# Patient Record
Sex: MALE | Race: BLACK OR AFRICAN AMERICAN | NOT HISPANIC OR LATINO | Employment: OTHER | URBAN - METROPOLITAN AREA
[De-identification: names, ages, dates, MRNs, and addresses within clinical notes are randomized per-mention and may not be internally consistent; named-entity substitution may affect disease eponyms.]

---

## 2017-01-20 RX ORDER — ATORVASTATIN CALCIUM 10 MG/1
TABLET, FILM COATED ORAL
Qty: 90 TABLET | Refills: 0 | Status: SHIPPED | OUTPATIENT
Start: 2017-01-20 | End: 2017-04-20 | Stop reason: SDUPTHER

## 2017-01-25 RX ORDER — METFORMIN HYDROCHLORIDE 1000 MG/1
TABLET ORAL
Qty: 180 TABLET | Refills: 0 | Status: SHIPPED | OUTPATIENT
Start: 2017-01-25 | End: 2017-06-09 | Stop reason: SDUPTHER

## 2017-02-06 ENCOUNTER — LAB VISIT (OUTPATIENT)
Dept: LAB | Facility: HOSPITAL | Age: 62
End: 2017-02-06
Attending: INTERNAL MEDICINE
Payer: MEDICARE

## 2017-02-06 ENCOUNTER — OFFICE VISIT (OUTPATIENT)
Dept: FAMILY MEDICINE | Facility: CLINIC | Age: 62
End: 2017-02-06
Payer: MEDICARE

## 2017-02-06 VITALS
DIASTOLIC BLOOD PRESSURE: 72 MMHG | TEMPERATURE: 98 F | SYSTOLIC BLOOD PRESSURE: 138 MMHG | HEART RATE: 70 BPM | OXYGEN SATURATION: 97 % | HEIGHT: 74 IN | WEIGHT: 252.19 LBS | BODY MASS INDEX: 32.36 KG/M2

## 2017-02-06 DIAGNOSIS — R80.9 MICROALBUMINURIA: ICD-10-CM

## 2017-02-06 DIAGNOSIS — Z12.5 SCREENING FOR PROSTATE CANCER: ICD-10-CM

## 2017-02-06 DIAGNOSIS — N18.30 CKD STAGE 3 DUE TO TYPE 2 DIABETES MELLITUS: ICD-10-CM

## 2017-02-06 DIAGNOSIS — Z79.4 LONG-TERM INSULIN USE: ICD-10-CM

## 2017-02-06 DIAGNOSIS — I10 ESSENTIAL HYPERTENSION: ICD-10-CM

## 2017-02-06 DIAGNOSIS — R80.9 DIABETES MELLITUS WITH PROTEINURIA: ICD-10-CM

## 2017-02-06 DIAGNOSIS — E11.29 DIABETES MELLITUS WITH PROTEINURIA: ICD-10-CM

## 2017-02-06 DIAGNOSIS — E11.21 DIABETES MELLITUS WITH PROTEINURIC DIABETIC NEPHROPATHY: ICD-10-CM

## 2017-02-06 DIAGNOSIS — E78.5 HYPERLIPIDEMIA, UNSPECIFIED HYPERLIPIDEMIA TYPE: ICD-10-CM

## 2017-02-06 DIAGNOSIS — R79.89 ABNORMAL LIVER FUNCTION TESTS: ICD-10-CM

## 2017-02-06 DIAGNOSIS — E11.22 CKD STAGE 3 DUE TO TYPE 2 DIABETES MELLITUS: ICD-10-CM

## 2017-02-06 LAB
ALBUMIN SERPL BCP-MCNC: 3.8 G/DL
ALP SERPL-CCNC: 91 U/L
ALT SERPL W/O P-5'-P-CCNC: 38 U/L
ANION GAP SERPL CALC-SCNC: 11 MMOL/L
AST SERPL-CCNC: 24 U/L
BASOPHILS # BLD AUTO: 0.02 K/UL
BASOPHILS NFR BLD: 0.4 %
BILIRUB SERPL-MCNC: 0.3 MG/DL
BUN SERPL-MCNC: 32 MG/DL
CALCIUM SERPL-MCNC: 9.1 MG/DL
CHLORIDE SERPL-SCNC: 106 MMOL/L
CO2 SERPL-SCNC: 20 MMOL/L
COMPLEXED PSA SERPL-MCNC: 0.93 NG/ML
CREAT SERPL-MCNC: 1.3 MG/DL
DIFFERENTIAL METHOD: ABNORMAL
EOSINOPHIL # BLD AUTO: 0.2 K/UL
EOSINOPHIL NFR BLD: 3.5 %
ERYTHROCYTE [DISTWIDTH] IN BLOOD BY AUTOMATED COUNT: 15.1 %
EST. GFR  (AFRICAN AMERICAN): >60 ML/MIN/1.73 M^2
EST. GFR  (NON AFRICAN AMERICAN): 58.5 ML/MIN/1.73 M^2
GLUCOSE SERPL-MCNC: 284 MG/DL
HCT VFR BLD AUTO: 42.8 %
HGB BLD-MCNC: 13.6 G/DL
LYMPHOCYTES # BLD AUTO: 2 K/UL
LYMPHOCYTES NFR BLD: 40.7 %
MCH RBC QN AUTO: 27.6 PG
MCHC RBC AUTO-ENTMCNC: 31.8 %
MCV RBC AUTO: 87 FL
MONOCYTES # BLD AUTO: 0.4 K/UL
MONOCYTES NFR BLD: 9 %
NEUTROPHILS # BLD AUTO: 2.3 K/UL
NEUTROPHILS NFR BLD: 46.2 %
PLATELET # BLD AUTO: 178 K/UL
PMV BLD AUTO: 11.3 FL
POTASSIUM SERPL-SCNC: 4.5 MMOL/L
PROT SERPL-MCNC: 7.2 G/DL
RBC # BLD AUTO: 4.93 M/UL
SODIUM SERPL-SCNC: 137 MMOL/L
WBC # BLD AUTO: 4.87 K/UL

## 2017-02-06 PROCEDURE — 99499 UNLISTED E&M SERVICE: CPT | Mod: S$GLB,,, | Performed by: INTERNAL MEDICINE

## 2017-02-06 PROCEDURE — 3078F DIAST BP <80 MM HG: CPT | Mod: S$GLB,,, | Performed by: INTERNAL MEDICINE

## 2017-02-06 PROCEDURE — 36415 COLL VENOUS BLD VENIPUNCTURE: CPT | Mod: PO

## 2017-02-06 PROCEDURE — 85025 COMPLETE CBC W/AUTO DIFF WBC: CPT

## 2017-02-06 PROCEDURE — 3075F SYST BP GE 130 - 139MM HG: CPT | Mod: S$GLB,,, | Performed by: INTERNAL MEDICINE

## 2017-02-06 PROCEDURE — 83036 HEMOGLOBIN GLYCOSYLATED A1C: CPT

## 2017-02-06 PROCEDURE — 99214 OFFICE O/P EST MOD 30 MIN: CPT | Mod: S$GLB,,, | Performed by: INTERNAL MEDICINE

## 2017-02-06 PROCEDURE — 84153 ASSAY OF PSA TOTAL: CPT

## 2017-02-06 PROCEDURE — 99999 PR PBB SHADOW E&M-EST. PATIENT-LVL III: CPT | Mod: PBBFAC,,, | Performed by: INTERNAL MEDICINE

## 2017-02-06 PROCEDURE — 80053 COMPREHEN METABOLIC PANEL: CPT

## 2017-02-06 PROCEDURE — 4010F ACE/ARB THERAPY RXD/TAKEN: CPT | Mod: S$GLB,,, | Performed by: INTERNAL MEDICINE

## 2017-02-06 PROCEDURE — 3045F PR MOST RECENT HEMOGLOBIN A1C LEVEL 7.0-9.0%: CPT | Mod: S$GLB,,, | Performed by: INTERNAL MEDICINE

## 2017-02-06 NOTE — PROGRESS NOTES
Chief complaint,Followup on labs,     61-year-old black male here to review labs .  His last A1c was 8.3 back in May 2016.  He presents today without any labs prior.   He was being seen by endocrine and has an insulin device attached to his arm but it has not been adjusted.  He says his sugars are normal but sometimes up to 180.  He does inquire about weight loss surgery which we did discuss about attending the seminars to become an warmth in that significant weight loss would help a lot of his comorbidities.  He is on his ACE inhibitor.  We will reassess his microalbumin.  His LDL is not perfectly controlled we discussed the potential increase in Lipitor.  Counseled regarding these issues.Total time over 25 minutes with over 50% counseling.    Review of systems: No other particular myalgias arthralgias no incontinence,       PAST MEDICAL HISTORY:                                                        1. Hypertension.                                                             2. Uncontrolled diabetes.  With renal disease and proteinuria. seen by ENDO                                                  3. Lumbar disk disease and DJD.                                              4. Hyperlipidemia.  -good on lipitor                                                         5. Erectile dysfunction.                                                     6. History of mild right rotator cuff tendinitis.                            7. Colon polyps .   Normal 11/10 -5 years   8. Elevated ALT    9.  Insomnia  10.  Slight hemoglobin reduction                                                                                                                   PAST SURGICAL HISTORY:  None.                                                                                                                             FAMILY HISTORY:  Father  of smoking-related lung cancer.  Brother    of smoking-related lung cancer.  Mom  of  "breast cancer.  Other sibling   with hypertension, diabetes, but no coronary artery disease and no other     new diagnoses.                                                                                                                                            SOCIAL HISTORY:   with 2 children.  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      X.       Vitals as above  Gen: no distress  Gerson was seen today for diabetes.    Diagnoses and all orders for this visit:    Uncontrolled type 2 diabetes mellitus with stage 3 chronic kidney disease, with long-term current use of insulin, reassess A1c, consider weight loss surgery  -     Hemoglobin A1c; Future  -     Microalbumin/creatinine urine ratio; Future  -     Comprehensive metabolic panel; Future  -     CBC auto differential; Future    Long-term insulin use, discussed that if indeed adjustments in the device need to be made he will need to follow-up with endocrine but I will be happy to refill    CKD stage 3 due to type 2 diabetes mellitus, reassess kidney function    Diabetes mellitus with proteinuria, reassess proteinuria and continue ACE inhibitor    Diabetes mellitus with proteinuric diabetic nephropathy    Microalbuminuria    Hyperlipidemia, unspecified hyperlipidemia type, reassess and possibly adjust Lipitor    Abnormal liver function tests, likely related to his weight and diabetes    Essential hypertension, fair control, restart monitoring at home to ensure accuracy of overall control    Screening for prostate cancer  -     PSA, Screening; Future        clinical note will be sensitive as I do need to document potential compliance issues with the uncontrolled hypertension but do not want that to stop patient from returning"This note will not be shared with the patient."  "

## 2017-02-07 ENCOUNTER — PATIENT MESSAGE (OUTPATIENT)
Dept: FAMILY MEDICINE | Facility: CLINIC | Age: 62
End: 2017-02-07

## 2017-02-07 LAB
ESTIMATED AVG GLUCOSE: 258 MG/DL
HBA1C MFR BLD HPLC: 10.6 %

## 2017-02-15 RX ORDER — QUINAPRIL 40 MG/1
TABLET ORAL
Qty: 180 TABLET | Refills: 0 | Status: SHIPPED | OUTPATIENT
Start: 2017-02-15 | End: 2017-05-13 | Stop reason: SDUPTHER

## 2017-03-05 RX ORDER — CITALOPRAM 40 MG/1
TABLET, FILM COATED ORAL
Qty: 30 TABLET | Refills: 11 | Status: SHIPPED | OUTPATIENT
Start: 2017-03-05 | End: 2018-04-20

## 2017-05-03 RX ORDER — DILTIAZEM HYDROCHLORIDE 240 MG/1
CAPSULE, COATED, EXTENDED RELEASE ORAL
Qty: 90 CAPSULE | Refills: 3 | Status: SHIPPED | OUTPATIENT
Start: 2017-05-03 | End: 2018-05-11 | Stop reason: SDUPTHER

## 2017-05-10 NOTE — TELEPHONE ENCOUNTER
----- Message from Silverio Hills sent at 5/10/2017  3:24 PM CDT -----  Contact: [facility]  Called regarding fax that was sent over for pt's medication. [place] can be reached @ [phone].

## 2017-05-15 RX ORDER — QUINAPRIL 40 MG/1
TABLET ORAL
Qty: 180 TABLET | Refills: 0 | Status: SHIPPED | OUTPATIENT
Start: 2017-05-15 | End: 2017-08-11 | Stop reason: SDUPTHER

## 2017-06-09 RX ORDER — METFORMIN HYDROCHLORIDE 1000 MG/1
TABLET ORAL
Qty: 60 TABLET | Refills: 0 | Status: SHIPPED | OUTPATIENT
Start: 2017-06-09 | End: 2017-07-04 | Stop reason: SDUPTHER

## 2017-07-05 RX ORDER — METFORMIN HYDROCHLORIDE 1000 MG/1
TABLET ORAL
Qty: 60 TABLET | Refills: 0 | Status: SHIPPED | OUTPATIENT
Start: 2017-07-05 | End: 2017-08-14 | Stop reason: SDUPTHER

## 2017-07-18 ENCOUNTER — TELEPHONE (OUTPATIENT)
Dept: FAMILY MEDICINE | Facility: CLINIC | Age: 62
End: 2017-07-18

## 2017-07-18 DIAGNOSIS — E11.29 DIABETES MELLITUS WITH PROTEINURIA: Primary | ICD-10-CM

## 2017-07-18 DIAGNOSIS — R80.9 DIABETES MELLITUS WITH PROTEINURIA: Primary | ICD-10-CM

## 2017-08-11 ENCOUNTER — LAB VISIT (OUTPATIENT)
Dept: LAB | Facility: HOSPITAL | Age: 62
End: 2017-08-11
Attending: INTERNAL MEDICINE
Payer: MEDICARE

## 2017-08-11 ENCOUNTER — OFFICE VISIT (OUTPATIENT)
Dept: FAMILY MEDICINE | Facility: CLINIC | Age: 62
End: 2017-08-11
Payer: MEDICARE

## 2017-08-11 VITALS
HEIGHT: 74 IN | OXYGEN SATURATION: 98 % | TEMPERATURE: 98 F | WEIGHT: 250.69 LBS | HEART RATE: 71 BPM | BODY MASS INDEX: 32.17 KG/M2 | SYSTOLIC BLOOD PRESSURE: 138 MMHG | DIASTOLIC BLOOD PRESSURE: 78 MMHG

## 2017-08-11 DIAGNOSIS — Z79.4 LONG-TERM INSULIN USE: ICD-10-CM

## 2017-08-11 DIAGNOSIS — E78.5 HYPERLIPIDEMIA, UNSPECIFIED HYPERLIPIDEMIA TYPE: ICD-10-CM

## 2017-08-11 DIAGNOSIS — M47.816 LUMBAR ARTHROPATHY: ICD-10-CM

## 2017-08-11 DIAGNOSIS — Z86.010 HISTORY OF COLONIC POLYPS: ICD-10-CM

## 2017-08-11 DIAGNOSIS — I10 ESSENTIAL HYPERTENSION: ICD-10-CM

## 2017-08-11 DIAGNOSIS — E11.29 DIABETES MELLITUS WITH PROTEINURIA: Primary | ICD-10-CM

## 2017-08-11 DIAGNOSIS — R80.9 DIABETES MELLITUS WITH PROTEINURIA: Primary | ICD-10-CM

## 2017-08-11 LAB
ALBUMIN SERPL BCP-MCNC: 3.9 G/DL
ALP SERPL-CCNC: 110 U/L
ALT SERPL W/O P-5'-P-CCNC: 41 U/L
ANION GAP SERPL CALC-SCNC: 12 MMOL/L
AST SERPL-CCNC: 22 U/L
BILIRUB SERPL-MCNC: 0.4 MG/DL
BUN SERPL-MCNC: 15 MG/DL
CALCIUM SERPL-MCNC: 9.5 MG/DL
CHLORIDE SERPL-SCNC: 105 MMOL/L
CHOLEST/HDLC SERPL: 2.3 {RATIO}
CO2 SERPL-SCNC: 22 MMOL/L
CREAT SERPL-MCNC: 1.3 MG/DL
EST. GFR  (AFRICAN AMERICAN): >60 ML/MIN/1.73 M^2
EST. GFR  (NON AFRICAN AMERICAN): 58.1 ML/MIN/1.73 M^2
GLUCOSE SERPL-MCNC: 147 MG/DL
HDL/CHOLESTEROL RATIO: 42.9 %
HDLC SERPL-MCNC: 147 MG/DL
HDLC SERPL-MCNC: 63 MG/DL
LDLC SERPL CALC-MCNC: 56.6 MG/DL
NONHDLC SERPL-MCNC: 84 MG/DL
POTASSIUM SERPL-SCNC: 4.1 MMOL/L
PROT SERPL-MCNC: 7.5 G/DL
SODIUM SERPL-SCNC: 139 MMOL/L
TRIGL SERPL-MCNC: 137 MG/DL

## 2017-08-11 PROCEDURE — 80053 COMPREHEN METABOLIC PANEL: CPT

## 2017-08-11 PROCEDURE — 99214 OFFICE O/P EST MOD 30 MIN: CPT | Mod: S$GLB,,, | Performed by: INTERNAL MEDICINE

## 2017-08-11 PROCEDURE — 4010F ACE/ARB THERAPY RXD/TAKEN: CPT | Mod: S$GLB,,, | Performed by: INTERNAL MEDICINE

## 2017-08-11 PROCEDURE — 3075F SYST BP GE 130 - 139MM HG: CPT | Mod: S$GLB,,, | Performed by: INTERNAL MEDICINE

## 2017-08-11 PROCEDURE — 3046F HEMOGLOBIN A1C LEVEL >9.0%: CPT | Mod: S$GLB,,, | Performed by: INTERNAL MEDICINE

## 2017-08-11 PROCEDURE — 99999 PR PBB SHADOW E&M-EST. PATIENT-LVL III: CPT | Mod: PBBFAC,,, | Performed by: INTERNAL MEDICINE

## 2017-08-11 PROCEDURE — 83036 HEMOGLOBIN GLYCOSYLATED A1C: CPT

## 2017-08-11 PROCEDURE — 36415 COLL VENOUS BLD VENIPUNCTURE: CPT | Mod: PO

## 2017-08-11 PROCEDURE — 3078F DIAST BP <80 MM HG: CPT | Mod: S$GLB,,, | Performed by: INTERNAL MEDICINE

## 2017-08-11 PROCEDURE — 3008F BODY MASS INDEX DOCD: CPT | Mod: S$GLB,,, | Performed by: INTERNAL MEDICINE

## 2017-08-11 PROCEDURE — 80061 LIPID PANEL: CPT

## 2017-08-11 PROCEDURE — 99499 UNLISTED E&M SERVICE: CPT | Mod: S$GLB,,, | Performed by: INTERNAL MEDICINE

## 2017-08-11 RX ORDER — QUINAPRIL 40 MG/1
TABLET ORAL
Qty: 180 TABLET | Refills: 0 | Status: SHIPPED | OUTPATIENT
Start: 2017-08-11 | End: 2017-11-13 | Stop reason: SDUPTHER

## 2017-08-11 RX ORDER — GLIMEPIRIDE 4 MG/1
TABLET ORAL
Qty: 180 TABLET | Refills: 0 | Status: SHIPPED | OUTPATIENT
Start: 2017-08-11 | End: 2017-09-06

## 2017-08-11 NOTE — PROGRESS NOTES
Chief complaint, checkup on diabetes    62-year-old black male here to check on his diabetes.  He was seen by endocrine.  They rec in an increase in his injection of victoza but he cannot afford it and it is no longer covered.  We discussed that this will likely be an issue in the future.  Likely need to find alternative long-term medication.  He was taken off of the Amaryl when starting this appropriately and so we will restart the full dose of Amaryl as well as continue the metformin and he will continue the NovoLog device that he has.  I would still recommend follow-up with endocrine given my lack of familiarity and adjusting this device.  He is due for lipid profile as well..  Colonoscopy was normal and 2010 but he has a history of polyps and so therefore is at the 7 year point and I would recommend a colonoscopy.  His PSA was actually normal this February 2017.  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  Some occasional low back pain after driving and we discussed this could be muscular he may well need to apply heat and do some stretching.  I made him aware of the healthy back program and other functional restoration programs should he have decreased function related to his back.  He questions about in the inversion device which I do not see recommended by the spine clinic often and he can possibly message them and get their opinion  Counseling regarding the multitude of these issues.Total time over 25 minutes with over 50% counseling.      ROS:   CONST: weight stable. EYES: no vision change. ENT: no sore throat. CV: no chest pain w/ exertion. RESP: no shortness of breath. GI: no nausea, vomiting, diarrhea. No dysphagia. : no urinary issues. MUSCULOSKELETAL: no new myalgias or arthralgias. SKIN: no new changes. NEURO: no focal deficits. PSYCH: no new issues. ENDOCRINE: no polyuria. HEME: no lymph nodes. ALLERGY: no general pruritis.       PAST MEDICAL HISTORY:                                                         1. Hypertension.                                                             2. Uncontrolled diabetes.  With renal disease and proteinuria. seen by ENDO                                                  3. Lumbar disk disease and DJD.  Seen by spine clinic in the past                                             4. Hyperlipidemia.  -good on lipitor                                                         5. Erectile dysfunction.                                                     6. History of mild right rotator cuff tendinitis.                            7. Colon polyps .   Normal 11/10 -5 years   8. Elevated ALT    9.  Insomnia  10.  Slight hemoglobin reduction                                                                                                                   PAST SURGICAL HISTORY:  None.                                                                                                                             FAMILY HISTORY:  Father  of smoking-related lung cancer.  Brother    of smoking-related lung cancer.  Mom  of breast cancer.  Other sibling   with hypertension, diabetes, but no coronary artery disease and no other     new diagnoses.                                                                                                                                            SOCIAL HISTORY:   with 2 children.  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      X.       Vitals as above  Gen: no distress  Exam otherwise deferred        Gerson was seen today for diabetes.    Diagnoses and all orders for this visit:    Diabetes mellitus with proteinuria, uncontrolled, A1c today, restart Amaryl, continue follow-up with endocrine    Long-term insulin use  -     Hemoglobin A1c; Future  -     Comprehensive metabolic panel; Future    Uncontrolled type 2 diabetes mellitus with microalbuminuria, with long-term current use of insulin    Hyperlipidemia,  "unspecified hyperlipidemia type, reassess  -     Lipid panel; Future    Essential hypertension, chronic and stable    History of colonic polyps, overdue, arrange colonoscopy    Lumbar arthropathy, discussed as above    Other orders  -     glimepiride (AMARYL) 4 MG tablet; TAKE 1 TABLET BY MOUTH TWICE A DAY       clinical note will be sensitive as I do need to document potential compliance issues with the uncontrolled hypertension but do not want that to stop patient from returning"This note will not be shared with the patient."  "

## 2017-08-12 LAB
ESTIMATED AVG GLUCOSE: 203 MG/DL
HBA1C MFR BLD HPLC: 8.7 %

## 2017-08-14 ENCOUNTER — TELEPHONE (OUTPATIENT)
Dept: ADMINISTRATIVE | Facility: HOSPITAL | Age: 62
End: 2017-08-14

## 2017-08-15 RX ORDER — METFORMIN HYDROCHLORIDE 1000 MG/1
TABLET ORAL
Qty: 60 TABLET | Refills: 0 | Status: SHIPPED | OUTPATIENT
Start: 2017-08-15 | End: 2017-09-14 | Stop reason: SDUPTHER

## 2017-08-17 NOTE — TELEPHONE ENCOUNTER
Received from [facility], Rob Jones, OD diabetic eye exam 6/30/2017 and Kiersten Jones, OD 1/23/2017.  Updated health maintenance and sent to [facility].

## 2017-09-04 RX ORDER — HYDROCHLOROTHIAZIDE 25 MG/1
TABLET ORAL
Qty: 90 TABLET | Refills: 2 | Status: SHIPPED | OUTPATIENT
Start: 2017-09-04 | End: 2018-05-04 | Stop reason: SDUPTHER

## 2017-09-14 ENCOUNTER — TELEPHONE (OUTPATIENT)
Dept: FAMILY MEDICINE | Facility: CLINIC | Age: 62
End: 2017-09-14

## 2017-09-14 DIAGNOSIS — Z86.010 HISTORY OF COLONIC POLYPS: Primary | ICD-10-CM

## 2017-09-14 RX ORDER — METFORMIN HYDROCHLORIDE 1000 MG/1
TABLET ORAL
Qty: 60 TABLET | Refills: 0 | Status: SHIPPED | OUTPATIENT
Start: 2017-09-14 | End: 2017-10-15 | Stop reason: SDUPTHER

## 2017-09-14 NOTE — TELEPHONE ENCOUNTER
----- Message from Linda Blum sent at 9/14/2017 12:55 PM CDT -----  Contact: self  Patient is requesting an order to have a colonoscopy done at [facility] .   [phone]

## 2017-10-07 ENCOUNTER — PATIENT MESSAGE (OUTPATIENT)
Dept: ENDOSCOPY | Facility: HOSPITAL | Age: 62
End: 2017-10-07

## 2017-10-16 RX ORDER — METFORMIN HYDROCHLORIDE 1000 MG/1
TABLET ORAL
Qty: 60 TABLET | Refills: 0 | Status: SHIPPED | OUTPATIENT
Start: 2017-10-16 | End: 2018-01-27 | Stop reason: SDUPTHER

## 2017-10-17 ENCOUNTER — TELEPHONE (OUTPATIENT)
Dept: OPTOMETRY | Facility: CLINIC | Age: 62
End: 2017-10-17

## 2017-11-06 ENCOUNTER — PATIENT MESSAGE (OUTPATIENT)
Dept: OPTOMETRY | Facility: CLINIC | Age: 62
End: 2017-11-06

## 2017-11-13 RX ORDER — GLIMEPIRIDE 4 MG/1
TABLET ORAL
Qty: 180 TABLET | Refills: 0 | Status: SHIPPED | OUTPATIENT
Start: 2017-11-13 | End: 2018-02-13 | Stop reason: SDUPTHER

## 2017-11-13 RX ORDER — QUINAPRIL 40 MG/1
TABLET ORAL
Qty: 180 TABLET | Refills: 0 | Status: SHIPPED | OUTPATIENT
Start: 2017-11-13 | End: 2018-02-06 | Stop reason: SDUPTHER

## 2018-01-27 RX ORDER — METFORMIN HYDROCHLORIDE 1000 MG/1
TABLET ORAL
Qty: 60 TABLET | Refills: 0 | Status: SHIPPED | OUTPATIENT
Start: 2018-01-27 | End: 2018-03-13 | Stop reason: SDUPTHER

## 2018-02-07 RX ORDER — QUINAPRIL 40 MG/1
TABLET ORAL
Qty: 180 TABLET | Refills: 0 | Status: SHIPPED | OUTPATIENT
Start: 2018-02-07 | End: 2018-05-09 | Stop reason: SDUPTHER

## 2018-02-09 PROBLEM — F39 MOOD DISORDER: Status: ACTIVE | Noted: 2018-02-09

## 2018-02-09 PROBLEM — N52.1 DIABETES WITH CIRCULATORY DISORDER CAUSING ERECTILE DYSFUNCTION: Status: ACTIVE | Noted: 2018-02-09

## 2018-02-09 PROBLEM — E11.59 DIABETES WITH CIRCULATORY DISORDER CAUSING ERECTILE DYSFUNCTION: Status: ACTIVE | Noted: 2018-02-09

## 2018-02-14 RX ORDER — GLIMEPIRIDE 4 MG/1
TABLET ORAL
Qty: 180 TABLET | Refills: 0 | Status: SHIPPED | OUTPATIENT
Start: 2018-02-14 | End: 2018-04-20 | Stop reason: ALTCHOICE

## 2018-02-21 DIAGNOSIS — H40.1132 PRIMARY OPEN ANGLE GLAUCOMA OF BOTH EYES, MODERATE STAGE: Primary | ICD-10-CM

## 2018-03-02 ENCOUNTER — OFFICE VISIT (OUTPATIENT)
Dept: FAMILY MEDICINE | Facility: CLINIC | Age: 63
End: 2018-03-02
Payer: MEDICARE

## 2018-03-02 ENCOUNTER — LAB VISIT (OUTPATIENT)
Dept: LAB | Facility: HOSPITAL | Age: 63
End: 2018-03-02
Attending: INTERNAL MEDICINE
Payer: MEDICARE

## 2018-03-02 VITALS
SYSTOLIC BLOOD PRESSURE: 120 MMHG | DIASTOLIC BLOOD PRESSURE: 80 MMHG | OXYGEN SATURATION: 100 % | TEMPERATURE: 99 F | HEART RATE: 67 BPM | WEIGHT: 249.31 LBS | BODY MASS INDEX: 32 KG/M2 | HEIGHT: 74 IN

## 2018-03-02 DIAGNOSIS — E11.21 DIABETES MELLITUS WITH PROTEINURIC DIABETIC NEPHROPATHY: ICD-10-CM

## 2018-03-02 DIAGNOSIS — Z12.5 SCREENING FOR PROSTATE CANCER: ICD-10-CM

## 2018-03-02 DIAGNOSIS — F39 MOOD DISORDER: ICD-10-CM

## 2018-03-02 DIAGNOSIS — N52.1 DIABETES WITH CIRCULATORY DISORDER CAUSING ERECTILE DYSFUNCTION: ICD-10-CM

## 2018-03-02 DIAGNOSIS — E78.2 MIXED HYPERLIPIDEMIA: ICD-10-CM

## 2018-03-02 DIAGNOSIS — R80.9 DIABETES MELLITUS WITH PROTEINURIA: ICD-10-CM

## 2018-03-02 DIAGNOSIS — E11.59 DIABETES WITH CIRCULATORY DISORDER CAUSING ERECTILE DYSFUNCTION: ICD-10-CM

## 2018-03-02 DIAGNOSIS — G47.00 INSOMNIA, UNSPECIFIED TYPE: ICD-10-CM

## 2018-03-02 DIAGNOSIS — M47.816 LUMBAR ARTHROPATHY: ICD-10-CM

## 2018-03-02 DIAGNOSIS — Z79.4 LONG-TERM INSULIN USE: ICD-10-CM

## 2018-03-02 DIAGNOSIS — Z00.00 ROUTINE MEDICAL EXAM: Primary | ICD-10-CM

## 2018-03-02 DIAGNOSIS — Z86.010 HISTORY OF COLONIC POLYPS: ICD-10-CM

## 2018-03-02 DIAGNOSIS — Z00.00 ROUTINE MEDICAL EXAM: ICD-10-CM

## 2018-03-02 DIAGNOSIS — I10 ESSENTIAL HYPERTENSION: ICD-10-CM

## 2018-03-02 DIAGNOSIS — E11.29 DIABETES MELLITUS WITH PROTEINURIA: ICD-10-CM

## 2018-03-02 LAB
ALBUMIN SERPL BCP-MCNC: 3.8 G/DL
ALP SERPL-CCNC: 90 U/L
ALT SERPL W/O P-5'-P-CCNC: 29 U/L
ANION GAP SERPL CALC-SCNC: 12 MMOL/L
AST SERPL-CCNC: 19 U/L
BASOPHILS # BLD AUTO: 0.03 K/UL
BASOPHILS NFR BLD: 0.7 %
BILIRUB SERPL-MCNC: 0.5 MG/DL
BUN SERPL-MCNC: 14 MG/DL
CALCIUM SERPL-MCNC: 9.4 MG/DL
CHLORIDE SERPL-SCNC: 104 MMOL/L
CHOLEST SERPL-MCNC: 121 MG/DL
CHOLEST/HDLC SERPL: 2.5 {RATIO}
CO2 SERPL-SCNC: 25 MMOL/L
COMPLEXED PSA SERPL-MCNC: 1 NG/ML
CREAT SERPL-MCNC: 1.3 MG/DL
DIFFERENTIAL METHOD: NORMAL
EOSINOPHIL # BLD AUTO: 0.1 K/UL
EOSINOPHIL NFR BLD: 3.2 %
ERYTHROCYTE [DISTWIDTH] IN BLOOD BY AUTOMATED COUNT: 13.6 %
EST. GFR  (AFRICAN AMERICAN): >60 ML/MIN/1.73 M^2
EST. GFR  (NON AFRICAN AMERICAN): 58.1 ML/MIN/1.73 M^2
ESTIMATED AVG GLUCOSE: 258 MG/DL
GLUCOSE SERPL-MCNC: 117 MG/DL
HBA1C MFR BLD HPLC: 10.6 %
HCT VFR BLD AUTO: 43.7 %
HDLC SERPL-MCNC: 49 MG/DL
HDLC SERPL: 40.5 %
HGB BLD-MCNC: 14.2 G/DL
IMM GRANULOCYTES # BLD AUTO: 0.01 K/UL
IMM GRANULOCYTES NFR BLD AUTO: 0.2 %
LDLC SERPL CALC-MCNC: 54 MG/DL
LYMPHOCYTES # BLD AUTO: 1.6 K/UL
LYMPHOCYTES NFR BLD: 36.3 %
MCH RBC QN AUTO: 27.8 PG
MCHC RBC AUTO-ENTMCNC: 32.5 G/DL
MCV RBC AUTO: 86 FL
MONOCYTES # BLD AUTO: 0.5 K/UL
MONOCYTES NFR BLD: 10.2 %
NEUTROPHILS # BLD AUTO: 2.2 K/UL
NEUTROPHILS NFR BLD: 49.4 %
NONHDLC SERPL-MCNC: 72 MG/DL
NRBC BLD-RTO: 0 /100 WBC
PLATELET # BLD AUTO: 212 K/UL
PMV BLD AUTO: 10.8 FL
POTASSIUM SERPL-SCNC: 3.7 MMOL/L
PROT SERPL-MCNC: 7.4 G/DL
RBC # BLD AUTO: 5.11 M/UL
SODIUM SERPL-SCNC: 141 MMOL/L
T4 FREE SERPL-MCNC: 0.98 NG/DL
TRIGL SERPL-MCNC: 90 MG/DL
TSH SERPL DL<=0.005 MIU/L-ACNC: 0.38 UIU/ML
WBC # BLD AUTO: 4.41 K/UL

## 2018-03-02 PROCEDURE — 80053 COMPREHEN METABOLIC PANEL: CPT

## 2018-03-02 PROCEDURE — 99499 UNLISTED E&M SERVICE: CPT | Mod: S$GLB,,, | Performed by: INTERNAL MEDICINE

## 2018-03-02 PROCEDURE — 3074F SYST BP LT 130 MM HG: CPT | Mod: S$GLB,,, | Performed by: INTERNAL MEDICINE

## 2018-03-02 PROCEDURE — 84439 ASSAY OF FREE THYROXINE: CPT

## 2018-03-02 PROCEDURE — 85025 COMPLETE CBC W/AUTO DIFF WBC: CPT

## 2018-03-02 PROCEDURE — 99396 PREV VISIT EST AGE 40-64: CPT | Mod: 25,S$GLB,, | Performed by: INTERNAL MEDICINE

## 2018-03-02 PROCEDURE — 99999 PR PBB SHADOW E&M-EST. PATIENT-LVL V: CPT | Mod: PBBFAC,,, | Performed by: INTERNAL MEDICINE

## 2018-03-02 PROCEDURE — 84443 ASSAY THYROID STIM HORMONE: CPT

## 2018-03-02 PROCEDURE — 99214 OFFICE O/P EST MOD 30 MIN: CPT | Mod: 25,S$GLB,, | Performed by: INTERNAL MEDICINE

## 2018-03-02 PROCEDURE — 36415 COLL VENOUS BLD VENIPUNCTURE: CPT | Mod: PO

## 2018-03-02 PROCEDURE — 80061 LIPID PANEL: CPT

## 2018-03-02 PROCEDURE — 83036 HEMOGLOBIN GLYCOSYLATED A1C: CPT

## 2018-03-02 PROCEDURE — 84153 ASSAY OF PSA TOTAL: CPT

## 2018-03-02 PROCEDURE — 3079F DIAST BP 80-89 MM HG: CPT | Mod: S$GLB,,, | Performed by: INTERNAL MEDICINE

## 2018-03-02 NOTE — PROGRESS NOTES
Chief complaint, PHYSICAL---last appt 6 mo ago but should by every 3- last ENDO appt 5 mo ago    62-year-old black male  here for his physical.  Regarding health maintenance his PSA is due at this time.History of colon polyps but normal in  with a 5 year follow-up recommended and we have attempted to schedule in the past.  Very overdue.  Living in [place] so we will set it up at the main campus          ROS:   CONST: weight stable. EYES: no vision change. ENT: no sore throat. CV: no chest pain w/ exertion. RESP: no shortness of breath. GI: no nausea, vomiting, diarrhea. No dysphagia. : no urinary issues. MUSCULOSKELETAL: no new myalgias or arthralgias. SKIN: no new changes. NEURO: no focal deficits. PSYCH: no new issues. ENDOCRINE: no polyuria. HEME: no lymph nodes. ALLERGY: no general pruritis.       PAST MEDICAL HISTORY:                                                        1. Hypertension.                                                             2. Uncontrolled diabetes.  With renal disease and proteinuria. seen by ENDO                                                  3. Lumbar disk disease and DJD.  Seen by spine clinic in the past                                             4. Hyperlipidemia.  -good on lipitor                                                         5. Erectile dysfunction.                                                     6. History of mild right rotator cuff tendinitis.                            7. Colon polyps .   Normal 11/10 -5 years   8. Elevated ALT    9.  Insomnia  10.  Slight hemoglobin reduction                                                                                                                   PAST SURGICAL HISTORY:  None.                                                                                                                             FAMILY HISTORY:  Father  of smoking-related lung cancer.  Brother    of smoking-related lung  cancer.  Mom  of breast cancer.  Other sibling   with hypertension, diabetes, but no coronary artery disease and no other     new diagnoses.                                                                                                                                            SOCIAL HISTORY:   with 2 children.  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      X.       Vitals as above  Gen: no distress  EYES: conjunctiva clear, non-icteric, PERRL  ENT: nose clear, nasal mucosa normal, oropharynx clear and moist, teeth good  NECK:supple, thyroid non-palpable  RESP: effort is good, lungs clear  CV: heart RRR w/o murmur, gallops or rubs; no carotid bruits, no edema  GI: abdomen soft, non-distended, non-tender, no hepatosplenomegaly  MS: gait normal, no clubbing or cyanosis of the digits  SKIN: no rashes, warm to touch      Archer was seen today for diabetes.    Diagnoses and all orders for this visit:    Routine medical exam, overdue for colonoscopy, update PSA and other blood work  -     Hemoglobin A1c; Future  -     Comprehensive metabolic panel; Future  -     PSA, Screening; Future  -     CBC auto differential; Future  -     Lipid panel; Future  -     TSH; Future  -     Urinalysis; Future  -     Microalbumin/creatinine urine ratio; Future    Screening for prostate cancer  -     PSA, Screening; Future                                                    Additional evaluation and management issues:    Additionally, patient has numerous other medical issues to address separate from his physical.  here to check on his diabetes.  He was seen by endocrine.  He does have the insulin device on his arm.  No longer on the victoza but he cannot afford it and it is no longer covered.  .  Still needs to be managed by some endocrine specialist of the endocrinologists he was seeing prior does not see patients on Friday and that is his day off so we will refer to local nurse practitioner..  He was  taken off of the Amaryl when starting this appropriately and so we will restart the full dose of Amaryl as well as continue the metformin and he will continue the NovoLog device that he has.  I would still recommend follow-up with endocrine given my lack of familiarity and adjusting this device.  He is due for lipid profile as well..  Colonoscopy was normal and 2010 but he has a history of polyps and so therefore is at the 7 year point and I would recommend a colonoscopy -ordered but not done.  His PSA was actually normal this February 2017.  Working part-time.  Some occasional low back pain after driving and we discussed this could be muscular he may well need to apply heat and do some stretching.  I made him aware of the healthy back program and other functional restoration programs should he have decreased function related to his back.              Assessment and plan:    Diabetes with circulatory disorder causing erectile dysfunction, overdue for assessment, refer to endocrine nurse practitioner  -     Hemoglobin A1c; Future  -     Comprehensive metabolic panel; Future  -     CBC auto differential; Future  -     Lipid panel; Future  -     TSH; Future  -     Urinalysis; Future  -     Microalbumin/creatinine urine ratio; Future    Uncontrolled type 2 diabetes mellitus with stage 3 chronic kidney disease, with long-term current use of insulin    Diabetes mellitus with proteinuric diabetic nephropathy    Diabetes mellitus with proteinuria, explained that there is protein in the urine which is a sign of kidney damage and we need to keep all associated risk factors under control especially diabetes    Uncontrolled type 2 diabetes mellitus with microalbuminuria, with long-term current use of insulin    History of colonic polyps  -     Case request GI: COLONOSCOPY    Essential hypertension, chronic and stable    Mood disorder, chronic and stable    Long-term insulin use    Mixed hyperlipidemia  -     Lipid panel;  "Future    Insomnia, unspecified type, chronic and stable    Lumbar arthropathy    Other orders  -     Cancel: Case request GI: COLONOSCOPY              clinical note will be sensitive as I do need to document potential compliance issues with the uncontrolled hypertension but do not want that to stop patient from returning""This note will not be shared with the patient."  "

## 2018-03-07 ENCOUNTER — TELEPHONE (OUTPATIENT)
Dept: ENDOSCOPY | Facility: HOSPITAL | Age: 63
End: 2018-03-07

## 2018-03-09 ENCOUNTER — OFFICE VISIT (OUTPATIENT)
Dept: OPTOMETRY | Facility: CLINIC | Age: 63
End: 2018-03-09
Payer: MEDICARE

## 2018-03-09 ENCOUNTER — CLINICAL SUPPORT (OUTPATIENT)
Dept: OPHTHALMOLOGY | Facility: CLINIC | Age: 63
End: 2018-03-09
Payer: MEDICARE

## 2018-03-09 DIAGNOSIS — H52.7 REFRACTIVE ERROR: ICD-10-CM

## 2018-03-09 DIAGNOSIS — H25.13 NUCLEAR SCLEROSIS OF BOTH EYES: ICD-10-CM

## 2018-03-09 DIAGNOSIS — H40.1132 PRIMARY OPEN ANGLE GLAUCOMA (POAG) OF BOTH EYES, MODERATE STAGE: Primary | ICD-10-CM

## 2018-03-09 DIAGNOSIS — E11.9 TYPE 2 DIABETES MELLITUS WITHOUT RETINOPATHY: ICD-10-CM

## 2018-03-09 DIAGNOSIS — H40.1132 PRIMARY OPEN ANGLE GLAUCOMA OF BOTH EYES, MODERATE STAGE: ICD-10-CM

## 2018-03-09 PROCEDURE — 92083 EXTENDED VISUAL FIELD XM: CPT | Mod: S$GLB,,, | Performed by: OPTOMETRIST

## 2018-03-09 PROCEDURE — 92014 COMPRE OPH EXAM EST PT 1/>: CPT | Mod: S$GLB,,, | Performed by: OPTOMETRIST

## 2018-03-09 PROCEDURE — 92015 DETERMINE REFRACTIVE STATE: CPT | Mod: S$GLB,,, | Performed by: OPTOMETRIST

## 2018-03-09 PROCEDURE — 92133 CPTRZD OPH DX IMG PST SGM ON: CPT | Mod: S$GLB,,, | Performed by: OPTOMETRIST

## 2018-03-09 PROCEDURE — 99999 PR PBB SHADOW E&M-EST. PATIENT-LVL I: CPT | Mod: PBBFAC,,, | Performed by: OPTOMETRIST

## 2018-03-09 RX ORDER — LATANOPROST 50 UG/ML
1 SOLUTION/ DROPS OPHTHALMIC NIGHTLY
Qty: 7.5 ML | Refills: 2 | Status: SHIPPED | OUTPATIENT
Start: 2018-03-09 | End: 2018-05-16 | Stop reason: SDUPTHER

## 2018-03-09 NOTE — PROGRESS NOTES
Subjective:       Patient ID: Gerson Phillips III is a 62 y.o. male      Chief Complaint   Patient presents with    Glaucoma     Latanoprost QHS OU    Diabetic Eye Exam     lbs: 73 x 1 day      History of Present Illness   Additional comments: lbs: 73 x 1 day            Comments   Dls: 6 months ago at Dr. Moncada    Pt here for diabetic eye exam and glaucoma check. Transferring care to   [facility].  Pt c/o blurry vision at distance and near ou. Pt wears pal's.  Pt c/o tearing ou off/on no itching no burning no pain no ha's no   floaters.     Eye meds:  Latanoprost ou qhs (pt states forgets to use gtts) last dose x 2 nights   ago    Hemoglobin A1C       Date                     Value               Ref Range           Status                03/02/2018               10.6 (H)            4.0 - 5.6 %         Final                 08/11/2017               8.7 (H)             4.0 - 5.6 %         Final                  02/06/2017               10.6 (H)            4.5 - 6.2 %         Final             ----------        Assessment/Plan:     1. Primary open angle glaucoma of both eyes, moderate stage  Moderate Normal Tension Glaucoma OU  · FHx: ?  · Tbase (before treatment):  14 // 14  · Tmax: 14 // 14  · HVF defects (date: 03/09/18) : OD inf and sup nasal step // OS inf and sup nasal step  · OCT optic nerve (date: 03/09/18) : abnormal  OD // abnormal OS    · CCT:  //   · Goal IOP:  Low teens  · Lasers/surgeries: none  · Current treatment: latanoprost QHS OU    Discussed with pt importance of compliance with drops. IOP doing well. Continue drops. RTC 6 months for IOP check and CCT.    - latanoprost 0.005 % ophthalmic solution; Place 1 drop into both eyes every evening.  Dispense: 7.5 mL; Refill: 2    2. Type 2 diabetes mellitus without retinopathy  No diabetic retinopathy. Educated patient on importance of good blood sugar control, compliance with meds, and follow up care with PCP. Return in 1 year for dilated eye exam, sooner  "PRN.    3. Nuclear sclerosis of both eyes  Educated patient on the presence of cataracts and effects on vision, including clouded, blurred or dim vision, increasing difficulty with vision at night, sensitivity to light and glare, need for brighter light for reading and other activities, and seeing "halos" around lights. No surgery at this time. Recheck in one year.    4. Refractive error  Educated patient on refractive error and discussed lens options. Dispensed updated spectacle Rx. Educated about adaptation period to new specs.    Eyeglass Final Rx     Eyeglass Final Rx       Sphere Cylinder Axis Add    Right -0.75 +0.50 075 +2.50    Left -0.50 Sphere  +2.50    Expiration Date:  3/10/2019                  Follow-up in about 6 months (around 9/9/2018) for IOP check, CCT.       "

## 2018-03-13 RX ORDER — METFORMIN HYDROCHLORIDE 1000 MG/1
TABLET ORAL
Qty: 60 TABLET | Refills: 2 | Status: SHIPPED | OUTPATIENT
Start: 2018-03-13 | End: 2018-06-14 | Stop reason: SDUPTHER

## 2018-03-25 ENCOUNTER — ANESTHESIA EVENT (OUTPATIENT)
Dept: ENDOSCOPY | Facility: HOSPITAL | Age: 63
End: 2018-03-25
Payer: MEDICARE

## 2018-03-26 ENCOUNTER — HOSPITAL ENCOUNTER (OUTPATIENT)
Facility: HOSPITAL | Age: 63
Discharge: HOME | End: 2018-03-26
Attending: COLON & RECTAL SURGERY | Admitting: COLON & RECTAL SURGERY
Payer: MEDICARE

## 2018-03-26 ENCOUNTER — ANESTHESIA (OUTPATIENT)
Dept: ENDOSCOPY | Facility: HOSPITAL | Age: 63
End: 2018-03-26
Payer: MEDICARE

## 2018-03-26 ENCOUNTER — SURGERY (OUTPATIENT)
Age: 63
End: 2018-03-26

## 2018-03-26 VITALS
TEMPERATURE: 98 F | OXYGEN SATURATION: 96 % | SYSTOLIC BLOOD PRESSURE: 124 MMHG | WEIGHT: 248 LBS | DIASTOLIC BLOOD PRESSURE: 80 MMHG | HEART RATE: 69 BPM | RESPIRATION RATE: 13 BRPM | BODY MASS INDEX: 31.83 KG/M2 | HEIGHT: 74 IN

## 2018-03-26 DIAGNOSIS — Z12.11 SPECIAL SCREENING FOR MALIGNANT NEOPLASMS, COLON: ICD-10-CM

## 2018-03-26 LAB — POCT GLUCOSE: 74 MG/DL (ref 70–110)

## 2018-03-26 PROCEDURE — S5010 5% DEXTROSE AND 0.45% SALINE: HCPCS | Performed by: COLON & RECTAL SURGERY

## 2018-03-26 PROCEDURE — D9220A PRA ANESTHESIA: Mod: CRNA,,, | Performed by: NURSE ANESTHETIST, CERTIFIED REGISTERED

## 2018-03-26 PROCEDURE — 82962 GLUCOSE BLOOD TEST: CPT | Performed by: COLON & RECTAL SURGERY

## 2018-03-26 PROCEDURE — G0105 COLORECTAL SCRN; HI RISK IND: HCPCS | Performed by: COLON & RECTAL SURGERY

## 2018-03-26 PROCEDURE — 25000003 PHARM REV CODE 250: Performed by: COLON & RECTAL SURGERY

## 2018-03-26 PROCEDURE — D9220A PRA ANESTHESIA: Mod: ANES,,, | Performed by: ANESTHESIOLOGY

## 2018-03-26 PROCEDURE — 37000008 HC ANESTHESIA 1ST 15 MINUTES: Performed by: COLON & RECTAL SURGERY

## 2018-03-26 PROCEDURE — 63600175 PHARM REV CODE 636 W HCPCS: Performed by: NURSE ANESTHETIST, CERTIFIED REGISTERED

## 2018-03-26 PROCEDURE — 37000009 HC ANESTHESIA EA ADD 15 MINS: Performed by: COLON & RECTAL SURGERY

## 2018-03-26 PROCEDURE — G0105 COLORECTAL SCRN; HI RISK IND: HCPCS | Mod: GC,,, | Performed by: COLON & RECTAL SURGERY

## 2018-03-26 RX ORDER — LIDOCAINE HCL/PF 100 MG/5ML
SYRINGE (ML) INTRAVENOUS
Status: DISCONTINUED | OUTPATIENT
Start: 2018-03-26 | End: 2018-03-26

## 2018-03-26 RX ORDER — PROPOFOL 10 MG/ML
VIAL (ML) INTRAVENOUS CONTINUOUS PRN
Status: DISCONTINUED | OUTPATIENT
Start: 2018-03-26 | End: 2018-03-26

## 2018-03-26 RX ORDER — DEXTROSE MONOHYDRATE AND SODIUM CHLORIDE 5; .45 G/100ML; G/100ML
INJECTION, SOLUTION INTRAVENOUS CONTINUOUS
Status: DISCONTINUED | OUTPATIENT
Start: 2018-03-26 | End: 2018-03-26 | Stop reason: HOSPADM

## 2018-03-26 RX ORDER — PROPOFOL 10 MG/ML
VIAL (ML) INTRAVENOUS
Status: DISCONTINUED | OUTPATIENT
Start: 2018-03-26 | End: 2018-03-26

## 2018-03-26 RX ADMIN — PROPOFOL 200 MCG/KG/MIN: 10 INJECTION, EMULSION INTRAVENOUS at 11:03

## 2018-03-26 RX ADMIN — PROPOFOL 40 MG: 10 INJECTION, EMULSION INTRAVENOUS at 11:03

## 2018-03-26 RX ADMIN — DEXTROSE AND SODIUM CHLORIDE: 5; .45 INJECTION, SOLUTION INTRAVENOUS at 10:03

## 2018-03-26 RX ADMIN — PROPOFOL 60 MG: 10 INJECTION, EMULSION INTRAVENOUS at 11:03

## 2018-03-26 RX ADMIN — LIDOCAINE HYDROCHLORIDE 100 MG: 20 INJECTION, SOLUTION INTRAVENOUS at 11:03

## 2018-03-26 RX ADMIN — DEXTROSE MONOHYDRATE AND SODIUM CHLORIDE: 5; .45 INJECTION, SOLUTION INTRAVENOUS at 11:03

## 2018-03-26 NOTE — PROVATION PATIENT INSTRUCTIONS
Discharge Summary/Instructions after an Endoscopic Procedure  Patient Name: Gerson Phillips  Patient MRN: [id number]  Patient YOB: 1955 Monday, March 26, 2018  Jamar Batista MD  RESTRICTIONS:  During your procedure today, you received medications for sedation.  These   medications may affect your judgment, balance and coordination.  Therefore,   for 24 hours, you have the following restrictions:   - DO NOT drive a car, operate machinery, make legal/financial decisions,   sign important papers or drink alcohol.    ACTIVITY:  The following day: return to full activity including work, except no heavy   lifting, straining or running for 3 days if polyps were removed.  DIET:  Eat and drink normally unless instructed otherwise.     TREATMENT FOR COMMON SIDE EFFECTS:  - Mild abdominal pain, nausea, belching, bloating or excessive gas:  rest,   eat lightly and use a heating pad.  - Sore Throat: treat with throat lozenges and/or gargle with warm salt   water.  - Because air was used during the procedure, expelling large amounts of air   from your rectum or belching is normal.  - If a bowel prep was taken, you may not have a bowel movement for 1-3 days.    This is normal.  SYMPTOMS TO WATCH FOR AND REPORT TO YOUR PHYSICIAN:  1. Abdominal pain or bloating, other than gas cramps.  2. Chest pain.  3. Back pain.  4. Signs of infection such as: chills or fever occurring within 24 hours   after the procedure.  5. Rectal bleeding, which would show as bright red, maroon, or black stools.   (A tablespoon of blood from the rectum is not serious, especially if   hemorrhoids are present.)  6. Vomiting.  7. Weakness or dizziness.  GO DIRECTLY TO THE NEAREST EMERGENCY ROOM IF YOU HAVE ANY OF THE FOLLOWING:      Difficulty breathing              Chills and/or fever over 101 F   Persistent vomiting and/or vomiting blood   Severe abdominal pain   Severe chest pain   Black, tarry stools   Bleeding- more than one tablespoon   Any  other symptom or condition that you feel may need urgent attention  Your doctor recommends these additional instructions:  If any biopsies were taken, your doctors clinic will contact you in 1 to 2   weeks with any results.  You are being discharged to home.   Your physician has recommended a repeat colonoscopy in 10 years for   screening purposes.  For questions, problems or results please call your physician - Jamar Batista MD at Work:  [phone].  [facility], EMERGENCY ROOM PHONE NUMBER: [phone]  IF A COMPLICATION OR EMERGENCY SITUATION ARISES AND YOU ARE UNABLE TO REACH   YOUR PHYSICIAN - GO DIRECTLY TO THE EMERGENCY ROOM.  Jamar Batista MD  3/26/2018 11:35:56 AM  This report has been verified and signed electronically.

## 2018-03-26 NOTE — ANESTHESIA PREPROCEDURE EVALUATION
03/26/2018  Gerson Phillips III is a 62 y.o., male.    Patient Active Problem List   Diagnosis    Hyperlipidemia    HTN (hypertension)    Insomnia    Diabetes mellitus type II, uncontrolled    Low back pain    Cervical radiculopathy    Lateral epicondylitis    Abnormal liver function tests    Anemia    DDD (degenerative disc disease), lumbar    Thoracic or lumbosacral neuritis or radiculitis    Diabetes mellitus with renal manifestations, uncontrolled    Microalbuminuria    History of colonic polyps    Depression    Long-term insulin use    CKD stage 3 due to type 2 diabetes mellitus    Diabetes mellitus with proteinuria    Diabetes mellitus with proteinuric diabetic nephropathy    Mood disorder    Diabetes with circulatory disorder causing erectile dysfunction     Past Surgical History:   Procedure Laterality Date    COLONOSCOPY N/A 9/1/2017    Procedure: COLONOSCOPY;  Surgeon: Gopal Ny MD;  Location: [facility];  Service: Endoscopy;  Laterality: N/A;         Anesthesia Evaluation    I have reviewed the Patient Summary Reports.    I have reviewed the Nursing Notes.   I have reviewed the Medications.     Review of Systems      Physical Exam  General:  Well nourished    Airway/Jaw/Neck:  Airway Findings: Mouth Opening: Normal General Airway Assessment: Adult  Mallampati: II  Improves to II with phonation.  Jaw/Neck Findings:  Limited Ability to Prognath  Neck ROM: Normal ROM     Eyes/Ears/Nose:  Eyes/Ears/Nose Findings:    Dental:  Dental Findings: In tact   Chest/Lungs:  Chest/Lungs Findings: Clear to auscultation, Normal Respiratory Rate     Heart/Vascular:  Heart Findings: Rate: Normal  Rhythm: Regular Rhythm  Sounds: Normal     Abdomen:  Abdomen Findings:  Normal     Musculoskeletal:  Musculoskeletal Findings:    Skin:  Skin Findings:     Mental Status:  Mental Status Findings:   Cooperative, Alert and Oriented         Anesthesia Plan  Type of Anesthesia, risks & benefits discussed:  Anesthesia Type:  general, MAC  Patient's Preference:   Intra-op Monitoring Plan:   Intra-op Monitoring Plan Comments:   Post Op Pain Control Plan:   Post Op Pain Control Plan Comments:   Induction:   IV  Beta Blocker:  Patient is not currently on a Beta-Blocker (No further documentation required).       Informed Consent: Patient understands risks and agrees with Anesthesia plan.  Questions answered. Anesthesia consent signed with patient.  ASA Score: 3     Day of Surgery Review of History & Physical:    H&P update referred to the surgeon.         Ready For Surgery From Anesthesia Perspective.

## 2018-03-26 NOTE — H&P
Endoscopy H&P    Procedure : Colonoscopy      asymptomatic screening exam, benign polyps 12 years ago, last colonoscopy normal 2010      Past Medical History:   Diagnosis Date    Abnormal liver function tests 6/18/2013    Anemia 6/18/2013    Cervical radiculopathy 3/12/2013    CKD stage 3 due to type 2 diabetes mellitus 3/4/2016    Diabetes mellitus type II, uncontrolled 10/18/2012    Diabetes mellitus with renal manifestations, uncontrolled 9/27/2013    History of colonic polyps 10/5/2015    HTN (hypertension) 10/18/2012    Hyperlipidemia 10/18/2012    Lateral epicondylitis 6/18/2013    Long-term insulin use 3/4/2016    Low back pain 1/29/2013    Microalbuminuria 3/30/2015    Uncontrolled type 2 diabetes mellitus with proteinuria or albuminuria 3/30/2015    Uncontrolled type 2 diabetes mellitus with proteinuria or microalbuminuria 3/30/2015             Review of Systems -ROS:  GENERAL: No fever, chills, fatigability or weight loss.  CHEST: Denies POLLACK, cyanosis, wheezing, cough and sputum production.  CARDIOVASCULAR: Denies chest pain, PND, orthopnea or reduced exercise tolerance.   Musculoskeletal ROS: negative for - gait disturbance or joint pain  Neurological ROS: negative for - confusion or memory loss        Physical Exam:  General: well developed, well nourished, no distress  Head: normocephalic  Neck: supple, symmetrical, trachea midline  Lungs:  clear to auscultation bilaterally and normal respiratory effort  Heart: regular rate and rhythm, S1, S2 normal, no murmur, rub or gallop and regular rate and rhythm  Abdomen: soft, non-tender non-distented; bowel sounds normal; no masses,  no organomegaly  Extremities: no cyanosis or edema, or clubbing       Moderate Sedation (choice): Mallampati Score 1    ASA : III    IMP: asymptomatic screening exam    Plan: Colonoscopy with Moderate sedation.  I have explained the procedure including indications, alternatives, expected outcomes and potential  complications. The patient appears to understand and gives informed consent. The patient is medically ready for surgery.     Have seen and examined the patient with the fellow and agree with their plan.  MONIAC LEBLANC

## 2018-03-26 NOTE — TRANSFER OF CARE
"Anesthesia Transfer of Care Note    Patient: Gerson Phillips III    Procedure(s) Performed: Procedure(s) (LRB):  COLONOSCOPY (N/A)    Patient location: GI    Anesthesia Type: general    Transport from OR: Transported from OR on 6-10 L/min O2 by face mask with adequate spontaneous ventilation    Post pain: adequate analgesia    Post assessment: no apparent anesthetic complications    Post vital signs: stable    Level of consciousness: sedated    Nausea/Vomiting: no nausea/vomiting    Complications: none    Transfer of care protocol was followed      Last vitals:   Visit Vitals  /61   Pulse 87   Temp 36.6 °C (97.9 °F)   Resp 15   Ht 6' 2" (1.88 m)   Wt 112.5 kg (248 lb)   SpO2 95%   BMI 31.84 kg/m²     "

## 2018-03-28 NOTE — ANESTHESIA POSTPROCEDURE EVALUATION
"Anesthesia Post Evaluation    Patient: Gerson Phillips III    Procedure(s) Performed: Procedure(s) (LRB):  COLONOSCOPY (N/A)    Final Anesthesia Type: general  Patient location during evaluation: PACU  Patient participation: Yes- Able to Participate  Level of consciousness: awake and alert and oriented  Pain management: adequate  Airway patency: patent  PONV status at discharge: No PONV  Anesthetic complications: no      Cardiovascular status: blood pressure returned to baseline and hemodynamically stable  Respiratory status: unassisted  Hydration status: euvolemic  Follow-up not needed.        Visit Vitals  /80   Pulse 69   Temp 36.6 °C (97.9 °F)   Resp 13   Ht 6' 2" (1.88 m)   Wt 112.5 kg (248 lb)   SpO2 96%   BMI 31.84 kg/m²       Pain/Viktor Score: No Data Recorded      "

## 2018-04-02 ENCOUNTER — TELEPHONE (OUTPATIENT)
Dept: ENDOSCOPY | Facility: HOSPITAL | Age: 63
End: 2018-04-02

## 2018-04-19 ENCOUNTER — TELEPHONE (OUTPATIENT)
Dept: ENDOCRINOLOGY | Facility: CLINIC | Age: 63
End: 2018-04-19

## 2018-04-20 ENCOUNTER — OFFICE VISIT (OUTPATIENT)
Dept: ENDOCRINOLOGY | Facility: CLINIC | Age: 63
End: 2018-04-20
Payer: MEDICARE

## 2018-04-20 VITALS
BODY MASS INDEX: 32.4 KG/M2 | HEART RATE: 84 BPM | WEIGHT: 252.44 LBS | HEIGHT: 74 IN | DIASTOLIC BLOOD PRESSURE: 71 MMHG | SYSTOLIC BLOOD PRESSURE: 137 MMHG

## 2018-04-20 DIAGNOSIS — E78.5 HYPERLIPIDEMIA LDL GOAL <70: ICD-10-CM

## 2018-04-20 DIAGNOSIS — R80.9 MICROALBUMINURIA: ICD-10-CM

## 2018-04-20 DIAGNOSIS — I10 ESSENTIAL HYPERTENSION: ICD-10-CM

## 2018-04-20 PROCEDURE — 99999 PR PBB SHADOW E&M-EST. PATIENT-LVL IV: CPT | Mod: PBBFAC,,, | Performed by: NURSE PRACTITIONER

## 2018-04-20 PROCEDURE — 3075F SYST BP GE 130 - 139MM HG: CPT | Mod: CPTII,S$GLB,, | Performed by: NURSE PRACTITIONER

## 2018-04-20 PROCEDURE — 3078F DIAST BP <80 MM HG: CPT | Mod: CPTII,S$GLB,, | Performed by: NURSE PRACTITIONER

## 2018-04-20 PROCEDURE — 99204 OFFICE O/P NEW MOD 45 MIN: CPT | Mod: S$GLB,,, | Performed by: NURSE PRACTITIONER

## 2018-04-20 PROCEDURE — 3046F HEMOGLOBIN A1C LEVEL >9.0%: CPT | Mod: CPTII,S$GLB,, | Performed by: NURSE PRACTITIONER

## 2018-04-20 NOTE — LETTER
April 20, 2018      Dylan Torrez MD  [address]  [place] [address]           [facility]  [address]  [place] [address]  Phone: [phone]  Fax: [phone]          Patient: Gerson Phillips III   MR Number: [id number]   YOB: 1955   Date of Visit: 4/20/2018       Dear Dr. Dylan Torrez:    Thank you for referring Gerson Phillips to me for evaluation. Attached you will find relevant portions of my assessment and plan of care.    If you have questions, please do not hesitate to call me. I look forward to following Gerson Phillips along with you.    Sincerely,    Katie Jones NP    Enclosure  CC:  No Recipients    If you would like to receive this communication electronically, please contact [email or web address] or [phone] to request more information on [employer] access.    For providers and/or their staff who would like to refer a patient to [facility], please contact us through our one-stop-shop provider referral line, [facility], at [phone].    If you feel you have received this communication in error or would no longer like to receive these types of communications, please e-mail [email or web address]

## 2018-04-20 NOTE — PATIENT INSTRUCTIONS
Please test blood sugar before meals and bedtime.   Keep a glucose log and record possible reasons for highs or lows.   Reduce to 5 clicks for dinner.   Stop glimepiride.     Undergo  Continuous Glucose Monitoring (CGM) study.   Return for CGM follow up.       UNDERSTANDING CONTINUOUS GLUCOSE MONITORING     What is continuous glucose monitoring?   Continuous glucose monitoring system (CGMS) is a method used to record your blood sugar levels over a period of time (usually 7 days). Your home blood glucose monitoring is very helpful, but only measures blood glucose levels at various points in time and can miss dangerous high and low patterns, especially during the night while you sleep. Continuous glucose monitoring provides a continuous 24-hour measurement of your blood glucose levels.     Who benefits from continuous glucose monitoring?    People who experience dangerous high and low blood glucose readings.    People who suffer from hypoglycemia unawareness and cannot feel when their blood glucose is dropping.    People who desire better blood glucose control.    People with elevated A1C levels.     How does continuous glucose monitoring work?   A glucose sensor is inserted on the top of your skin on the back of your arm. We do not leave a needle under your skin. The blood glucose sensor measures your blood sugar level every 15 minutes, 24 hours a day. At the end of the 7 days, the CGM Sensor is then downloaded and reviewed so your healthcare provider can see your blood sugar trends and patterns.     What are your responsibilities to ensure successful testing?   It is recommended that you monitor your blood readings as directed by your healthcare provider.  It is vital that you document the correct times of your medications, meals, snacks, blood sugar readings, and any exercise.       How do you prepare for the test?   There is nothing you need to do to prepare for the test. You do not need to fast (restrict food  intake) the night before the test.     Revised: 01/2017    © 2015 [facility] ([email or web address]) is a non-profit, academic, multi-specialty, healthcare delivery system dedicated to patient care, research and education.

## 2018-04-20 NOTE — PROGRESS NOTES
"CC: This 62 y.o. Black or  male  is here for evaluation of  T2DM along with comorbidities indicated in the Visit Diagnosis section of this encounter.    HPI: Gerson Phillips III was diagnosed with T2DM  > 20 years ago.     New to Endocrine at [facility]. Previously followed by Dr. Hoskins with lov in (9/2017), when he was having wide BG fluctuations presumably d/t diet.   Transferring DM care here since Dr. Seals cannot see him on Fridays.     Glimepiride was dc'd per Dr. Seals but was resumed about 2 months ago. He started using VGo last year; finds it more convenient than MDI.     a1c carla from 8.7 to 10.6% in March. Pt does not know why his BG carla.     LAST DIABETES EDUCATION: years ago     PRESCRIBED DIABETES MEDICATIONS: metformin 1000 mg bid, glimepiride 4 mg bid, Novolog via Vgo 40 - takes 3-6 clicks per meal depending on his BG.     Misses medication doses - no but sometimes boluses a little late during the meal.     Changes his Vgo every morning.   Generally takes 6 clicks for lunch and for dinner.     DM COMPLICATIONS: nephropathy and impotence    SIGNIFICANT DIABETES MED HISTORY:   Unable to afford Victoza     SELF MONITORING BLOOD GLUCOSE: Checks blood glucose at home 3x/week. Recalls BGs , up to 200s in the evening.     HYPOGLYCEMIC EPISODES: BG drops overnight about 2x/week to 50-70s. Corrects with coke.      CURRENT DIET: drinks Coke Zero and water. Eats 3 meals/day. No snacks. Eats out a lot on the road d/t nature of job.     Diet recall: breakfast was scrambled eggs and 3 slices of bread. Dinner was chicken strips with sauce, 10-15 french fries, 1 slice of [place] toast. Lunch was skipped.     CURRENT EXERCISE: none     OCCUPATION: works Sunday through Thursday. .       /71 (BP Location: Right arm, Patient Position: Sitting, BP Method: Large (Automatic))   Pulse 84   Ht 6' 2" (1.88 m)   Wt 114.5 kg (252 lb 6.8 oz)   BMI 32.41 kg/m²       ROS: "   CONSTITUTIONAL: Appetite good, denies fatigue  SKIN: No rash or pruritis   EYES: + visual disturbances damien when BG is high   RESPIRATORY: No shortness of breath or cough  CARDIAC: No chest pain or palpitations  GI: No nausea, vomiting, or diarrhea  : No urinary frequency or dysuria   MS: No arthralgias or mylagias   NEURO: No paresthesias or tremors.   OTHER: denies polydipsia       PHYSICAL EXAM:  GENERAL: Well developed, well nourished. No acute distress.   PSYCH: AAOx3, appropriate mood and affect, conversant, well-groomed. Judgement and insight good.   NEURO: Cranial nerves grossly intact. Speech clear, no tremor.   NECK: Trachea midline, no thyromegaly or lymphadenopathy.   CHEST: Respirations even and unlabored. CTA bilaterally.  CARDIOVASCULAR: Regular rate and rhythm. No bruits. No murmur. No edema.   ABDOMEN: Soft, non-tender, non-distended. Bowel sounds present.   MS: Gait steady. No clubbing.   SKIN: Normal skin turgor. Skin warm and dry. No areas of breakdown.     Feet - sees Podiatry as well.     Protective Sensation (w/ 10 gram monofilament):  Right: Intact  Left: Intact     vibratory sensation present to BLE.       Visual Inspection:  Skin Breakdown -  Neither    Pedal Pulses:   Right: Present  Left: Present        Hemoglobin A1C   Date Value Ref Range Status   03/02/2018 10.6 (H) 4.0 - 5.6 % Final     Comment:     According to ADA guidelines, hemoglobin A1c <7.0% represents  optimal control in non-pregnant diabetic patients. Different  metrics may apply to specific patient populations.   Standards of Medical Care in Diabetes-2016.  For the purpose of screening for the presence of diabetes:  <5.7%     Consistent with the absence of diabetes  5.7-6.4%  Consistent with increasing risk for diabetes   (prediabetes)  >or=6.5%  Consistent with diabetes  Currently, no consensus exists for use of hemoglobin A1c  for diagnosis of diabetes for children.  This Hemoglobin A1c assay has significant  interference with fetal   hemoglobin   (HbF). The results are invalid for patients with abnormal amounts of   HbF,   including those with known Hereditary Persistence   of Fetal Hemoglobin. Heterozygous hemoglobin variants (HbAS, HbAC,   HbAD, HbAE, HbA2) do not significantly interfere with this assay;   however, presence of multiple variants in a sample may impact the %   interference.     08/11/2017 8.7 (H) 4.0 - 5.6 % Final     Comment:     According to ADA guidelines, hemoglobin A1c <7.0% represents  optimal control in non-pregnant diabetic patients. Different  metrics may apply to specific patient populations.   Standards of Medical Care in Diabetes-2016.  For the purpose of screening for the presence of diabetes:  <5.7%     Consistent with the absence of diabetes  5.7-6.4%  Consistent with increasing risk for diabetes   (prediabetes)  >or=6.5%  Consistent with diabetes  Currently, no consensus exists for use of hemoglobin A1c  for diagnosis of diabetes for children.  This Hemoglobin A1c assay has significant interference with fetal   hemoglobin   (HbF). The results are invalid for patients with abnormal amounts of   HbF,   including those with known Hereditary Persistence   of Fetal Hemoglobin. Heterozygous hemoglobin variants (HbAS, HbAC,   HbAD, HbAE, HbA2) do not significantly interfere with this assay;   however, presence of multiple variants in a sample may impact the %   interference.     02/06/2017 10.6 (H) 4.5 - 6.2 % Final     Comment:     According to ADA guidelines, hemoglobin A1C <7.0% represents  optimal control in non-pregnant diabetic patients.  Different  metrics may apply to specific populations.   Standards of Medical Care in Diabetes - 2016.  For the purpose of screening for the presence of diabetes:  <5.7%     Consistent with the absence of diabetes  5.7-6.4%  Consistent with increasing risk for diabetes   (prediabetes)  >or=6.5%  Consistent with diabetes  Currently no consensus exists for  use of hemoglobin A1C  for diagnosis of diabetes for children.             Chemistry        Component Value Date/Time     03/02/2018 1025    K 3.7 03/02/2018 1025     03/02/2018 1025    CO2 25 03/02/2018 1025    BUN 14 03/02/2018 1025    CREATININE 1.3 03/02/2018 1025     (H) 03/02/2018 1025        Component Value Date/Time    CALCIUM 9.4 03/02/2018 1025    ALKPHOS 90 03/02/2018 1025    AST 19 03/02/2018 1025    ALT 29 03/02/2018 1025    BILITOT 0.5 03/02/2018 1025    ESTGFRAFRICA >60.0 03/02/2018 1025    EGFRNONAA 58.1 (A) 03/02/2018 1025          Lab Results   Component Value Date    LDLCALC 54.0 (L) 03/02/2018        Ref. Range 3/2/2018 10:25   Cholesterol Latest Ref Range: 120 - 199 mg/dL 121   HDL Latest Ref Range: 40 - 75 mg/dL 49   LDL Cholesterol Latest Ref Range: 63.0 - 159.0 mg/dL 54.0 (L)   Total Cholesterol/HDL Ratio Latest Ref Range: 2.0 - 5.0  2.5   Triglycerides Latest Ref Range: 30 - 150 mg/dL 90     Lab Results   Component Value Date    MICALBCREAT 68.3 (H) 03/02/2018           STANDARDS of CARE:        ASA:               Last eye exam: 3/2018      ASSESSMENT and PLAN:    A1C GOAL: < 7.5%    1. Uncontrolled type 2 diabetes mellitus with complication, with long-term current use of insulin  Will set pt's glycemic goals a bit higher than usual d/t the nature of his job and risk of hypoglycemia on frequent insulin doses/day.     Difficult to determine reasons for fluctuations without a good BG log, especially when he is adjusting his insulin doses frequently.     Advised -   Please test blood sugar before meals and bedtime.   Keep a glucose log and record possible reasons for highs or lows.   Reduce to 5 clicks for dinner.   Stop glimepiride.     Undergo  Continuous Glucose Monitoring (CGM) study.   Return for CGM follow up.     Ambulatory Referral to Diabetes Education    GLUCOSE MONITORING CONTINUOUS MIN 72 HOURS   2. Microalbuminuria  Improve glycemic control.      3. Essential  hypertension  controlled   4. Hyperlipidemia LDL goal <70  At goal.        Orders Placed This Encounter   Procedures    Ambulatory Referral to Diabetes Education     Referral Priority:   Routine     Referral Type:   Consultation     Referral Reason:   Specialty Services Required     Requested Specialty:   Endocrinology     Number of Visits Requested:   1     Expiration Date:   4/20/2019    GLUCOSE MONITORING CONTINUOUS MIN 72 HOURS     Standing Status:   Future     Standing Expiration Date:   4/21/2019        No Follow-up on file.     Thank you very much for allowing me to participate in Gerson Phillips III's care.

## 2018-04-24 ENCOUNTER — TELEPHONE (OUTPATIENT)
Dept: ENDOCRINOLOGY | Facility: CLINIC | Age: 63
End: 2018-04-24

## 2018-04-24 NOTE — TELEPHONE ENCOUNTER
Called patient and scheduled CGM On for Wed 5/30/18 at 3:00p and CGM follow up for Wed 6/6/18 at 1:00p. Patient verbalized understanding. Reminder letter mailed.

## 2018-05-04 RX ORDER — HYDROCHLOROTHIAZIDE 25 MG/1
TABLET ORAL
Qty: 90 TABLET | Refills: 2 | Status: SHIPPED | OUTPATIENT
Start: 2018-05-04 | End: 2019-02-07 | Stop reason: SDUPTHER

## 2018-05-09 RX ORDER — QUINAPRIL 40 MG/1
TABLET ORAL
Qty: 180 TABLET | Refills: 1 | Status: SHIPPED | OUTPATIENT
Start: 2018-05-09 | End: 2019-04-23 | Stop reason: SDUPTHER

## 2018-05-14 RX ORDER — QUINAPRIL 40 MG/1
TABLET ORAL
Qty: 180 TABLET | Refills: 0 | Status: SHIPPED | OUTPATIENT
Start: 2018-05-14 | End: 2018-10-10 | Stop reason: SDUPTHER

## 2018-05-14 RX ORDER — DILTIAZEM HYDROCHLORIDE 240 MG/1
CAPSULE, COATED, EXTENDED RELEASE ORAL
Qty: 90 CAPSULE | Refills: 3 | Status: SHIPPED | OUTPATIENT
Start: 2018-05-14 | End: 2019-06-14 | Stop reason: SDUPTHER

## 2018-05-16 DIAGNOSIS — H40.1132 PRIMARY OPEN ANGLE GLAUCOMA (POAG) OF BOTH EYES, MODERATE STAGE: ICD-10-CM

## 2018-05-16 RX ORDER — LATANOPROST 50 UG/ML
1 SOLUTION/ DROPS OPHTHALMIC NIGHTLY
Qty: 7.5 ML | Refills: 2 | Status: SHIPPED | OUTPATIENT
Start: 2018-05-16 | End: 2020-02-14 | Stop reason: SDUPTHER

## 2018-05-18 ENCOUNTER — TELEPHONE (OUTPATIENT)
Dept: OPTOMETRY | Facility: CLINIC | Age: 63
End: 2018-05-18

## 2018-06-14 RX ORDER — METFORMIN HYDROCHLORIDE 1000 MG/1
TABLET ORAL
Qty: 60 TABLET | Refills: 2 | Status: SHIPPED | OUTPATIENT
Start: 2018-06-14 | End: 2018-07-20 | Stop reason: SDUPTHER

## 2018-06-19 RX ORDER — GLIMEPIRIDE 4 MG/1
TABLET ORAL
Qty: 180 TABLET | Refills: 0 | Status: SHIPPED | OUTPATIENT
Start: 2018-06-19 | End: 2018-07-20

## 2018-07-13 ENCOUNTER — CLINICAL SUPPORT (OUTPATIENT)
Dept: ENDOCRINOLOGY | Facility: CLINIC | Age: 63
End: 2018-07-13
Payer: MEDICARE

## 2018-07-13 DIAGNOSIS — Z79.4 UNCONTROLLED TYPE 2 DIABETES MELLITUS WITH HYPEROSMOLARITY WITHOUT COMA, WITH LONG-TERM CURRENT USE OF INSULIN: Primary | ICD-10-CM

## 2018-07-13 DIAGNOSIS — E11.00 UNCONTROLLED TYPE 2 DIABETES MELLITUS WITH HYPEROSMOLARITY WITHOUT COMA, WITH LONG-TERM CURRENT USE OF INSULIN: Primary | ICD-10-CM

## 2018-07-13 PROCEDURE — 95250 CONT GLUC MNTR PHYS/QHP EQP: CPT | Mod: S$GLB,,, | Performed by: DIETITIAN, REGISTERED

## 2018-07-13 NOTE — PROGRESS NOTES
"DIABETES EDUCATOR NOTE    PLACEMENT OF FREESTYLE MARIKA PRO SENSOR    CONTINOUS GLUCOSE MONITORING SYSTEM (CGMS)    Patient is here in clinic today for placement of continuous glucose monitoring sensor.    Each patient verified that they were here for CGMS procedure ordered by their provider and that they have a working glucose meter and supplies at home.    Patient will be provided with a Freestyle Marika Sensor and a copy of the Continuous Glucose Monitoring Patient Log to fill out during the study.    A detailed explanation of Continuous Glucose Monitoring was provided. Patient informed that this is a blind procedure and that they will not actually see the blood sugar tracing in real time. Reviewed with patient the [employer] patient education handout called "Your Freestyle Marika Pro sensor: What you need to know" to review self-care during the study to avoid sensor loosening or removal ie... Bathing, Swimming, dressing, and exercising.    Instructed patient to check blood sugar using home glucometer and to record the following on provided patient log sheets:Blood sugar taken at home, Meals and snacks, Activity, and Diabetes medications taken and dosage    Patient was brought to a private location. Arm for insertion was selected and prepared and allowed to dry. Glucose Sensor Serial Number [id number] was inserted to back of patient's right  upper arm.    The following forms were given and reviewed in detail with patient and all questions answered.    · Continuous Glucose Monitoring Patient Log MRN [id number]    · Freestyle Tutor Assignment Patient Handout "Your Freestyle Marika Pro Sensor: What you need to know"    "

## 2018-07-20 ENCOUNTER — CLINICAL SUPPORT (OUTPATIENT)
Dept: ENDOCRINOLOGY | Facility: CLINIC | Age: 63
End: 2018-07-20
Payer: MEDICARE

## 2018-07-20 ENCOUNTER — LAB VISIT (OUTPATIENT)
Dept: LAB | Facility: HOSPITAL | Age: 63
End: 2018-07-20
Attending: NURSE PRACTITIONER
Payer: MEDICARE

## 2018-07-20 ENCOUNTER — OFFICE VISIT (OUTPATIENT)
Dept: ENDOCRINOLOGY | Facility: CLINIC | Age: 63
End: 2018-07-20
Payer: MEDICARE

## 2018-07-20 VITALS
HEIGHT: 74 IN | RESPIRATION RATE: 18 BRPM | BODY MASS INDEX: 32.79 KG/M2 | WEIGHT: 255.5 LBS | HEART RATE: 81 BPM | SYSTOLIC BLOOD PRESSURE: 128 MMHG | DIASTOLIC BLOOD PRESSURE: 60 MMHG

## 2018-07-20 DIAGNOSIS — R80.9 MICROALBUMINURIA: ICD-10-CM

## 2018-07-20 DIAGNOSIS — Z79.4 UNCONTROLLED TYPE 2 DIABETES MELLITUS WITH HYPEROSMOLARITY WITHOUT COMA, WITH LONG-TERM CURRENT USE OF INSULIN: Primary | ICD-10-CM

## 2018-07-20 DIAGNOSIS — E78.5 HYPERLIPIDEMIA LDL GOAL <70: ICD-10-CM

## 2018-07-20 DIAGNOSIS — Z79.4 UNCONTROLLED TYPE 2 DIABETES MELLITUS WITH HYPEROSMOLARITY WITHOUT COMA, WITH LONG-TERM CURRENT USE OF INSULIN: ICD-10-CM

## 2018-07-20 DIAGNOSIS — E11.00 UNCONTROLLED TYPE 2 DIABETES MELLITUS WITH HYPEROSMOLARITY WITHOUT COMA, WITH LONG-TERM CURRENT USE OF INSULIN: Primary | ICD-10-CM

## 2018-07-20 DIAGNOSIS — E11.00 UNCONTROLLED TYPE 2 DIABETES MELLITUS WITH HYPEROSMOLARITY WITHOUT COMA, WITH LONG-TERM CURRENT USE OF INSULIN: ICD-10-CM

## 2018-07-20 DIAGNOSIS — I10 ESSENTIAL HYPERTENSION: ICD-10-CM

## 2018-07-20 LAB
ANION GAP SERPL CALC-SCNC: 9 MMOL/L
BUN SERPL-MCNC: 16 MG/DL
CALCIUM SERPL-MCNC: 9.4 MG/DL
CHLORIDE SERPL-SCNC: 106 MMOL/L
CO2 SERPL-SCNC: 23 MMOL/L
CREAT SERPL-MCNC: 1.4 MG/DL
EST. GFR  (AFRICAN AMERICAN): >60 ML/MIN/1.73 M^2
EST. GFR  (NON AFRICAN AMERICAN): 53 ML/MIN/1.73 M^2
GLUCOSE SERPL-MCNC: 226 MG/DL
POTASSIUM SERPL-SCNC: 4.3 MMOL/L
SODIUM SERPL-SCNC: 138 MMOL/L

## 2018-07-20 PROCEDURE — 99999 PR PBB SHADOW E&M-EST. PATIENT-LVL IV: CPT | Mod: PBBFAC,,, | Performed by: NURSE PRACTITIONER

## 2018-07-20 PROCEDURE — 83036 HEMOGLOBIN GLYCOSYLATED A1C: CPT

## 2018-07-20 PROCEDURE — 3008F BODY MASS INDEX DOCD: CPT | Mod: CPTII,S$GLB,, | Performed by: NURSE PRACTITIONER

## 2018-07-20 PROCEDURE — 3046F HEMOGLOBIN A1C LEVEL >9.0%: CPT | Mod: CPTII,S$GLB,, | Performed by: NURSE PRACTITIONER

## 2018-07-20 PROCEDURE — 3074F SYST BP LT 130 MM HG: CPT | Mod: CPTII,S$GLB,, | Performed by: NURSE PRACTITIONER

## 2018-07-20 PROCEDURE — 99214 OFFICE O/P EST MOD 30 MIN: CPT | Mod: S$GLB,,, | Performed by: NURSE PRACTITIONER

## 2018-07-20 PROCEDURE — 95250 CONT GLUC MNTR PHYS/QHP EQP: CPT | Mod: S$GLB,,, | Performed by: NURSE PRACTITIONER

## 2018-07-20 PROCEDURE — 3078F DIAST BP <80 MM HG: CPT | Mod: CPTII,S$GLB,, | Performed by: NURSE PRACTITIONER

## 2018-07-20 PROCEDURE — 80048 BASIC METABOLIC PNL TOTAL CA: CPT

## 2018-07-20 PROCEDURE — 36415 COLL VENOUS BLD VENIPUNCTURE: CPT | Mod: PN

## 2018-07-20 PROCEDURE — 99499 UNLISTED E&M SERVICE: CPT | Mod: S$GLB,,, | Performed by: NURSE PRACTITIONER

## 2018-07-20 PROCEDURE — 95251 CONT GLUC MNTR ANALYSIS I&R: CPT | Mod: S$GLB,,, | Performed by: NURSE PRACTITIONER

## 2018-07-20 RX ORDER — ASPIRIN 81 MG/1
81 TABLET ORAL DAILY
Refills: 0 | COMMUNITY
Start: 2018-07-20 | End: 2019-01-21

## 2018-07-20 RX ORDER — INSULIN ASPART 100 [IU]/ML
INJECTION, SOLUTION INTRAVENOUS; SUBCUTANEOUS
Qty: 1 BOX | Refills: 2 | Status: SHIPPED | OUTPATIENT
Start: 2018-07-20 | End: 2018-08-17

## 2018-07-20 RX ORDER — INSULIN GLARGINE 100 [IU]/ML
INJECTION, SOLUTION SUBCUTANEOUS
Qty: 1 BOX | Refills: 2 | Status: SHIPPED | OUTPATIENT
Start: 2018-07-20 | End: 2018-11-06 | Stop reason: SDUPTHER

## 2018-07-20 RX ORDER — PEN NEEDLE, DIABETIC 30 GX3/16"
1 NEEDLE, DISPOSABLE MISCELLANEOUS
Qty: 400 EACH | Refills: 4 | Status: SHIPPED | OUTPATIENT
Start: 2018-07-20 | End: 2019-09-28 | Stop reason: SDUPTHER

## 2018-07-20 RX ORDER — METFORMIN HYDROCHLORIDE 1000 MG/1
1000 TABLET ORAL 2 TIMES DAILY
Qty: 180 TABLET | Refills: 2 | Status: SHIPPED | OUTPATIENT
Start: 2018-07-20 | End: 2019-01-21 | Stop reason: SDUPTHER

## 2018-07-20 NOTE — PROGRESS NOTES
DIABETES EDUCATION NOTE    Return of the Freestyle Delores Pro Sensor and Patient Log.    Patient returned to clinic today to return Glucose Sensor and signed patient log form used in CMGS procedure.    The CGMS Sensor will be scanned and downloaded. All reports will be imported into the patient's electronic medical record.    Endocrine Nurse Practitioner will complete data interpretation and make recommendations; will forward recommendations to the ordering provider for follow up with patient.

## 2018-07-20 NOTE — PATIENT INSTRUCTIONS
Stop V-Go - not delivering enough insulin for you.   Start Basaglar (long acting once daily insulin) 46 units every day.   Start Novolog Flexpen 16 units before each meal.   Test blood sugar 4x/day before each meal and bedtime.   Return to clinic in a month. Send a glucose log in 2 weeks.

## 2018-07-20 NOTE — PROGRESS NOTES
CC: This 63 y.o. Black or  male  is here for evaluation of  T2DM along with comorbidities indicated in the Visit Diagnosis section of this encounter.    HPI: Gerson Phillips III was diagnosed with T2DM  > 20 years ago.     Initial visit on 4/20/18  New to Endocrine at [facility]. Previously followed by Dr. Hoskins with lov in (9/2017), when he was having wide BG fluctuations presumably d/t diet.   Transferring DM care here since Dr. Seals cannot see him on Fridays.   Glimepiride was dc'd per Dr. Selas but was resumed about 2 months ago. He started using VGo last year; finds it more convenient than MDI.   a1c carla from 8.7 to 10.6% in March. Pt does not know why his BG carla.   Plan Will set pt's glycemic goals a bit higher than usual d/t the nature of his job and risk of hypoglycemia on frequent insulin doses/day.   Difficult to determine reasons for fluctuations without a good BG log, especially when he is adjusting his insulin doses frequently.   Advised -   Please test blood sugar before meals and bedtime.   Keep a glucose log and record possible reasons for highs or lows.   Reduce to 5 clicks for dinner.   Stop glimepiride.   Undergo  Continuous Glucose Monitoring (CGM) study. Return for CGM follow up.     Interval history  Arrives today for  Continuous Glucose Monitoring (CGM) study f/u.   BGs continue to be high with average  mg/dL despite taking 12 units ac consistently.       LAST DIABETES EDUCATION: years ago     PRESCRIBED DIABETES MEDICATIONS: metformin 1000 mg bid,  Novolog in Vgo 40 with 6 clicks ac (=12 units ac)     Misses medication doses - no but sometimes boluses a little late during the meal.     Changes his Vgo every morning.       DM COMPLICATIONS: nephropathy and impotence    SIGNIFICANT DIABETES MED HISTORY:   Unable to afford Victoza     SELF MONITORING BLOOD GLUCOSE: Checks blood glucose at home - inconsistent     HYPOGLYCEMIC EPISODES: none recent. Lowest BG was 89  "last night.       CURRENT DIET: drinks Coke Zero and water. Eats 3 meals/day. No snacks. Eats out a lot on the road d/t nature of job.   See food log in [employer].     CURRENT EXERCISE: none     OCCUPATION: works Sunday through Thursday. .       /60   Pulse 81   Resp 18   Ht 6' 2" (1.88 m)   Wt 115.9 kg (255 lb 8.2 oz)   BMI 32.81 kg/m²       ROS:   CONSTITUTIONAL: Appetite good, + fatigue  EYES: + visual disturbances damien when BG is high         PHYSICAL EXAM:  GENERAL: Well developed, well nourished. No acute distress.   PSYCH: AAOx3, appropriate mood and affect, conversant, well-groomed. Judgement and insight good.   NEURO: Cranial nerves grossly intact. Speech clear, no tremor.   CHEST: Respirations even and unlabored.         Hemoglobin A1C   Date Value Ref Range Status   03/02/2018 10.6 (H) 4.0 - 5.6 % Final     Comment:     According to ADA guidelines, hemoglobin A1c <7.0% represents  optimal control in non-pregnant diabetic patients. Different  metrics may apply to specific patient populations.   Standards of Medical Care in Diabetes-2016.  For the purpose of screening for the presence of diabetes:  <5.7%     Consistent with the absence of diabetes  5.7-6.4%  Consistent with increasing risk for diabetes   (prediabetes)  >or=6.5%  Consistent with diabetes  Currently, no consensus exists for use of hemoglobin A1c  for diagnosis of diabetes for children.  This Hemoglobin A1c assay has significant interference with fetal   hemoglobin   (HbF). The results are invalid for patients with abnormal amounts of   HbF,   including those with known Hereditary Persistence   of Fetal Hemoglobin. Heterozygous hemoglobin variants (HbAS, HbAC,   HbAD, HbAE, HbA2) do not significantly interfere with this assay;   however, presence of multiple variants in a sample may impact the %   interference.     08/11/2017 8.7 (H) 4.0 - 5.6 % Final     Comment:     According to ADA guidelines, hemoglobin A1c <7.0% " represents  optimal control in non-pregnant diabetic patients. Different  metrics may apply to specific patient populations.   Standards of Medical Care in Diabetes-2016.  For the purpose of screening for the presence of diabetes:  <5.7%     Consistent with the absence of diabetes  5.7-6.4%  Consistent with increasing risk for diabetes   (prediabetes)  >or=6.5%  Consistent with diabetes  Currently, no consensus exists for use of hemoglobin A1c  for diagnosis of diabetes for children.  This Hemoglobin A1c assay has significant interference with fetal   hemoglobin   (HbF). The results are invalid for patients with abnormal amounts of   HbF,   including those with known Hereditary Persistence   of Fetal Hemoglobin. Heterozygous hemoglobin variants (HbAS, HbAC,   HbAD, HbAE, HbA2) do not significantly interfere with this assay;   however, presence of multiple variants in a sample may impact the %   interference.     02/06/2017 10.6 (H) 4.5 - 6.2 % Final     Comment:     According to ADA guidelines, hemoglobin A1C <7.0% represents  optimal control in non-pregnant diabetic patients.  Different  metrics may apply to specific populations.   Standards of Medical Care in Diabetes - 2016.  For the purpose of screening for the presence of diabetes:  <5.7%     Consistent with the absence of diabetes  5.7-6.4%  Consistent with increasing risk for diabetes   (prediabetes)  >or=6.5%  Consistent with diabetes  Currently no consensus exists for use of hemoglobin A1C  for diagnosis of diabetes for children.             Chemistry        Component Value Date/Time     03/02/2018 1025    K 3.7 03/02/2018 1025     03/02/2018 1025    CO2 25 03/02/2018 1025    BUN 14 03/02/2018 1025    CREATININE 1.3 03/02/2018 1025     (H) 03/02/2018 1025        Component Value Date/Time    CALCIUM 9.4 03/02/2018 1025    ALKPHOS 90 03/02/2018 1025    AST 19 03/02/2018 1025    ALT 29 03/02/2018 1025    BILITOT 0.5 03/02/2018 1025     ESTGFRAFRICA >60.0 03/02/2018 1025    EGFRNONAA 58.1 (A) 03/02/2018 1025          Lab Results   Component Value Date    LDLCALC 54.0 (L) 03/02/2018        Ref. Range 3/2/2018 10:25   Cholesterol Latest Ref Range: 120 - 199 mg/dL 121   HDL Latest Ref Range: 40 - 75 mg/dL 49   LDL Cholesterol Latest Ref Range: 63.0 - 159.0 mg/dL 54.0 (L)   Total Cholesterol/HDL Ratio Latest Ref Range: 2.0 - 5.0  2.5   Triglycerides Latest Ref Range: 30 - 150 mg/dL 90     Lab Results   Component Value Date    MICALBCREAT 68.3 (H) 03/02/2018           STANDARDS of CARE:        ASA:               Last eye exam: 3/2018      ASSESSMENT and PLAN:    A1C GOAL: < 7.5%      1. Uncontrolled type 2 diabetes mellitus with hyperosmolarity without coma, with long-term current use of insulin  Reviewed CGM report in depth.   Stop V-Go - not delivering enough insulin for you.   Start Basaglar (long acting once daily insulin) 46 units every day.   Start Novolog Flexpen 16 units before each meal.   Test blood sugar 4x/day before each meal and bedtime.   Return to clinic in a month. Send a glucose log in 2 weeks.   Labs today.       Hemoglobin A1c    Basic metabolic panel    aspirin (ECOTRIN) 81 MG EC tablet   2. Hyperlipidemia LDL goal <70  At goal    3. Microalbuminuria  Basic metabolic panel   4. Essential hypertension  controlled       Orders Placed This Encounter   Procedures    Hemoglobin A1c     Standing Status:   Future     Number of Occurrences:   1     Standing Expiration Date:   9/18/2019    Basic metabolic panel     Standing Status:   Future     Number of Occurrences:   1     Standing Expiration Date:   9/18/2019        Follow-up in about 4 weeks (around 8/17/2018).

## 2018-07-21 LAB
ESTIMATED AVG GLUCOSE: 292 MG/DL
HBA1C MFR BLD HPLC: 11.8 %

## 2018-08-06 ENCOUNTER — TELEPHONE (OUTPATIENT)
Dept: FAMILY MEDICINE | Facility: CLINIC | Age: 63
End: 2018-08-06

## 2018-08-06 NOTE — TELEPHONE ENCOUNTER
----- Message from Anjali Dumont sent at 8/6/2018 11:01 AM CDT -----  Contact: Self  Pt is requesting blood work order be put in the system, He is scheduled on 08/24. Please call pt at [phone].

## 2018-08-09 RX ORDER — IBUPROFEN 800 MG/1
TABLET ORAL
Qty: 40 TABLET | Refills: 1 | Status: SHIPPED | OUTPATIENT
Start: 2018-08-09 | End: 2020-04-06

## 2018-08-17 ENCOUNTER — LAB VISIT (OUTPATIENT)
Dept: LAB | Facility: HOSPITAL | Age: 63
End: 2018-08-17
Attending: INTERNAL MEDICINE
Payer: MEDICARE

## 2018-08-17 ENCOUNTER — TELEPHONE (OUTPATIENT)
Dept: ENDOCRINOLOGY | Facility: CLINIC | Age: 63
End: 2018-08-17

## 2018-08-17 LAB
ANION GAP SERPL CALC-SCNC: 9 MMOL/L
BUN SERPL-MCNC: 16 MG/DL
CALCIUM SERPL-MCNC: 9.4 MG/DL
CHLORIDE SERPL-SCNC: 108 MMOL/L
CO2 SERPL-SCNC: 23 MMOL/L
CREAT SERPL-MCNC: 1.2 MG/DL
EST. GFR  (AFRICAN AMERICAN): >60 ML/MIN/1.73 M^2
EST. GFR  (NON AFRICAN AMERICAN): >60 ML/MIN/1.73 M^2
ESTIMATED AVG GLUCOSE: 269 MG/DL
GLUCOSE SERPL-MCNC: 227 MG/DL
HBA1C MFR BLD HPLC: 11 %
POTASSIUM SERPL-SCNC: 3.9 MMOL/L
SODIUM SERPL-SCNC: 140 MMOL/L
T4 FREE SERPL-MCNC: 0.83 NG/DL
TSH SERPL DL<=0.005 MIU/L-ACNC: 0.25 UIU/ML

## 2018-08-17 PROCEDURE — 84439 ASSAY OF FREE THYROXINE: CPT

## 2018-08-17 PROCEDURE — 83036 HEMOGLOBIN GLYCOSYLATED A1C: CPT

## 2018-08-17 PROCEDURE — 36415 COLL VENOUS BLD VENIPUNCTURE: CPT | Mod: PO

## 2018-08-17 PROCEDURE — 80048 BASIC METABOLIC PNL TOTAL CA: CPT

## 2018-08-17 PROCEDURE — 84443 ASSAY THYROID STIM HORMONE: CPT

## 2018-08-17 RX ORDER — INSULIN ASPART 100 [IU]/ML
INJECTION, SOLUTION INTRAVENOUS; SUBCUTANEOUS
Qty: 1 BOX | Refills: 2 | Status: SHIPPED | OUTPATIENT
Start: 2018-08-17 | End: 2018-12-10 | Stop reason: ALTCHOICE

## 2018-08-17 NOTE — TELEPHONE ENCOUNTER
----- Message from Meli Macedo sent at 8/17/2018 10:34 AM CDT -----  Contact: [facility]  [phone]  Pt needs refill on [employer] Strength . Pls call [facility] [phone]. Thanks.......Charla

## 2018-09-04 ENCOUNTER — TELEPHONE (OUTPATIENT)
Dept: ENDOCRINOLOGY | Facility: CLINIC | Age: 63
End: 2018-09-04

## 2018-09-05 NOTE — TELEPHONE ENCOUNTER
Spoke to pt re: bg log. Advised him to f/u in 1 month and test bg 4x/day, ac/hs. Make notes on log when having higher or lower BGs than expected. He reports having high BGs in the mornings likely bc of bedtime snack like crackers.     He voiced understanding.

## 2018-09-14 ENCOUNTER — TELEPHONE (OUTPATIENT)
Dept: ENDOCRINOLOGY | Facility: CLINIC | Age: 63
End: 2018-09-14

## 2018-10-10 RX ORDER — QUINAPRIL 40 MG/1
TABLET ORAL
Qty: 180 TABLET | Refills: 0 | Status: SHIPPED | OUTPATIENT
Start: 2018-10-10 | End: 2018-12-10 | Stop reason: SDUPTHER

## 2018-10-29 ENCOUNTER — OFFICE VISIT (OUTPATIENT)
Dept: FAMILY MEDICINE | Facility: CLINIC | Age: 63
End: 2018-10-29
Payer: MEDICARE

## 2018-10-29 ENCOUNTER — HOSPITAL ENCOUNTER (OUTPATIENT)
Dept: RADIOLOGY | Facility: HOSPITAL | Age: 63
Discharge: HOME | End: 2018-10-29
Attending: INTERNAL MEDICINE
Payer: MEDICARE

## 2018-10-29 VITALS
HEIGHT: 74 IN | BODY MASS INDEX: 32.73 KG/M2 | WEIGHT: 255.06 LBS | TEMPERATURE: 99 F | DIASTOLIC BLOOD PRESSURE: 72 MMHG | SYSTOLIC BLOOD PRESSURE: 148 MMHG | OXYGEN SATURATION: 98 % | HEART RATE: 91 BPM

## 2018-10-29 DIAGNOSIS — E78.5 HYPERLIPIDEMIA, UNSPECIFIED HYPERLIPIDEMIA TYPE: ICD-10-CM

## 2018-10-29 DIAGNOSIS — M25.572 ACUTE LEFT ANKLE PAIN: ICD-10-CM

## 2018-10-29 DIAGNOSIS — Z86.010 HISTORY OF COLONIC POLYPS: ICD-10-CM

## 2018-10-29 DIAGNOSIS — E78.2 MIXED HYPERLIPIDEMIA: ICD-10-CM

## 2018-10-29 DIAGNOSIS — I10 ESSENTIAL HYPERTENSION: ICD-10-CM

## 2018-10-29 DIAGNOSIS — R79.89 ABNORMAL THYROID BLOOD TEST: ICD-10-CM

## 2018-10-29 DIAGNOSIS — Z79.4 LONG-TERM INSULIN USE: ICD-10-CM

## 2018-10-29 PROCEDURE — 3046F HEMOGLOBIN A1C LEVEL >9.0%: CPT | Mod: CPTII,,, | Performed by: INTERNAL MEDICINE

## 2018-10-29 PROCEDURE — 73610 X-RAY EXAM OF ANKLE: CPT | Mod: TC,FY,PO,LT

## 2018-10-29 PROCEDURE — 99214 OFFICE O/P EST MOD 30 MIN: CPT | Mod: S$PBB,,, | Performed by: INTERNAL MEDICINE

## 2018-10-29 PROCEDURE — 3008F BODY MASS INDEX DOCD: CPT | Mod: CPTII,,, | Performed by: INTERNAL MEDICINE

## 2018-10-29 PROCEDURE — 73610 X-RAY EXAM OF ANKLE: CPT | Mod: 26,LT,, | Performed by: RADIOLOGY

## 2018-10-29 PROCEDURE — 99999 PR PBB SHADOW E&M-EST. PATIENT-LVL III: CPT | Mod: PBBFAC,,, | Performed by: INTERNAL MEDICINE

## 2018-10-29 PROCEDURE — 3077F SYST BP >= 140 MM HG: CPT | Mod: CPTII,,, | Performed by: INTERNAL MEDICINE

## 2018-10-29 PROCEDURE — 99213 OFFICE O/P EST LOW 20 MIN: CPT | Mod: PBBFAC,PO,25 | Performed by: INTERNAL MEDICINE

## 2018-10-29 PROCEDURE — 3078F DIAST BP <80 MM HG: CPT | Mod: CPTII,,, | Performed by: INTERNAL MEDICINE

## 2018-10-29 NOTE — PROGRESS NOTES
Chief complaint, DM---last appt 7 mo ago but should by every 3- last ENDO appt 3 mo ago    63-year-old black male  here for diabetic follow-up and other issues.  It looks like about 3 months ago he was changed to basal insulin as well as NovoLog 16 before meals.  It has been about 3 months so we can reassess A1c response.  He does report that his sugars are running in the 100 range rather than in the 200.  He does have diabetic shoes with inserts and does have flat feet.  For 2 weeks has had pain in the left inner ankle.  No trauma.  It is slightly swollen.  Moderate severity.  He has not been walking around barefoot.  He also has some pain in the right lower side and abdomen where he had shingles before.  It has been ongoing for 2-3 weeks.  He has not checked but has not noticed any rash or sticking of his clothes.  It is a burning quality.  It is somewhat positional.          ROS:   CONST: weight stable. EYES: no vision change. ENT: no sore throat. CV: no chest pain w/ exertion. RESP: no shortness of breath. GI: no nausea, vomiting, diarrhea. No dysphagia. : no urinary issues. MUSCULOSKELETAL: no new myalgias or arthralgias. SKIN: no new changes. NEURO: no focal deficits. PSYCH: no new issues. ENDOCRINE: no polyuria. HEME: no lymph nodes. ALLERGY: no general pruritis.       PAST MEDICAL HISTORY:                                                        1. Hypertension.                                                             2. Uncontrolled diabetes.  With renal disease and proteinuria. seen by ENDO 2012                                                 3. Lumbar disk disease and DJD.  Seen by spine clinic in the past                                             4. Hyperlipidemia.  -good on lipitor                                                         5. Erectile dysfunction.                                                     6. History of mild right rotator cuff tendinitis.                            7. Colon  polyps .   Normal 11/10 , normal 3/18----5 years   8. Elevated ALT    9.  Insomnia  10.  Slight hemoglobin reduction      11.  Zoster on right abdomen                                                                                                               PAST SURGICAL HISTORY:  None.                                                                                                                             FAMILY HISTORY:  Father  of smoking-related lung cancer.  Brother    of smoking-related lung cancer.  Mom  of breast cancer.  Other sibling   with hypertension, diabetes, but no coronary artery disease and no other     new diagnoses.                                                                                                                                            SOCIAL HISTORY:   with 2 children.  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      X.       Vitals as above  Gen: no distress  EYES: conjunctiva clear, non-icteric, PERRL  ENT: nose clear, nasal mucosa normal, oropharynx clear and moist, teeth good  NECK:supple, thyroid non-palpable  RESP: effort is good, lungs clear  CV: heart RRR w/o murmur, gallops or rubs; no carotid bruits, no edema  GI: abdomen soft, non-distended, no abdominal tenderness but there is some tenderness along the right lateral ribcage with the abdominal musculature attach is to the ribs.  Squeezing the ribs side to side and anterior posterior however does not elicit any particular rib pain, no hepatosplenomegaly  MS: gait normal, no clubbing or cyanosis of the digits, he does have obvious flat feet with an inward turned ankle.  The ankle itself has good flexion and extension without ankle joint pain.  There is tenderness over the left medial ankle below the malleolus and it is a little bit swollen in this area.  It appears to be more so the tendons.  No lateral tenderness. No redness or inflammatory changes to the area of  swelling  SKIN: no rashes, warm to touch, no zoster    [place] was seen today for diabetes.    Diagnoses and all orders for this visit:    Uncontrolled type 2 diabetes mellitus with microalbuminuria, with long-term current use of insulin, reassess on current insulin regimen and patient knows he needs to make follow-up with endocrine that is not set right now so will check labs  -     Hemoglobin A1c; Future  -     T4, free; Future  -     TSH; Future    Acute left ankle pain, appears to be a strain of the left inner ankle possibly related to his flat feet.  He does have some new diabetic shoes with inserts and perhaps this is aggravating but does not appear to directly relate to that.  Given uncontrolled diabetes we did discuss possibility of unrecognized fracture due to minor trauma so will obtain x-ray to rule out such issue.  I recommend he use a pull on ankle support and continue to wear his shoes and inserts and if it is an ongoing problem we would have him revisit with Podiatry  -     X-Ray Foot Complete Left; Future    Essential hypertension, fair control    Long-term insulin use    Mixed hyperlipidemia, assessed for the year    Hyperlipidemia, unspecified hyperlipidemia type    History of colonic polyps    Abnormal thyroid blood test, appears to have a persistent slight TSH suppression but no symptoms of hyperthyroidism and actually having trouble losing weight.  Reassess  -     T4, free; Future  -     TSH; Future     Right abdominal pain, suspect this is a muscular strain given that it is reproducible with the muscles attach to the rib but no rib injury and I do not think there is any intra-abdominal pathology and do not think this is zoster given there has been no rash over 3 weeks and no previous sensitivity in this area to suggest any post herpetic neuralgia.  Discussed this with patient and he will monitor clinically     clinical note will be sensitive as I do need to document potential compliance issues  "with the uncontrolled hypertension but do not want that to stop patient from returning"This note will not be shared with the patient."  "

## 2018-10-29 NOTE — PROGRESS NOTES
Per communication w provider (via emeterio/phone) - exam should be a L ankle. Changing xray order to L ankle. RICARDO

## 2018-11-06 RX ORDER — INSULIN GLARGINE 100 [IU]/ML
INJECTION, SOLUTION SUBCUTANEOUS
Qty: 15 ML | Refills: 0 | Status: SHIPPED | OUTPATIENT
Start: 2018-11-06 | End: 2018-12-09 | Stop reason: SDUPTHER

## 2018-11-12 ENCOUNTER — TELEPHONE (OUTPATIENT)
Dept: FAMILY MEDICINE | Facility: CLINIC | Age: 63
End: 2018-11-12

## 2018-11-12 NOTE — TELEPHONE ENCOUNTER
----- Message from Meli Macedo sent at 11/12/2018  9:56 AM CST -----  Contact: [facility]/ [facility] [phone]  Requesting office notes on this pt. Pls fax to [facility] [phone]. Thanks...........Charla

## 2018-12-10 ENCOUNTER — OFFICE VISIT (OUTPATIENT)
Dept: ENDOCRINOLOGY | Facility: CLINIC | Age: 63
End: 2018-12-10
Payer: MEDICARE

## 2018-12-10 VITALS
SYSTOLIC BLOOD PRESSURE: 138 MMHG | WEIGHT: 256.19 LBS | HEIGHT: 74 IN | DIASTOLIC BLOOD PRESSURE: 78 MMHG | BODY MASS INDEX: 32.88 KG/M2 | HEART RATE: 87 BPM

## 2018-12-10 DIAGNOSIS — E78.5 HYPERLIPIDEMIA LDL GOAL <70: ICD-10-CM

## 2018-12-10 DIAGNOSIS — R80.9 MICROALBUMINURIA: ICD-10-CM

## 2018-12-10 PROCEDURE — 3078F DIAST BP <80 MM HG: CPT | Mod: CPTII,S$GLB,, | Performed by: NURSE PRACTITIONER

## 2018-12-10 PROCEDURE — 99214 OFFICE O/P EST MOD 30 MIN: CPT | Mod: 25,S$GLB,, | Performed by: NURSE PRACTITIONER

## 2018-12-10 PROCEDURE — 3045F PR MOST RECENT HEMOGLOBIN A1C LEVEL 7.0-9.0%: CPT | Mod: CPTII,S$GLB,, | Performed by: NURSE PRACTITIONER

## 2018-12-10 PROCEDURE — 99999 PR PBB SHADOW E&M-EST. PATIENT-LVL IV: CPT | Mod: PBBFAC,,, | Performed by: NURSE PRACTITIONER

## 2018-12-10 PROCEDURE — 3075F SYST BP GE 130 - 139MM HG: CPT | Mod: CPTII,S$GLB,, | Performed by: NURSE PRACTITIONER

## 2018-12-10 PROCEDURE — G0008 ADMIN INFLUENZA VIRUS VAC: HCPCS | Mod: S$GLB,,, | Performed by: NURSE PRACTITIONER

## 2018-12-10 PROCEDURE — 90686 IIV4 VACC NO PRSV 0.5 ML IM: CPT | Mod: S$GLB,,, | Performed by: NURSE PRACTITIONER

## 2018-12-10 PROCEDURE — 3008F BODY MASS INDEX DOCD: CPT | Mod: CPTII,S$GLB,, | Performed by: NURSE PRACTITIONER

## 2018-12-10 RX ORDER — INSULIN GLARGINE 100 [IU]/ML
INJECTION, SOLUTION SUBCUTANEOUS
Qty: 3 BOX | Refills: 2 | Status: SHIPPED | OUTPATIENT
Start: 2018-12-10 | End: 2019-01-21 | Stop reason: SDUPTHER

## 2018-12-10 NOTE — PROGRESS NOTES
CC: This 63 y.o. Black or  male  is here for evaluation of  T2DM along with comorbidities indicated in the Visit Diagnosis section of this encounter.    HPI: Gerson Phillips III was diagnosed with T2DM  > 20 years ago.   Previously followed by Dr. Hoskins with lov in (9/2017).       Prior visit on 7/20/18  Arrives today for  Continuous Glucose Monitoring (CGM) study f/u.   BGs continue to be high with average  mg/dL despite taking 12 units ac consistently.   Plan Reviewed CGM report in depth.   Stop V-Go - not delivering enough insulin for you.   Start Basaglar (long acting once daily insulin) 46 units every day.   Start Novolog Flexpen 16 units before each meal.   Test blood sugar 4x/day before each meal and bedtime.   Return to clinic in a month. Send a glucose log in 2 weeks.   Labs today.     Interval history  a1c down from 11 to 8.7% about 6 wks ago with change from Vgo to MDI.   Reports fluctuations in his BGs but he is also adjusting the dose of his Novolog by a few units each time and brings no logs.       LAST DIABETES EDUCATION: years ago     PRESCRIBED DIABETES MEDICATIONS: metformin 1000 mg bid,  Basaglar  46 units every day, Novolog Flexpen 16 units before each meal.       Misses medication doses - doesn't go out to eat a lot but if he does, then he will inject novolog prior to leaving his house.     Skips Novolog if premeal BG < 150.   He adjusts novolog anywhere from 13-18 units ac depending on his BG.     DM COMPLICATIONS: nephropathy and impotence    SIGNIFICANT DIABETES MED HISTORY:   Unable to afford Victoza     SELF MONITORING BLOOD GLUCOSE: Checks blood glucose at home - 3-4x/day. No logs. Recalls -   Fasting 78-200s - morning numbers fluctuate more and tend to be higher.   prelunch and predinner 100s.     HYPOGLYCEMIC EPISODES: BG drops to the 70s once or twice a week, either in the morning when he wakes up or in the daytime.   Keeps a coke on hand for correction.   Denies  "overnight symptoms.     Last occurred around 11 am; breakfast at 6 am earlier that day was coffee with AS and biscuit and he had injected 16 units of novolog. FBG that day was in the 180s.      CURRENT DIET: drinks Coke Zero and water. Eats 3 meals/day. No snacks. Eats out a lot on the road d/t nature of job.     CURRENT EXERCISE: none     OCCUPATION: works Sunday through Thursday. .       /78 (BP Location: Right arm, Patient Position: Sitting, BP Method: Large (Automatic))   Pulse 87   Ht 6' 2" (1.88 m)   Wt 116.2 kg (256 lb 2.8 oz)   BMI 32.89 kg/m²       ROS:   CONSTITUTIONAL: Appetite good, + fatigue  EYES: + visual disturbances damien when BG is high         PHYSICAL EXAM:  GENERAL: Well developed, well nourished. No acute distress.   PSYCH: AAOx3, appropriate mood and affect, conversant, well-groomed. Judgement and insight good.   NEURO: Cranial nerves grossly intact. Speech clear, no tremor.   CHEST: Respirations even and unlabored.         Hemoglobin A1C   Date Value Ref Range Status   10/29/2018 8.7 (H) 4.0 - 5.6 % Final     Comment:     ADA Screening Guidelines:  5.7-6.4%  Consistent with prediabetes  >or=6.5%  Consistent with diabetes  High levels of fetal hemoglobin interfere with the HbA1C  assay. Heterozygous hemoglobin variants (HbS, HgC, etc)do  not significantly interfere with this assay.   However, presence of multiple variants may affect accuracy.     08/17/2018 11.0 (H) 4.0 - 5.6 % Final     Comment:     ADA Screening Guidelines:  5.7-6.4%  Consistent with prediabetes  >or=6.5%  Consistent with diabetes  High levels of fetal hemoglobin interfere with the HbA1C  assay. Heterozygous hemoglobin variants (HbS, HgC, etc)do  not significantly interfere with this assay.   However, presence of multiple variants may affect accuracy.     07/20/2018 11.8 (H) 4.0 - 5.6 % Final     Comment:     ADA Screening Guidelines:  5.7-6.4%  Consistent with prediabetes  >or=6.5%  Consistent with " diabetes  High levels of fetal hemoglobin interfere with the HbA1C  assay. Heterozygous hemoglobin variants (HbS, HgC, etc)do  not significantly interfere with this assay.   However, presence of multiple variants may affect accuracy.             Chemistry        Component Value Date/Time     08/17/2018 0907    K 3.9 08/17/2018 0907     08/17/2018 0907    CO2 23 08/17/2018 0907    BUN 16 08/17/2018 0907    CREATININE 1.2 08/17/2018 0907     (H) 08/17/2018 0907        Component Value Date/Time    CALCIUM 9.4 08/17/2018 0907    ALKPHOS 90 03/02/2018 1025    AST 19 03/02/2018 1025    ALT 29 03/02/2018 1025    BILITOT 0.5 03/02/2018 1025    ESTGFRAFRICA >60.0 08/17/2018 0907    EGFRNONAA >60.0 08/17/2018 0907          Lab Results   Component Value Date    LDLCALC 54.0 (L) 03/02/2018        Ref. Range 3/2/2018 10:25   Cholesterol Latest Ref Range: 120 - 199 mg/dL 121   HDL Latest Ref Range: 40 - 75 mg/dL 49   LDL Cholesterol Latest Ref Range: 63.0 - 159.0 mg/dL 54.0 (L)   Total Cholesterol/HDL Ratio Latest Ref Range: 2.0 - 5.0  2.5   Triglycerides Latest Ref Range: 30 - 150 mg/dL 90     Lab Results   Component Value Date    MICALBCREAT 68.3 (H) 03/02/2018           STANDARDS of CARE:        ASA:               Last eye exam: 3/2018      ASSESSMENT and PLAN:    A1C GOAL: < 7.5%    1. Diabetes mellitus type 2, uncontrolled, with complications  Continue metformin.   Continue Basaglar.   Stop Novolog and start Ozempic, which has a lower risk of hypoglycemia and hopefully will help to stabilize blood sugars.     Start Ozempic. Inject 0.25 mg once weekly for 4 weeks, then increase to 0.5 mg weekly.     Test blood sugar 2x/day.     Return to clinic in 6 weeks. Labs prior.         Hemoglobin A1c    Basic metabolic panel   2. Microalbuminuria  Microalbumin/creatinine urine ratio  Continue quinapril    3. Hyperlipidemia LDL goal <70  Lipid panel  At goal         Orders Placed This Encounter   Procedures     Influenza - Quadrivalent (3 years & older) (PF)    Hemoglobin A1c     Standing Status:   Future     Standing Expiration Date:   2/8/2020    Basic metabolic panel     Standing Status:   Future     Standing Expiration Date:   2/8/2020    Microalbumin/creatinine urine ratio     Standing Status:   Future     Standing Expiration Date:   2/8/2020     Order Specific Question:   Specimen Source     Answer:   Urine    Lipid panel     Standing Status:   Future     Standing Expiration Date:   12/10/2019        Follow-up in about 6 weeks (around 1/21/2019).

## 2018-12-10 NOTE — PATIENT INSTRUCTIONS
Continue metformin.   Continue Basaglar.   Stop Novolog and start Ozempic, which has a lower risk of hypoglycemia and hopefully will help to stabilize blood sugars.     Start Ozempic. Inject 0.25 mg once weekly for 4 weeks, then increase to 0.5 mg weekly.     Test blood sugar 2x/day.     Return to clinic in 6 weeks.

## 2019-01-14 ENCOUNTER — LAB VISIT (OUTPATIENT)
Dept: LAB | Facility: HOSPITAL | Age: 64
End: 2019-01-14
Attending: NURSE PRACTITIONER
Payer: MEDICARE

## 2019-01-14 DIAGNOSIS — E78.5 HYPERLIPIDEMIA LDL GOAL <70: ICD-10-CM

## 2019-01-14 LAB
ANION GAP SERPL CALC-SCNC: 7 MMOL/L
BUN SERPL-MCNC: 18 MG/DL
CALCIUM SERPL-MCNC: 9.6 MG/DL
CHLORIDE SERPL-SCNC: 98 MMOL/L
CHOLEST SERPL-MCNC: 111 MG/DL
CHOLEST/HDLC SERPL: 2.4 {RATIO}
CO2 SERPL-SCNC: 28 MMOL/L
CREAT SERPL-MCNC: 1.2 MG/DL
EST. GFR  (AFRICAN AMERICAN): >60 ML/MIN/1.73 M^2
EST. GFR  (NON AFRICAN AMERICAN): >60 ML/MIN/1.73 M^2
ESTIMATED AVG GLUCOSE: 203 MG/DL
GLUCOSE SERPL-MCNC: 139 MG/DL
HBA1C MFR BLD HPLC: 8.7 %
HDLC SERPL-MCNC: 47 MG/DL
HDLC SERPL: 42.3 %
LDLC SERPL CALC-MCNC: 48.4 MG/DL
NONHDLC SERPL-MCNC: 64 MG/DL
POTASSIUM SERPL-SCNC: 3.9 MMOL/L
SODIUM SERPL-SCNC: 133 MMOL/L
TRIGL SERPL-MCNC: 78 MG/DL

## 2019-01-14 PROCEDURE — 80048 BASIC METABOLIC PNL TOTAL CA: CPT

## 2019-01-14 PROCEDURE — 36415 COLL VENOUS BLD VENIPUNCTURE: CPT | Mod: PN

## 2019-01-14 PROCEDURE — 83036 HEMOGLOBIN GLYCOSYLATED A1C: CPT

## 2019-01-14 PROCEDURE — 80061 LIPID PANEL: CPT

## 2019-01-16 RX ORDER — SEMAGLUTIDE 1.34 MG/ML
INJECTION, SOLUTION SUBCUTANEOUS
Qty: 1.5 SYRINGE | Refills: 0 | OUTPATIENT
Start: 2019-01-16

## 2019-01-16 NOTE — TELEPHONE ENCOUNTER
Is he feeling ok on the Ozempic? Any nausea, vomiting, constipation, or diarrhea?     If not, I would like to increase his Ozempic dose.

## 2019-01-16 NOTE — TELEPHONE ENCOUNTER
Patient called and stated that he is completely out of his Ozempic. He usually takes it on Monday but did not have a pen so was unable to take it this week. He has an appointment with provider on Mon 1/21/19 and would like a refill.

## 2019-01-17 NOTE — TELEPHONE ENCOUNTER
Called patient who stated he had mild nausea at first but thought it was from the Metformin. He said it is ok to increase his Ozempic because the nausea symptoms have subsided for a while.

## 2019-01-21 ENCOUNTER — OFFICE VISIT (OUTPATIENT)
Dept: ENDOCRINOLOGY | Facility: CLINIC | Age: 64
End: 2019-01-21
Payer: MEDICARE

## 2019-01-21 VITALS
SYSTOLIC BLOOD PRESSURE: 134 MMHG | WEIGHT: 249.31 LBS | HEIGHT: 74 IN | HEART RATE: 87 BPM | DIASTOLIC BLOOD PRESSURE: 76 MMHG | BODY MASS INDEX: 32 KG/M2

## 2019-01-21 DIAGNOSIS — R80.9 MICROALBUMINURIA: ICD-10-CM

## 2019-01-21 DIAGNOSIS — I10 ESSENTIAL HYPERTENSION: ICD-10-CM

## 2019-01-21 PROCEDURE — 99999 PR PBB SHADOW E&M-EST. PATIENT-LVL IV: ICD-10-PCS | Mod: PBBFAC,,, | Performed by: NURSE PRACTITIONER

## 2019-01-21 PROCEDURE — 3008F BODY MASS INDEX DOCD: CPT | Mod: CPTII,S$GLB,, | Performed by: NURSE PRACTITIONER

## 2019-01-21 PROCEDURE — 3045F PR MOST RECENT HEMOGLOBIN A1C LEVEL 7.0-9.0%: ICD-10-PCS | Mod: CPTII,S$GLB,, | Performed by: NURSE PRACTITIONER

## 2019-01-21 PROCEDURE — 99214 PR OFFICE/OUTPT VISIT, EST, LEVL IV, 30-39 MIN: ICD-10-PCS | Mod: S$GLB,,, | Performed by: NURSE PRACTITIONER

## 2019-01-21 PROCEDURE — 3075F SYST BP GE 130 - 139MM HG: CPT | Mod: CPTII,S$GLB,, | Performed by: NURSE PRACTITIONER

## 2019-01-21 PROCEDURE — 3045F PR MOST RECENT HEMOGLOBIN A1C LEVEL 7.0-9.0%: CPT | Mod: CPTII,S$GLB,, | Performed by: NURSE PRACTITIONER

## 2019-01-21 PROCEDURE — 99214 OFFICE O/P EST MOD 30 MIN: CPT | Mod: S$GLB,,, | Performed by: NURSE PRACTITIONER

## 2019-01-21 PROCEDURE — 3078F PR MOST RECENT DIASTOLIC BLOOD PRESSURE < 80 MM HG: ICD-10-PCS | Mod: CPTII,S$GLB,, | Performed by: NURSE PRACTITIONER

## 2019-01-21 PROCEDURE — 99999 PR PBB SHADOW E&M-EST. PATIENT-LVL IV: CPT | Mod: PBBFAC,,, | Performed by: NURSE PRACTITIONER

## 2019-01-21 PROCEDURE — 3008F PR BODY MASS INDEX (BMI) DOCUMENTED: ICD-10-PCS | Mod: CPTII,S$GLB,, | Performed by: NURSE PRACTITIONER

## 2019-01-21 PROCEDURE — 3078F DIAST BP <80 MM HG: CPT | Mod: CPTII,S$GLB,, | Performed by: NURSE PRACTITIONER

## 2019-01-21 PROCEDURE — 3075F PR MOST RECENT SYSTOLIC BLOOD PRESS GE 130-139MM HG: ICD-10-PCS | Mod: CPTII,S$GLB,, | Performed by: NURSE PRACTITIONER

## 2019-01-21 RX ORDER — INSULIN GLARGINE 100 [IU]/ML
INJECTION, SOLUTION SUBCUTANEOUS
Qty: 1 BOX | Refills: 5 | Status: SHIPPED | OUTPATIENT
Start: 2019-01-21 | End: 2020-01-22 | Stop reason: SDUPTHER

## 2019-01-21 RX ORDER — METFORMIN HYDROCHLORIDE 1000 MG/1
1000 TABLET ORAL 2 TIMES DAILY
Qty: 180 TABLET | Refills: 2 | Status: SHIPPED | OUTPATIENT
Start: 2019-01-21 | End: 2020-02-11

## 2019-01-21 NOTE — PROGRESS NOTES
CC: This 63 y.o. Black or  male  is here for evaluation of  T2DM along with comorbidities indicated in the Visit Diagnosis section of this encounter.    HPI: Gerson Phillips III was diagnosed with T2DM  > 20 years ago.   Previously followed by Dr. Hoskins with lov in (9/2017).         Prior visit on 12/10/18  a1c down from 11 to 8.7% about 6 wks ago with change from Vgo to MDI.   Reports fluctuations in his BGs but he is also adjusting the dose of his Novolog by a few units each time and brings no logs.   Plan Continue metformin.   Continue Basaglar.   Stop Novolog and start Ozempic, which has a lower risk of hypoglycemia and hopefully will help to stabilize blood sugars.   Start Ozempic. Inject 0.25 mg once weekly for 4 weeks, then increase to 0.5 mg weekly.   Test blood sugar 2x/day. Return to clinic in 6 weeks. Labs prior.     Interval history  a1c the same at 8.7%.     He has switched from novolog to ozempic weekly as advised. Has titrated to 0.5 mg weekly and was advised last week to increase to 1 mg weekly. However, he's been waiting for the last week for a refill from his pharmacy so was unable to take last week's dose.     He is tolerating Ozempic well - had a couple of episodes of nausea but none recent; finds it does cut his appetite but he is able to eat. He has lost 7 lb since lov.     LAST DIABETES EDUCATION: years ago     PRESCRIBED DIABETES MEDICATIONS: metformin 1000 mg bid,  Basaglar  46 units every day, Ozempic as above     Misses medication doses - no     DM COMPLICATIONS: nephropathy and impotence    SIGNIFICANT DIABETES MED HISTORY:   Unable to afford Victoza   Vgo stopped d/t ineffectiveness in 7/2018 and MDI (basalgar and novolog)  started     SELF MONITORING BLOOD GLUCOSE: Checks blood glucose at home - 2x/day                       HYPOGLYCEMIC EPISODES: none   Keeps a coke on hand for correction.     CURRENT DIET: drinks Coke Zero and water. Eats 3 meals/day. No snacks. Eats  "out a lot on the road d/t nature of job.     CURRENT EXERCISE: none     OCCUPATION: works Sunday through Thursday. .       /76 (BP Location: Right arm, Patient Position: Sitting, BP Method: Large (Automatic))   Pulse 87   Ht 6' 2" (1.88 m)   Wt 113.1 kg (249 lb 5.4 oz)   BMI 32.01 kg/m²       ROS:   CONSTITUTIONAL: Appetite good, + fatigue  RESPIRATORY: no dyspnea   CV: No chest pain   MS:         PHYSICAL EXAM:  GENERAL: Well developed, well nourished. No acute distress.   PSYCH: AAOx3, appropriate mood and affect, conversant, well-groomed. Judgement and insight good.   NEURO: Cranial nerves grossly intact. Speech clear, no tremor.   CHEST: Respirations even and unlabored.         Hemoglobin A1C   Date Value Ref Range Status   01/14/2019 8.7 (H) 4.0 - 5.6 % Final     Comment:     ADA Screening Guidelines:  5.7-6.4%  Consistent with prediabetes  >or=6.5%  Consistent with diabetes  High levels of fetal hemoglobin interfere with the HbA1C  assay. Heterozygous hemoglobin variants (HbS, HgC, etc)do  not significantly interfere with this assay.   However, presence of multiple variants may affect accuracy.     10/29/2018 8.7 (H) 4.0 - 5.6 % Final     Comment:     ADA Screening Guidelines:  5.7-6.4%  Consistent with prediabetes  >or=6.5%  Consistent with diabetes  High levels of fetal hemoglobin interfere with the HbA1C  assay. Heterozygous hemoglobin variants (HbS, HgC, etc)do  not significantly interfere with this assay.   However, presence of multiple variants may affect accuracy.     08/17/2018 11.0 (H) 4.0 - 5.6 % Final     Comment:     ADA Screening Guidelines:  5.7-6.4%  Consistent with prediabetes  >or=6.5%  Consistent with diabetes  High levels of fetal hemoglobin interfere with the HbA1C  assay. Heterozygous hemoglobin variants (HbS, HgC, etc)do  not significantly interfere with this assay.   However, presence of multiple variants may affect accuracy.             Chemistry        Component " Value Date/Time     (L) 01/14/2019 0813    K 3.9 01/14/2019 0813    CL 98 01/14/2019 0813    CO2 28 01/14/2019 0813    BUN 18 01/14/2019 0813    CREATININE 1.2 01/14/2019 0813     (H) 01/14/2019 0813        Component Value Date/Time    CALCIUM 9.6 01/14/2019 0813    ALKPHOS 90 03/02/2018 1025    AST 19 03/02/2018 1025    ALT 29 03/02/2018 1025    BILITOT 0.5 03/02/2018 1025    ESTGFRAFRICA >60 01/14/2019 0813    EGFRNONAA >60 01/14/2019 0813          Lab Results   Component Value Date    LDLCALC 48.4 (L) 01/14/2019        Ref. Range 3/2/2018 10:25   Cholesterol Latest Ref Range: 120 - 199 mg/dL 121   HDL Latest Ref Range: 40 - 75 mg/dL 49   LDL Cholesterol Latest Ref Range: 63.0 - 159.0 mg/dL 54.0 (L)   Total Cholesterol/HDL Ratio Latest Ref Range: 2.0 - 5.0  2.5   Triglycerides Latest Ref Range: 30 - 150 mg/dL 90     Lab Results   Component Value Date    MICALBCREAT 29.5 01/14/2019           STANDARDS of CARE:        ASA:               Last eye exam: 3/2018      ASSESSMENT and PLAN:    A1C GOAL: < 7.5%      1. Diabetes mellitus type 2, uncontrolled, with complications  Unsure why A1c is not better despite lower blood sugars noted from log.   Will continue same plan however - increase Ozempic to 1 mg once weekly.   Continue metformin and Basaglar at 46 units once daily.   Call if blood sugars drop less than 80 - will need to reduce Basaglar dose possibly to 38-40 units.     Test blood sugar 2x/day.     Call with any issues especially if blood sugars are low or you are unable to tolerate higher Ozempic dose.   Return to clinic in 5 months with labs prior.      2. Microalbuminuria  Improved and wnl now.   Continue quinapril   3. Essential hypertension  Continue current rx             No orders of the defined types were placed in this encounter.       Follow-up in about 5 months (around 6/21/2019).

## 2019-01-21 NOTE — PATIENT INSTRUCTIONS
Unsure why A1c is not better despite lower blood sugars noted from log.   Will continue same plan however - increase Ozempic to 1 mg once weekly.   Continue metformin and Basaglar at 46 units once daily.   Call if blood sugars drop less than 80 - will need to reduce Basaglar dose possibly to 38-40 units.     Test blood sugar 2x/day.     Call with any issues especially if blood sugars are low or you are unable to tolerate higher Ozempic dose.

## 2019-02-08 RX ORDER — HYDROCHLOROTHIAZIDE 25 MG/1
TABLET ORAL
Qty: 90 TABLET | Refills: 2 | Status: SHIPPED | OUTPATIENT
Start: 2019-02-08 | End: 2019-11-11 | Stop reason: SDUPTHER

## 2019-03-21 ENCOUNTER — TELEPHONE (OUTPATIENT)
Dept: FAMILY MEDICINE | Facility: CLINIC | Age: 64
End: 2019-03-21

## 2019-03-21 DIAGNOSIS — Z80.0 FAMILY HISTORY OF COLON CANCER: ICD-10-CM

## 2019-03-21 DIAGNOSIS — N40.0 BENIGN PROSTATIC HYPERPLASIA, UNSPECIFIED WHETHER LOWER URINARY TRACT SYMPTOMS PRESENT: ICD-10-CM

## 2019-03-21 NOTE — TELEPHONE ENCOUNTER
----- Message from Jihan Hickman sent at 3/21/2019  3:12 PM CDT -----  Contact: Self  Patient called to request lab orders prior to upcoming visit. Please call to advise at [phone]

## 2019-03-29 ENCOUNTER — TELEPHONE (OUTPATIENT)
Dept: SURGERY | Facility: CLINIC | Age: 64
End: 2019-03-29

## 2019-03-29 NOTE — TELEPHONE ENCOUNTER
Returning pt call , left .informed pt  needs labs drawn before pt 3 month f/u on 4/15/19. Informed pt he can go to any [facility] facility that has a lab and get them done or if he prefers to have an appt he can call back to have them scheduled.

## 2019-03-29 NOTE — TELEPHONE ENCOUNTER
----- Message from Sofya Marina sent at 3/29/2019  3:17 PM CDT -----  Contact: Self   Patient is rt a call. Please call at [phone].

## 2019-04-03 DIAGNOSIS — E11.9 TYPE 2 DIABETES MELLITUS WITHOUT COMPLICATION, UNSPECIFIED WHETHER LONG TERM INSULIN USE: ICD-10-CM

## 2019-04-08 ENCOUNTER — LAB VISIT (OUTPATIENT)
Dept: LAB | Facility: HOSPITAL | Age: 64
End: 2019-04-08
Attending: INTERNAL MEDICINE
Payer: MEDICARE

## 2019-04-08 DIAGNOSIS — N40.0 BENIGN PROSTATIC HYPERPLASIA, UNSPECIFIED WHETHER LOWER URINARY TRACT SYMPTOMS PRESENT: ICD-10-CM

## 2019-04-08 LAB
ALBUMIN SERPL BCP-MCNC: 3.8 G/DL (ref 3.5–5.2)
ALP SERPL-CCNC: 86 U/L (ref 55–135)
ALT SERPL W/O P-5'-P-CCNC: 24 U/L (ref 10–44)
ANION GAP SERPL CALC-SCNC: 7 MMOL/L (ref 8–16)
AST SERPL-CCNC: 14 U/L (ref 10–40)
BASOPHILS # BLD AUTO: 0.03 K/UL (ref 0–0.2)
BASOPHILS NFR BLD: 0.7 % (ref 0–1.9)
BILIRUB SERPL-MCNC: 0.5 MG/DL (ref 0.1–1)
BUN SERPL-MCNC: 14 MG/DL (ref 8–23)
CALCIUM SERPL-MCNC: 9.6 MG/DL (ref 8.7–10.5)
CHLORIDE SERPL-SCNC: 105 MMOL/L (ref 95–110)
CO2 SERPL-SCNC: 26 MMOL/L (ref 23–29)
COMPLEXED PSA SERPL-MCNC: 1.3 NG/ML (ref 0–4)
CREAT SERPL-MCNC: 1.3 MG/DL (ref 0.5–1.4)
DIFFERENTIAL METHOD: ABNORMAL
EOSINOPHIL # BLD AUTO: 0.1 K/UL (ref 0–0.5)
EOSINOPHIL NFR BLD: 2.5 % (ref 0–8)
ERYTHROCYTE [DISTWIDTH] IN BLOOD BY AUTOMATED COUNT: 14.3 % (ref 11.5–14.5)
EST. GFR  (AFRICAN AMERICAN): >60 ML/MIN/1.73 M^2
EST. GFR  (NON AFRICAN AMERICAN): 57.7 ML/MIN/1.73 M^2
ESTIMATED AVG GLUCOSE: 163 MG/DL (ref 68–131)
GLUCOSE SERPL-MCNC: 130 MG/DL (ref 70–110)
HBA1C MFR BLD HPLC: 7.3 % (ref 4–5.6)
HCT VFR BLD AUTO: 44.4 % (ref 40–54)
HGB BLD-MCNC: 13.8 G/DL (ref 14–18)
IMM GRANULOCYTES # BLD AUTO: 0.01 K/UL (ref 0–0.04)
IMM GRANULOCYTES NFR BLD AUTO: 0.2 % (ref 0–0.5)
LYMPHOCYTES # BLD AUTO: 1.7 K/UL (ref 1–4.8)
LYMPHOCYTES NFR BLD: 37.5 % (ref 18–48)
MCH RBC QN AUTO: 27 PG (ref 27–31)
MCHC RBC AUTO-ENTMCNC: 31.1 G/DL (ref 32–36)
MCV RBC AUTO: 87 FL (ref 82–98)
MONOCYTES # BLD AUTO: 0.5 K/UL (ref 0.3–1)
MONOCYTES NFR BLD: 11.6 % (ref 4–15)
NEUTROPHILS # BLD AUTO: 2.1 K/UL (ref 1.8–7.7)
NEUTROPHILS NFR BLD: 47.5 % (ref 38–73)
NRBC BLD-RTO: 0 /100 WBC
PLATELET # BLD AUTO: 217 K/UL (ref 150–350)
PMV BLD AUTO: 10.4 FL (ref 9.2–12.9)
POTASSIUM SERPL-SCNC: 4.1 MMOL/L (ref 3.5–5.1)
PROT SERPL-MCNC: 7.1 G/DL (ref 6–8.4)
RBC # BLD AUTO: 5.11 M/UL (ref 4.6–6.2)
SODIUM SERPL-SCNC: 138 MMOL/L (ref 136–145)
WBC # BLD AUTO: 4.4 K/UL (ref 3.9–12.7)

## 2019-04-08 PROCEDURE — 84153 ASSAY OF PSA TOTAL: CPT

## 2019-04-08 PROCEDURE — 80053 COMPREHEN METABOLIC PANEL: CPT

## 2019-04-08 PROCEDURE — 36415 COLL VENOUS BLD VENIPUNCTURE: CPT | Mod: PO

## 2019-04-08 PROCEDURE — 83036 HEMOGLOBIN GLYCOSYLATED A1C: CPT

## 2019-04-08 PROCEDURE — 85025 COMPLETE CBC W/AUTO DIFF WBC: CPT

## 2019-04-12 ENCOUNTER — DOCUMENTATION ONLY (OUTPATIENT)
Dept: ENDOCRINOLOGY | Facility: CLINIC | Age: 64
End: 2019-04-12

## 2019-04-12 NOTE — PROGRESS NOTES
Patients insurance does not cover Ozempic but prefers bydureon and patient would prefer this based on the huge difference in out of pocket costs so I have put in the rx for the substitute.

## 2019-04-23 RX ORDER — QUINAPRIL 40 MG/1
TABLET ORAL
Qty: 180 TABLET | Refills: 0 | Status: SHIPPED | OUTPATIENT
Start: 2019-04-23 | End: 2019-07-21 | Stop reason: SDUPTHER

## 2019-05-06 ENCOUNTER — OFFICE VISIT (OUTPATIENT)
Dept: FAMILY MEDICINE | Facility: CLINIC | Age: 64
End: 2019-05-06
Payer: MEDICARE

## 2019-05-06 VITALS
HEART RATE: 89 BPM | DIASTOLIC BLOOD PRESSURE: 76 MMHG | SYSTOLIC BLOOD PRESSURE: 136 MMHG | WEIGHT: 242.31 LBS | BODY MASS INDEX: 31.1 KG/M2 | OXYGEN SATURATION: 98 % | HEIGHT: 74 IN

## 2019-05-06 DIAGNOSIS — E11.29 DIABETES MELLITUS WITH PROTEINURIA: ICD-10-CM

## 2019-05-06 DIAGNOSIS — Z86.010 HISTORY OF COLONIC POLYPS: ICD-10-CM

## 2019-05-06 DIAGNOSIS — Z12.5 SCREENING FOR PROSTATE CANCER: ICD-10-CM

## 2019-05-06 DIAGNOSIS — E11.21 DIABETES MELLITUS WITH PROTEINURIC DIABETIC NEPHROPATHY: ICD-10-CM

## 2019-05-06 DIAGNOSIS — N52.1 DIABETES WITH CIRCULATORY DISORDER CAUSING ERECTILE DYSFUNCTION: ICD-10-CM

## 2019-05-06 DIAGNOSIS — M47.816 LUMBAR ARTHROPATHY: ICD-10-CM

## 2019-05-06 DIAGNOSIS — N18.30 CKD STAGE 3 DUE TO TYPE 2 DIABETES MELLITUS: ICD-10-CM

## 2019-05-06 DIAGNOSIS — E11.22 CKD STAGE 3 DUE TO TYPE 2 DIABETES MELLITUS: ICD-10-CM

## 2019-05-06 DIAGNOSIS — Z00.00 ROUTINE MEDICAL EXAM: Primary | ICD-10-CM

## 2019-05-06 DIAGNOSIS — E11.59 DIABETES WITH CIRCULATORY DISORDER CAUSING ERECTILE DYSFUNCTION: ICD-10-CM

## 2019-05-06 DIAGNOSIS — I10 ESSENTIAL HYPERTENSION: ICD-10-CM

## 2019-05-06 DIAGNOSIS — R80.9 DIABETES MELLITUS WITH PROTEINURIA: ICD-10-CM

## 2019-05-06 DIAGNOSIS — E11.65 UNCONTROLLED TYPE 2 DIABETES MELLITUS WITH HYPERGLYCEMIA: ICD-10-CM

## 2019-05-06 DIAGNOSIS — E78.2 MIXED HYPERLIPIDEMIA: ICD-10-CM

## 2019-05-06 DIAGNOSIS — Z79.4 LONG-TERM INSULIN USE: ICD-10-CM

## 2019-05-06 DIAGNOSIS — F39 MOOD DISORDER: ICD-10-CM

## 2019-05-06 PROCEDURE — 3008F PR BODY MASS INDEX (BMI) DOCUMENTED: ICD-10-PCS | Mod: CPTII,S$GLB,, | Performed by: INTERNAL MEDICINE

## 2019-05-06 PROCEDURE — 99999 PR PBB SHADOW E&M-EST. PATIENT-LVL III: ICD-10-PCS | Mod: PBBFAC,,, | Performed by: INTERNAL MEDICINE

## 2019-05-06 PROCEDURE — 99214 PR OFFICE/OUTPT VISIT, EST, LEVL IV, 30-39 MIN: ICD-10-PCS | Mod: S$GLB,,, | Performed by: INTERNAL MEDICINE

## 2019-05-06 PROCEDURE — 99214 OFFICE O/P EST MOD 30 MIN: CPT | Mod: S$GLB,,, | Performed by: INTERNAL MEDICINE

## 2019-05-06 PROCEDURE — 3078F DIAST BP <80 MM HG: CPT | Mod: CPTII,S$GLB,, | Performed by: INTERNAL MEDICINE

## 2019-05-06 PROCEDURE — 3075F SYST BP GE 130 - 139MM HG: CPT | Mod: CPTII,S$GLB,, | Performed by: INTERNAL MEDICINE

## 2019-05-06 PROCEDURE — 3045F PR MOST RECENT HEMOGLOBIN A1C LEVEL 7.0-9.0%: ICD-10-PCS | Mod: CPTII,S$GLB,, | Performed by: INTERNAL MEDICINE

## 2019-05-06 PROCEDURE — 99999 PR PBB SHADOW E&M-EST. PATIENT-LVL III: CPT | Mod: PBBFAC,,, | Performed by: INTERNAL MEDICINE

## 2019-05-06 PROCEDURE — 99499 UNLISTED E&M SERVICE: CPT | Mod: S$GLB,,, | Performed by: INTERNAL MEDICINE

## 2019-05-06 PROCEDURE — 3075F PR MOST RECENT SYSTOLIC BLOOD PRESS GE 130-139MM HG: ICD-10-PCS | Mod: CPTII,S$GLB,, | Performed by: INTERNAL MEDICINE

## 2019-05-06 PROCEDURE — 3008F BODY MASS INDEX DOCD: CPT | Mod: CPTII,S$GLB,, | Performed by: INTERNAL MEDICINE

## 2019-05-06 PROCEDURE — 3045F PR MOST RECENT HEMOGLOBIN A1C LEVEL 7.0-9.0%: CPT | Mod: CPTII,S$GLB,, | Performed by: INTERNAL MEDICINE

## 2019-05-06 PROCEDURE — 3078F PR MOST RECENT DIASTOLIC BLOOD PRESSURE < 80 MM HG: ICD-10-PCS | Mod: CPTII,S$GLB,, | Performed by: INTERNAL MEDICINE

## 2019-05-06 PROCEDURE — 99499 RISK ADDL DX/OHS AUDIT: ICD-10-PCS | Mod: S$GLB,,, | Performed by: INTERNAL MEDICINE

## 2019-05-06 NOTE — PROGRESS NOTES
Chief complaint, PHYSICAL---last appt 6 mo ago but should by every 3- last ENDO appt 5 mo ago    63-year-old black male  here for his physical.  Regarding health maintenance his PSA is normal at this time.History of colon polyps but normal in , normal 3/18 --5 years  with a 5 year follow-up recommended.  Living in [place]         ROS:   CONST: weight stable. EYES: no vision change. ENT: no sore throat. CV: no chest pain w/ exertion. RESP: no shortness of breath. GI: no nausea, vomiting, diarrhea. No dysphagia. : no urinary issues. MUSCULOSKELETAL: no new myalgias or arthralgias. SKIN: no new changes. NEURO: no focal deficits. PSYCH: no new issues. ENDOCRINE: no polyuria. HEME: no lymph nodes. ALLERGY: no general pruritis.       PAST MEDICAL HISTORY:                                                        1. Hypertension.                                                             2. Uncontrolled diabetes.  With renal disease and proteinuria. seen by ENDO                                                  3. Lumbar disk disease and DJD.  Seen by spine clinic in the past                                             4. Hyperlipidemia.  -good on lipitor                                                         5. Erectile dysfunction.                                                     6. History of mild right rotator cuff tendinitis.                            7. Colon polyps .   Normal 11/10 -normal 3/18 --5 years   8. Elevated ALT    9.  Insomnia  10.  Slight hemoglobin reduction                                                                                                                   PAST SURGICAL HISTORY:  None.                                                                                                                             FAMILY HISTORY:  Father  of smoking-related lung cancer.  Brother    of smoking-related lung cancer.  Mom  of breast cancer.  Other sibling   with  hypertension, diabetes, but no coronary artery disease and no other     new diagnoses.                                                                                                                                            SOCIAL HISTORY:   with 2 children.  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      X.       Vitals as above  Gen: no distress  EYES: conjunctiva clear, non-icteric, PERRL  ENT: nose clear, nasal mucosa normal, oropharynx clear and moist, teeth good  NECK:supple, thyroid non-palpable  RESP: effort is good, lungs clear  CV: heart RRR w/o murmur, gallops or rubs; no carotid bruits, no edema  GI: abdomen soft, non-distended, non-tender, no hepatosplenomegaly  MS: gait normal, no clubbing or cyanosis of the digits  SKIN: no rashes, warm to touch      Gerson was seen today for diabetes.    Diagnoses and all orders for this visit:    Routine medical exam, UTD on colonoscopy,  PSA and other blood work      Screening for prostate cancer  -     PSA, normal                                                    Additional evaluation and management issues:    Additionally, patient has numerous other medical issues to address separate from his physical.  here to check on his diabetes.  He was seen by endocrine. a1c down to 7.3.  he was changed to basal insulin as well as a weekly injection and taken off of his mealtime NovoLog..  He does note reduced appetite and reduced intake therefore allowing him to lose some weight which appears to be about 13 lb since last October.  Encouraged him to continue to work with that dietary reduction, weight loss and diabetes should continue to improve.  .  Still needs to be managed by some endocrine specialist of the endocrinologists he was seeing prior.  .  I would still recommend follow-up with endocrine given my lack of familiarity and adjusting medications.  He is due for lipid profile as well..    Blood pressure running under good control.  All  "the separate issues reviewed patient counseled and is evaluation and management will be based upon time counseling.Total time over 25 minutes with over 50% counseling.      Working part-time.  Some occasional low back pain after driving and we discussed this could be muscular he may well need to apply heat and do some stretching.  I made him aware of the healthy back program and other functional restoration programs should he have decreased function related to his back.              Assessment and plan:        Uncontrolled type 2 diabetes mellitus with microalbuminuria, with long-term current use of insulin, improving which is good and encouraged him to continue with lifestyle modification, weight loss and current meds which I did refill    Long-term insulin use, continue basal insulin    Mixed hyperlipidemia, chronic and stable    Essential hypertension, chronic and stable    Diabetes with circulatory disorder causing erectile dysfunction, chronic and stable    Uncontrolled type 2 diabetes mellitus with stage 3 chronic kidney disease, with long-term current use of insulin    Diabetes mellitus with proteinuric diabetic nephropathy    Diabetes mellitus with proteinuria    Mood disorder, chronic and stable    Lumbar arthropathy    CKD stage 3 due to type 2 diabetes mellitus, slight reduction in the past but GFR most recently normal    Diabetes mellitus with renal manifestations, uncontrolled    Uncontrolled type 2 diabetes mellitus with hyperglycemia    Other orders  -     semaglutide (OZEMPIC) 1 mg/dose (2 mg/1.5 mL) PnIj; Inject 1 mg into the skin every 7 days.              clinical note will be sensitive as I do need to document potential compliance issues with the uncontrolled hypertension but do not want that to stop patient from returning"This note will not be shared with the patient."  "

## 2019-06-17 RX ORDER — DILTIAZEM HYDROCHLORIDE 240 MG/1
CAPSULE, COATED, EXTENDED RELEASE ORAL
Qty: 90 CAPSULE | Refills: 12 | Status: SHIPPED | OUTPATIENT
Start: 2019-06-17 | End: 2020-06-30

## 2019-06-20 ENCOUNTER — TELEPHONE (OUTPATIENT)
Dept: ENDOCRINOLOGY | Facility: CLINIC | Age: 64
End: 2019-06-20

## 2019-07-08 ENCOUNTER — TELEPHONE (OUTPATIENT)
Dept: ENDOCRINOLOGY | Facility: CLINIC | Age: 64
End: 2019-07-08

## 2019-07-08 NOTE — TELEPHONE ENCOUNTER
----- Message from Justino Romero sent at 7/8/2019 12:31 PM CDT -----  Contact: Self  Type: Patient Call Back    Who called:self    What is the request in detail: Patient looking to reschedule his appointment. Please advise    Can the clinic reply by ANILNER? no    Would the patient rather a call back or a response via [facility]? call    Best call back number: [phone]

## 2019-07-22 RX ORDER — QUINAPRIL 40 MG/1
TABLET ORAL
Qty: 180 TABLET | Refills: 0 | Status: SHIPPED | OUTPATIENT
Start: 2019-07-22 | End: 2020-01-26

## 2019-08-02 ENCOUNTER — PATIENT OUTREACH (OUTPATIENT)
Dept: ADMINISTRATIVE | Facility: OTHER | Age: 64
End: 2019-08-02

## 2019-08-02 DIAGNOSIS — E11.9 TYPE 2 DIABETES MELLITUS WITHOUT COMPLICATION, UNSPECIFIED WHETHER LONG TERM INSULIN USE: Primary | ICD-10-CM

## 2019-08-05 ENCOUNTER — LAB VISIT (OUTPATIENT)
Dept: LAB | Facility: HOSPITAL | Age: 64
End: 2019-08-05
Attending: NURSE PRACTITIONER
Payer: COMMERCIAL

## 2019-08-05 ENCOUNTER — OFFICE VISIT (OUTPATIENT)
Dept: ENDOCRINOLOGY | Facility: CLINIC | Age: 64
End: 2019-08-05
Payer: COMMERCIAL

## 2019-08-05 VITALS
SYSTOLIC BLOOD PRESSURE: 139 MMHG | WEIGHT: 243.63 LBS | BODY MASS INDEX: 31.28 KG/M2 | DIASTOLIC BLOOD PRESSURE: 83 MMHG | HEART RATE: 81 BPM

## 2019-08-05 DIAGNOSIS — E66.9 NON MORBID OBESITY, UNSPECIFIED OBESITY TYPE: ICD-10-CM

## 2019-08-05 DIAGNOSIS — E78.5 HYPERLIPIDEMIA LDL GOAL <70: ICD-10-CM

## 2019-08-05 DIAGNOSIS — I10 ESSENTIAL HYPERTENSION: ICD-10-CM

## 2019-08-05 LAB
ESTIMATED AVG GLUCOSE: 174 MG/DL (ref 68–131)
HBA1C MFR BLD HPLC: 7.7 % (ref 4–5.6)

## 2019-08-05 PROCEDURE — 99999 PR PBB SHADOW E&M-EST. PATIENT-LVL III: ICD-10-PCS | Mod: PBBFAC,,, | Performed by: NURSE PRACTITIONER

## 2019-08-05 PROCEDURE — 99214 OFFICE O/P EST MOD 30 MIN: CPT | Mod: S$GLB,,, | Performed by: NURSE PRACTITIONER

## 2019-08-05 PROCEDURE — 3045F PR MOST RECENT HEMOGLOBIN A1C LEVEL 7.0-9.0%: ICD-10-PCS | Mod: CPTII,S$GLB,, | Performed by: NURSE PRACTITIONER

## 2019-08-05 PROCEDURE — 3045F PR MOST RECENT HEMOGLOBIN A1C LEVEL 7.0-9.0%: CPT | Mod: CPTII,S$GLB,, | Performed by: NURSE PRACTITIONER

## 2019-08-05 PROCEDURE — 99499 UNLISTED E&M SERVICE: CPT | Mod: S$GLB,,, | Performed by: NURSE PRACTITIONER

## 2019-08-05 PROCEDURE — 1101F PT FALLS ASSESS-DOCD LE1/YR: CPT | Mod: CPTII,S$GLB,, | Performed by: NURSE PRACTITIONER

## 2019-08-05 PROCEDURE — 3008F BODY MASS INDEX DOCD: CPT | Mod: CPTII,S$GLB,, | Performed by: NURSE PRACTITIONER

## 2019-08-05 PROCEDURE — 99214 PR OFFICE/OUTPT VISIT, EST, LEVL IV, 30-39 MIN: ICD-10-PCS | Mod: S$GLB,,, | Performed by: NURSE PRACTITIONER

## 2019-08-05 PROCEDURE — 3075F SYST BP GE 130 - 139MM HG: CPT | Mod: CPTII,S$GLB,, | Performed by: NURSE PRACTITIONER

## 2019-08-05 PROCEDURE — 83036 HEMOGLOBIN GLYCOSYLATED A1C: CPT

## 2019-08-05 PROCEDURE — 3008F PR BODY MASS INDEX (BMI) DOCUMENTED: ICD-10-PCS | Mod: CPTII,S$GLB,, | Performed by: NURSE PRACTITIONER

## 2019-08-05 PROCEDURE — 99999 PR PBB SHADOW E&M-EST. PATIENT-LVL III: CPT | Mod: PBBFAC,,, | Performed by: NURSE PRACTITIONER

## 2019-08-05 PROCEDURE — 3075F PR MOST RECENT SYSTOLIC BLOOD PRESS GE 130-139MM HG: ICD-10-PCS | Mod: CPTII,S$GLB,, | Performed by: NURSE PRACTITIONER

## 2019-08-05 PROCEDURE — 1101F PR PT FALLS ASSESS DOC 0-1 FALLS W/OUT INJ PAST YR: ICD-10-PCS | Mod: CPTII,S$GLB,, | Performed by: NURSE PRACTITIONER

## 2019-08-05 PROCEDURE — 3079F DIAST BP 80-89 MM HG: CPT | Mod: CPTII,S$GLB,, | Performed by: NURSE PRACTITIONER

## 2019-08-05 PROCEDURE — 36415 COLL VENOUS BLD VENIPUNCTURE: CPT | Mod: PN

## 2019-08-05 PROCEDURE — 3079F PR MOST RECENT DIASTOLIC BLOOD PRESSURE 80-89 MM HG: ICD-10-PCS | Mod: CPTII,S$GLB,, | Performed by: NURSE PRACTITIONER

## 2019-08-05 PROCEDURE — 99499 RISK ADDL DX/OHS AUDIT: ICD-10-PCS | Mod: S$GLB,,, | Performed by: NURSE PRACTITIONER

## 2019-08-05 NOTE — PROGRESS NOTES
CC: This 64 y.o. Black or  male  is here for evaluation of  T2DM along with comorbidities indicated in the Visit Diagnosis section of this encounter.    HPI: Gerson Phillips III was diagnosed with T2DM  > 20 years ago.   Previously followed by Dr. Hoskins with lov in (9/2017).         Prior visit 1/2019  a1c the same at 8.7%.   He has switched from novolog to ozempic weekly as advised. Has titrated to 0.5 mg weekly and was advised last week to increase to 1 mg weekly. However, he's been waiting for the last week for a refill from his pharmacy so was unable to take last week's dose.   He is tolerating Ozempic well - had a couple of episodes of nausea but none recent; finds it does cut his appetite but he is able to eat. He has lost 7 lb since lov.   Plan Unsure why A1c is not better despite lower blood sugars noted from log.   Will continue same plan however - increase Ozempic to 1 mg once weekly.   Continue metformin and Basaglar at 46 units once daily.   Call if blood sugars drop less than 80 - will need to reduce Basaglar dose possibly to 38-40 units.   Test blood sugar 2x/day.   Call with any issues especially if blood sugars are low or you are unable to tolerate higher Ozempic dose.   Return to clinic in 5 months with labs prior.     Interval history  a1c down 8.7 to 7.3%. He has lost 6 lb since last visit. Tolerating higher dose of ozempic of 1 mg weekly. Finds it cuts his appetite.     LAST DIABETES EDUCATION: years ago     PRESCRIBED DIABETES MEDICATIONS: metformin 1000 mg bid,  Basaglar  46 units every day, Ozempic 1 mg once weekly     Misses medication doses - no     DM COMPLICATIONS: nephropathy and impotence    SIGNIFICANT DIABETES MED HISTORY:   Unable to afford Victoza   Vgo stopped d/t ineffectiveness in 7/2018 and MDI (basalgar and novolog)  started   Novolog stopped and Ozempic started 12/2019.     SELF MONITORING BLOOD GLUCOSE: Checks blood glucose at home - 1x/day at different times.  Forgot log.   Recalls BGs range from 90-150s. He does not note any particular glucose patterns during the day.     HYPOGLYCEMIC EPISODES: felt his BG was low in the last couple of weeks once. Very infrequent overall. Was not able to test his BG at that time; it was during the day and he hadn't eat anything for several hours. Felt better after he ate.   Keeps a coke on hand for correction.     CURRENT DIET: drinks Coke Zero and water. Eats 3 meals/day. No snacks. Eats out a lot on the road d/t nature of job - makes his own sandwiches or eats a subway. Eats 1 sandwich at a time     CURRENT EXERCISE: none - will start walking on his days off.     OCCUPATION: -- has cut down his days - works now from Tuesday to Friday. .     SOCIAL: he has a 5 year old daughter.       /83 (BP Location: Right arm, Patient Position: Sitting, BP Method: Large (Automatic))   Pulse 81   Wt 110.5 kg (243 lb 9.6 oz)   BMI 31.28 kg/m²       ROS:   CONSTITUTIONAL: Appetite good, + fatigue  GI: Denies nausea, diarrhea, constipation  MS:         PHYSICAL EXAM:  GENERAL: Well developed, well nourished. No acute distress.   PSYCH: AAOx3, appropriate mood and affect, conversant, well-groomed. Judgement and insight good.   NEURO: Cranial nerves grossly intact. Speech clear, no tremor.   CHEST: Respirations even and unlabored.         Hemoglobin A1C   Date Value Ref Range Status   04/08/2019 7.3 (H) 4.0 - 5.6 % Final     Comment:     ADA Screening Guidelines:  5.7-6.4%  Consistent with prediabetes  >or=6.5%  Consistent with diabetes  High levels of fetal hemoglobin interfere with the HbA1C  assay. Heterozygous hemoglobin variants (HbS, HgC, etc)do  not significantly interfere with this assay.   However, presence of multiple variants may affect accuracy.     01/14/2019 8.7 (H) 4.0 - 5.6 % Final     Comment:     ADA Screening Guidelines:  5.7-6.4%  Consistent with prediabetes  >or=6.5%  Consistent with diabetes  High levels of fetal  hemoglobin interfere with the HbA1C  assay. Heterozygous hemoglobin variants (HbS, HgC, etc)do  not significantly interfere with this assay.   However, presence of multiple variants may affect accuracy.     10/29/2018 8.7 (H) 4.0 - 5.6 % Final     Comment:     ADA Screening Guidelines:  5.7-6.4%  Consistent with prediabetes  >or=6.5%  Consistent with diabetes  High levels of fetal hemoglobin interfere with the HbA1C  assay. Heterozygous hemoglobin variants (HbS, HgC, etc)do  not significantly interfere with this assay.   However, presence of multiple variants may affect accuracy.             Chemistry        Component Value Date/Time     04/08/2019 0838    K 4.1 04/08/2019 0838     04/08/2019 0838    CO2 26 04/08/2019 0838    BUN 14 04/08/2019 0838    CREATININE 1.3 04/08/2019 0838     (H) 04/08/2019 0838        Component Value Date/Time    CALCIUM 9.6 04/08/2019 0838    ALKPHOS 86 04/08/2019 0838    AST 14 04/08/2019 0838    ALT 24 04/08/2019 0838    BILITOT 0.5 04/08/2019 0838    ESTGFRAFRICA >60.0 04/08/2019 0838    EGFRNONAA 57.7 (A) 04/08/2019 0838          Lab Results   Component Value Date    LDLCALC 48.4 (L) 01/14/2019        Ref. Range 1/14/2019 08:13   Cholesterol Latest Ref Range: 120 - 199 mg/dL 111 (L)   HDL Latest Ref Range: 40 - 75 mg/dL 47   Hdl/Cholesterol Ratio Latest Ref Range: 20.0 - 50.0 % 42.3   LDL Cholesterol External Latest Ref Range: 63.0 - 159.0 mg/dL 48.4 (L)   Non-HDL Cholesterol Latest Units: mg/dL 64   Total Cholesterol/HDL Ratio Latest Ref Range: 2.0 - 5.0  2.4   Triglycerides Latest Ref Range: 30 - 150 mg/dL 78       Lab Results   Component Value Date    MICALBCREAT 29.5 01/14/2019           STANDARDS of CARE:        ASA:               Last eye exam: 3/2018      ASSESSMENT and PLAN:    A1C GOAL: < 7.5%    1. Diabetes mellitus type 2, uncontrolled, with complications  Start exercise - 1/2 hour a day and then increase to 1 hour a day.     a1c today and will call with  results.     Test blood glucose 2x/day.   Return to clinic in 3 months with a1c and bmp prior.     Hemoglobin A1c    Hemoglobin A1c   2. Essential hypertension  Basic metabolic panel   3. Hyperlipidemia LDL goal <70  At goal   4. Non morbid obesity, unspecified obesity type  Increases insulin resistance. Weight loss noted.   Will start exercise.        Orders Placed This Encounter   Procedures    Hemoglobin A1c     Standing Status:   Future     Standing Expiration Date:   10/3/2020    Hemoglobin A1c     Standing Status:   Future     Standing Expiration Date:   10/3/2020    Basic metabolic panel     Standing Status:   Future     Standing Expiration Date:   10/3/2020        Follow up in about 3 months (around 11/5/2019).

## 2019-08-05 NOTE — PATIENT INSTRUCTIONS
Start exercise - 1/2 hour a day and then increase to 1 hour a day.     a1c today and will call with results.   6  Test blood glucose 2x/day.   Return to clinic in 3 months with a1c and bmp prior.

## 2019-09-10 ENCOUNTER — PATIENT OUTREACH (OUTPATIENT)
Dept: ADMINISTRATIVE | Facility: OTHER | Age: 64
End: 2019-09-10

## 2019-09-30 RX ORDER — PEN NEEDLE, DIABETIC 30 GX3/16"
NEEDLE, DISPOSABLE MISCELLANEOUS
Qty: 100 EACH | Refills: 3 | Status: SHIPPED | OUTPATIENT
Start: 2019-09-30 | End: 2020-07-10 | Stop reason: SDUPTHER

## 2019-10-08 ENCOUNTER — PATIENT OUTREACH (OUTPATIENT)
Dept: ADMINISTRATIVE | Facility: OTHER | Age: 64
End: 2019-10-08

## 2019-10-31 ENCOUNTER — PATIENT OUTREACH (OUTPATIENT)
Dept: ADMINISTRATIVE | Facility: OTHER | Age: 64
End: 2019-10-31

## 2019-11-11 RX ORDER — HYDROCHLOROTHIAZIDE 25 MG/1
TABLET ORAL
Qty: 90 TABLET | Refills: 2 | Status: SHIPPED | OUTPATIENT
Start: 2019-11-11 | End: 2020-03-13 | Stop reason: DRUGHIGH

## 2019-11-21 ENCOUNTER — TELEPHONE (OUTPATIENT)
Dept: ENDOCRINOLOGY | Facility: CLINIC | Age: 64
End: 2019-11-21

## 2019-11-21 NOTE — PATIENT INSTRUCTIONS
.   Patient with N/V x1 day. Not feeling nauseated now. Is having contractions. Has several children at home flu +.     -will IV hydrate and r/o labor, if no cervical change then will d/c home with tamiflu script since has close contacts with flu.

## 2019-11-21 NOTE — TELEPHONE ENCOUNTER
----- Message from Nicole Nielsen LPN sent at 11/21/2019  1:15 PM CST -----  Contact: Self/  [phone]      ----- Message -----  From: Allyson Womack  Sent: 11/21/2019  12:50 PM CST  To: Karen Wilson Staff    Type: Patient Call Back    Who called:  Patient    What is the request in detail:  Patient would like staff to give him a call.  He stated his [employer] has dropped him.  He's also wanting to know if there's a cheaper insulin that can be called in for him.  Thank you    Would the patient rather a call back or a response via [facility]?   Call back    Best call back number:  [phone]

## 2019-11-21 NOTE — TELEPHONE ENCOUNTER
Called pt to give him Katie's advice regarding a cheaper insulin.Pt did not have a pen and paper at the time to write down the advice because he was driving. Pt stated he will call the office on tomorrow to get the advice. Pt is aware that the Novolin 70/30 is in a vial and requires a syringe. Pt stated he is a  and he needs a pen. Pt is concerned this may not be a good option for him.

## 2019-11-21 NOTE — TELEPHONE ENCOUNTER
The insulin vials will be the cheapest option. All other options are brand name only, expensive and about the same price as the Basaglar.     However, there is an oral form of Ozempic now called Rybelsus. With the coupon, it should be $10/month. He will need to text READY to [id number]. The offer ends at the end of the year. He will need to take it once daily first thing in the morning when he wakes up with only water and no more than 1/2 cup of water. He must wait at least 1/2 hour before eating or drinking any else, including his other meds. He can start the first dose of Rybelsus 1 week after his last dose of Ozempic.

## 2019-11-21 NOTE — TELEPHONE ENCOUNTER
He can take mixed insulin called Novolin 70/30 24 units twice daily before breakfast and dinner. It will need to be taken from vial and syringe. He can purchase it without a prescription at [facility] for $27 per vial.     He can test his BG 3x/day before breakfast, dinner, and bedtime and send a log in 2 weeks for further advice.

## 2019-11-22 NOTE — TELEPHONE ENCOUNTER
Spoke with pt on yesterday. He stated he will call back so that he could write down the advice from Katie. However, Katie has additional advice for the pt. Called pt today but was unable to reach him. LVM for him to call the office.

## 2019-11-26 ENCOUNTER — TELEPHONE (OUTPATIENT)
Dept: ENDOCRINOLOGY | Facility: CLINIC | Age: 64
End: 2019-11-26

## 2019-11-26 ENCOUNTER — PATIENT MESSAGE (OUTPATIENT)
Dept: ENDOCRINOLOGY | Facility: CLINIC | Age: 64
End: 2019-11-26

## 2019-11-26 NOTE — TELEPHONE ENCOUNTER
----- Message from Allyson Womack sent at 11/26/2019 12:57 PM CST -----  Contact: Self/  [phone]  Type: Patient Call Back    Who called:  Patient    What is the request in detail: Patient return staffs call from Friday, and would like staff to give him a call.  Thank you    Would the patient rather a call back or a response via [facility]?   Call back    Best call back number: [phone]

## 2019-11-26 NOTE — TELEPHONE ENCOUNTER
Spoke with pt to inform him of Katie's advice:    You can take mixed insulin called Novolin 70/30.   24 units twice daily before breakfast and dinner. It will need to be taken from vial and syringe. You can purchase it without a prescription at [facility] for $27 per vial. The insulin vials will be the cheapest option. All other options are brand name only, expensive and about the same price as the Basaglar.      Test your blood sugars  3 times a day before breakfast, dinner, and bedtime and send a blood sugar log in 2 weeks for further advice.    There is an oral form of Ozempic now called Rybelsus. With the coupon, it should be $10/month. You will need to text READY to "[employer] to get the coupon. The offer ends at the end of the year. You will need to take it once daily first thing in the morning when you wake up with only water (no more than 1/2 cup of water). You must wait at least 1/2 hour before eating or drinking any else, including your other meds. He can start the first dose of Rybelsus 1 week after his last dose of Ozempic. A prescription has been sent to [facility] pharmacy.    Pt wrote down advice and read correct advice back to me. Also, stated he understood advice. Pt  requested that I send him a message. Sent advice in the message through the pt portal. Pt stated he had no further questions or concerns.

## 2020-01-22 RX ORDER — INSULIN GLARGINE 100 [IU]/ML
INJECTION, SOLUTION SUBCUTANEOUS
Qty: 1 BOX | Refills: 12 | Status: SHIPPED | OUTPATIENT
Start: 2020-01-22 | End: 2020-07-10 | Stop reason: SDUPTHER

## 2020-01-22 NOTE — TELEPHONE ENCOUNTER
----- Message from Sheron Pizarro sent at 2020 11:02 AM CST -----  Contact: Wife Layo [phone]  Type: RX Refill Request    Who Called: Wife Layo    Have you contacted your pharmacy: Yes, was told the Rx has .    Refill or New Rx: Refill    RX Name and Strength: insulin (BASAGLAR KWIKPEN U-100 INSULIN) glargine 100 units/mL (3mL) SubQ pen    Is this a 30 day or 90 day RX: 90 day    Preferred Pharmacy with phone number:  .  [facility]/pharmacy #[id number] - [place], [place] - [address]  [address]  [place] [address]  Phone: [phone] Fax: [phone]    Local or Mail Order: Local    Would the patient rather a call back or a response via [facility]? Call back    Best Call Back Number: [phone]    Additional Information: Pt is out of town working. Was told by [facility] to have Dr Torrez update the Rx so that an out of town [facility] can pull it from the [facility]. Patient is out of his insulin and would like a refill for a couple of days. Please call wife when this is done.

## 2020-01-24 DIAGNOSIS — E11.9 TYPE 2 DIABETES MELLITUS WITHOUT COMPLICATION: ICD-10-CM

## 2020-01-24 DIAGNOSIS — E11.9 TYPE 2 DIABETES MELLITUS WITHOUT COMPLICATION, UNSPECIFIED WHETHER LONG TERM INSULIN USE: ICD-10-CM

## 2020-01-26 RX ORDER — QUINAPRIL 40 MG/1
TABLET ORAL
Qty: 180 TABLET | Refills: 0 | Status: SHIPPED | OUTPATIENT
Start: 2020-01-26 | End: 2020-04-23

## 2020-02-11 RX ORDER — METFORMIN HYDROCHLORIDE 1000 MG/1
TABLET ORAL
Qty: 180 TABLET | Refills: 0 | Status: SHIPPED | OUTPATIENT
Start: 2020-02-11 | End: 2020-07-10 | Stop reason: SDUPTHER

## 2020-02-11 NOTE — TELEPHONE ENCOUNTER
Spoke with pt and informed him that his refills for Metformin have been sent to the pharmacy and that it is time for a F/U appt with A1C prior. He stated he has an appt on 02/14/2020 with Dr. Guilleng he will see if he can do the labs on that appt.

## 2020-02-13 ENCOUNTER — PATIENT OUTREACH (OUTPATIENT)
Dept: ADMINISTRATIVE | Facility: OTHER | Age: 65
End: 2020-02-13

## 2020-02-13 NOTE — PROGRESS NOTES
Chart reviewed.   Requested updates from Care Everywhere.  Immunizations reconciled.   HM updated.    Eye appt 02/14/2020    a1c orders already placed.

## 2020-02-14 ENCOUNTER — LAB VISIT (OUTPATIENT)
Dept: LAB | Facility: HOSPITAL | Age: 65
End: 2020-02-14
Attending: INTERNAL MEDICINE
Payer: COMMERCIAL

## 2020-02-14 ENCOUNTER — OFFICE VISIT (OUTPATIENT)
Dept: FAMILY MEDICINE | Facility: CLINIC | Age: 65
End: 2020-02-14
Payer: COMMERCIAL

## 2020-02-14 ENCOUNTER — OFFICE VISIT (OUTPATIENT)
Dept: OPTOMETRY | Facility: CLINIC | Age: 65
End: 2020-02-14
Payer: COMMERCIAL

## 2020-02-14 VITALS
DIASTOLIC BLOOD PRESSURE: 70 MMHG | SYSTOLIC BLOOD PRESSURE: 126 MMHG | OXYGEN SATURATION: 99 % | BODY MASS INDEX: 30.21 KG/M2 | WEIGHT: 235.44 LBS | HEART RATE: 59 BPM | HEIGHT: 74 IN | TEMPERATURE: 98 F

## 2020-02-14 DIAGNOSIS — N52.1 DIABETES WITH CIRCULATORY DISORDER CAUSING ERECTILE DYSFUNCTION: ICD-10-CM

## 2020-02-14 DIAGNOSIS — H25.13 NUCLEAR SCLEROSIS OF BOTH EYES: ICD-10-CM

## 2020-02-14 DIAGNOSIS — E11.22 CKD STAGE 3 DUE TO TYPE 2 DIABETES MELLITUS: ICD-10-CM

## 2020-02-14 DIAGNOSIS — E11.59 DIABETES WITH CIRCULATORY DISORDER CAUSING ERECTILE DYSFUNCTION: ICD-10-CM

## 2020-02-14 DIAGNOSIS — H40.1132 PRIMARY OPEN ANGLE GLAUCOMA (POAG) OF BOTH EYES, MODERATE STAGE: Primary | ICD-10-CM

## 2020-02-14 DIAGNOSIS — E11.9 TYPE 2 DIABETES MELLITUS WITHOUT RETINOPATHY: ICD-10-CM

## 2020-02-14 DIAGNOSIS — D64.9 ANEMIA, UNSPECIFIED TYPE: ICD-10-CM

## 2020-02-14 DIAGNOSIS — R80.9 DIABETES MELLITUS WITH PROTEINURIA: ICD-10-CM

## 2020-02-14 DIAGNOSIS — N18.30 CKD STAGE 3 DUE TO TYPE 2 DIABETES MELLITUS: ICD-10-CM

## 2020-02-14 DIAGNOSIS — E78.5 HYPERLIPIDEMIA, UNSPECIFIED HYPERLIPIDEMIA TYPE: ICD-10-CM

## 2020-02-14 DIAGNOSIS — H52.7 REFRACTIVE ERROR: ICD-10-CM

## 2020-02-14 DIAGNOSIS — F39 MOOD DISORDER: ICD-10-CM

## 2020-02-14 DIAGNOSIS — Z80.0 FAMILY HISTORY OF COLON CANCER: ICD-10-CM

## 2020-02-14 DIAGNOSIS — E11.29 DIABETES MELLITUS WITH PROTEINURIA: ICD-10-CM

## 2020-02-14 DIAGNOSIS — E11.65 UNCONTROLLED TYPE 2 DIABETES MELLITUS WITH HYPERGLYCEMIA: ICD-10-CM

## 2020-02-14 DIAGNOSIS — E11.21 DIABETES MELLITUS WITH PROTEINURIC DIABETIC NEPHROPATHY: ICD-10-CM

## 2020-02-14 DIAGNOSIS — I10 ESSENTIAL HYPERTENSION: ICD-10-CM

## 2020-02-14 DIAGNOSIS — E78.2 MIXED HYPERLIPIDEMIA: ICD-10-CM

## 2020-02-14 DIAGNOSIS — Z79.4 LONG-TERM INSULIN USE: ICD-10-CM

## 2020-02-14 LAB
ALBUMIN SERPL BCP-MCNC: 4.3 G/DL (ref 3.5–5.2)
ALP SERPL-CCNC: 81 U/L (ref 55–135)
ALT SERPL W/O P-5'-P-CCNC: 19 U/L (ref 10–44)
ANION GAP SERPL CALC-SCNC: 9 MMOL/L (ref 8–16)
AST SERPL-CCNC: 15 U/L (ref 10–40)
BASOPHILS # BLD AUTO: 0.04 K/UL (ref 0–0.2)
BASOPHILS NFR BLD: 0.9 % (ref 0–1.9)
BILIRUB SERPL-MCNC: 0.3 MG/DL (ref 0.1–1)
BUN SERPL-MCNC: 14 MG/DL (ref 8–23)
CALCIUM SERPL-MCNC: 10 MG/DL (ref 8.7–10.5)
CHLORIDE SERPL-SCNC: 103 MMOL/L (ref 95–110)
CHOLEST SERPL-MCNC: 120 MG/DL (ref 120–199)
CHOLEST/HDLC SERPL: 2 {RATIO} (ref 2–5)
CO2 SERPL-SCNC: 29 MMOL/L (ref 23–29)
CREAT SERPL-MCNC: 1.2 MG/DL (ref 0.5–1.4)
DIFFERENTIAL METHOD: ABNORMAL
EOSINOPHIL # BLD AUTO: 0.1 K/UL (ref 0–0.5)
EOSINOPHIL NFR BLD: 3.1 % (ref 0–8)
ERYTHROCYTE [DISTWIDTH] IN BLOOD BY AUTOMATED COUNT: 13.9 % (ref 11.5–14.5)
EST. GFR  (AFRICAN AMERICAN): >60 ML/MIN/1.73 M^2
EST. GFR  (NON AFRICAN AMERICAN): >60 ML/MIN/1.73 M^2
ESTIMATED AVG GLUCOSE: 189 MG/DL (ref 68–131)
GLUCOSE SERPL-MCNC: 68 MG/DL (ref 70–110)
HBA1C MFR BLD HPLC: 8.2 % (ref 4–5.6)
HCT VFR BLD AUTO: 48.2 % (ref 40–54)
HDLC SERPL-MCNC: 59 MG/DL (ref 40–75)
HDLC SERPL: 49.2 % (ref 20–50)
HGB BLD-MCNC: 14.4 G/DL (ref 14–18)
IMM GRANULOCYTES # BLD AUTO: 0 K/UL (ref 0–0.04)
IMM GRANULOCYTES NFR BLD AUTO: 0 % (ref 0–0.5)
LDLC SERPL CALC-MCNC: 53.4 MG/DL (ref 63–159)
LEFT EYE DM RETINOPATHY: NEGATIVE
LYMPHOCYTES # BLD AUTO: 2 K/UL (ref 1–4.8)
LYMPHOCYTES NFR BLD: 44.8 % (ref 18–48)
MCH RBC QN AUTO: 27.8 PG (ref 27–31)
MCHC RBC AUTO-ENTMCNC: 29.9 G/DL (ref 32–36)
MCV RBC AUTO: 93 FL (ref 82–98)
MONOCYTES # BLD AUTO: 0.5 K/UL (ref 0.3–1)
MONOCYTES NFR BLD: 11.9 % (ref 4–15)
NEUTROPHILS # BLD AUTO: 1.8 K/UL (ref 1.8–7.7)
NEUTROPHILS NFR BLD: 39.3 % (ref 38–73)
NONHDLC SERPL-MCNC: 61 MG/DL
NRBC BLD-RTO: 0 /100 WBC
PLATELET # BLD AUTO: 177 K/UL (ref 150–350)
PMV BLD AUTO: 10.7 FL (ref 9.2–12.9)
POTASSIUM SERPL-SCNC: 3.8 MMOL/L (ref 3.5–5.1)
PROT SERPL-MCNC: 7.8 G/DL (ref 6–8.4)
RBC # BLD AUTO: 5.18 M/UL (ref 4.6–6.2)
RIGHT EYE DM RETINOPATHY: NEGATIVE
SODIUM SERPL-SCNC: 141 MMOL/L (ref 136–145)
TRIGL SERPL-MCNC: 38 MG/DL (ref 30–150)
TSH SERPL DL<=0.005 MIU/L-ACNC: 0.41 UIU/ML (ref 0.4–4)
WBC # BLD AUTO: 4.46 K/UL (ref 3.9–12.7)

## 2020-02-14 PROCEDURE — 83036 HEMOGLOBIN GLYCOSYLATED A1C: CPT

## 2020-02-14 PROCEDURE — 92014 PR EYE EXAM, EST PATIENT,COMPREHESV: ICD-10-PCS | Mod: S$GLB,,, | Performed by: OPTOMETRIST

## 2020-02-14 PROCEDURE — 92015 DETERMINE REFRACTIVE STATE: CPT | Mod: S$GLB,,, | Performed by: OPTOMETRIST

## 2020-02-14 PROCEDURE — 92133 POSTERIOR SEGMENT OCT OPTIC NERVE(OCULAR COHERENCE TOMOGRAPHY) - OU - BOTH EYES: ICD-10-PCS | Mod: S$GLB,,, | Performed by: OPTOMETRIST

## 2020-02-14 PROCEDURE — 80053 COMPREHEN METABOLIC PANEL: CPT

## 2020-02-14 PROCEDURE — 99214 PR OFFICE/OUTPT VISIT, EST, LEVL IV, 30-39 MIN: ICD-10-PCS | Mod: S$GLB,,, | Performed by: INTERNAL MEDICINE

## 2020-02-14 PROCEDURE — 1101F PR PT FALLS ASSESS DOC 0-1 FALLS W/OUT INJ PAST YR: ICD-10-PCS | Mod: CPTII,S$GLB,, | Performed by: INTERNAL MEDICINE

## 2020-02-14 PROCEDURE — 3074F PR MOST RECENT SYSTOLIC BLOOD PRESSURE < 130 MM HG: ICD-10-PCS | Mod: CPTII,S$GLB,, | Performed by: INTERNAL MEDICINE

## 2020-02-14 PROCEDURE — 3078F DIAST BP <80 MM HG: CPT | Mod: CPTII,S$GLB,, | Performed by: INTERNAL MEDICINE

## 2020-02-14 PROCEDURE — 3074F SYST BP LT 130 MM HG: CPT | Mod: CPTII,S$GLB,, | Performed by: INTERNAL MEDICINE

## 2020-02-14 PROCEDURE — 36415 COLL VENOUS BLD VENIPUNCTURE: CPT | Mod: PO

## 2020-02-14 PROCEDURE — 80061 LIPID PANEL: CPT

## 2020-02-14 PROCEDURE — 3051F HG A1C>EQUAL 7.0%<8.0%: CPT | Mod: CPTII,S$GLB,, | Performed by: INTERNAL MEDICINE

## 2020-02-14 PROCEDURE — 84443 ASSAY THYROID STIM HORMONE: CPT

## 2020-02-14 PROCEDURE — 92014 COMPRE OPH EXAM EST PT 1/>: CPT | Mod: S$GLB,,, | Performed by: OPTOMETRIST

## 2020-02-14 PROCEDURE — 92133 CPTRZD OPH DX IMG PST SGM ON: CPT | Mod: S$GLB,,, | Performed by: OPTOMETRIST

## 2020-02-14 PROCEDURE — 99999 PR PBB SHADOW E&M-EST. PATIENT-LVL III: CPT | Mod: PBBFAC,,, | Performed by: INTERNAL MEDICINE

## 2020-02-14 PROCEDURE — 99999 PR PBB SHADOW E&M-EST. PATIENT-LVL III: ICD-10-PCS | Mod: PBBFAC,,, | Performed by: INTERNAL MEDICINE

## 2020-02-14 PROCEDURE — 1101F PT FALLS ASSESS-DOCD LE1/YR: CPT | Mod: CPTII,S$GLB,, | Performed by: INTERNAL MEDICINE

## 2020-02-14 PROCEDURE — 92015 PR REFRACTION: ICD-10-PCS | Mod: S$GLB,,, | Performed by: OPTOMETRIST

## 2020-02-14 PROCEDURE — 99999 PR PBB SHADOW E&M-EST. PATIENT-LVL II: CPT | Mod: PBBFAC,,, | Performed by: OPTOMETRIST

## 2020-02-14 PROCEDURE — 3051F PR MOST RECENT HEMOGLOBIN A1C LEVEL 7.0 - < 8.0%: ICD-10-PCS | Mod: CPTII,S$GLB,, | Performed by: INTERNAL MEDICINE

## 2020-02-14 PROCEDURE — 85025 COMPLETE CBC W/AUTO DIFF WBC: CPT

## 2020-02-14 PROCEDURE — 99999 PR PBB SHADOW E&M-EST. PATIENT-LVL II: ICD-10-PCS | Mod: PBBFAC,,, | Performed by: OPTOMETRIST

## 2020-02-14 PROCEDURE — 3078F PR MOST RECENT DIASTOLIC BLOOD PRESSURE < 80 MM HG: ICD-10-PCS | Mod: CPTII,S$GLB,, | Performed by: INTERNAL MEDICINE

## 2020-02-14 PROCEDURE — 99214 OFFICE O/P EST MOD 30 MIN: CPT | Mod: S$GLB,,, | Performed by: INTERNAL MEDICINE

## 2020-02-14 PROCEDURE — 3008F PR BODY MASS INDEX (BMI) DOCUMENTED: ICD-10-PCS | Mod: CPTII,S$GLB,, | Performed by: INTERNAL MEDICINE

## 2020-02-14 PROCEDURE — 3008F BODY MASS INDEX DOCD: CPT | Mod: CPTII,S$GLB,, | Performed by: INTERNAL MEDICINE

## 2020-02-14 RX ORDER — LATANOPROST 50 UG/ML
1 SOLUTION/ DROPS OPHTHALMIC NIGHTLY
Qty: 7.5 ML | Refills: 2 | Status: SHIPPED | OUTPATIENT
Start: 2020-02-14 | End: 2020-07-17

## 2020-02-14 NOTE — PROGRESS NOTES
Chief complaint, follow-up on diabetes    64-year-old black male  here to check on his diabetes.  He was seen by endocrine. a1c down to 7.3, 7.7 - 6 mo ago.  He is now on basal insulin which was 46 with sometimes he takes 36 due to some low sugars at night.  He was on Medicare disability but then lost his disability since he is back to work.  He is now back on his wife's insurance with coverage.  He is now back on his weekly injection ofOzempic. .  He does note reduced appetite and reduced intake therefore allowing him to lose some weight which appears to be about 7 lb ..  Encouraged him to continue to work with that dietary reduction, weight loss and diabetes should continue to improve.  .  Still needs to be managed by some endocrine specialist of the endocrinologists he was seeing prior.  .  I would still recommend follow-up with endocrine given my lack of familiarity and adjusting medications.  He is due for lipid profile as well..    Blood pressure running under good control.      All issues reviewed patient counseled and is evaluation and management will be based upon time counseling.Total time over 25 minutes with over 50% counseling.      Working part-time.  Some occasional low back pain after driving and we discussed this could be muscular he may well need to apply heat and do some stretching.  I made him aware of the healthy back program and other functional restoration programs should he have decreased function related to his back.          Regarding health maintenance his PSA is normal at this time.History of colon polyps but normal in 2010, normal 3/18 --5 years  with a 5 year follow-up recommended.  Living in [place]         ROS:   CONST: weight stable. EYES: no vision change. ENT: no sore throat. CV: no chest pain w/ exertion. RESP: no shortness of breath. GI: no nausea, vomiting, diarrhea. No dysphagia. : no urinary issues. MUSCULOSKELETAL: no new myalgias or arthralgias. SKIN: no new changes.  NEURO: no focal deficits. PSYCH: no new issues. ENDOCRINE: no polyuria. HEME: no lymph nodes. ALLERGY: no general pruritis.       PAST MEDICAL HISTORY:                                                        1. Hypertension.                                                             2. Uncontrolled diabetes.  With renal disease and proteinuria. seen by ENDO                                                  3. Lumbar disk disease and DJD.  Seen by spine clinic in the past                                             4. Hyperlipidemia.  -good on lipitor                                                         5. Erectile dysfunction.                                                     6. History of mild right rotator cuff tendinitis.                            7. Colon polyps .   Normal 11/10 -normal 3/18 --5 years   8. Elevated ALT    9.  Insomnia  10.  Slight hemoglobin reduction                                                                                                                   PAST SURGICAL HISTORY:  None.                                                                                                                             FAMILY HISTORY:  Father  of smoking-related lung cancer.  Brother    of smoking-related lung cancer.  Mom  of breast cancer.  Other sibling   with hypertension, diabetes, but no coronary artery disease and no other     new diagnoses.                                                                                                                                            SOCIAL HISTORY:   with 2 children.  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      X.       Vitals as above  Gen: no distress    Exam otherwise deferred            Assessment and plan:    Gerson was seen today for diabetes.    Diagnoses and all orders for this visit:    Uncontrolled type 2 diabetes mellitus with microalbuminuria, with long-term current use of insulin,  "he is adjusting insulin, he is back on his weekly medication for about one month which is likely improving sugars but is A1c still may reflect the lack of the medication previously.  He has Endocrine follow-up in April.  -     Lipid panel; Future  -     Hemoglobin A1c; Future  -     Comprehensive metabolic panel; Future  -     CBC auto differential; Future  -     TSH; Future    Long-term insulin use    Mixed hyperlipidemia, reassess    Essential hypertension, chronic and stable    Diabetes with circulatory disorder causing erectile dysfunction    Uncontrolled type 2 diabetes mellitus with stage 3 chronic kidney disease, with long-term current use of insulin    Diabetes mellitus with proteinuric diabetic nephropathy    Diabetes mellitus with proteinuria    Mood disorder, chronic and stable    CKD stage 3 due to type 2 diabetes mellitus, reassess    Diabetes mellitus with renal manifestations, uncontrolled    Uncontrolled type 2 diabetes mellitus with hyperglycemia    Family history of colon cancer    Hyperlipidemia, unspecified hyperlipidemia type    Anemia, unspecified type, reassess            clinical note will be sensitive as I do need to document potential compliance issues with the uncontrolled hypertension but do not want that to stop patient from returning""This note will not be shared with the patient."  "

## 2020-02-14 NOTE — PROGRESS NOTES
Subjective:       Patient ID: Gerson Phillips III is a 64 y.o. male      Chief Complaint   Patient presents with    Concerns About Ocular Health    Glaucoma    Diabetic Eye Exam     History of Present Illness  Dls: 3/9/18 Dr. Contreras     65 y/o male presents today for diabetic eye exam and glaucoma ck.   Pt c/o problems seeing at night for driving with glare at night.   Pt c/o blurry vision at distance and near ou. Pt wears bifocal glasses.      this am     No tearing  + itching  + burning  No pain  No ha's  No floaters  No flashes    Eye meds  Latanoprost OU Q HS last dose x 2 nights ago    Hemoglobin A1C       Date                     Value               Ref Range           Status             08/05/2019               7.7 (H)             4.0 - 5.6 %         Final             04/08/2019               7.3 (H)             4.0 - 5.6 %         Final             01/14/2019               8.7 (H)             4.0 - 5.6 %         Final         Assessment/Plan:     1. Primary open angle glaucoma (POAG) of both eyes, moderate stage  Moderate Normal Tension Glaucoma OU  · FHx: ?  · Tbase (before treatment):  14 // 14  · Tmax: 14 // 14  · HVF defects (date: 03/09/18) : OD inf and sup nasal step // OS inf and sup nasal step  · OCT optic nerve (date: 2/14/2020) : abnormal  OD // abnormal OS    · CCT:  //   · Goal IOP:  Low teens  · Lasers/surgeries: none  · Current treatment: latanoprost QHS OU      - Posterior Segment OCT Optic Nerve- Both eyes  - Wolff Visual Field - OU - Extended - Both Eyes; Future  - latanoprost 0.005 % ophthalmic solution; Place 1 drop into both eyes every evening.  Dispense: 7.5 mL; Refill: 2    Continue latanoprost QHS OU. 6 month IOP/HVF/CCT      2. Type 2 diabetes mellitus without retinopathy  No diabetic retinopathy. Discussed with pt the effects of diabetes on vision, importance of good blood sugar control, compliance with meds, and follow up care with PCP. Return in 1 year for dilated eye exam,  sooner PRN.    3. Nuclear sclerosis of both eyes  Educated pt on presence of cataracts and effects on vision. No surgery at this time. Recheck in one year.    4. Refractive error  Educated patient on refractive error and discussed lens options. Dispensed updated spectacle Rx. Educated about adaptation period to new specs.    Eyeglass Final Rx     Eyeglass Final Rx       Sphere Cylinder Axis Add    Right -0.50 +0.50 085 +2.50    Left -0.25 +0.75 025 +2.50    Expiration Date:  2/14/2021                  Follow up in about 6 months (around 8/14/2020) for HVF 24-2, IOP check, CCT.

## 2020-03-12 ENCOUNTER — PATIENT OUTREACH (OUTPATIENT)
Dept: ADMINISTRATIVE | Facility: OTHER | Age: 65
End: 2020-03-12

## 2020-03-13 ENCOUNTER — OFFICE VISIT (OUTPATIENT)
Dept: ENDOCRINOLOGY | Facility: CLINIC | Age: 65
End: 2020-03-13
Payer: COMMERCIAL

## 2020-03-13 VITALS
SYSTOLIC BLOOD PRESSURE: 128 MMHG | HEART RATE: 71 BPM | DIASTOLIC BLOOD PRESSURE: 81 MMHG | BODY MASS INDEX: 29.83 KG/M2 | WEIGHT: 232.31 LBS

## 2020-03-13 DIAGNOSIS — E78.5 HYPERLIPIDEMIA LDL GOAL <70: ICD-10-CM

## 2020-03-13 DIAGNOSIS — I10 ESSENTIAL HYPERTENSION: ICD-10-CM

## 2020-03-13 PROCEDURE — 3079F DIAST BP 80-89 MM HG: CPT | Mod: CPTII,S$GLB,, | Performed by: NURSE PRACTITIONER

## 2020-03-13 PROCEDURE — 3008F BODY MASS INDEX DOCD: CPT | Mod: CPTII,S$GLB,, | Performed by: NURSE PRACTITIONER

## 2020-03-13 PROCEDURE — 3052F HG A1C>EQUAL 8.0%<EQUAL 9.0%: CPT | Mod: CPTII,S$GLB,, | Performed by: NURSE PRACTITIONER

## 2020-03-13 PROCEDURE — 99999 PR PBB SHADOW E&M-EST. PATIENT-LVL III: ICD-10-PCS | Mod: PBBFAC,,, | Performed by: NURSE PRACTITIONER

## 2020-03-13 PROCEDURE — 3008F PR BODY MASS INDEX (BMI) DOCUMENTED: ICD-10-PCS | Mod: CPTII,S$GLB,, | Performed by: NURSE PRACTITIONER

## 2020-03-13 PROCEDURE — 3074F PR MOST RECENT SYSTOLIC BLOOD PRESSURE < 130 MM HG: ICD-10-PCS | Mod: CPTII,S$GLB,, | Performed by: NURSE PRACTITIONER

## 2020-03-13 PROCEDURE — 3074F SYST BP LT 130 MM HG: CPT | Mod: CPTII,S$GLB,, | Performed by: NURSE PRACTITIONER

## 2020-03-13 PROCEDURE — 99214 PR OFFICE/OUTPT VISIT, EST, LEVL IV, 30-39 MIN: ICD-10-PCS | Mod: S$GLB,,, | Performed by: NURSE PRACTITIONER

## 2020-03-13 PROCEDURE — 1101F PR PT FALLS ASSESS DOC 0-1 FALLS W/OUT INJ PAST YR: ICD-10-PCS | Mod: CPTII,S$GLB,, | Performed by: NURSE PRACTITIONER

## 2020-03-13 PROCEDURE — 99214 OFFICE O/P EST MOD 30 MIN: CPT | Mod: S$GLB,,, | Performed by: NURSE PRACTITIONER

## 2020-03-13 PROCEDURE — 99999 PR PBB SHADOW E&M-EST. PATIENT-LVL III: CPT | Mod: PBBFAC,,, | Performed by: NURSE PRACTITIONER

## 2020-03-13 PROCEDURE — 1101F PT FALLS ASSESS-DOCD LE1/YR: CPT | Mod: CPTII,S$GLB,, | Performed by: NURSE PRACTITIONER

## 2020-03-13 PROCEDURE — 3079F PR MOST RECENT DIASTOLIC BLOOD PRESSURE 80-89 MM HG: ICD-10-PCS | Mod: CPTII,S$GLB,, | Performed by: NURSE PRACTITIONER

## 2020-03-13 PROCEDURE — 3052F PR MOST RECENT HEMOGLOBIN A1C LEVEL 8.0 - < 9.0%: ICD-10-PCS | Mod: CPTII,S$GLB,, | Performed by: NURSE PRACTITIONER

## 2020-03-13 RX ORDER — HYDROCHLOROTHIAZIDE 12.5 MG/1
12.5 TABLET ORAL DAILY
Qty: 30 TABLET | Refills: 11 | Status: SHIPPED | OUTPATIENT
Start: 2020-03-13 | End: 2020-06-01 | Stop reason: SDUPTHER

## 2020-03-13 NOTE — PATIENT INSTRUCTIONS
Continue metformin 1000 mg twice daily and Ozempic 1 mg once weekly.   Decrease Basaglar to 32 units once daily.  Test glucose 2x/day. Return to clinic in 3 months with labs prior. Call if blood sugars continue to drop less than 80. Would prefer for your blood sugars to be  before meals in order to avoid low glucoses while on the road.

## 2020-03-13 NOTE — PROGRESS NOTES
CC: This 64 y.o. Black or  male  is here for evaluation of  T2DM along with comorbidities indicated in the Visit Diagnosis section of this encounter.    HPI: Gerson Phillips III was diagnosed with T2DM  > 20 years ago.   Previously followed by Dr. Hoskins with lov in (9/2017).         Prior visit 8/5/19  a1c down 8.7 to 7.3%. He has lost 6 lb since last visit. Tolerating higher dose of ozempic of 1 mg weekly. Finds it cuts his appetite.   Plan Start exercise - 1/2 hour a day and then increase to 1 hour a day.   a1c today and will call with results.   Test blood glucose 2x/day.   Return to clinic in 3 months with a1c and bmp prior.       Interval history  a1c up from 7.7 to 8.2% about a month ago. Patient was without his insurance for 3 months and couldn't get his Ozempic at that time. He resumed Ozempic in late January.     Because his BGs were dropping into the 50-60s midday, he has been adjusting Basaglar based off of morning BGs. If BG < 90, he skips it. If BG 90-150s, he injects 36 units. If his BG is > 150, he injects 46.   This week, he has mostly injected 36 units.     He has lost 11 lb since last visit in August. He finds his appetite is lower.   BP this morning is 128/81. He has not taken his quinapril, diltiazem, or hctz this morning yet.         LAST DIABETES EDUCATION: years ago     PRESCRIBED DIABETES MEDICATIONS: metformin 1000 mg bid with food,  Basaglar 46 units every morning ~ 7 AM, Ozempic 1 mg once weekly     Misses medication doses - no     DM COMPLICATIONS: nephropathy and impotence    SIGNIFICANT DIABETES MED HISTORY:   Unable to afford Victoza   Vgo stopped d/t ineffectiveness in 7/2018 and MDI (basalgar and novolog)  started   Novolog stopped and Ozempic started 12/2019.       SELF MONITORING BLOOD GLUCOSE: Checks blood glucose at home - 2x/day. Forgot log.  FBGs 90-150s, depending on diet. Evenings 90-130s  '  HYPOGLYCEMIC EPISODES: lowest BG this week was 78 during the day and  he thinks he had taken 36 units that morning     CURRENT DIET: drinks Coke Zero and water. Eats 3 meals/day. No snacks. Eats out a lot on the road for dinner d/t nature of job - makes his own sandwiches for lunch/breakfast.  Eats 1 sandwich at a time     CURRENT EXERCISE: none     OCCUPATION:  .           /81 (BP Location: Right arm, Patient Position: Sitting, BP Method: Large (Automatic))   Pulse 71   Wt 105.4 kg (232 lb 4.8 oz)   BMI 29.83 kg/m²       ROS:   CONSTITUTIONAL: Appetite ok, + fatigue  GI: Denies nausea, diarrhea, constipation      PHYSICAL EXAM:  GENERAL: Well developed, well nourished. No acute distress.   PSYCH: AAOx3, appropriate mood and affect, conversant, well-groomed. Judgement and insight good.   NEURO: Cranial nerves grossly intact. Speech clear, no tremor.   CHEST: Respirations even and unlabored.   Foot exam: no wounds noted.       Hemoglobin A1C   Date Value Ref Range Status   02/14/2020 8.2 (H) 4.0 - 5.6 % Final     Comment:     ADA Screening Guidelines:  5.7-6.4%  Consistent with prediabetes  >or=6.5%  Consistent with diabetes  High levels of fetal hemoglobin interfere with the HbA1C  assay. Heterozygous hemoglobin variants (HbS, HgC, etc)do  not significantly interfere with this assay.   However, presence of multiple variants may affect accuracy.     08/05/2019 7.7 (H) 4.0 - 5.6 % Final     Comment:     ADA Screening Guidelines:  5.7-6.4%  Consistent with prediabetes  >or=6.5%  Consistent with diabetes  High levels of fetal hemoglobin interfere with the HbA1C  assay. Heterozygous hemoglobin variants (HbS, HgC, etc)do  not significantly interfere with this assay.   However, presence of multiple variants may affect accuracy.     04/08/2019 7.3 (H) 4.0 - 5.6 % Final     Comment:     ADA Screening Guidelines:  5.7-6.4%  Consistent with prediabetes  >or=6.5%  Consistent with diabetes  High levels of fetal hemoglobin interfere with the HbA1C  assay. Heterozygous  hemoglobin variants (HbS, HgC, etc)do  not significantly interfere with this assay.   However, presence of multiple variants may affect accuracy.             Chemistry        Component Value Date/Time     02/14/2020 1136    K 3.8 02/14/2020 1136     02/14/2020 1136    CO2 29 02/14/2020 1136    BUN 14 02/14/2020 1136    CREATININE 1.2 02/14/2020 1136    GLU 68 (L) 02/14/2020 1136        Component Value Date/Time    CALCIUM 10.0 02/14/2020 1136    ALKPHOS 81 02/14/2020 1136    AST 15 02/14/2020 1136    ALT 19 02/14/2020 1136    BILITOT 0.3 02/14/2020 1136    ESTGFRAFRICA >60.0 02/14/2020 1136    EGFRNONAA >60.0 02/14/2020 1136          Lab Results   Component Value Date    LDLCALC 53.4 (L) 02/14/2020        Ref. Range 2/14/2020 11:36   Cholesterol Latest Ref Range: 120 - 199 mg/dL 120   HDL Latest Ref Range: 40 - 75 mg/dL 59   Hdl/Cholesterol Ratio Latest Ref Range: 20.0 - 50.0 % 49.2   LDL Cholesterol External Latest Ref Range: 63.0 - 159.0 mg/dL 53.4 (L)   Non-HDL Cholesterol Latest Units: mg/dL 61   Total Cholesterol/HDL Ratio Latest Ref Range: 2.0 - 5.0  2.0   Triglycerides Latest Ref Range: 30 - 150 mg/dL 38       Lab Results   Component Value Date    MICALBCREAT 29.5 01/14/2019           STANDARDS of CARE:        ASA:               Last eye exam: 3/2018      ASSESSMENT and PLAN:    A1C GOAL: < 7.5%      1. Diabetes mellitus type 2, uncontrolled, with complications  Continue metformin 1000 mg twice daily and Ozempic 1 mg once weekly.   Decrease Basaglar to 32 units once daily.  Test glucose 2x/day.   Return to clinic in 3 months with labs prior. Call if blood sugars continue to drop less than 80. Would prefer for your blood sugars to be  before meals in order to avoid low glucoses while on the road.       Microalbumin/creatinine urine ratio    Hemoglobin A1c   2. Essential hypertension  DECREASE hctz to 12.5 mg once daily     hydroCHLOROthiazide (HYDRODIURIL) 12.5 MG Tab   3. Hyperlipidemia LDL  goal <70  At goal        Orders Placed This Encounter   Procedures    Microalbumin/creatinine urine ratio     Standing Status:   Future     Standing Expiration Date:   5/12/2021     Order Specific Question:   Specimen Source     Answer:   Urine    Hemoglobin A1c     Standing Status:   Future     Standing Expiration Date:   5/12/2021        Follow up in about 3 months (around 6/13/2020).

## 2020-04-06 RX ORDER — IBUPROFEN 800 MG/1
TABLET ORAL
Qty: 20 TABLET | Refills: 0 | Status: SHIPPED | OUTPATIENT
Start: 2020-04-06 | End: 2020-04-27

## 2020-04-23 RX ORDER — QUINAPRIL 40 MG/1
TABLET ORAL
Qty: 180 TABLET | Refills: 0 | Status: SHIPPED | OUTPATIENT
Start: 2020-04-23 | End: 2020-08-28 | Stop reason: SDUPTHER

## 2020-04-27 RX ORDER — IBUPROFEN 800 MG/1
TABLET ORAL
Qty: 20 TABLET | Refills: 0 | Status: SHIPPED | OUTPATIENT
Start: 2020-04-27 | End: 2020-06-09

## 2020-04-28 ENCOUNTER — TELEPHONE (OUTPATIENT)
Dept: ENDOCRINOLOGY | Facility: CLINIC | Age: 65
End: 2020-04-28

## 2020-04-28 NOTE — TELEPHONE ENCOUNTER
----- Message from Nasima Fritz MA sent at 4/28/2020  8:57 AM CDT -----  Contact: self  [phone]      ----- Message -----  From: Amina Ireland  Sent: 4/28/2020   8:51 AM CDT  To: [facility] Staff    .Type: Patient Call Back    Who called: self    What is the request in detail: pt stated that he had an episode with his diabetes around 4 pm yesteday and couldn't stop it from going down. He was able to get levels to go back to normal around 1 am this morning & feeling fine     Can the clinic reply by [facility]? Call back     Would the patient rather a call back or a response via [facility]?  Call back    Best call back number: [phone]

## 2020-04-28 NOTE — TELEPHONE ENCOUNTER
BG dropped to 60s at 4 pm. He got shaky and weak. Drank 20 ounce coke and BG carla to 90. Then dropped to 50s. Then he drank OJ and 6-7 peppermints through the night because BGs stayed < 100. He was checking his BG hourly. BGs finally carla to 140 around midnight. He skipped metformin last night.     FBG today was 96.     He drank 2 beers yesterday ~ 2 pm, which is not unusual for him. No alcohol on Sunday.   Breakfast yesterday was scrambled eggs and mcclellan, 2 slices of bread. Lunch was okra and shrimp, maybe a spoonful of rice but not much. Denies unusual amount of physical activity the last few days. He has been taking Basaglar 32 units every morning.   FBGs 130-140s  predinner 76-96    Advised - Recommend decreasing Basaglar from 32 to 24 units and take it every night. Reviewed importance of timing every ~ 24 hours. Continue metformin and Ozempic. Best to avoid alcohol as it can lower blood sugar too much. If you do drink alcohol, you need to make sure to eat a meal with carbohydrate.   To correct low blood sugar, correct with coke/3-4 peppermints and then once blood sugar is around 90 to 100, eat a snack with carbohydrate and protein like nuts, cheese, or meat.

## 2020-05-25 RX ORDER — IBUPROFEN 800 MG/1
TABLET ORAL
Qty: 20 TABLET | Refills: 0 | OUTPATIENT
Start: 2020-05-25

## 2020-06-01 DIAGNOSIS — I10 ESSENTIAL HYPERTENSION: ICD-10-CM

## 2020-06-01 RX ORDER — HYDROCHLOROTHIAZIDE 12.5 MG/1
12.5 TABLET ORAL DAILY
Qty: 90 TABLET | Refills: 1 | Status: SHIPPED | OUTPATIENT
Start: 2020-06-01 | End: 2020-08-19 | Stop reason: SDUPTHER

## 2020-06-05 ENCOUNTER — LAB VISIT (OUTPATIENT)
Dept: LAB | Facility: HOSPITAL | Age: 65
End: 2020-06-05
Attending: NURSE PRACTITIONER

## 2020-06-05 PROCEDURE — 83036 HEMOGLOBIN GLYCOSYLATED A1C: CPT

## 2020-06-05 PROCEDURE — 36415 COLL VENOUS BLD VENIPUNCTURE: CPT | Mod: PN

## 2020-06-06 LAB
ESTIMATED AVG GLUCOSE: 146 MG/DL (ref 68–131)
HBA1C MFR BLD HPLC: 6.7 % (ref 4–5.6)

## 2020-06-23 ENCOUNTER — TELEPHONE (OUTPATIENT)
Dept: FAMILY MEDICINE | Facility: CLINIC | Age: 65
End: 2020-06-23

## 2020-06-23 NOTE — TELEPHONE ENCOUNTER
----- Message from Allyson Womack sent at 6/23/2020  1:45 PM CDT -----  Regarding: Self/  [phone]  Type: Patient Call Back    Who called:  Patient    What is the request in detail:  Patient would like staff to give him a call regarding how his heart is beating.  Thank you    Would the patient rather a call back or a response via [facility]?   Call back    Best call back number:  [phone]

## 2020-07-09 ENCOUNTER — PATIENT OUTREACH (OUTPATIENT)
Dept: ADMINISTRATIVE | Facility: OTHER | Age: 65
End: 2020-07-09

## 2020-07-10 ENCOUNTER — OFFICE VISIT (OUTPATIENT)
Dept: ENDOCRINOLOGY | Facility: CLINIC | Age: 65
End: 2020-07-10
Payer: COMMERCIAL

## 2020-07-10 VITALS
DIASTOLIC BLOOD PRESSURE: 75 MMHG | SYSTOLIC BLOOD PRESSURE: 130 MMHG | TEMPERATURE: 98 F | WEIGHT: 234.69 LBS | HEART RATE: 68 BPM | BODY MASS INDEX: 30.13 KG/M2

## 2020-07-10 DIAGNOSIS — E11.9 CONTROLLED TYPE 2 DIABETES MELLITUS WITHOUT COMPLICATION, WITHOUT LONG-TERM CURRENT USE OF INSULIN: Primary | ICD-10-CM

## 2020-07-10 DIAGNOSIS — R00.2 PALPITATIONS: ICD-10-CM

## 2020-07-10 DIAGNOSIS — I10 ESSENTIAL HYPERTENSION: ICD-10-CM

## 2020-07-10 DIAGNOSIS — E78.5 HYPERLIPIDEMIA LDL GOAL <70: ICD-10-CM

## 2020-07-10 DIAGNOSIS — R21 RASH: ICD-10-CM

## 2020-07-10 PROCEDURE — 1101F PR PT FALLS ASSESS DOC 0-1 FALLS W/OUT INJ PAST YR: ICD-10-PCS | Mod: CPTII,S$GLB,, | Performed by: NURSE PRACTITIONER

## 2020-07-10 PROCEDURE — 99999 PR PBB SHADOW E&M-EST. PATIENT-LVL IV: CPT | Mod: PBBFAC,,, | Performed by: NURSE PRACTITIONER

## 2020-07-10 PROCEDURE — 99999 PR PBB SHADOW E&M-EST. PATIENT-LVL IV: ICD-10-PCS | Mod: PBBFAC,,, | Performed by: NURSE PRACTITIONER

## 2020-07-10 PROCEDURE — 3078F PR MOST RECENT DIASTOLIC BLOOD PRESSURE < 80 MM HG: ICD-10-PCS | Mod: CPTII,S$GLB,, | Performed by: NURSE PRACTITIONER

## 2020-07-10 PROCEDURE — 3075F SYST BP GE 130 - 139MM HG: CPT | Mod: CPTII,S$GLB,, | Performed by: NURSE PRACTITIONER

## 2020-07-10 PROCEDURE — 1159F PR MEDICATION LIST DOCUMENTED IN MEDICAL RECORD: ICD-10-PCS | Mod: S$GLB,,, | Performed by: NURSE PRACTITIONER

## 2020-07-10 PROCEDURE — 99214 OFFICE O/P EST MOD 30 MIN: CPT | Mod: S$GLB,,, | Performed by: NURSE PRACTITIONER

## 2020-07-10 PROCEDURE — 99214 PR OFFICE/OUTPT VISIT, EST, LEVL IV, 30-39 MIN: ICD-10-PCS | Mod: S$GLB,,, | Performed by: NURSE PRACTITIONER

## 2020-07-10 PROCEDURE — 1101F PT FALLS ASSESS-DOCD LE1/YR: CPT | Mod: CPTII,S$GLB,, | Performed by: NURSE PRACTITIONER

## 2020-07-10 PROCEDURE — 1126F PR PAIN SEVERITY QUANTIFIED, NO PAIN PRESENT: ICD-10-PCS | Mod: S$GLB,,, | Performed by: NURSE PRACTITIONER

## 2020-07-10 PROCEDURE — 3044F HG A1C LEVEL LT 7.0%: CPT | Mod: CPTII,S$GLB,, | Performed by: NURSE PRACTITIONER

## 2020-07-10 PROCEDURE — 3075F PR MOST RECENT SYSTOLIC BLOOD PRESS GE 130-139MM HG: ICD-10-PCS | Mod: CPTII,S$GLB,, | Performed by: NURSE PRACTITIONER

## 2020-07-10 PROCEDURE — 3008F PR BODY MASS INDEX (BMI) DOCUMENTED: ICD-10-PCS | Mod: CPTII,S$GLB,, | Performed by: NURSE PRACTITIONER

## 2020-07-10 PROCEDURE — 3044F PR MOST RECENT HEMOGLOBIN A1C LEVEL <7.0%: ICD-10-PCS | Mod: CPTII,S$GLB,, | Performed by: NURSE PRACTITIONER

## 2020-07-10 PROCEDURE — 3078F DIAST BP <80 MM HG: CPT | Mod: CPTII,S$GLB,, | Performed by: NURSE PRACTITIONER

## 2020-07-10 PROCEDURE — 1126F AMNT PAIN NOTED NONE PRSNT: CPT | Mod: S$GLB,,, | Performed by: NURSE PRACTITIONER

## 2020-07-10 PROCEDURE — 3008F BODY MASS INDEX DOCD: CPT | Mod: CPTII,S$GLB,, | Performed by: NURSE PRACTITIONER

## 2020-07-10 PROCEDURE — 1159F MED LIST DOCD IN RCRD: CPT | Mod: S$GLB,,, | Performed by: NURSE PRACTITIONER

## 2020-07-10 RX ORDER — SEMAGLUTIDE 1.34 MG/ML
INJECTION, SOLUTION SUBCUTANEOUS
Qty: 6 SYRINGE | Refills: 2 | Status: SHIPPED | OUTPATIENT
Start: 2020-07-10 | End: 2021-02-02 | Stop reason: SDUPTHER

## 2020-07-10 RX ORDER — PEN NEEDLE, DIABETIC 30 GX3/16"
NEEDLE, DISPOSABLE MISCELLANEOUS
Qty: 100 EACH | Refills: 3 | Status: SHIPPED | OUTPATIENT
Start: 2020-07-10 | End: 2021-02-02 | Stop reason: SDUPTHER

## 2020-07-10 RX ORDER — INSULIN GLARGINE 100 [IU]/ML
INJECTION, SOLUTION SUBCUTANEOUS
Qty: 2 BOX | Refills: 2 | Status: SHIPPED | OUTPATIENT
Start: 2020-07-10 | End: 2020-08-28

## 2020-07-10 RX ORDER — METFORMIN HYDROCHLORIDE 1000 MG/1
1000 TABLET ORAL 2 TIMES DAILY
Qty: 180 TABLET | Refills: 2 | Status: SHIPPED | OUTPATIENT
Start: 2020-07-10 | End: 2020-08-28 | Stop reason: SDUPTHER

## 2020-07-10 NOTE — PROGRESS NOTES
CC: This 65 y.o. Black or  male  is here for evaluation of  T2DM along with comorbidities indicated in the Visit Diagnosis section of this encounter.    HPI: Gerson Phillips III was diagnosed with T2DM  > 20 years ago.   Previously followed by Dr. Hoskins with lov in (9/2017).       Prior visit 3/13/20  a1c up from 7.7 to 8.2% about a month ago. Patient was without his insurance for 3 months and couldn't get his Ozempic at that time. He resumed Ozempic in late January.   Because his BGs were dropping into the 50-60s midday, he has been adjusting Basaglar based off of morning BGs. If BG < 90, he skips it. If BG 90-150s, he injects 36 units. If his BG is > 150, he injects 46.   This week, he has mostly injected 36 units.   He has lost 11 lb since last visit in August. He finds his appetite is lower.   BP this morning is 128/81. He has not taken his quinapril, diltiazem, or hctz this morning yet.   Plan Continue metformin 1000 mg twice daily and Ozempic 1 mg once weekly.   Decrease Basaglar to 32 units once daily.  Test glucose 2x/day.   Return to clinic in 3 months with labs prior. Call if blood sugars continue to drop less than 80. Would prefer for your blood sugars to be  before meals in order to avoid low glucoses while on the road.       Interval history  a1c is down from 8.2 in Feb to 6.7% in June, which is the lowest it's ever been. He has been watching his diet.   He suspects he has another shingles outbreak on his back. It itches and burns like his previous shingles rash.       LAST DIABETES EDUCATION: years ago     DIABETES MEDICATIONS: metformin 1000 mg bid with food,  Basaglar 30 units every morning ~ 7 AM, Ozempic 1 mg once weekly     Misses medication doses - no     DM COMPLICATIONS: nephropathy and impotence    SIGNIFICANT DIABETES MED HISTORY:   Unable to afford Victoza   Vgo stopped d/t ineffectiveness in 7/2018 and MDI (basalgar and novolog)  started   Novolog stopped and Ozempic  started 12/2019.       SELF MONITORING BLOOD GLUCOSE: Checks blood glucose at home - 2x/day.                  HYPOGLYCEMIC EPISODES:   BG drops to 70s about 1x every 2 weeks - morning and afternoon. One day BG remained in the 50-70s despite drinking a lot of coke and eating a meal. Not related to exercise or alcohol.     CURRENT DIET: drinks Coke Zero and water. Eats 3 meals/day. No snacks. Eats out a lot on the road for dinner d/t nature of job - makes his own sandwiches for lunch/breakfast.  Eats 1 sandwich at a time     CURRENT EXERCISE: walks a little     OCCUPATION:            /75 (BP Location: Right arm, Patient Position: Sitting, BP Method: Large (Automatic))   Pulse 68   Temp 98.1 °F (36.7 °C) (Oral)   Wt 106.5 kg (234 lb 11.2 oz)   BMI 30.13 kg/m²       ROS:   CONSTITUTIONAL: Appetite ok, + fatigue  SKIN: burning and itching to left upper back   CV: + palpitations - had a virtual visit scheduled with Dr. Torrez but was unable to complete it       PHYSICAL EXAM:  GENERAL: Well developed, well nourished. No acute distress.   PSYCH: AAOx3, appropriate mood and affect, conversant, well-groomed. Judgement and insight good.   NEURO: Cranial nerves grossly intact. Speech clear, no tremor.   CHEST: Respirations even and unlabored.   SKIN: RASH to left upper back         Hemoglobin A1C   Date Value Ref Range Status   06/05/2020 6.7 (H) 4.0 - 5.6 % Final     Comment:     ADA Screening Guidelines:  5.7-6.4%  Consistent with prediabetes  >or=6.5%  Consistent with diabetes  High levels of fetal hemoglobin interfere with the HbA1C  assay. Heterozygous hemoglobin variants (HbS, HgC, etc)do  not significantly interfere with this assay.   However, presence of multiple variants may affect accuracy.     02/14/2020 8.2 (H) 4.0 - 5.6 % Final     Comment:     ADA Screening Guidelines:  5.7-6.4%  Consistent with prediabetes  >or=6.5%  Consistent with diabetes  High levels of fetal hemoglobin interfere  with the HbA1C  assay. Heterozygous hemoglobin variants (HbS, HgC, etc)do  not significantly interfere with this assay.   However, presence of multiple variants may affect accuracy.     08/05/2019 7.7 (H) 4.0 - 5.6 % Final     Comment:     ADA Screening Guidelines:  5.7-6.4%  Consistent with prediabetes  >or=6.5%  Consistent with diabetes  High levels of fetal hemoglobin interfere with the HbA1C  assay. Heterozygous hemoglobin variants (HbS, HgC, etc)do  not significantly interfere with this assay.   However, presence of multiple variants may affect accuracy.             Chemistry        Component Value Date/Time     02/14/2020 1136    K 3.8 02/14/2020 1136     02/14/2020 1136    CO2 29 02/14/2020 1136    BUN 14 02/14/2020 1136    CREATININE 1.2 02/14/2020 1136    GLU 68 (L) 02/14/2020 1136        Component Value Date/Time    CALCIUM 10.0 02/14/2020 1136    ALKPHOS 81 02/14/2020 1136    AST 15 02/14/2020 1136    ALT 19 02/14/2020 1136    BILITOT 0.3 02/14/2020 1136    ESTGFRAFRICA >60.0 02/14/2020 1136    EGFRNONAA >60.0 02/14/2020 1136          Lab Results   Component Value Date    LDLCALC 53.4 (L) 02/14/2020        Ref. Range 2/14/2020 11:36   Cholesterol Latest Ref Range: 120 - 199 mg/dL 120   HDL Latest Ref Range: 40 - 75 mg/dL 59   Hdl/Cholesterol Ratio Latest Ref Range: 20.0 - 50.0 % 49.2   LDL Cholesterol External Latest Ref Range: 63.0 - 159.0 mg/dL 53.4 (L)   Non-HDL Cholesterol Latest Units: mg/dL 61   Total Cholesterol/HDL Ratio Latest Ref Range: 2.0 - 5.0  2.0   Triglycerides Latest Ref Range: 30 - 150 mg/dL 38       Lab Results   Component Value Date    MICALBCREAT 26.0 06/05/2020           STANDARDS of CARE:        ASA:               Last eye exam: 3/2018      ASSESSMENT and PLAN:    A1C GOAL: < 7.5%      1. Controlled type 2 diabetes mellitus without complication, without long-term current use of insulin  Reduce Basaglar from 30 to 26 units once daily to avoid low blood sugars. Patient is  at high risk of injury from hypoglycemia during his work as a .   Continue Ozempic and metformin.   Test glucose 2x/day.    Follow up in 3 months with virtual visit and labs prior. Pt wishes to stay in Endocrine at this time.       Hemoglobin A1C    Basic metabolic panel   2. Essential hypertension  controlled   3. Hyperlipidemia LDL goal <70  At goal   4. Rash  F/u with Primary   5. Palpitations  F/u with Primary          Orders Placed This Encounter   Procedures    Hemoglobin A1C     Standing Status:   Future     Standing Expiration Date:   9/8/2021    Basic metabolic panel     Standing Status:   Future     Standing Expiration Date:   9/8/2021        Follow up in about 3 months (around 10/10/2020).

## 2020-07-10 NOTE — PATIENT INSTRUCTIONS
Reduce Basaglar from 30 to 26 units once daily to avoid low blood sugars. Patient is at high risk of injury from hypoglycemia during his work as a .   Continue Ozempic and metformin.   Test glucose 2x/day.    Follow up in 3 months with virtual visit and labs prior. Pt wishes to stay in Endocrine at this time.

## 2020-07-14 ENCOUNTER — TELEPHONE (OUTPATIENT)
Dept: FAMILY MEDICINE | Facility: CLINIC | Age: 65
End: 2020-07-14

## 2020-07-14 NOTE — TELEPHONE ENCOUNTER
----- Message from Mica Ovalle sent at 7/14/2020  3:35 PM CDT -----  Type: Patient Call Back    Who called: Self     What is the request in detail: calling in regards to scheduling a sooner appt. With  [place] he thinks he may have shingles again. Pt. Stated he wont be back in town until Friday but would like to be seen when he arrives in town.     Can the clinic reply by [facility]? Call back     Would the patient rather a call back or a response via [facility]? Call back     Best call back number: [phone]

## 2020-08-19 DIAGNOSIS — I10 ESSENTIAL HYPERTENSION: ICD-10-CM

## 2020-08-20 RX ORDER — HYDROCHLOROTHIAZIDE 12.5 MG/1
12.5 TABLET ORAL DAILY
Qty: 90 TABLET | Refills: 1 | Status: SHIPPED | OUTPATIENT
Start: 2020-08-20 | End: 2020-08-21 | Stop reason: SDUPTHER

## 2020-08-21 DIAGNOSIS — I10 ESSENTIAL HYPERTENSION: ICD-10-CM

## 2020-08-21 RX ORDER — HYDROCHLOROTHIAZIDE 12.5 MG/1
12.5 TABLET ORAL DAILY
Qty: 90 TABLET | Refills: 1 | Status: SHIPPED | OUTPATIENT
Start: 2020-08-21 | End: 2020-08-28 | Stop reason: SDUPTHER

## 2020-08-28 ENCOUNTER — LAB VISIT (OUTPATIENT)
Dept: LAB | Facility: HOSPITAL | Age: 65
End: 2020-08-28
Attending: INTERNAL MEDICINE
Payer: MEDICARE

## 2020-08-28 ENCOUNTER — OFFICE VISIT (OUTPATIENT)
Dept: FAMILY MEDICINE | Facility: CLINIC | Age: 65
End: 2020-08-28
Payer: MEDICARE

## 2020-08-28 VITALS
HEART RATE: 83 BPM | HEIGHT: 74 IN | OXYGEN SATURATION: 97 % | BODY MASS INDEX: 30.36 KG/M2 | DIASTOLIC BLOOD PRESSURE: 70 MMHG | TEMPERATURE: 99 F | WEIGHT: 236.56 LBS | SYSTOLIC BLOOD PRESSURE: 138 MMHG

## 2020-08-28 DIAGNOSIS — Z00.00 ROUTINE MEDICAL EXAM: Primary | ICD-10-CM

## 2020-08-28 DIAGNOSIS — E78.2 MIXED HYPERLIPIDEMIA: ICD-10-CM

## 2020-08-28 DIAGNOSIS — Z12.5 SCREENING FOR PROSTATE CANCER: ICD-10-CM

## 2020-08-28 DIAGNOSIS — N40.0 BENIGN PROSTATIC HYPERPLASIA, UNSPECIFIED WHETHER LOWER URINARY TRACT SYMPTOMS PRESENT: ICD-10-CM

## 2020-08-28 DIAGNOSIS — B02.8 HERPES ZOSTER WITH COMPLICATION: ICD-10-CM

## 2020-08-28 DIAGNOSIS — Z79.4 LONG-TERM INSULIN USE: ICD-10-CM

## 2020-08-28 DIAGNOSIS — I10 ESSENTIAL HYPERTENSION: ICD-10-CM

## 2020-08-28 LAB
COMPLEXED PSA SERPL-MCNC: 1.2 NG/ML (ref 0–4)
ESTIMATED AVG GLUCOSE: 148 MG/DL (ref 68–131)
HBA1C MFR BLD HPLC: 6.8 % (ref 4–5.6)

## 2020-08-28 PROCEDURE — 3044F PR MOST RECENT HEMOGLOBIN A1C LEVEL <7.0%: ICD-10-PCS | Mod: CPTII,S$GLB,, | Performed by: INTERNAL MEDICINE

## 2020-08-28 PROCEDURE — 1159F MED LIST DOCD IN RCRD: CPT | Mod: S$GLB,,, | Performed by: INTERNAL MEDICINE

## 2020-08-28 PROCEDURE — 3075F SYST BP GE 130 - 139MM HG: CPT | Mod: CPTII,S$GLB,, | Performed by: INTERNAL MEDICINE

## 2020-08-28 PROCEDURE — 3078F PR MOST RECENT DIASTOLIC BLOOD PRESSURE < 80 MM HG: ICD-10-PCS | Mod: CPTII,S$GLB,, | Performed by: INTERNAL MEDICINE

## 2020-08-28 PROCEDURE — 3044F HG A1C LEVEL LT 7.0%: CPT | Mod: CPTII,S$GLB,, | Performed by: INTERNAL MEDICINE

## 2020-08-28 PROCEDURE — 83036 HEMOGLOBIN GLYCOSYLATED A1C: CPT

## 2020-08-28 PROCEDURE — 99397 PR PREVENTIVE VISIT,EST,65 & OVER: ICD-10-PCS | Mod: S$GLB,,, | Performed by: INTERNAL MEDICINE

## 2020-08-28 PROCEDURE — 1101F PR PT FALLS ASSESS DOC 0-1 FALLS W/OUT INJ PAST YR: ICD-10-PCS | Mod: CPTII,S$GLB,, | Performed by: INTERNAL MEDICINE

## 2020-08-28 PROCEDURE — 3075F PR MOST RECENT SYSTOLIC BLOOD PRESS GE 130-139MM HG: ICD-10-PCS | Mod: CPTII,S$GLB,, | Performed by: INTERNAL MEDICINE

## 2020-08-28 PROCEDURE — 1159F PR MEDICATION LIST DOCUMENTED IN MEDICAL RECORD: ICD-10-PCS | Mod: S$GLB,,, | Performed by: INTERNAL MEDICINE

## 2020-08-28 PROCEDURE — 99397 PER PM REEVAL EST PAT 65+ YR: CPT | Mod: S$GLB,,, | Performed by: INTERNAL MEDICINE

## 2020-08-28 PROCEDURE — 99214 OFFICE O/P EST MOD 30 MIN: CPT | Mod: 25,S$GLB,, | Performed by: INTERNAL MEDICINE

## 2020-08-28 PROCEDURE — 84153 ASSAY OF PSA TOTAL: CPT

## 2020-08-28 PROCEDURE — 99999 PR PBB SHADOW E&M-EST. PATIENT-LVL III: ICD-10-PCS | Mod: PBBFAC,,, | Performed by: INTERNAL MEDICINE

## 2020-08-28 PROCEDURE — 3008F BODY MASS INDEX DOCD: CPT | Mod: CPTII,S$GLB,, | Performed by: INTERNAL MEDICINE

## 2020-08-28 PROCEDURE — 3078F DIAST BP <80 MM HG: CPT | Mod: CPTII,S$GLB,, | Performed by: INTERNAL MEDICINE

## 2020-08-28 PROCEDURE — 99214 PR OFFICE/OUTPT VISIT, EST, LEVL IV, 30-39 MIN: ICD-10-PCS | Mod: 25,S$GLB,, | Performed by: INTERNAL MEDICINE

## 2020-08-28 PROCEDURE — 3008F PR BODY MASS INDEX (BMI) DOCUMENTED: ICD-10-PCS | Mod: CPTII,S$GLB,, | Performed by: INTERNAL MEDICINE

## 2020-08-28 PROCEDURE — 99999 PR PBB SHADOW E&M-EST. PATIENT-LVL III: CPT | Mod: PBBFAC,,, | Performed by: INTERNAL MEDICINE

## 2020-08-28 PROCEDURE — 36415 COLL VENOUS BLD VENIPUNCTURE: CPT | Mod: PO

## 2020-08-28 PROCEDURE — 1101F PT FALLS ASSESS-DOCD LE1/YR: CPT | Mod: CPTII,S$GLB,, | Performed by: INTERNAL MEDICINE

## 2020-08-28 RX ORDER — DILTIAZEM HYDROCHLORIDE 240 MG/1
240 CAPSULE, COATED, EXTENDED RELEASE ORAL DAILY
Qty: 90 CAPSULE | Refills: 12 | Status: SHIPPED | OUTPATIENT
Start: 2020-08-28 | End: 2021-11-23 | Stop reason: SDUPTHER

## 2020-08-28 RX ORDER — HYDROCHLOROTHIAZIDE 12.5 MG/1
12.5 TABLET ORAL DAILY
Qty: 90 TABLET | Refills: 1 | Status: SHIPPED | OUTPATIENT
Start: 2020-08-28 | End: 2020-10-30 | Stop reason: SDUPTHER

## 2020-08-28 RX ORDER — INSULIN GLARGINE 100 [IU]/ML
INJECTION, SOLUTION SUBCUTANEOUS
Qty: 100 ML | Refills: 12 | Status: SHIPPED | OUTPATIENT
Start: 2020-08-28 | End: 2020-12-18 | Stop reason: CLARIF

## 2020-08-28 RX ORDER — ATORVASTATIN CALCIUM 20 MG/1
20 TABLET, FILM COATED ORAL DAILY
Qty: 90 TABLET | Refills: 3 | Status: SHIPPED | OUTPATIENT
Start: 2020-08-28 | End: 2021-01-27 | Stop reason: SDUPTHER

## 2020-08-28 RX ORDER — QUINAPRIL 40 MG/1
40 TABLET ORAL 2 TIMES DAILY
Qty: 180 TABLET | Refills: 12 | Status: SHIPPED | OUTPATIENT
Start: 2020-08-28 | End: 2021-01-26 | Stop reason: SDUPTHER

## 2020-08-28 RX ORDER — METFORMIN HYDROCHLORIDE 1000 MG/1
1000 TABLET ORAL 2 TIMES DAILY
Qty: 180 TABLET | Refills: 2 | Status: SHIPPED | OUTPATIENT
Start: 2020-08-28 | End: 2021-01-26 | Stop reason: SDUPTHER

## 2020-08-28 NOTE — PROGRESS NOTES
Chief complaint, physical exam    65-year-old black male  here for his physical.  Looks like he is due for his PSA.  He is up-to-date on his colonoscopy which was updated in 2018.  He is trying to be active.  He did gain a little bit of weight due to decreased activity.   Regarding health maintenance his PSA is normal at this time.History of colon polyps but normal in 2010, normal 3/18 --5 years  with a 5 year follow-up recommended.  Living in [place]                              ROS:   CONST: weight stable. EYES: no vision change. ENT: no sore throat. CV: no chest pain w/ exertion. RESP: no shortness of breath. GI: no nausea, vomiting, diarrhea. No dysphagia. : no urinary issues. MUSCULOSKELETAL: no new myalgias or arthralgias. SKIN: no new changes. NEURO: no focal deficits. PSYCH: no new issues. ENDOCRINE: no polyuria. HEME: no lymph nodes. ALLERGY: no general pruritis.       PAST MEDICAL HISTORY:                                                        1. Hypertension.                                                             2. Uncontrolled diabetes.  With renal disease and proteinuria. seen by ENDO 2012                                                 3. Lumbar disk disease and DJD.  Seen by spine clinic in the past                                             4. Hyperlipidemia.  -good on lipitor                                                         5. Erectile dysfunction.                                                     6. History of mild right rotator cuff tendinitis.                            7. Colon polyps 2006.   Normal 11/10 -normal 3/18 --5 years   8. Elevated ALT    9.  Insomnia  10.  Slight hemoglobin reduction                                                                                                                   PAST SURGICAL HISTORY:  None.                                                                                                                             FAMILY HISTORY:   Father  of smoking-related lung cancer.  Brother    of smoking-related lung cancer.  Mom  of breast cancer.  Other sibling   with hypertension, diabetes, but no coronary artery disease and no other     new diagnoses.                                                                                                                                            SOCIAL HISTORY:   with 2 children.  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      X.       Vitals as above  Gen: no distress  EYES: conjunctiva clear, non-icteric, PERRL  ENT: nose clear, nasal mucosa normal, oropharynx clear and moist, teeth good  NECK:supple, thyroid non-palpable  RESP: effort is good, lungs clear  CV: heart RRR w/o murmur, gallops or rubs; no carotid bruits, no edema  GI: abdomen soft, non-distended, non-tender, no hepatosplenomegaly  MS: gait normal, no clubbing or cyanosis of the digits  SKIN: no rashes, warm to touch              Assessment and plan:        Gerson was seen today for diabetes.    Diagnoses and all orders for this visit:    Routine medical exam                                              Additional evaluation and management issues:    Additionally, patient has numerous other medical issues to address separate from his physical.  He has had shingles before in about two months ago he had some itching and pain in his posterior left shoulder blade.  He never saw a rash.  He size endocrinologist who looked and did not see a rash.  He took no medicines.  He thinks it might have been shingles.  It is still there but pain is definitely mild in getting better.  We discussed watching for recurrence and if ever it is more apparent that it might be shingles we would treat him with Valtrex and so forth.    He solid by endocrinology for his diabetes which has generally improved.  He is back on a Medicare product and his current basal insulin is not covered so we will switch him to Lantus which is  probably covered.  He is taking 28 units at this time.  I will write the prescription for 30 and he can adjust especially if indeed switching over to a new insulin does not give him the same control he can make adjustments.  The weekly injection is helping as well.  He is due for an A1c today.    Previously a1c down to 7.3, 7.7, 8.2, 6.7 .         He is now back on his weekly injection ofOzempic. .  He does note reduced appetite and reduced intake therefore allowing him to lose some weight which appears to be about 7 lb ..  Encouraged him to continue to work with that dietary reduction, weight loss and diabetes should continue to improve.  .  Still needs to be managed by some endocrine specialist of the endocrinologists he was seeing prior.      Blood pressure running well.  He needs various refills so I will send them all.      All issues reviewed patient counseled and is evaluation and management will be based upon time counseling.Total time over 25 minutes with over 50% counseling.            Assessment and plan:    Herpes zoster with complication clinically sounds like it could have been zoster but cannot confirm, either way his post herpetic neuralgia is improving fairly rapidly    Uncontrolled type 2 diabetes mellitus with microalbuminuria, with long-term current use of insulin, improving, due for reassessment, make insulin switch as above  -     Hemoglobin A1C; Future    Long-term insulin use, insulin switch due to formulary change    Essential hypertension, chronic and stable  -     hydroCHLOROthiazide (HYDRODIURIL) 12.5 MG Tab; Take 1 tablet (12.5 mg total) by mouth once daily.    Mixed hyperlipidemia, chronic and stable  -     atorvastatin (LIPITOR) 20 MG tablet; Take 1 tablet (20 mg total) by mouth once daily.    Benign prostatic hyperplasia, unspecified whether lower urinary tract symptoms present  -     Prostate Specific Antigen, Diagnostic; Future    Screening for prostate cancer  -     Prostate  "Specific Antigen, Diagnostic; Future    Other orders  -     insulin (LANTUS SOLOSTAR U-100 INSULIN) glargine 100 units/mL (3mL) SubQ pen; 30 units a day  -     quinapriL (ACCUPRIL) 40 MG tablet; Take 1 tablet (40 mg total) by mouth 2 (two) times daily.  -     metFORMIN (GLUCOPHAGE) 1000 MG tablet; Take 1 tablet (1,000 mg total) by mouth 2 (two) times daily.  -     diltiaZEM (CARDIZEM CD) 240 MG 24 hr capsule; Take 1 capsule (240 mg total) by mouth once daily.        clinical note will be sensitive as I do need to document potential compliance issues with the uncontrolled hypertension but do not want that to stop patient from returning"This note will not be shared with the patient."    "

## 2020-10-30 DIAGNOSIS — I10 ESSENTIAL HYPERTENSION: ICD-10-CM

## 2020-10-30 RX ORDER — HYDROCHLOROTHIAZIDE 12.5 MG/1
12.5 TABLET ORAL DAILY
Qty: 90 TABLET | Refills: 1 | Status: SHIPPED | OUTPATIENT
Start: 2020-10-30 | End: 2022-02-02 | Stop reason: SDUPTHER

## 2020-12-18 ENCOUNTER — PATIENT OUTREACH (OUTPATIENT)
Dept: ADMINISTRATIVE | Facility: OTHER | Age: 65
End: 2020-12-18

## 2020-12-18 ENCOUNTER — CLINICAL SUPPORT (OUTPATIENT)
Dept: FAMILY MEDICINE | Facility: CLINIC | Age: 65
End: 2020-12-18
Payer: MEDICARE

## 2020-12-18 ENCOUNTER — OFFICE VISIT (OUTPATIENT)
Dept: ENDOCRINOLOGY | Facility: CLINIC | Age: 65
End: 2020-12-18
Payer: MEDICARE

## 2020-12-18 VITALS
RESPIRATION RATE: 18 BRPM | OXYGEN SATURATION: 98 % | HEART RATE: 78 BPM | SYSTOLIC BLOOD PRESSURE: 132 MMHG | BODY MASS INDEX: 30.42 KG/M2 | DIASTOLIC BLOOD PRESSURE: 77 MMHG | HEIGHT: 74 IN | WEIGHT: 237 LBS | TEMPERATURE: 98 F

## 2020-12-18 DIAGNOSIS — Z23 NEED FOR INFLUENZA VACCINATION: Primary | ICD-10-CM

## 2020-12-18 DIAGNOSIS — E78.5 HYPERLIPIDEMIA, UNSPECIFIED HYPERLIPIDEMIA TYPE: ICD-10-CM

## 2020-12-18 DIAGNOSIS — I10 ESSENTIAL HYPERTENSION: ICD-10-CM

## 2020-12-18 DIAGNOSIS — E11.9 CONTROLLED TYPE 2 DIABETES MELLITUS WITHOUT COMPLICATION, WITHOUT LONG-TERM CURRENT USE OF INSULIN: Primary | ICD-10-CM

## 2020-12-18 PROCEDURE — 1159F MED LIST DOCD IN RCRD: CPT | Mod: S$GLB,,, | Performed by: NURSE PRACTITIONER

## 2020-12-18 PROCEDURE — 3075F PR MOST RECENT SYSTOLIC BLOOD PRESS GE 130-139MM HG: ICD-10-PCS | Mod: CPTII,S$GLB,, | Performed by: NURSE PRACTITIONER

## 2020-12-18 PROCEDURE — 90694 VACC AIIV4 NO PRSRV 0.5ML IM: CPT | Mod: S$GLB,,, | Performed by: NURSE PRACTITIONER

## 2020-12-18 PROCEDURE — 3078F PR MOST RECENT DIASTOLIC BLOOD PRESSURE < 80 MM HG: ICD-10-PCS | Mod: CPTII,S$GLB,, | Performed by: NURSE PRACTITIONER

## 2020-12-18 PROCEDURE — 3008F PR BODY MASS INDEX (BMI) DOCUMENTED: ICD-10-PCS | Mod: CPTII,S$GLB,, | Performed by: NURSE PRACTITIONER

## 2020-12-18 PROCEDURE — 3008F BODY MASS INDEX DOCD: CPT | Mod: CPTII,S$GLB,, | Performed by: NURSE PRACTITIONER

## 2020-12-18 PROCEDURE — 99999 PR PBB SHADOW E&M-EST. PATIENT-LVL V: ICD-10-PCS | Mod: PBBFAC,,, | Performed by: NURSE PRACTITIONER

## 2020-12-18 PROCEDURE — 1126F PR PAIN SEVERITY QUANTIFIED, NO PAIN PRESENT: ICD-10-PCS | Mod: S$GLB,,, | Performed by: NURSE PRACTITIONER

## 2020-12-18 PROCEDURE — 90694 FLU VACCINE - QUADRIVALENT - ADJUVANTED: ICD-10-PCS | Mod: S$GLB,,, | Performed by: NURSE PRACTITIONER

## 2020-12-18 PROCEDURE — G0008 FLU VACCINE - QUADRIVALENT - ADJUVANTED: ICD-10-PCS | Mod: S$GLB,,, | Performed by: NURSE PRACTITIONER

## 2020-12-18 PROCEDURE — 99999 PR PBB SHADOW E&M-EST. PATIENT-LVL V: CPT | Mod: PBBFAC,,, | Performed by: NURSE PRACTITIONER

## 2020-12-18 PROCEDURE — 3044F HG A1C LEVEL LT 7.0%: CPT | Mod: CPTII,S$GLB,, | Performed by: NURSE PRACTITIONER

## 2020-12-18 PROCEDURE — 1126F AMNT PAIN NOTED NONE PRSNT: CPT | Mod: S$GLB,,, | Performed by: NURSE PRACTITIONER

## 2020-12-18 PROCEDURE — 99214 OFFICE O/P EST MOD 30 MIN: CPT | Mod: S$GLB,,, | Performed by: NURSE PRACTITIONER

## 2020-12-18 PROCEDURE — 99999 PR PBB SHADOW E&M-EST. PATIENT-LVL I: ICD-10-PCS | Mod: PBBFAC,,,

## 2020-12-18 PROCEDURE — 1159F PR MEDICATION LIST DOCUMENTED IN MEDICAL RECORD: ICD-10-PCS | Mod: S$GLB,,, | Performed by: NURSE PRACTITIONER

## 2020-12-18 PROCEDURE — 3075F SYST BP GE 130 - 139MM HG: CPT | Mod: CPTII,S$GLB,, | Performed by: NURSE PRACTITIONER

## 2020-12-18 PROCEDURE — 99499 NO LOS: ICD-10-PCS | Mod: S$GLB,,, | Performed by: NURSE PRACTITIONER

## 2020-12-18 PROCEDURE — 3078F DIAST BP <80 MM HG: CPT | Mod: CPTII,S$GLB,, | Performed by: NURSE PRACTITIONER

## 2020-12-18 PROCEDURE — 3044F PR MOST RECENT HEMOGLOBIN A1C LEVEL <7.0%: ICD-10-PCS | Mod: CPTII,S$GLB,, | Performed by: NURSE PRACTITIONER

## 2020-12-18 PROCEDURE — 99214 PR OFFICE/OUTPT VISIT, EST, LEVL IV, 30-39 MIN: ICD-10-PCS | Mod: S$GLB,,, | Performed by: NURSE PRACTITIONER

## 2020-12-18 PROCEDURE — G0008 ADMIN INFLUENZA VIRUS VAC: HCPCS | Mod: S$GLB,,, | Performed by: NURSE PRACTITIONER

## 2020-12-18 PROCEDURE — 99999 PR PBB SHADOW E&M-EST. PATIENT-LVL I: CPT | Mod: PBBFAC,,,

## 2020-12-18 PROCEDURE — 99499 UNLISTED E&M SERVICE: CPT | Mod: S$GLB,,, | Performed by: NURSE PRACTITIONER

## 2020-12-18 RX ORDER — INSULIN GLARGINE 300 U/ML
INJECTION, SOLUTION SUBCUTANEOUS
Qty: 3 SYRINGE | Refills: 5 | Status: SHIPPED | OUTPATIENT
Start: 2020-12-18 | End: 2021-03-19 | Stop reason: ALTCHOICE

## 2020-12-18 NOTE — PATIENT INSTRUCTIONS
Labs today. Will call with recommendations. Reported glucoses are mostly at goal.   Test glucose 2x/day. Follow up in 3 months with labs prior.   F/u with Dr. Torrez regarding fatigue.

## 2020-12-18 NOTE — PROGRESS NOTES
CC: This 65 y.o. Black or  male  is here for evaluation of  T2DM along with comorbidities indicated in the Visit Diagnosis section of this encounter.    HPI: Gerson Phillips III was diagnosed with T2DM  > 20 years ago.   Previously followed by Dr. Hoskins with lov in (9/2017).         Prior visit 7/10/20  a1c is down from 8.2 in Feb to 6.7% in June, which is the lowest it's ever been. He has been watching his diet.   He suspects he has another shingles outbreak on his back. It itches and burns like his previous shingles rash.   Plan Reduce Basaglar from 30 to 26 units once daily to avoid low blood sugars. Patient is at high risk of injury from hypoglycemia during his work as a .   Continue Ozempic and metformin.   Test glucose 2x/day.    Follow up in 3 months with virtual visit and labs prior. Pt wishes to stay in Endocrine at this time.       Interval history  Last a1c was 6.8% in late August.   He has reduced Basaglar from 30 to 28 units. States Basaglar is no longer covered and requests alternative.         LAST DIABETES EDUCATION: years ago     DIABETES MEDICATIONS: metformin 1000 mg bid with food,  Basaglar 28 units every morning ~ 7 AM, Ozempic 1 mg once weekly     Misses medication doses - no     DM COMPLICATIONS: nephropathy and impotence    SIGNIFICANT DIABETES MED HISTORY:   Unable to afford Victoza   Vgo stopped d/t ineffectiveness in 7/2018 and MDI (basalgar and novolog)  started   Novolog stopped and Ozempic started 12/2019.       SELF MONITORING BLOOD GLUCOSE: Checks blood glucose at home - 2x/day.  Fasting, predinner   BGs 90-140s; mostly 120-140s        HYPOGLYCEMIC EPISODES:   None recent     CURRENT DIET: drinks Coke Zero and water. Eats 3 meals/day. No snacks. Eats out a lot on the road for dinner d/t nature of job - makes his own sandwiches for lunch/breakfast.  Eats 1 sandwich at a time     CURRENT EXERCISE: walks a little     OCCUPATION:            BP  "132/77 (BP Location: Right arm, Patient Position: Sitting, BP Method: Large (Automatic))   Pulse 78   Temp 97.8 °F (36.6 °C)   Resp 18   Ht 6' 2" (1.88 m)   Wt 107.5 kg (237 lb)   SpO2 98%   BMI 30.43 kg/m²       ROS:   CONSTITUTIONAL: Appetite ok, + fatigue  EYES: NO VISUAL DISTURBANCES       PHYSICAL EXAM:  GENERAL: Well developed, well nourished. No acute distress.   PSYCH: AAOx3, appropriate mood and affect, conversant, well-groomed. Judgement and insight good.   NEURO: Cranial nerves grossly intact. Speech clear, no tremor.   CHEST: Respirations even and unlabored.     Foot exam:     Protective Sensation (w/ 10 gram monofilament):  Right: Intact  Left: Intact    Visual Inspection:  Skin Breakdown -  Neither    Pedal Pulses:   Right: Present  Left: Present    Posterior tibialis:   Right:Present  Left: Present          Hemoglobin A1C   Date Value Ref Range Status   08/28/2020 6.8 (H) 4.0 - 5.6 % Final     Comment:     ADA Screening Guidelines:  5.7-6.4%  Consistent with prediabetes  >or=6.5%  Consistent with diabetes  High levels of fetal hemoglobin interfere with the HbA1C  assay. Heterozygous hemoglobin variants (HbS, HgC, etc)do  not significantly interfere with this assay.   However, presence of multiple variants may affect accuracy.     06/05/2020 6.7 (H) 4.0 - 5.6 % Final     Comment:     ADA Screening Guidelines:  5.7-6.4%  Consistent with prediabetes  >or=6.5%  Consistent with diabetes  High levels of fetal hemoglobin interfere with the HbA1C  assay. Heterozygous hemoglobin variants (HbS, HgC, etc)do  not significantly interfere with this assay.   However, presence of multiple variants may affect accuracy.     02/14/2020 8.2 (H) 4.0 - 5.6 % Final     Comment:     ADA Screening Guidelines:  5.7-6.4%  Consistent with prediabetes  >or=6.5%  Consistent with diabetes  High levels of fetal hemoglobin interfere with the HbA1C  assay. Heterozygous hemoglobin variants (HbS, HgC, etc)do  not significantly " interfere with this assay.   However, presence of multiple variants may affect accuracy.             Chemistry        Component Value Date/Time     02/14/2020 1136    K 3.8 02/14/2020 1136     02/14/2020 1136    CO2 29 02/14/2020 1136    BUN 14 02/14/2020 1136    CREATININE 1.2 02/14/2020 1136    GLU 68 (L) 02/14/2020 1136        Component Value Date/Time    CALCIUM 10.0 02/14/2020 1136    ALKPHOS 81 02/14/2020 1136    AST 15 02/14/2020 1136    ALT 19 02/14/2020 1136    BILITOT 0.3 02/14/2020 1136    ESTGFRAFRICA >60.0 02/14/2020 1136    EGFRNONAA >60.0 02/14/2020 1136          Lab Results   Component Value Date    LDLCALC 53.4 (L) 02/14/2020        Ref. Range 2/14/2020 11:36   Cholesterol Latest Ref Range: 120 - 199 mg/dL 120   HDL Latest Ref Range: 40 - 75 mg/dL 59   Hdl/Cholesterol Ratio Latest Ref Range: 20.0 - 50.0 % 49.2   LDL Cholesterol External Latest Ref Range: 63.0 - 159.0 mg/dL 53.4 (L)   Non-HDL Cholesterol Latest Units: mg/dL 61   Total Cholesterol/HDL Ratio Latest Ref Range: 2.0 - 5.0  2.0   Triglycerides Latest Ref Range: 30 - 150 mg/dL 38       Lab Results   Component Value Date    MICALBCREAT 26.0 06/05/2020               ASSESSMENT and PLAN:    A1C GOAL: < 7.5%      1. Controlled type 2 diabetes mellitus without complication, without long-term current use of insulin  Labs today. Will call with recommendations. Reported glucoses are mostly at goal.   Test glucose 2x/day. Follow up in 3 months with labs prior.     Ambulatory referral/consult to Podiatry    Hemoglobin A1C   2. Hyperlipidemia, unspecified hyperlipidemia type  Lipid Panel  Continue statin    3. Essential hypertension  controlled   4.  fatigue F/u with PCP            No orders of the defined types were placed in this encounter.       No follow-ups on file.

## 2021-01-26 DIAGNOSIS — E78.2 MIXED HYPERLIPIDEMIA: ICD-10-CM

## 2021-01-27 RX ORDER — INSULIN PUMP SYRINGE, 3 ML
EACH MISCELLANEOUS
Qty: 1 EACH | Refills: 12 | Status: SHIPPED | OUTPATIENT
Start: 2021-01-27 | End: 2021-11-22

## 2021-01-27 RX ORDER — IBUPROFEN 800 MG/1
TABLET ORAL
Qty: 10 TABLET | Refills: 0 | OUTPATIENT
Start: 2021-01-27

## 2021-01-27 RX ORDER — IBUPROFEN 800 MG/1
800 TABLET ORAL 3 TIMES DAILY
Qty: 10 TABLET | Refills: 0 | Status: SHIPPED | OUTPATIENT
Start: 2021-01-27 | End: 2021-09-17

## 2021-01-27 RX ORDER — QUINAPRIL 40 MG/1
40 TABLET ORAL 2 TIMES DAILY
Qty: 180 TABLET | Refills: 12 | Status: SHIPPED | OUTPATIENT
Start: 2021-01-27 | End: 2021-09-13

## 2021-01-27 RX ORDER — METFORMIN HYDROCHLORIDE 1000 MG/1
1000 TABLET ORAL 2 TIMES DAILY
Qty: 180 TABLET | Refills: 0 | Status: SHIPPED | OUTPATIENT
Start: 2021-01-27 | End: 2021-03-19 | Stop reason: SDUPTHER

## 2021-01-27 RX ORDER — ATORVASTATIN CALCIUM 20 MG/1
20 TABLET, FILM COATED ORAL DAILY
Qty: 90 TABLET | Refills: 3 | Status: SHIPPED | OUTPATIENT
Start: 2021-01-27 | End: 2022-02-02 | Stop reason: SDUPTHER

## 2021-01-29 ENCOUNTER — PATIENT OUTREACH (OUTPATIENT)
Dept: ADMINISTRATIVE | Facility: HOSPITAL | Age: 66
End: 2021-01-29

## 2021-01-29 DIAGNOSIS — E11.65 UNCONTROLLED TYPE 2 DIABETES MELLITUS WITH HYPERGLYCEMIA: Primary | ICD-10-CM

## 2021-02-05 RX ORDER — PEN NEEDLE, DIABETIC 30 GX3/16"
NEEDLE, DISPOSABLE MISCELLANEOUS
Qty: 300 EACH | Refills: 3 | Status: SHIPPED | OUTPATIENT
Start: 2021-02-05 | End: 2021-12-27

## 2021-02-05 RX ORDER — DEXTROSE 4 G
1 TABLET,CHEWABLE ORAL 3 TIMES DAILY
Qty: 200 EACH | Refills: 12 | Status: SHIPPED | OUTPATIENT
Start: 2021-02-05 | End: 2021-11-22

## 2021-02-05 RX ORDER — SEMAGLUTIDE 1.34 MG/ML
INJECTION, SOLUTION SUBCUTANEOUS
Qty: 6 SYRINGE | Refills: 3 | Status: SHIPPED | OUTPATIENT
Start: 2021-02-05 | End: 2021-09-09

## 2021-02-05 RX ORDER — LANCETS
1 EACH MISCELLANEOUS 3 TIMES DAILY
Qty: 200 EACH | Refills: 0 | Status: SHIPPED | OUTPATIENT
Start: 2021-02-05 | End: 2021-11-22

## 2021-02-26 ENCOUNTER — OFFICE VISIT (OUTPATIENT)
Dept: PRIMARY CARE CLINIC | Facility: CLINIC | Age: 66
End: 2021-02-26
Payer: MEDICARE

## 2021-02-26 VITALS
HEIGHT: 74 IN | DIASTOLIC BLOOD PRESSURE: 74 MMHG | RESPIRATION RATE: 19 BRPM | TEMPERATURE: 97 F | HEART RATE: 77 BPM | BODY MASS INDEX: 29.85 KG/M2 | SYSTOLIC BLOOD PRESSURE: 136 MMHG | OXYGEN SATURATION: 97 % | WEIGHT: 232.56 LBS

## 2021-02-26 DIAGNOSIS — N52.1 DIABETES WITH CIRCULATORY DISORDER CAUSING ERECTILE DYSFUNCTION: Primary | ICD-10-CM

## 2021-02-26 DIAGNOSIS — L73.9 FOLLICULITIS: ICD-10-CM

## 2021-02-26 DIAGNOSIS — L02.211 ABSCESS OF SKIN OF ABDOMEN: ICD-10-CM

## 2021-02-26 DIAGNOSIS — E11.59 DIABETES WITH CIRCULATORY DISORDER CAUSING ERECTILE DYSFUNCTION: Primary | ICD-10-CM

## 2021-02-26 DIAGNOSIS — L03.311 CELLULITIS OF ABDOMINAL WALL: ICD-10-CM

## 2021-02-26 DIAGNOSIS — L73.9 FOLLICULITIS OF RIGHT AXILLA: ICD-10-CM

## 2021-02-26 PROCEDURE — 10061 I&D ABSCESS COMP/MULTIPLE: CPT | Mod: S$GLB,,, | Performed by: STUDENT IN AN ORGANIZED HEALTH CARE EDUCATION/TRAINING PROGRAM

## 2021-02-26 PROCEDURE — 99214 PR OFFICE/OUTPT VISIT, EST, LEVL IV, 30-39 MIN: ICD-10-PCS | Mod: 25,S$GLB,, | Performed by: STUDENT IN AN ORGANIZED HEALTH CARE EDUCATION/TRAINING PROGRAM

## 2021-02-26 PROCEDURE — 3008F BODY MASS INDEX DOCD: CPT | Mod: CPTII,S$GLB,, | Performed by: STUDENT IN AN ORGANIZED HEALTH CARE EDUCATION/TRAINING PROGRAM

## 2021-02-26 PROCEDURE — 10061 INCISION & DRAINAGE: ICD-10-PCS | Mod: S$GLB,,, | Performed by: STUDENT IN AN ORGANIZED HEALTH CARE EDUCATION/TRAINING PROGRAM

## 2021-02-26 PROCEDURE — 1159F MED LIST DOCD IN RCRD: CPT | Mod: S$GLB,,, | Performed by: STUDENT IN AN ORGANIZED HEALTH CARE EDUCATION/TRAINING PROGRAM

## 2021-02-26 PROCEDURE — 99999 PR PBB SHADOW E&M-EST. PATIENT-LVL III: CPT | Mod: PBBFAC,,, | Performed by: STUDENT IN AN ORGANIZED HEALTH CARE EDUCATION/TRAINING PROGRAM

## 2021-02-26 PROCEDURE — 3078F DIAST BP <80 MM HG: CPT | Mod: CPTII,S$GLB,, | Performed by: STUDENT IN AN ORGANIZED HEALTH CARE EDUCATION/TRAINING PROGRAM

## 2021-02-26 PROCEDURE — 3044F PR MOST RECENT HEMOGLOBIN A1C LEVEL <7.0%: ICD-10-PCS | Mod: CPTII,S$GLB,, | Performed by: STUDENT IN AN ORGANIZED HEALTH CARE EDUCATION/TRAINING PROGRAM

## 2021-02-26 PROCEDURE — 1159F PR MEDICATION LIST DOCUMENTED IN MEDICAL RECORD: ICD-10-PCS | Mod: S$GLB,,, | Performed by: STUDENT IN AN ORGANIZED HEALTH CARE EDUCATION/TRAINING PROGRAM

## 2021-02-26 PROCEDURE — 3075F SYST BP GE 130 - 139MM HG: CPT | Mod: CPTII,S$GLB,, | Performed by: STUDENT IN AN ORGANIZED HEALTH CARE EDUCATION/TRAINING PROGRAM

## 2021-02-26 PROCEDURE — 99999 PR PBB SHADOW E&M-EST. PATIENT-LVL III: ICD-10-PCS | Mod: PBBFAC,,, | Performed by: STUDENT IN AN ORGANIZED HEALTH CARE EDUCATION/TRAINING PROGRAM

## 2021-02-26 PROCEDURE — 1101F PT FALLS ASSESS-DOCD LE1/YR: CPT | Mod: CPTII,S$GLB,, | Performed by: STUDENT IN AN ORGANIZED HEALTH CARE EDUCATION/TRAINING PROGRAM

## 2021-02-26 PROCEDURE — 3078F PR MOST RECENT DIASTOLIC BLOOD PRESSURE < 80 MM HG: ICD-10-PCS | Mod: CPTII,S$GLB,, | Performed by: STUDENT IN AN ORGANIZED HEALTH CARE EDUCATION/TRAINING PROGRAM

## 2021-02-26 PROCEDURE — 99214 OFFICE O/P EST MOD 30 MIN: CPT | Mod: 25,S$GLB,, | Performed by: STUDENT IN AN ORGANIZED HEALTH CARE EDUCATION/TRAINING PROGRAM

## 2021-02-26 PROCEDURE — 3288F PR FALLS RISK ASSESSMENT DOCUMENTED: ICD-10-PCS | Mod: CPTII,S$GLB,, | Performed by: STUDENT IN AN ORGANIZED HEALTH CARE EDUCATION/TRAINING PROGRAM

## 2021-02-26 PROCEDURE — 3075F PR MOST RECENT SYSTOLIC BLOOD PRESS GE 130-139MM HG: ICD-10-PCS | Mod: CPTII,S$GLB,, | Performed by: STUDENT IN AN ORGANIZED HEALTH CARE EDUCATION/TRAINING PROGRAM

## 2021-02-26 PROCEDURE — 1101F PR PT FALLS ASSESS DOC 0-1 FALLS W/OUT INJ PAST YR: ICD-10-PCS | Mod: CPTII,S$GLB,, | Performed by: STUDENT IN AN ORGANIZED HEALTH CARE EDUCATION/TRAINING PROGRAM

## 2021-02-26 PROCEDURE — 3008F PR BODY MASS INDEX (BMI) DOCUMENTED: ICD-10-PCS | Mod: CPTII,S$GLB,, | Performed by: STUDENT IN AN ORGANIZED HEALTH CARE EDUCATION/TRAINING PROGRAM

## 2021-02-26 PROCEDURE — 3044F HG A1C LEVEL LT 7.0%: CPT | Mod: CPTII,S$GLB,, | Performed by: STUDENT IN AN ORGANIZED HEALTH CARE EDUCATION/TRAINING PROGRAM

## 2021-02-26 PROCEDURE — 1125F AMNT PAIN NOTED PAIN PRSNT: CPT | Mod: S$GLB,,, | Performed by: STUDENT IN AN ORGANIZED HEALTH CARE EDUCATION/TRAINING PROGRAM

## 2021-02-26 PROCEDURE — 3288F FALL RISK ASSESSMENT DOCD: CPT | Mod: CPTII,S$GLB,, | Performed by: STUDENT IN AN ORGANIZED HEALTH CARE EDUCATION/TRAINING PROGRAM

## 2021-02-26 PROCEDURE — 1125F PR PAIN SEVERITY QUANTIFIED, PAIN PRESENT: ICD-10-PCS | Mod: S$GLB,,, | Performed by: STUDENT IN AN ORGANIZED HEALTH CARE EDUCATION/TRAINING PROGRAM

## 2021-02-26 RX ORDER — DOXYCYCLINE 100 MG/1
100 CAPSULE ORAL EVERY 12 HOURS
Qty: 20 CAPSULE | Refills: 0 | Status: SHIPPED | OUTPATIENT
Start: 2021-02-26 | End: 2021-02-26

## 2021-02-26 RX ORDER — LIDOCAINE HYDROCHLORIDE AND EPINEPHRINE 20; 10 MG/ML; UG/ML
2 INJECTION, SOLUTION INFILTRATION; PERINEURAL
Status: SHIPPED | OUTPATIENT
Start: 2021-02-26

## 2021-02-26 RX ORDER — DOXYCYCLINE 100 MG/1
100 CAPSULE ORAL EVERY 12 HOURS
Qty: 20 CAPSULE | Refills: 0 | Status: SHIPPED | OUTPATIENT
Start: 2021-02-26 | End: 2021-03-08

## 2021-03-01 ENCOUNTER — TELEPHONE (OUTPATIENT)
Dept: PRIMARY CARE CLINIC | Facility: CLINIC | Age: 66
End: 2021-03-01

## 2021-03-12 ENCOUNTER — OFFICE VISIT (OUTPATIENT)
Dept: PRIMARY CARE CLINIC | Facility: CLINIC | Age: 66
End: 2021-03-12
Payer: MEDICARE

## 2021-03-12 ENCOUNTER — LAB VISIT (OUTPATIENT)
Dept: LAB | Facility: HOSPITAL | Age: 66
End: 2021-03-12
Attending: NURSE PRACTITIONER
Payer: MEDICARE

## 2021-03-12 VITALS
BODY MASS INDEX: 29.41 KG/M2 | TEMPERATURE: 99 F | WEIGHT: 229.19 LBS | SYSTOLIC BLOOD PRESSURE: 124 MMHG | RESPIRATION RATE: 16 BRPM | HEIGHT: 74 IN | OXYGEN SATURATION: 96 % | HEART RATE: 85 BPM | DIASTOLIC BLOOD PRESSURE: 60 MMHG

## 2021-03-12 DIAGNOSIS — L02.229 BOIL OF TRUNK: ICD-10-CM

## 2021-03-12 DIAGNOSIS — E78.5 HYPERLIPIDEMIA, UNSPECIFIED HYPERLIPIDEMIA TYPE: ICD-10-CM

## 2021-03-12 DIAGNOSIS — Z79.899 MEDICATION MANAGEMENT: Primary | ICD-10-CM

## 2021-03-12 DIAGNOSIS — E11.9 CONTROLLED TYPE 2 DIABETES MELLITUS WITHOUT COMPLICATION, WITHOUT LONG-TERM CURRENT USE OF INSULIN: ICD-10-CM

## 2021-03-12 DIAGNOSIS — I10 ESSENTIAL HYPERTENSION: ICD-10-CM

## 2021-03-12 LAB
CHOLEST SERPL-MCNC: 105 MG/DL (ref 120–199)
CHOLEST/HDLC SERPL: 2.3 {RATIO} (ref 2–5)
ESTIMATED AVG GLUCOSE: 154 MG/DL (ref 68–131)
HBA1C MFR BLD: 7 % (ref 4–5.6)
HDLC SERPL-MCNC: 46 MG/DL (ref 40–75)
HDLC SERPL: 43.8 % (ref 20–50)
LDLC SERPL CALC-MCNC: 45.8 MG/DL (ref 63–159)
NONHDLC SERPL-MCNC: 59 MG/DL
TRIGL SERPL-MCNC: 66 MG/DL (ref 30–150)

## 2021-03-12 PROCEDURE — 99214 OFFICE O/P EST MOD 30 MIN: CPT | Mod: S$GLB,,, | Performed by: STUDENT IN AN ORGANIZED HEALTH CARE EDUCATION/TRAINING PROGRAM

## 2021-03-12 PROCEDURE — 3074F SYST BP LT 130 MM HG: CPT | Mod: CPTII,S$GLB,, | Performed by: STUDENT IN AN ORGANIZED HEALTH CARE EDUCATION/TRAINING PROGRAM

## 2021-03-12 PROCEDURE — 99214 PR OFFICE/OUTPT VISIT, EST, LEVL IV, 30-39 MIN: ICD-10-PCS | Mod: S$GLB,,, | Performed by: STUDENT IN AN ORGANIZED HEALTH CARE EDUCATION/TRAINING PROGRAM

## 2021-03-12 PROCEDURE — 99999 PR PBB SHADOW E&M-EST. PATIENT-LVL V: CPT | Mod: PBBFAC,,, | Performed by: STUDENT IN AN ORGANIZED HEALTH CARE EDUCATION/TRAINING PROGRAM

## 2021-03-12 PROCEDURE — 3288F FALL RISK ASSESSMENT DOCD: CPT | Mod: CPTII,S$GLB,, | Performed by: STUDENT IN AN ORGANIZED HEALTH CARE EDUCATION/TRAINING PROGRAM

## 2021-03-12 PROCEDURE — 3008F PR BODY MASS INDEX (BMI) DOCUMENTED: ICD-10-PCS | Mod: CPTII,S$GLB,, | Performed by: STUDENT IN AN ORGANIZED HEALTH CARE EDUCATION/TRAINING PROGRAM

## 2021-03-12 PROCEDURE — 3008F BODY MASS INDEX DOCD: CPT | Mod: CPTII,S$GLB,, | Performed by: STUDENT IN AN ORGANIZED HEALTH CARE EDUCATION/TRAINING PROGRAM

## 2021-03-12 PROCEDURE — 1101F PR PT FALLS ASSESS DOC 0-1 FALLS W/OUT INJ PAST YR: ICD-10-PCS | Mod: CPTII,S$GLB,, | Performed by: STUDENT IN AN ORGANIZED HEALTH CARE EDUCATION/TRAINING PROGRAM

## 2021-03-12 PROCEDURE — 36415 COLL VENOUS BLD VENIPUNCTURE: CPT | Mod: PN | Performed by: NURSE PRACTITIONER

## 2021-03-12 PROCEDURE — 80061 LIPID PANEL: CPT | Performed by: NURSE PRACTITIONER

## 2021-03-12 PROCEDURE — 83036 HEMOGLOBIN GLYCOSYLATED A1C: CPT | Performed by: NURSE PRACTITIONER

## 2021-03-12 PROCEDURE — 3078F DIAST BP <80 MM HG: CPT | Mod: CPTII,S$GLB,, | Performed by: STUDENT IN AN ORGANIZED HEALTH CARE EDUCATION/TRAINING PROGRAM

## 2021-03-12 PROCEDURE — 99999 PR PBB SHADOW E&M-EST. PATIENT-LVL V: ICD-10-PCS | Mod: PBBFAC,,, | Performed by: STUDENT IN AN ORGANIZED HEALTH CARE EDUCATION/TRAINING PROGRAM

## 2021-03-12 PROCEDURE — 3078F PR MOST RECENT DIASTOLIC BLOOD PRESSURE < 80 MM HG: ICD-10-PCS | Mod: CPTII,S$GLB,, | Performed by: STUDENT IN AN ORGANIZED HEALTH CARE EDUCATION/TRAINING PROGRAM

## 2021-03-12 PROCEDURE — 1159F MED LIST DOCD IN RCRD: CPT | Mod: S$GLB,,, | Performed by: STUDENT IN AN ORGANIZED HEALTH CARE EDUCATION/TRAINING PROGRAM

## 2021-03-12 PROCEDURE — 1159F PR MEDICATION LIST DOCUMENTED IN MEDICAL RECORD: ICD-10-PCS | Mod: S$GLB,,, | Performed by: STUDENT IN AN ORGANIZED HEALTH CARE EDUCATION/TRAINING PROGRAM

## 2021-03-12 PROCEDURE — 1101F PT FALLS ASSESS-DOCD LE1/YR: CPT | Mod: CPTII,S$GLB,, | Performed by: STUDENT IN AN ORGANIZED HEALTH CARE EDUCATION/TRAINING PROGRAM

## 2021-03-12 PROCEDURE — 3074F PR MOST RECENT SYSTOLIC BLOOD PRESSURE < 130 MM HG: ICD-10-PCS | Mod: CPTII,S$GLB,, | Performed by: STUDENT IN AN ORGANIZED HEALTH CARE EDUCATION/TRAINING PROGRAM

## 2021-03-12 PROCEDURE — 3288F PR FALLS RISK ASSESSMENT DOCUMENTED: ICD-10-PCS | Mod: CPTII,S$GLB,, | Performed by: STUDENT IN AN ORGANIZED HEALTH CARE EDUCATION/TRAINING PROGRAM

## 2021-03-19 ENCOUNTER — OFFICE VISIT (OUTPATIENT)
Dept: ENDOCRINOLOGY | Facility: CLINIC | Age: 66
End: 2021-03-19
Payer: MEDICARE

## 2021-03-19 VITALS
SYSTOLIC BLOOD PRESSURE: 131 MMHG | DIASTOLIC BLOOD PRESSURE: 79 MMHG | BODY MASS INDEX: 29.43 KG/M2 | TEMPERATURE: 98 F | HEART RATE: 75 BPM | WEIGHT: 229.19 LBS

## 2021-03-19 DIAGNOSIS — E11.9 CONTROLLED TYPE 2 DIABETES MELLITUS WITHOUT COMPLICATION, WITHOUT LONG-TERM CURRENT USE OF INSULIN: Primary | ICD-10-CM

## 2021-03-19 DIAGNOSIS — E66.3 OVERWEIGHT: ICD-10-CM

## 2021-03-19 DIAGNOSIS — E78.5 HYPERLIPIDEMIA, UNSPECIFIED HYPERLIPIDEMIA TYPE: ICD-10-CM

## 2021-03-19 DIAGNOSIS — I10 ESSENTIAL HYPERTENSION: ICD-10-CM

## 2021-03-19 PROCEDURE — 1159F PR MEDICATION LIST DOCUMENTED IN MEDICAL RECORD: ICD-10-PCS | Mod: S$GLB,,, | Performed by: NURSE PRACTITIONER

## 2021-03-19 PROCEDURE — 1126F AMNT PAIN NOTED NONE PRSNT: CPT | Mod: S$GLB,,, | Performed by: NURSE PRACTITIONER

## 2021-03-19 PROCEDURE — 99214 OFFICE O/P EST MOD 30 MIN: CPT | Mod: S$GLB,,, | Performed by: NURSE PRACTITIONER

## 2021-03-19 PROCEDURE — 3008F BODY MASS INDEX DOCD: CPT | Mod: CPTII,S$GLB,, | Performed by: NURSE PRACTITIONER

## 2021-03-19 PROCEDURE — 3078F PR MOST RECENT DIASTOLIC BLOOD PRESSURE < 80 MM HG: ICD-10-PCS | Mod: CPTII,S$GLB,, | Performed by: NURSE PRACTITIONER

## 2021-03-19 PROCEDURE — 3078F DIAST BP <80 MM HG: CPT | Mod: CPTII,S$GLB,, | Performed by: NURSE PRACTITIONER

## 2021-03-19 PROCEDURE — 1126F PR PAIN SEVERITY QUANTIFIED, NO PAIN PRESENT: ICD-10-PCS | Mod: S$GLB,,, | Performed by: NURSE PRACTITIONER

## 2021-03-19 PROCEDURE — 99999 PR PBB SHADOW E&M-EST. PATIENT-LVL IV: CPT | Mod: PBBFAC,,, | Performed by: NURSE PRACTITIONER

## 2021-03-19 PROCEDURE — 1159F MED LIST DOCD IN RCRD: CPT | Mod: S$GLB,,, | Performed by: NURSE PRACTITIONER

## 2021-03-19 PROCEDURE — 3075F SYST BP GE 130 - 139MM HG: CPT | Mod: CPTII,S$GLB,, | Performed by: NURSE PRACTITIONER

## 2021-03-19 PROCEDURE — 3075F PR MOST RECENT SYSTOLIC BLOOD PRESS GE 130-139MM HG: ICD-10-PCS | Mod: CPTII,S$GLB,, | Performed by: NURSE PRACTITIONER

## 2021-03-19 PROCEDURE — 99999 PR PBB SHADOW E&M-EST. PATIENT-LVL IV: ICD-10-PCS | Mod: PBBFAC,,, | Performed by: NURSE PRACTITIONER

## 2021-03-19 PROCEDURE — 3051F PR MOST RECENT HEMOGLOBIN A1C LEVEL 7.0 - < 8.0%: ICD-10-PCS | Mod: CPTII,S$GLB,, | Performed by: NURSE PRACTITIONER

## 2021-03-19 PROCEDURE — 99214 PR OFFICE/OUTPT VISIT, EST, LEVL IV, 30-39 MIN: ICD-10-PCS | Mod: S$GLB,,, | Performed by: NURSE PRACTITIONER

## 2021-03-19 PROCEDURE — 3051F HG A1C>EQUAL 7.0%<8.0%: CPT | Mod: CPTII,S$GLB,, | Performed by: NURSE PRACTITIONER

## 2021-03-19 PROCEDURE — 3008F PR BODY MASS INDEX (BMI) DOCUMENTED: ICD-10-PCS | Mod: CPTII,S$GLB,, | Performed by: NURSE PRACTITIONER

## 2021-03-19 RX ORDER — INSULIN DEGLUDEC 100 U/ML
INJECTION, SOLUTION SUBCUTANEOUS
Qty: 15 ML | Refills: 5 | Status: SHIPPED | OUTPATIENT
Start: 2021-03-19 | End: 2021-07-23 | Stop reason: SDUPTHER

## 2021-03-19 RX ORDER — METFORMIN HYDROCHLORIDE 1000 MG/1
1000 TABLET ORAL 2 TIMES DAILY
Qty: 180 TABLET | Refills: 2 | Status: SHIPPED | OUTPATIENT
Start: 2021-03-19 | End: 2021-09-17

## 2021-04-23 ENCOUNTER — OFFICE VISIT (OUTPATIENT)
Dept: OPTOMETRY | Facility: CLINIC | Age: 66
End: 2021-04-23
Payer: MEDICARE

## 2021-04-23 DIAGNOSIS — H40.1132 PRIMARY OPEN ANGLE GLAUCOMA (POAG) OF BOTH EYES, MODERATE STAGE: ICD-10-CM

## 2021-04-23 DIAGNOSIS — E11.9 DIABETIC EYE EXAM: Primary | ICD-10-CM

## 2021-04-23 DIAGNOSIS — H52.7 REFRACTIVE ERROR: ICD-10-CM

## 2021-04-23 DIAGNOSIS — Z01.00 DIABETIC EYE EXAM: Primary | ICD-10-CM

## 2021-04-23 DIAGNOSIS — H25.13 NUCLEAR SCLEROSIS OF BOTH EYES: ICD-10-CM

## 2021-04-23 PROCEDURE — 92015 DETERMINE REFRACTIVE STATE: CPT | Mod: S$GLB,,, | Performed by: OPTOMETRIST

## 2021-04-23 PROCEDURE — 99999 PR PBB SHADOW E&M-EST. PATIENT-LVL III: CPT | Mod: PBBFAC,,, | Performed by: OPTOMETRIST

## 2021-04-23 PROCEDURE — 1101F PR PT FALLS ASSESS DOC 0-1 FALLS W/OUT INJ PAST YR: ICD-10-PCS | Mod: CPTII,S$GLB,, | Performed by: OPTOMETRIST

## 2021-04-23 PROCEDURE — 92014 PR EYE EXAM, EST PATIENT,COMPREHESV: ICD-10-PCS | Mod: S$GLB,,, | Performed by: OPTOMETRIST

## 2021-04-23 PROCEDURE — 3051F HG A1C>EQUAL 7.0%<8.0%: CPT | Mod: CPTII,S$GLB,, | Performed by: OPTOMETRIST

## 2021-04-23 PROCEDURE — 3288F PR FALLS RISK ASSESSMENT DOCUMENTED: ICD-10-PCS | Mod: CPTII,S$GLB,, | Performed by: OPTOMETRIST

## 2021-04-23 PROCEDURE — 92133 CPTRZD OPH DX IMG PST SGM ON: CPT | Mod: S$GLB,,, | Performed by: OPTOMETRIST

## 2021-04-23 PROCEDURE — 3288F FALL RISK ASSESSMENT DOCD: CPT | Mod: CPTII,S$GLB,, | Performed by: OPTOMETRIST

## 2021-04-23 PROCEDURE — 1101F PT FALLS ASSESS-DOCD LE1/YR: CPT | Mod: CPTII,S$GLB,, | Performed by: OPTOMETRIST

## 2021-04-23 PROCEDURE — 92015 PR REFRACTION: ICD-10-PCS | Mod: S$GLB,,, | Performed by: OPTOMETRIST

## 2021-04-23 PROCEDURE — 92014 COMPRE OPH EXAM EST PT 1/>: CPT | Mod: S$GLB,,, | Performed by: OPTOMETRIST

## 2021-04-23 PROCEDURE — 3051F PR MOST RECENT HEMOGLOBIN A1C LEVEL 7.0 - < 8.0%: ICD-10-PCS | Mod: CPTII,S$GLB,, | Performed by: OPTOMETRIST

## 2021-04-23 PROCEDURE — 99999 PR PBB SHADOW E&M-EST. PATIENT-LVL III: ICD-10-PCS | Mod: PBBFAC,,, | Performed by: OPTOMETRIST

## 2021-04-23 PROCEDURE — 92133 POSTERIOR SEGMENT OCT OPTIC NERVE(OCULAR COHERENCE TOMOGRAPHY) - OU - BOTH EYES: ICD-10-PCS | Mod: S$GLB,,, | Performed by: OPTOMETRIST

## 2021-06-25 ENCOUNTER — LAB VISIT (OUTPATIENT)
Dept: LAB | Facility: HOSPITAL | Age: 66
End: 2021-06-25
Attending: STUDENT IN AN ORGANIZED HEALTH CARE EDUCATION/TRAINING PROGRAM
Payer: MEDICARE

## 2021-06-25 DIAGNOSIS — I10 ESSENTIAL HYPERTENSION: ICD-10-CM

## 2021-06-25 LAB
ALBUMIN/CREAT UR: 51.6 UG/MG (ref 0–30)
CREAT UR-MCNC: 122 MG/DL (ref 23–375)
MICROALBUMIN UR DL<=1MG/L-MCNC: 63 UG/ML

## 2021-06-25 PROCEDURE — 82043 UR ALBUMIN QUANTITATIVE: CPT | Performed by: NURSE PRACTITIONER

## 2021-06-25 PROCEDURE — 82570 ASSAY OF URINE CREATININE: CPT | Performed by: NURSE PRACTITIONER

## 2021-07-23 ENCOUNTER — OFFICE VISIT (OUTPATIENT)
Dept: ENDOCRINOLOGY | Facility: CLINIC | Age: 66
End: 2021-07-23
Payer: MEDICARE

## 2021-07-23 VITALS
SYSTOLIC BLOOD PRESSURE: 126 MMHG | HEART RATE: 85 BPM | DIASTOLIC BLOOD PRESSURE: 62 MMHG | WEIGHT: 231.81 LBS | TEMPERATURE: 97 F | BODY MASS INDEX: 29.76 KG/M2

## 2021-07-23 DIAGNOSIS — E11.9 CONTROLLED TYPE 2 DIABETES MELLITUS WITHOUT COMPLICATION, WITHOUT LONG-TERM CURRENT USE OF INSULIN: Primary | ICD-10-CM

## 2021-07-23 DIAGNOSIS — E66.3 OVERWEIGHT: ICD-10-CM

## 2021-07-23 DIAGNOSIS — E78.5 HYPERLIPIDEMIA, UNSPECIFIED HYPERLIPIDEMIA TYPE: ICD-10-CM

## 2021-07-23 DIAGNOSIS — I10 ESSENTIAL HYPERTENSION: ICD-10-CM

## 2021-07-23 PROCEDURE — 3074F SYST BP LT 130 MM HG: CPT | Mod: CPTII,S$GLB,, | Performed by: NURSE PRACTITIONER

## 2021-07-23 PROCEDURE — 3078F PR MOST RECENT DIASTOLIC BLOOD PRESSURE < 80 MM HG: ICD-10-PCS | Mod: CPTII,S$GLB,, | Performed by: NURSE PRACTITIONER

## 2021-07-23 PROCEDURE — 3008F BODY MASS INDEX DOCD: CPT | Mod: CPTII,S$GLB,, | Performed by: NURSE PRACTITIONER

## 2021-07-23 PROCEDURE — 99214 PR OFFICE/OUTPT VISIT, EST, LEVL IV, 30-39 MIN: ICD-10-PCS | Mod: S$GLB,,, | Performed by: NURSE PRACTITIONER

## 2021-07-23 PROCEDURE — 3074F PR MOST RECENT SYSTOLIC BLOOD PRESSURE < 130 MM HG: ICD-10-PCS | Mod: CPTII,S$GLB,, | Performed by: NURSE PRACTITIONER

## 2021-07-23 PROCEDURE — 3044F PR MOST RECENT HEMOGLOBIN A1C LEVEL <7.0%: ICD-10-PCS | Mod: CPTII,S$GLB,, | Performed by: NURSE PRACTITIONER

## 2021-07-23 PROCEDURE — 99214 OFFICE O/P EST MOD 30 MIN: CPT | Mod: S$GLB,,, | Performed by: NURSE PRACTITIONER

## 2021-07-23 PROCEDURE — 1126F PR PAIN SEVERITY QUANTIFIED, NO PAIN PRESENT: ICD-10-PCS | Mod: CPTII,S$GLB,, | Performed by: NURSE PRACTITIONER

## 2021-07-23 PROCEDURE — 1126F AMNT PAIN NOTED NONE PRSNT: CPT | Mod: CPTII,S$GLB,, | Performed by: NURSE PRACTITIONER

## 2021-07-23 PROCEDURE — 3008F PR BODY MASS INDEX (BMI) DOCUMENTED: ICD-10-PCS | Mod: CPTII,S$GLB,, | Performed by: NURSE PRACTITIONER

## 2021-07-23 PROCEDURE — 3078F DIAST BP <80 MM HG: CPT | Mod: CPTII,S$GLB,, | Performed by: NURSE PRACTITIONER

## 2021-07-23 PROCEDURE — 3044F HG A1C LEVEL LT 7.0%: CPT | Mod: CPTII,S$GLB,, | Performed by: NURSE PRACTITIONER

## 2021-07-23 PROCEDURE — 99999 PR PBB SHADOW E&M-EST. PATIENT-LVL IV: ICD-10-PCS | Mod: PBBFAC,,, | Performed by: NURSE PRACTITIONER

## 2021-07-23 PROCEDURE — 99999 PR PBB SHADOW E&M-EST. PATIENT-LVL IV: CPT | Mod: PBBFAC,,, | Performed by: NURSE PRACTITIONER

## 2021-07-23 PROCEDURE — 1159F PR MEDICATION LIST DOCUMENTED IN MEDICAL RECORD: ICD-10-PCS | Mod: CPTII,S$GLB,, | Performed by: NURSE PRACTITIONER

## 2021-07-23 PROCEDURE — 1159F MED LIST DOCD IN RCRD: CPT | Mod: CPTII,S$GLB,, | Performed by: NURSE PRACTITIONER

## 2021-07-23 RX ORDER — INSULIN DEGLUDEC 100 U/ML
INJECTION, SOLUTION SUBCUTANEOUS
Qty: 5 PEN | Refills: 1 | Status: SHIPPED | OUTPATIENT
Start: 2021-07-23 | End: 2021-09-17

## 2021-09-09 ENCOUNTER — TELEPHONE (OUTPATIENT)
Dept: ENDOCRINOLOGY | Facility: CLINIC | Age: 66
End: 2021-09-09

## 2021-09-09 RX ORDER — SEMAGLUTIDE 1.34 MG/ML
INJECTION, SOLUTION SUBCUTANEOUS
Qty: 1 PEN | Refills: 4 | Status: SHIPPED | OUTPATIENT
Start: 2021-09-09 | End: 2021-09-09

## 2021-09-17 ENCOUNTER — OFFICE VISIT (OUTPATIENT)
Dept: PRIMARY CARE CLINIC | Facility: CLINIC | Age: 66
End: 2021-09-17
Payer: MEDICARE

## 2021-09-17 VITALS
SYSTOLIC BLOOD PRESSURE: 134 MMHG | TEMPERATURE: 99 F | WEIGHT: 230.06 LBS | RESPIRATION RATE: 18 BRPM | DIASTOLIC BLOOD PRESSURE: 66 MMHG | HEIGHT: 74 IN | HEART RATE: 80 BPM | OXYGEN SATURATION: 98 % | BODY MASS INDEX: 29.52 KG/M2

## 2021-09-17 DIAGNOSIS — S39.012A SACROILIAC STRAIN, INITIAL ENCOUNTER: ICD-10-CM

## 2021-09-17 DIAGNOSIS — M25.552 BILATERAL HIP PAIN: ICD-10-CM

## 2021-09-17 DIAGNOSIS — M54.42 CHRONIC BILATERAL LOW BACK PAIN WITH BILATERAL SCIATICA: Primary | ICD-10-CM

## 2021-09-17 DIAGNOSIS — I10 HYPERTENSION, UNSPECIFIED TYPE: ICD-10-CM

## 2021-09-17 DIAGNOSIS — G89.29 CHRONIC BILATERAL LOW BACK PAIN WITH BILATERAL SCIATICA: Primary | ICD-10-CM

## 2021-09-17 DIAGNOSIS — M54.9 DORSALGIA, UNSPECIFIED: ICD-10-CM

## 2021-09-17 DIAGNOSIS — M25.551 BILATERAL HIP PAIN: ICD-10-CM

## 2021-09-17 DIAGNOSIS — M54.41 CHRONIC BILATERAL LOW BACK PAIN WITH BILATERAL SCIATICA: Primary | ICD-10-CM

## 2021-09-17 PROCEDURE — 3078F DIAST BP <80 MM HG: CPT | Mod: CPTII,S$GLB,, | Performed by: STUDENT IN AN ORGANIZED HEALTH CARE EDUCATION/TRAINING PROGRAM

## 2021-09-17 PROCEDURE — 3044F HG A1C LEVEL LT 7.0%: CPT | Mod: CPTII,S$GLB,, | Performed by: STUDENT IN AN ORGANIZED HEALTH CARE EDUCATION/TRAINING PROGRAM

## 2021-09-17 PROCEDURE — 3288F PR FALLS RISK ASSESSMENT DOCUMENTED: ICD-10-PCS | Mod: CPTII,S$GLB,, | Performed by: STUDENT IN AN ORGANIZED HEALTH CARE EDUCATION/TRAINING PROGRAM

## 2021-09-17 PROCEDURE — 3008F PR BODY MASS INDEX (BMI) DOCUMENTED: ICD-10-PCS | Mod: CPTII,S$GLB,, | Performed by: STUDENT IN AN ORGANIZED HEALTH CARE EDUCATION/TRAINING PROGRAM

## 2021-09-17 PROCEDURE — 1101F PR PT FALLS ASSESS DOC 0-1 FALLS W/OUT INJ PAST YR: ICD-10-PCS | Mod: CPTII,S$GLB,, | Performed by: STUDENT IN AN ORGANIZED HEALTH CARE EDUCATION/TRAINING PROGRAM

## 2021-09-17 PROCEDURE — 3066F PR DOCUMENTATION OF TREATMENT FOR NEPHROPATHY: ICD-10-PCS | Mod: CPTII,S$GLB,, | Performed by: STUDENT IN AN ORGANIZED HEALTH CARE EDUCATION/TRAINING PROGRAM

## 2021-09-17 PROCEDURE — 3066F NEPHROPATHY DOC TX: CPT | Mod: CPTII,S$GLB,, | Performed by: STUDENT IN AN ORGANIZED HEALTH CARE EDUCATION/TRAINING PROGRAM

## 2021-09-17 PROCEDURE — 99999 PR PBB SHADOW E&M-EST. PATIENT-LVL V: CPT | Mod: PBBFAC,,, | Performed by: STUDENT IN AN ORGANIZED HEALTH CARE EDUCATION/TRAINING PROGRAM

## 2021-09-17 PROCEDURE — 3288F FALL RISK ASSESSMENT DOCD: CPT | Mod: CPTII,S$GLB,, | Performed by: STUDENT IN AN ORGANIZED HEALTH CARE EDUCATION/TRAINING PROGRAM

## 2021-09-17 PROCEDURE — 3075F SYST BP GE 130 - 139MM HG: CPT | Mod: CPTII,S$GLB,, | Performed by: STUDENT IN AN ORGANIZED HEALTH CARE EDUCATION/TRAINING PROGRAM

## 2021-09-17 PROCEDURE — 1160F RVW MEDS BY RX/DR IN RCRD: CPT | Mod: CPTII,S$GLB,, | Performed by: STUDENT IN AN ORGANIZED HEALTH CARE EDUCATION/TRAINING PROGRAM

## 2021-09-17 PROCEDURE — 99214 OFFICE O/P EST MOD 30 MIN: CPT | Mod: S$GLB,,, | Performed by: STUDENT IN AN ORGANIZED HEALTH CARE EDUCATION/TRAINING PROGRAM

## 2021-09-17 PROCEDURE — 99214 PR OFFICE/OUTPT VISIT, EST, LEVL IV, 30-39 MIN: ICD-10-PCS | Mod: S$GLB,,, | Performed by: STUDENT IN AN ORGANIZED HEALTH CARE EDUCATION/TRAINING PROGRAM

## 2021-09-17 PROCEDURE — 1125F AMNT PAIN NOTED PAIN PRSNT: CPT | Mod: CPTII,S$GLB,, | Performed by: STUDENT IN AN ORGANIZED HEALTH CARE EDUCATION/TRAINING PROGRAM

## 2021-09-17 PROCEDURE — 3044F PR MOST RECENT HEMOGLOBIN A1C LEVEL <7.0%: ICD-10-PCS | Mod: CPTII,S$GLB,, | Performed by: STUDENT IN AN ORGANIZED HEALTH CARE EDUCATION/TRAINING PROGRAM

## 2021-09-17 PROCEDURE — 1101F PT FALLS ASSESS-DOCD LE1/YR: CPT | Mod: CPTII,S$GLB,, | Performed by: STUDENT IN AN ORGANIZED HEALTH CARE EDUCATION/TRAINING PROGRAM

## 2021-09-17 PROCEDURE — 1160F PR REVIEW ALL MEDS BY PRESCRIBER/CLIN PHARMACIST DOCUMENTED: ICD-10-PCS | Mod: CPTII,S$GLB,, | Performed by: STUDENT IN AN ORGANIZED HEALTH CARE EDUCATION/TRAINING PROGRAM

## 2021-09-17 PROCEDURE — 3060F POS MICROALBUMINURIA REV: CPT | Mod: CPTII,S$GLB,, | Performed by: STUDENT IN AN ORGANIZED HEALTH CARE EDUCATION/TRAINING PROGRAM

## 2021-09-17 PROCEDURE — 3060F PR POS MICROALBUMINURIA RESULT DOCUMENTED/REVIEW: ICD-10-PCS | Mod: CPTII,S$GLB,, | Performed by: STUDENT IN AN ORGANIZED HEALTH CARE EDUCATION/TRAINING PROGRAM

## 2021-09-17 PROCEDURE — 1159F MED LIST DOCD IN RCRD: CPT | Mod: CPTII,S$GLB,, | Performed by: STUDENT IN AN ORGANIZED HEALTH CARE EDUCATION/TRAINING PROGRAM

## 2021-09-17 PROCEDURE — 4010F PR ACE/ARB THEARPY RXD/TAKEN: ICD-10-PCS | Mod: CPTII,S$GLB,, | Performed by: STUDENT IN AN ORGANIZED HEALTH CARE EDUCATION/TRAINING PROGRAM

## 2021-09-17 PROCEDURE — 3075F PR MOST RECENT SYSTOLIC BLOOD PRESS GE 130-139MM HG: ICD-10-PCS | Mod: CPTII,S$GLB,, | Performed by: STUDENT IN AN ORGANIZED HEALTH CARE EDUCATION/TRAINING PROGRAM

## 2021-09-17 PROCEDURE — 3078F PR MOST RECENT DIASTOLIC BLOOD PRESSURE < 80 MM HG: ICD-10-PCS | Mod: CPTII,S$GLB,, | Performed by: STUDENT IN AN ORGANIZED HEALTH CARE EDUCATION/TRAINING PROGRAM

## 2021-09-17 PROCEDURE — 1159F PR MEDICATION LIST DOCUMENTED IN MEDICAL RECORD: ICD-10-PCS | Mod: CPTII,S$GLB,, | Performed by: STUDENT IN AN ORGANIZED HEALTH CARE EDUCATION/TRAINING PROGRAM

## 2021-09-17 PROCEDURE — 3008F BODY MASS INDEX DOCD: CPT | Mod: CPTII,S$GLB,, | Performed by: STUDENT IN AN ORGANIZED HEALTH CARE EDUCATION/TRAINING PROGRAM

## 2021-09-17 PROCEDURE — 1125F PR PAIN SEVERITY QUANTIFIED, PAIN PRESENT: ICD-10-PCS | Mod: CPTII,S$GLB,, | Performed by: STUDENT IN AN ORGANIZED HEALTH CARE EDUCATION/TRAINING PROGRAM

## 2021-09-17 PROCEDURE — 4010F ACE/ARB THERAPY RXD/TAKEN: CPT | Mod: CPTII,S$GLB,, | Performed by: STUDENT IN AN ORGANIZED HEALTH CARE EDUCATION/TRAINING PROGRAM

## 2021-09-17 PROCEDURE — 99999 PR PBB SHADOW E&M-EST. PATIENT-LVL V: ICD-10-PCS | Mod: PBBFAC,,, | Performed by: STUDENT IN AN ORGANIZED HEALTH CARE EDUCATION/TRAINING PROGRAM

## 2021-09-17 RX ORDER — INSULIN GLARGINE 300 U/ML
20 INJECTION, SOLUTION SUBCUTANEOUS DAILY
COMMUNITY
Start: 2021-08-10 | End: 2022-02-25

## 2021-09-17 RX ORDER — DICLOFENAC SODIUM 50 MG/1
50 TABLET, DELAYED RELEASE ORAL 2 TIMES DAILY
Qty: 60 TABLET | Refills: 1 | Status: SHIPPED | OUTPATIENT
Start: 2021-09-17 | End: 2021-10-18

## 2021-10-01 ENCOUNTER — TELEPHONE (OUTPATIENT)
Dept: PRIMARY CARE CLINIC | Facility: CLINIC | Age: 66
End: 2021-10-01

## 2021-10-04 ENCOUNTER — TELEPHONE (OUTPATIENT)
Dept: PRIMARY CARE CLINIC | Facility: CLINIC | Age: 66
End: 2021-10-04

## 2021-10-05 ENCOUNTER — TELEPHONE (OUTPATIENT)
Dept: PRIMARY CARE CLINIC | Facility: CLINIC | Age: 66
End: 2021-10-05

## 2021-10-05 ENCOUNTER — PATIENT MESSAGE (OUTPATIENT)
Dept: ADMINISTRATIVE | Facility: HOSPITAL | Age: 66
End: 2021-10-05

## 2021-10-08 ENCOUNTER — PATIENT MESSAGE (OUTPATIENT)
Dept: PRIMARY CARE CLINIC | Facility: CLINIC | Age: 66
End: 2021-10-08

## 2021-10-11 DIAGNOSIS — N40.0 BENIGN PROSTATIC HYPERPLASIA, UNSPECIFIED WHETHER LOWER URINARY TRACT SYMPTOMS PRESENT: Primary | ICD-10-CM

## 2021-10-14 ENCOUNTER — PATIENT MESSAGE (OUTPATIENT)
Dept: PRIMARY CARE CLINIC | Facility: CLINIC | Age: 66
End: 2021-10-14
Payer: MEDICARE

## 2021-10-15 ENCOUNTER — PATIENT OUTREACH (OUTPATIENT)
Dept: ADMINISTRATIVE | Facility: OTHER | Age: 66
End: 2021-10-15

## 2021-10-20 ENCOUNTER — TELEPHONE (OUTPATIENT)
Dept: SLEEP MEDICINE | Facility: CLINIC | Age: 66
End: 2021-10-20

## 2021-10-21 ENCOUNTER — OFFICE VISIT (OUTPATIENT)
Dept: PAIN MEDICINE | Facility: CLINIC | Age: 66
End: 2021-10-21
Payer: MEDICARE

## 2021-10-21 VITALS
BODY MASS INDEX: 29.86 KG/M2 | SYSTOLIC BLOOD PRESSURE: 159 MMHG | HEART RATE: 73 BPM | DIASTOLIC BLOOD PRESSURE: 83 MMHG | HEIGHT: 74 IN | WEIGHT: 232.69 LBS | RESPIRATION RATE: 16 BRPM

## 2021-10-21 DIAGNOSIS — M47.816 LUMBAR SPONDYLOSIS: ICD-10-CM

## 2021-10-21 DIAGNOSIS — M54.41 CHRONIC BILATERAL LOW BACK PAIN WITH BILATERAL SCIATICA: ICD-10-CM

## 2021-10-21 DIAGNOSIS — M48.062 SPINAL STENOSIS OF LUMBAR REGION WITH NEUROGENIC CLAUDICATION: ICD-10-CM

## 2021-10-21 DIAGNOSIS — G89.29 CHRONIC BILATERAL LOW BACK PAIN WITH BILATERAL SCIATICA: ICD-10-CM

## 2021-10-21 DIAGNOSIS — M54.42 CHRONIC BILATERAL LOW BACK PAIN WITH BILATERAL SCIATICA: ICD-10-CM

## 2021-10-21 DIAGNOSIS — M51.36 DDD (DEGENERATIVE DISC DISEASE), LUMBAR: Primary | ICD-10-CM

## 2021-10-21 DIAGNOSIS — M54.9 DORSALGIA, UNSPECIFIED: ICD-10-CM

## 2021-10-21 PROCEDURE — 3060F POS MICROALBUMINURIA REV: CPT | Mod: CPTII,S$GLB,, | Performed by: PAIN MEDICINE

## 2021-10-21 PROCEDURE — 3066F PR DOCUMENTATION OF TREATMENT FOR NEPHROPATHY: ICD-10-PCS | Mod: CPTII,S$GLB,, | Performed by: PAIN MEDICINE

## 2021-10-21 PROCEDURE — 4010F PR ACE/ARB THEARPY RXD/TAKEN: ICD-10-PCS | Mod: CPTII,S$GLB,, | Performed by: PAIN MEDICINE

## 2021-10-21 PROCEDURE — 99204 OFFICE O/P NEW MOD 45 MIN: CPT | Mod: S$GLB,,, | Performed by: PAIN MEDICINE

## 2021-10-21 PROCEDURE — 4010F ACE/ARB THERAPY RXD/TAKEN: CPT | Mod: CPTII,S$GLB,, | Performed by: PAIN MEDICINE

## 2021-10-21 PROCEDURE — 99999 PR PBB SHADOW E&M-EST. PATIENT-LVL V: CPT | Mod: PBBFAC,,, | Performed by: PAIN MEDICINE

## 2021-10-21 PROCEDURE — 1125F AMNT PAIN NOTED PAIN PRSNT: CPT | Mod: CPTII,S$GLB,, | Performed by: PAIN MEDICINE

## 2021-10-21 PROCEDURE — 3288F FALL RISK ASSESSMENT DOCD: CPT | Mod: CPTII,S$GLB,, | Performed by: PAIN MEDICINE

## 2021-10-21 PROCEDURE — 1159F MED LIST DOCD IN RCRD: CPT | Mod: CPTII,S$GLB,, | Performed by: PAIN MEDICINE

## 2021-10-21 PROCEDURE — 3077F SYST BP >= 140 MM HG: CPT | Mod: CPTII,S$GLB,, | Performed by: PAIN MEDICINE

## 2021-10-21 PROCEDURE — 3288F PR FALLS RISK ASSESSMENT DOCUMENTED: ICD-10-PCS | Mod: CPTII,S$GLB,, | Performed by: PAIN MEDICINE

## 2021-10-21 PROCEDURE — 1101F PR PT FALLS ASSESS DOC 0-1 FALLS W/OUT INJ PAST YR: ICD-10-PCS | Mod: CPTII,S$GLB,, | Performed by: PAIN MEDICINE

## 2021-10-21 PROCEDURE — 3008F BODY MASS INDEX DOCD: CPT | Mod: CPTII,S$GLB,, | Performed by: PAIN MEDICINE

## 2021-10-21 PROCEDURE — 3044F PR MOST RECENT HEMOGLOBIN A1C LEVEL <7.0%: ICD-10-PCS | Mod: CPTII,S$GLB,, | Performed by: PAIN MEDICINE

## 2021-10-21 PROCEDURE — 99999 PR PBB SHADOW E&M-EST. PATIENT-LVL V: ICD-10-PCS | Mod: PBBFAC,,, | Performed by: PAIN MEDICINE

## 2021-10-21 PROCEDURE — 3008F PR BODY MASS INDEX (BMI) DOCUMENTED: ICD-10-PCS | Mod: CPTII,S$GLB,, | Performed by: PAIN MEDICINE

## 2021-10-21 PROCEDURE — 1160F PR REVIEW ALL MEDS BY PRESCRIBER/CLIN PHARMACIST DOCUMENTED: ICD-10-PCS | Mod: CPTII,S$GLB,, | Performed by: PAIN MEDICINE

## 2021-10-21 PROCEDURE — 3079F PR MOST RECENT DIASTOLIC BLOOD PRESSURE 80-89 MM HG: ICD-10-PCS | Mod: CPTII,S$GLB,, | Performed by: PAIN MEDICINE

## 2021-10-21 PROCEDURE — 1125F PR PAIN SEVERITY QUANTIFIED, PAIN PRESENT: ICD-10-PCS | Mod: CPTII,S$GLB,, | Performed by: PAIN MEDICINE

## 2021-10-21 PROCEDURE — 3060F PR POS MICROALBUMINURIA RESULT DOCUMENTED/REVIEW: ICD-10-PCS | Mod: CPTII,S$GLB,, | Performed by: PAIN MEDICINE

## 2021-10-21 PROCEDURE — 3066F NEPHROPATHY DOC TX: CPT | Mod: CPTII,S$GLB,, | Performed by: PAIN MEDICINE

## 2021-10-21 PROCEDURE — 3079F DIAST BP 80-89 MM HG: CPT | Mod: CPTII,S$GLB,, | Performed by: PAIN MEDICINE

## 2021-10-21 PROCEDURE — 3044F HG A1C LEVEL LT 7.0%: CPT | Mod: CPTII,S$GLB,, | Performed by: PAIN MEDICINE

## 2021-10-21 PROCEDURE — 1101F PT FALLS ASSESS-DOCD LE1/YR: CPT | Mod: CPTII,S$GLB,, | Performed by: PAIN MEDICINE

## 2021-10-21 PROCEDURE — 1159F PR MEDICATION LIST DOCUMENTED IN MEDICAL RECORD: ICD-10-PCS | Mod: CPTII,S$GLB,, | Performed by: PAIN MEDICINE

## 2021-10-21 PROCEDURE — 99204 PR OFFICE/OUTPT VISIT, NEW, LEVL IV, 45-59 MIN: ICD-10-PCS | Mod: S$GLB,,, | Performed by: PAIN MEDICINE

## 2021-10-21 PROCEDURE — 3077F PR MOST RECENT SYSTOLIC BLOOD PRESSURE >= 140 MM HG: ICD-10-PCS | Mod: CPTII,S$GLB,, | Performed by: PAIN MEDICINE

## 2021-10-21 PROCEDURE — 1160F RVW MEDS BY RX/DR IN RCRD: CPT | Mod: CPTII,S$GLB,, | Performed by: PAIN MEDICINE

## 2021-10-29 ENCOUNTER — OFFICE VISIT (OUTPATIENT)
Dept: UROLOGY | Facility: CLINIC | Age: 66
End: 2021-10-29
Payer: MEDICARE

## 2021-10-29 VITALS
HEART RATE: 67 BPM | HEIGHT: 74 IN | WEIGHT: 230.81 LBS | BODY MASS INDEX: 29.62 KG/M2 | SYSTOLIC BLOOD PRESSURE: 154 MMHG | DIASTOLIC BLOOD PRESSURE: 84 MMHG

## 2021-10-29 DIAGNOSIS — R30.0 DYSURIA: ICD-10-CM

## 2021-10-29 DIAGNOSIS — R97.20 ELEVATED PSA MEASUREMENT: Primary | ICD-10-CM

## 2021-10-29 PROCEDURE — 1126F PR PAIN SEVERITY QUANTIFIED, NO PAIN PRESENT: ICD-10-PCS | Mod: CPTII,S$GLB,, | Performed by: NURSE PRACTITIONER

## 2021-10-29 PROCEDURE — 1101F PT FALLS ASSESS-DOCD LE1/YR: CPT | Mod: CPTII,S$GLB,, | Performed by: NURSE PRACTITIONER

## 2021-10-29 PROCEDURE — 3060F POS MICROALBUMINURIA REV: CPT | Mod: CPTII,S$GLB,, | Performed by: NURSE PRACTITIONER

## 2021-10-29 PROCEDURE — 3066F PR DOCUMENTATION OF TREATMENT FOR NEPHROPATHY: ICD-10-PCS | Mod: CPTII,S$GLB,, | Performed by: NURSE PRACTITIONER

## 2021-10-29 PROCEDURE — 3079F PR MOST RECENT DIASTOLIC BLOOD PRESSURE 80-89 MM HG: ICD-10-PCS | Mod: CPTII,S$GLB,, | Performed by: NURSE PRACTITIONER

## 2021-10-29 PROCEDURE — 1126F AMNT PAIN NOTED NONE PRSNT: CPT | Mod: CPTII,S$GLB,, | Performed by: NURSE PRACTITIONER

## 2021-10-29 PROCEDURE — 99213 OFFICE O/P EST LOW 20 MIN: CPT | Mod: S$GLB,,, | Performed by: NURSE PRACTITIONER

## 2021-10-29 PROCEDURE — 1160F RVW MEDS BY RX/DR IN RCRD: CPT | Mod: CPTII,S$GLB,, | Performed by: NURSE PRACTITIONER

## 2021-10-29 PROCEDURE — 3077F PR MOST RECENT SYSTOLIC BLOOD PRESSURE >= 140 MM HG: ICD-10-PCS | Mod: CPTII,S$GLB,, | Performed by: NURSE PRACTITIONER

## 2021-10-29 PROCEDURE — 87086 URINE CULTURE/COLONY COUNT: CPT | Performed by: NURSE PRACTITIONER

## 2021-10-29 PROCEDURE — 1160F PR REVIEW ALL MEDS BY PRESCRIBER/CLIN PHARMACIST DOCUMENTED: ICD-10-PCS | Mod: CPTII,S$GLB,, | Performed by: NURSE PRACTITIONER

## 2021-10-29 PROCEDURE — 3008F PR BODY MASS INDEX (BMI) DOCUMENTED: ICD-10-PCS | Mod: CPTII,S$GLB,, | Performed by: NURSE PRACTITIONER

## 2021-10-29 PROCEDURE — 3008F BODY MASS INDEX DOCD: CPT | Mod: CPTII,S$GLB,, | Performed by: NURSE PRACTITIONER

## 2021-10-29 PROCEDURE — 3079F DIAST BP 80-89 MM HG: CPT | Mod: CPTII,S$GLB,, | Performed by: NURSE PRACTITIONER

## 2021-10-29 PROCEDURE — 3288F PR FALLS RISK ASSESSMENT DOCUMENTED: ICD-10-PCS | Mod: CPTII,S$GLB,, | Performed by: NURSE PRACTITIONER

## 2021-10-29 PROCEDURE — 1159F MED LIST DOCD IN RCRD: CPT | Mod: CPTII,S$GLB,, | Performed by: NURSE PRACTITIONER

## 2021-10-29 PROCEDURE — 4010F PR ACE/ARB THEARPY RXD/TAKEN: ICD-10-PCS | Mod: CPTII,S$GLB,, | Performed by: NURSE PRACTITIONER

## 2021-10-29 PROCEDURE — 4010F ACE/ARB THERAPY RXD/TAKEN: CPT | Mod: CPTII,S$GLB,, | Performed by: NURSE PRACTITIONER

## 2021-10-29 PROCEDURE — 99213 PR OFFICE/OUTPT VISIT, EST, LEVL III, 20-29 MIN: ICD-10-PCS | Mod: S$GLB,,, | Performed by: NURSE PRACTITIONER

## 2021-10-29 PROCEDURE — 3288F FALL RISK ASSESSMENT DOCD: CPT | Mod: CPTII,S$GLB,, | Performed by: NURSE PRACTITIONER

## 2021-10-29 PROCEDURE — 1159F PR MEDICATION LIST DOCUMENTED IN MEDICAL RECORD: ICD-10-PCS | Mod: CPTII,S$GLB,, | Performed by: NURSE PRACTITIONER

## 2021-10-29 PROCEDURE — 3044F PR MOST RECENT HEMOGLOBIN A1C LEVEL <7.0%: ICD-10-PCS | Mod: CPTII,S$GLB,, | Performed by: NURSE PRACTITIONER

## 2021-10-29 PROCEDURE — 1101F PR PT FALLS ASSESS DOC 0-1 FALLS W/OUT INJ PAST YR: ICD-10-PCS | Mod: CPTII,S$GLB,, | Performed by: NURSE PRACTITIONER

## 2021-10-29 PROCEDURE — 99999 PR PBB SHADOW E&M-EST. PATIENT-LVL IV: ICD-10-PCS | Mod: PBBFAC,,, | Performed by: NURSE PRACTITIONER

## 2021-10-29 PROCEDURE — 3066F NEPHROPATHY DOC TX: CPT | Mod: CPTII,S$GLB,, | Performed by: NURSE PRACTITIONER

## 2021-10-29 PROCEDURE — 3044F HG A1C LEVEL LT 7.0%: CPT | Mod: CPTII,S$GLB,, | Performed by: NURSE PRACTITIONER

## 2021-10-29 PROCEDURE — 3060F PR POS MICROALBUMINURIA RESULT DOCUMENTED/REVIEW: ICD-10-PCS | Mod: CPTII,S$GLB,, | Performed by: NURSE PRACTITIONER

## 2021-10-29 PROCEDURE — 99999 PR PBB SHADOW E&M-EST. PATIENT-LVL IV: CPT | Mod: PBBFAC,,, | Performed by: NURSE PRACTITIONER

## 2021-10-29 PROCEDURE — 3077F SYST BP >= 140 MM HG: CPT | Mod: CPTII,S$GLB,, | Performed by: NURSE PRACTITIONER

## 2021-10-31 LAB — BACTERIA UR CULT: NORMAL

## 2021-12-13 ENCOUNTER — TELEPHONE (OUTPATIENT)
Dept: PAIN MEDICINE | Facility: CLINIC | Age: 66
End: 2021-12-13
Payer: MEDICARE

## 2021-12-13 ENCOUNTER — PATIENT OUTREACH (OUTPATIENT)
Dept: ADMINISTRATIVE | Facility: OTHER | Age: 66
End: 2021-12-13
Payer: MEDICARE

## 2021-12-17 ENCOUNTER — IMMUNIZATION (OUTPATIENT)
Dept: PRIMARY CARE CLINIC | Facility: CLINIC | Age: 66
End: 2021-12-17
Payer: MEDICARE

## 2021-12-17 DIAGNOSIS — Z23 NEED FOR VACCINATION: Primary | ICD-10-CM

## 2021-12-17 PROCEDURE — 0004A COVID-19, MRNA, LNP-S, PF, 30 MCG/0.3 ML DOSE VACCINE: CPT | Mod: PBBFAC | Performed by: EMERGENCY MEDICINE

## 2022-01-07 ENCOUNTER — CLINICAL SUPPORT (OUTPATIENT)
Dept: PRIMARY CARE CLINIC | Facility: CLINIC | Age: 67
End: 2022-01-07
Payer: MEDICARE

## 2022-01-07 DIAGNOSIS — Z23 NEED FOR VACCINATION: Primary | ICD-10-CM

## 2022-01-07 PROCEDURE — 90670 PCV13 VACCINE IM: CPT | Mod: S$GLB,,, | Performed by: STUDENT IN AN ORGANIZED HEALTH CARE EDUCATION/TRAINING PROGRAM

## 2022-01-07 PROCEDURE — G0008 FLU VACCINE - QUADRIVALENT - ADJUVANTED: ICD-10-PCS | Mod: S$GLB,,, | Performed by: STUDENT IN AN ORGANIZED HEALTH CARE EDUCATION/TRAINING PROGRAM

## 2022-01-07 PROCEDURE — 90694 FLU VACCINE - QUADRIVALENT - ADJUVANTED: ICD-10-PCS | Mod: S$GLB,,, | Performed by: STUDENT IN AN ORGANIZED HEALTH CARE EDUCATION/TRAINING PROGRAM

## 2022-01-07 PROCEDURE — G0008 ADMIN INFLUENZA VIRUS VAC: HCPCS | Mod: S$GLB,,, | Performed by: STUDENT IN AN ORGANIZED HEALTH CARE EDUCATION/TRAINING PROGRAM

## 2022-01-07 PROCEDURE — 90670 PNEUMOCOCCAL CONJUGATE VACCINE 13-VALENT LESS THAN 5YO & GREATER THAN: ICD-10-PCS | Mod: S$GLB,,, | Performed by: STUDENT IN AN ORGANIZED HEALTH CARE EDUCATION/TRAINING PROGRAM

## 2022-01-07 PROCEDURE — G0009 ADMIN PNEUMOCOCCAL VACCINE: HCPCS | Mod: S$GLB,,, | Performed by: STUDENT IN AN ORGANIZED HEALTH CARE EDUCATION/TRAINING PROGRAM

## 2022-01-07 PROCEDURE — 90694 VACC AIIV4 NO PRSRV 0.5ML IM: CPT | Mod: S$GLB,,, | Performed by: STUDENT IN AN ORGANIZED HEALTH CARE EDUCATION/TRAINING PROGRAM

## 2022-01-07 PROCEDURE — G0009 PNEUMOCOCCAL CONJUGATE VACCINE 13-VALENT LESS THAN 5YO & GREATER THAN: ICD-10-PCS | Mod: S$GLB,,, | Performed by: STUDENT IN AN ORGANIZED HEALTH CARE EDUCATION/TRAINING PROGRAM

## 2022-01-07 NOTE — PROGRESS NOTES
Verified pt ID using name and . NKDA. Administered flu vaccine in right deltoid and prevnar 13 in left deltoid per physician order using aseptic technique. Aspirated and no blood return noted. Pt tolerated well with no adverse reactions noted.

## 2022-01-13 ENCOUNTER — OFFICE VISIT (OUTPATIENT)
Dept: PAIN MEDICINE | Facility: CLINIC | Age: 67
End: 2022-01-13
Payer: MEDICARE

## 2022-01-13 VITALS
HEART RATE: 77 BPM | DIASTOLIC BLOOD PRESSURE: 80 MMHG | WEIGHT: 235 LBS | HEIGHT: 74 IN | OXYGEN SATURATION: 99 % | SYSTOLIC BLOOD PRESSURE: 156 MMHG | BODY MASS INDEX: 30.16 KG/M2

## 2022-01-13 DIAGNOSIS — M47.816 LUMBAR SPONDYLOSIS: ICD-10-CM

## 2022-01-13 DIAGNOSIS — M51.36 DDD (DEGENERATIVE DISC DISEASE), LUMBAR: Primary | ICD-10-CM

## 2022-01-13 DIAGNOSIS — M54.16 LUMBAR RADICULOPATHY: ICD-10-CM

## 2022-01-13 PROCEDURE — 3288F PR FALLS RISK ASSESSMENT DOCUMENTED: ICD-10-PCS | Mod: CPTII,S$GLB,, | Performed by: PAIN MEDICINE

## 2022-01-13 PROCEDURE — 3077F SYST BP >= 140 MM HG: CPT | Mod: CPTII,S$GLB,, | Performed by: PAIN MEDICINE

## 2022-01-13 PROCEDURE — 1159F PR MEDICATION LIST DOCUMENTED IN MEDICAL RECORD: ICD-10-PCS | Mod: CPTII,S$GLB,, | Performed by: PAIN MEDICINE

## 2022-01-13 PROCEDURE — 1160F PR REVIEW ALL MEDS BY PRESCRIBER/CLIN PHARMACIST DOCUMENTED: ICD-10-PCS | Mod: CPTII,S$GLB,, | Performed by: PAIN MEDICINE

## 2022-01-13 PROCEDURE — 99214 OFFICE O/P EST MOD 30 MIN: CPT | Mod: S$GLB,,, | Performed by: PAIN MEDICINE

## 2022-01-13 PROCEDURE — 1125F PR PAIN SEVERITY QUANTIFIED, PAIN PRESENT: ICD-10-PCS | Mod: CPTII,S$GLB,, | Performed by: PAIN MEDICINE

## 2022-01-13 PROCEDURE — 3079F PR MOST RECENT DIASTOLIC BLOOD PRESSURE 80-89 MM HG: ICD-10-PCS | Mod: CPTII,S$GLB,, | Performed by: PAIN MEDICINE

## 2022-01-13 PROCEDURE — 1159F MED LIST DOCD IN RCRD: CPT | Mod: CPTII,S$GLB,, | Performed by: PAIN MEDICINE

## 2022-01-13 PROCEDURE — 3008F BODY MASS INDEX DOCD: CPT | Mod: CPTII,S$GLB,, | Performed by: PAIN MEDICINE

## 2022-01-13 PROCEDURE — 3288F FALL RISK ASSESSMENT DOCD: CPT | Mod: CPTII,S$GLB,, | Performed by: PAIN MEDICINE

## 2022-01-13 PROCEDURE — 99999 PR PBB SHADOW E&M-EST. PATIENT-LVL IV: ICD-10-PCS | Mod: PBBFAC,,, | Performed by: PAIN MEDICINE

## 2022-01-13 PROCEDURE — 1125F AMNT PAIN NOTED PAIN PRSNT: CPT | Mod: CPTII,S$GLB,, | Performed by: PAIN MEDICINE

## 2022-01-13 PROCEDURE — 99999 PR PBB SHADOW E&M-EST. PATIENT-LVL IV: CPT | Mod: PBBFAC,,, | Performed by: PAIN MEDICINE

## 2022-01-13 PROCEDURE — 3008F PR BODY MASS INDEX (BMI) DOCUMENTED: ICD-10-PCS | Mod: CPTII,S$GLB,, | Performed by: PAIN MEDICINE

## 2022-01-13 PROCEDURE — 3077F PR MOST RECENT SYSTOLIC BLOOD PRESSURE >= 140 MM HG: ICD-10-PCS | Mod: CPTII,S$GLB,, | Performed by: PAIN MEDICINE

## 2022-01-13 PROCEDURE — 1101F PR PT FALLS ASSESS DOC 0-1 FALLS W/OUT INJ PAST YR: ICD-10-PCS | Mod: CPTII,S$GLB,, | Performed by: PAIN MEDICINE

## 2022-01-13 PROCEDURE — 99214 PR OFFICE/OUTPT VISIT, EST, LEVL IV, 30-39 MIN: ICD-10-PCS | Mod: S$GLB,,, | Performed by: PAIN MEDICINE

## 2022-01-13 PROCEDURE — 1160F RVW MEDS BY RX/DR IN RCRD: CPT | Mod: CPTII,S$GLB,, | Performed by: PAIN MEDICINE

## 2022-01-13 PROCEDURE — 3079F DIAST BP 80-89 MM HG: CPT | Mod: CPTII,S$GLB,, | Performed by: PAIN MEDICINE

## 2022-01-13 PROCEDURE — 1101F PT FALLS ASSESS-DOCD LE1/YR: CPT | Mod: CPTII,S$GLB,, | Performed by: PAIN MEDICINE

## 2022-01-13 NOTE — PROGRESS NOTES
Subjective:     Patient ID: Gerson Phillips III is a 66 y.o. male    Chief Complaint: Low-back Pain      Referred by: No ref. provider found      HPI:    Interval History (1/13/22):  He returns today for follow up.  He reports that diclofenac prescribed by his primary care physician has been very helpful for the bilateral low back pain.  He states that he still has some pain but the diclofenac controlled very well.  He was scheduled to start physical therapy, but his visit was canceled and he was never contacted to reschedule.  Otherwise he denies any changes in the quality or location was pain.  He denies any new worsening symptoms.      Initial Encounter (10/21/21):  Gerson Phillips III is a 66 y.o. male who presents today with chronic bilateral low back pain.  This pain has been present for over 20 years.  No specific inciting events or injuries noted.  The pain is located across the lower lumbar spine.  Pain does not radiate to lower extremities.  Patient does report some bilateral groin pain when ambulating or standing for prolonged periods of time.  He describes his pain as burning but is unsure if it was related to his back.  He denies any associated lower extremity numbness, tingling, weakness, bowel bladder dysfunction..   This pain is described in detail below.    Physical Therapy:  Not recently.    Non-pharmacologic Treatment:  Rest helps         · TENS?  No    Pain Medications:         · Currently taking:  Diclofenac    · Has tried in the past:  NSAIDs, Tylenol    · Has not tried:  Opioids, Muscle relaxants, TCAs, SNRIs, anticonvulsants, topical creams    Blood thinners:  None    Interventional Therapies:   7/13/13 - right L4 and L5 transforaminal epidural steroid injection - no significant relief noted    Relevant Surgeries:  None    Affecting sleep?  Yes    Affecting daily activities? yes    Depressive symptoms? no          · SI/HI? No    Work status: Retired    Pain Scores:    Best:       3/10  Worst:      9/10  Usually:   3/10  Today:    3/10    Review of Systems   Constitutional: Negative for activity change, appetite change, chills, fatigue, fever and unexpected weight change.   HENT: Negative for hearing loss.    Eyes: Negative for visual disturbance.   Respiratory: Negative for chest tightness and shortness of breath.    Cardiovascular: Negative for chest pain.   Gastrointestinal: Negative for abdominal pain, constipation, diarrhea, nausea and vomiting.   Genitourinary: Negative for difficulty urinating.   Musculoskeletal: Positive for arthralgias, back pain, gait problem and myalgias. Negative for neck pain.   Skin: Negative for rash.   Neurological: Negative for dizziness, weakness, light-headedness, numbness and headaches.   Psychiatric/Behavioral: Positive for sleep disturbance. Negative for hallucinations and suicidal ideas. The patient is not nervous/anxious.        Past Medical History:   Diagnosis Date    Abnormal liver function tests 6/18/2013    Anemia 6/18/2013    Cervical radiculopathy 3/12/2013    CKD stage 3 due to type 2 diabetes mellitus 3/4/2016    Colon polyp     Diabetes mellitus type II, uncontrolled 10/18/2012    Diabetes mellitus with renal manifestations, uncontrolled 9/27/2013    History of colonic polyps 10/5/2015    HTN (hypertension) 10/18/2012    Hyperlipidemia 10/18/2012    Lateral epicondylitis 6/18/2013    Long-term insulin use 3/4/2016    Low back pain 1/29/2013    Microalbuminuria 3/30/2015    Nuclear sclerosis of both eyes 2/14/2020    Open-angle glaucoma     Primary open angle glaucoma (POAG) of both eyes, moderate stage 2/14/2020    Uncontrolled type 2 diabetes mellitus with proteinuria or albuminuria 3/30/2015    Uncontrolled type 2 diabetes mellitus with proteinuria or microalbuminuria 3/30/2015       Past Surgical History:   Procedure Laterality Date    COLONOSCOPY N/A 9/1/2017    Procedure: COLONOSCOPY;  Surgeon: Gopal Ny MD;  Location: [facility]  "ENDO;  Service: Endoscopy;  Laterality: N/A;    COLONOSCOPY N/A 3/26/2018    Procedure: COLONOSCOPY;  Surgeon: Jamar Batista MD;  Location: [facility] ([address] [place] [address]);  Service: Endoscopy;  Laterality: N/A;       Social History     Socioeconomic History    Marital status:    Tobacco Use    Smoking status: Never Smoker    Smokeless tobacco: Never Used   Substance and Sexual Activity    Alcohol use: Yes     Alcohol/week: 3.0 - 4.0 standard drinks     Types: 3 - 4 Cans of beer per week     Comment: occasion    Sexual activity: Yes       Review of patient's allergies indicates:  No Known Allergies    Current Outpatient Medications on File Prior to Visit   Medication Sig Dispense Refill    atorvastatin (LIPITOR) 20 MG tablet Take 1 tablet (20 mg total) by mouth once daily. 90 tablet 3    blood sugar diagnostic Strp To check blood glucose three times daily, to use with insurance preferred meter 100 each 3    blood-glucose meter kit To check blood glucose three times daily, to use with insurance preferred meter 1 each 0    diclofenac (VOLTAREN) 50 MG EC tablet TAKE 1 TABLET BY MOUTH TWICE A DAY 60 tablet 1    diltiaZEM (CARDIZEM CD) 240 MG 24 hr capsule Take 1 capsule (240 mg total) by mouth once daily. 90 capsule 12    lancets Misc To check blood glucose three times daily, to use with insurance preferred meter 100 each 3    metFORMIN (GLUCOPHAGE) 1000 MG tablet TAKE 1 TABLET BY MOUTH TWICE A  tablet 2    pen needle, diabetic (BD ULTRA-FINE FORREST PEN NEEDLE) 32 gauge x 5/32" Ndle USE 1X/ each 3    quinapriL (ACCUPRIL) 40 MG tablet TAKE 1 TABLET BY MOUTH TWICE A  tablet 12    semaglutide (OZEMPIC) 1 mg/dose (4 mg/3 mL) Inject 1 mg into the skin every 7 days. 1 pen 5    TOUJEO SOLOSTAR U-300 INSULIN 300 unit/mL (1.5 mL) InPn pen Inject 20 Units into the skin once daily.      hydroCHLOROthiazide (HYDRODIURIL) 12.5 MG Tab Take 1 tablet (12.5 mg total) by mouth once daily. 90 tablet 1 " "   latanoprost 0.005 % ophthalmic solution PLACE 1 DROP INTO BOTH EYES EVERY EVENING. 7.5 mL 1     Current Facility-Administered Medications on File Prior to Visit   Medication Dose Route Frequency Provider Last Rate Last Admin    LIDOcaine 2%/EPINEPHrine 1:100,000 injection 2 mL  2 mL Intradermal 1 time in Clinic/HOD Andrew Busch MD           Objective:      BP (!) 156/80 (BP Location: Right arm, Patient Position: Sitting, BP Method: Medium (Automatic))   Pulse 77   Ht 6' 2" (1.88 m)   Wt 106.6 kg (235 lb 0.2 oz)   SpO2 99%   BMI 30.17 kg/m²     Exam:  GEN:  Well developed, well nourished.  No acute distress.   HEENT:  No trauma.  Mucous membranes moist.  Nares patent bilaterally.  PSYCH: Normal affect. Thought content appropriate.  CHEST:  Breathing symmetric.  No audible wheezing.  ABD: Soft, non-distended.  SKIN:  Warm, pink, dry.  No rash on exposed areas.    EXT:  No cyanosis, clubbing, or edema.  No color change or changes in nail or hair growth.  NEURO/MUSCULOSKELETAL:  Fully alert, oriented, and appropriate. Speech normal chip. No cranial nerve deficits.   Gait:  Normal.  No focal motor deficits.           Imaging:      Narrative & Impression    EXAMINATION:  MRI LUMBAR SPINE WITHOUT CONTRAST     CLINICAL HISTORY:  lumbar DDD; Other intervertebral disc degeneration, lumbar region     TECHNIQUE:  Multiplanar, multisequence MR images were acquired from the thoracolumbar junction to the sacrum without contrast.     COMPARISON:  09/17/2021     FINDINGS:  Alignment: Normal.     Vertebrae: Degenerative endplate changes are seen in the lower lumbar spine.  No fracture or marrow infiltrative process.     Discs: Partial fusion across the L2-L3 disc space.  Severe disc height loss at L5-S1, mild at L4-L5.  No evidence for discitis.     Cord: Normal.  Conus terminates at L1.     Degenerative findings:     T12-L1: No spinal canal stenosis or neural foraminal narrowing.     L1-L2: No spinal canal " stenosis or neural foraminal narrowing.     L2-L3: No spinal canal stenosis or neural foraminal narrowing.     L3-L4: Circumferential disc bulge and mild facet arthropathy result in mild left neural foraminal narrowing.     L4-L5: Circumferential disc bulge and moderate facet arthropathy result in mild effacement of the thecal sac and moderate right, mild left neural foraminal narrowing.     L5-S1: Circumferential disc bulge and mild facet arthropathy result in mild right neural foraminal narrowing.     Paraspinal muscles & soft tissues: Moderate paraspinal muscle atrophy.     Impression:     1. Multilevel degenerative changes of the lumbar spine.  Moderate right neural foraminal narrowing noted at L4-L5.        Electronically signed by: Ezra Vences MD  Date:                                            11/18/2021  Time:                                           10:35           Narrative & Impression    EXAMINATION:  XR HIPS BILATERAL 2 VIEW INCL AP PELVIS     CLINICAL HISTORY:  Lumbago with sciatica, left side     TECHNIQUE:  AP view of the pelvis and frogleg lateral views of both hips were performed.     COMPARISON:  None.     FINDINGS:  Mild bilateral hip joint space narrowing.     No significant osteophyte formation, sclerosis or geodes.     Normal bilateral femoral head contour.     The bilateral sacroiliac joints appear within normal limits.     No fracture, no osseous lesions.     Atherosclerotic changes of the aorta and its branches.     The soft tissues appear normal.     Impression:     Mild degenerative changes of the bilateral hip joints.        Electronically signed by: rBiana Marquez MD  Date:                                            09/17/2021  Time:                                           12:36         Narrative & Impression    EXAMINATION:  XR LUMBAR SPINE COMPLETE 5 VIEW     CLINICAL HISTORY:  Back pain or radiculopathy, > 6 wks;Dorsalgia, unspecified     TECHNIQUE:  AP, lateral, spot  and bilateral oblique views of the lumbar spine were performed.     COMPARISON:  09/03/2015     FINDINGS:  Mild dextroscoliosis of the lumbar spine.     The vertebral body heights are well maintained.     Mild disc space narrowing L5-S1.     The oblique views demonstrate no evidence of spondylolysis.     The bilateral sacroiliac joints appear symmetrical on the AP view.     Prominent right lateral bridging osteophyte at L1-L2 and L2-L3.     The facet joints appear within normal limits.     Impression:     Spondylosis of the lumbar spine. No acute process seen.        Electronically signed by: Briana Marquez MD  Date:                                            09/17/2021  Time:                                           12:34         Assessment:       Encounter Diagnoses   Name Primary?    DDD (degenerative disc disease), lumbar Yes    Lumbar spondylosis     Lumbar radiculopathy          Plan:       Gerson was seen today for low-back pain.    Diagnoses and all orders for this visit:    DDD (degenerative disc disease), lumbar  -     Ambulatory referral/consult to Physical/Occupational Therapy; Future    Lumbar spondylosis  -     Ambulatory referral/consult to Physical/Occupational Therapy; Future    Lumbar radiculopathy  -     Ambulatory referral/consult to Physical/Occupational Therapy; Future        [place] Alan ELENA is a 66 y.o. male with chronic bilateral low back pain.  Exact etiology somewhat unclear though does appear to be mostly axial and likely related to lower lumbar facet joints.  Does have multilevel degenerative disc disease and may have some pain related to spinal stenosis as well.  Patient also reports bilateral anterior groin pain.  Does have degenerative changes noted on bilateral hip x-rays.  Pain not reproduced with internal rotation of bilateral hips.  Unclear if hip pain is related to hip joints or if related to lumbar spine/spinal stenosis..    1.  Pertinent imaging studies reviewed by  me. Imaging results were discussed with patient.  2.  Refer to PT for ROM, strengthening, stretching and HEP.  3.  Continue diclofenac as prescribed by primary care physician.  The laboratory contraindications noted.  4.  Return to clinic in 8 weeks or sooner if needed.  At that time we will discuss efficacy of physical therapy/home exercise program.  May consider lumbar medial branch blocks/RFA if needed.        This note was created by combination of typed  and M-Modal dictation. Transcription and phonetic errors may be present.  If there are any questions, please contact me.

## 2022-01-21 ENCOUNTER — OFFICE VISIT (OUTPATIENT)
Dept: ENDOCRINOLOGY | Facility: CLINIC | Age: 67
End: 2022-01-21
Payer: MEDICARE

## 2022-01-21 ENCOUNTER — PATIENT MESSAGE (OUTPATIENT)
Dept: ADMINISTRATIVE | Facility: HOSPITAL | Age: 67
End: 2022-01-21
Payer: MEDICARE

## 2022-01-21 VITALS
BODY MASS INDEX: 29.97 KG/M2 | HEIGHT: 74 IN | SYSTOLIC BLOOD PRESSURE: 148 MMHG | WEIGHT: 233.5 LBS | OXYGEN SATURATION: 99 % | DIASTOLIC BLOOD PRESSURE: 82 MMHG | TEMPERATURE: 98 F | HEART RATE: 76 BPM

## 2022-01-21 DIAGNOSIS — E78.5 HYPERLIPIDEMIA, UNSPECIFIED HYPERLIPIDEMIA TYPE: ICD-10-CM

## 2022-01-21 DIAGNOSIS — E11.9 CONTROLLED TYPE 2 DIABETES MELLITUS WITHOUT COMPLICATION, WITHOUT LONG-TERM CURRENT USE OF INSULIN: Primary | ICD-10-CM

## 2022-01-21 DIAGNOSIS — I10 ESSENTIAL HYPERTENSION: ICD-10-CM

## 2022-01-21 PROCEDURE — 3077F SYST BP >= 140 MM HG: CPT | Mod: CPTII,S$GLB,, | Performed by: NURSE PRACTITIONER

## 2022-01-21 PROCEDURE — 99999 PR PBB SHADOW E&M-EST. PATIENT-LVL IV: CPT | Mod: PBBFAC,,, | Performed by: NURSE PRACTITIONER

## 2022-01-21 PROCEDURE — 3288F PR FALLS RISK ASSESSMENT DOCUMENTED: ICD-10-PCS | Mod: CPTII,S$GLB,, | Performed by: NURSE PRACTITIONER

## 2022-01-21 PROCEDURE — 99214 PR OFFICE/OUTPT VISIT, EST, LEVL IV, 30-39 MIN: ICD-10-PCS | Mod: S$GLB,,, | Performed by: NURSE PRACTITIONER

## 2022-01-21 PROCEDURE — 3077F PR MOST RECENT SYSTOLIC BLOOD PRESSURE >= 140 MM HG: ICD-10-PCS | Mod: CPTII,S$GLB,, | Performed by: NURSE PRACTITIONER

## 2022-01-21 PROCEDURE — 1101F PR PT FALLS ASSESS DOC 0-1 FALLS W/OUT INJ PAST YR: ICD-10-PCS | Mod: CPTII,S$GLB,, | Performed by: NURSE PRACTITIONER

## 2022-01-21 PROCEDURE — 99999 PR PBB SHADOW E&M-EST. PATIENT-LVL IV: ICD-10-PCS | Mod: PBBFAC,,, | Performed by: NURSE PRACTITIONER

## 2022-01-21 PROCEDURE — 3288F FALL RISK ASSESSMENT DOCD: CPT | Mod: CPTII,S$GLB,, | Performed by: NURSE PRACTITIONER

## 2022-01-21 PROCEDURE — 1101F PT FALLS ASSESS-DOCD LE1/YR: CPT | Mod: CPTII,S$GLB,, | Performed by: NURSE PRACTITIONER

## 2022-01-21 PROCEDURE — 1126F AMNT PAIN NOTED NONE PRSNT: CPT | Mod: CPTII,S$GLB,, | Performed by: NURSE PRACTITIONER

## 2022-01-21 PROCEDURE — 3008F BODY MASS INDEX DOCD: CPT | Mod: CPTII,S$GLB,, | Performed by: NURSE PRACTITIONER

## 2022-01-21 PROCEDURE — 99214 OFFICE O/P EST MOD 30 MIN: CPT | Mod: S$GLB,,, | Performed by: NURSE PRACTITIONER

## 2022-01-21 PROCEDURE — 1126F PR PAIN SEVERITY QUANTIFIED, NO PAIN PRESENT: ICD-10-PCS | Mod: CPTII,S$GLB,, | Performed by: NURSE PRACTITIONER

## 2022-01-21 PROCEDURE — 3008F PR BODY MASS INDEX (BMI) DOCUMENTED: ICD-10-PCS | Mod: CPTII,S$GLB,, | Performed by: NURSE PRACTITIONER

## 2022-01-21 PROCEDURE — 1159F MED LIST DOCD IN RCRD: CPT | Mod: CPTII,S$GLB,, | Performed by: NURSE PRACTITIONER

## 2022-01-21 PROCEDURE — 3079F DIAST BP 80-89 MM HG: CPT | Mod: CPTII,S$GLB,, | Performed by: NURSE PRACTITIONER

## 2022-01-21 PROCEDURE — 1159F PR MEDICATION LIST DOCUMENTED IN MEDICAL RECORD: ICD-10-PCS | Mod: CPTII,S$GLB,, | Performed by: NURSE PRACTITIONER

## 2022-01-21 PROCEDURE — 3079F PR MOST RECENT DIASTOLIC BLOOD PRESSURE 80-89 MM HG: ICD-10-PCS | Mod: CPTII,S$GLB,, | Performed by: NURSE PRACTITIONER

## 2022-01-21 RX ORDER — LANCETS
EACH MISCELLANEOUS
Qty: 100 EACH | Refills: 3 | OUTPATIENT
Start: 2022-01-21 | End: 2022-04-14 | Stop reason: SDUPTHER

## 2022-01-21 RX ORDER — INSULIN PUMP SYRINGE, 3 ML
EACH MISCELLANEOUS
Qty: 1 EACH | Refills: 0 | OUTPATIENT
Start: 2022-01-21 | End: 2022-04-14 | Stop reason: SDUPTHER

## 2022-01-21 NOTE — PROGRESS NOTES
CC: This 66 y.o. Black or  male  is here for evaluation of  T2DM along with comorbidities indicated in the Visit Diagnosis section of this encounter.    HPI: Gerson Phillips III was diagnosed with T2DM  > 20 years ago.   Previously followed by Dr. Hoskins with lov in (9/2017).       Prior visit 7/2021  a1c is down from 7 to 6.7%.   Pt has been taking Toujeo 20-25 units, mostly 20 units, and BGs sometimes are as low as 80s in the evenings.   Pt was actually without Ozempic for a month because he couldn't get it from his pharmacy.   Admits that his diet is not always healthy and yet his BGs do not spike as high as expected after high-carb meals.   Plan Reduce Tresiba from 20 to 16 units daily.   Continue Ozempic and metformin.   Test glucose 2x/day.  Encouraged patient to improve diet and start exercise regimen. He probably would not require insulin with lifestyle changes and some mild weight loss.   Return to clinic in 3 months with labs prior.         Interval history  He couldn't get Ozempic for about 2 months but has been back on it x 2 months.     He has been taking Toujeo 24 units, previously advised to take 16 units. States that there was a week when he didn't take insulin at all, and his BGs were unchanged.           LAST DIABETES EDUCATION: years ago     DIABETES MEDICATIONS:   metformin 1000 mg bid with food   Toujeo - as above  Ozempic 1 mg once weekly     Misses medication doses - no     DM COMPLICATIONS: nephropathy and impotence    SIGNIFICANT DIABETES MED HISTORY:   Unable to afford Victoza   Vgo stopped d/t ineffectiveness in 7/2018 and MDI (basalgar and novolog)  started   Novolog stopped and Ozempic started 12/2019.       SELF MONITORING BLOOD GLUCOSE: Checks blood glucose at home - 2x/day, fasting and predinner.  Forgot log.   Recalls BGs 90-120s; averages 110s    140 after a meal     HYPOGLYCEMIC EPISODES: denies.  No overnight symptoms.     CURRENT DIET: drinks Coke Zero and water.  "Eats 3 meals/day. No snacks. He brings his food on the road with him and tries to avoid eating out.       CURRENT EXERCISE: none     OCCUPATION:  Long distance           BP (!) 148/82 (BP Location: Left arm, Patient Position: Sitting, BP Method: Large (Automatic))   Pulse 76   Temp 98.3 °F (36.8 °C) (Oral)   Ht 6' 2" (1.88 m)   Wt 105.9 kg (233 lb 8 oz)   SpO2 99%   BMI 29.98 kg/m²       ROS:   CONSTITUTIONAL: Appetite ok, no fatigue  GI: denies constipation, diarrhea, nausea       PHYSICAL EXAM:  GENERAL: Well developed, well nourished. No acute distress.   PSYCH: AAOx3, appropriate mood and affect, conversant, well-groomed. Judgement and insight good.   NEURO: Cranial nerves grossly intact. Speech clear, no tremor.   CHEST: Respirations even and unlabored.         Hemoglobin A1C   Date Value Ref Range Status   06/25/2021 6.7 (H) 4.0 - 5.6 % Final     Comment:     ADA Screening Guidelines:  5.7-6.4%  Consistent with prediabetes  >or=6.5%  Consistent with diabetes    High levels of fetal hemoglobin interfere with the HbA1C  assay. Heterozygous hemoglobin variants (HbS, HgC, etc)do  not significantly interfere with this assay.   However, presence of multiple variants may affect accuracy.     03/12/2021 7.0 (H) 4.0 - 5.6 % Final     Comment:     ADA Screening Guidelines:  5.7-6.4%  Consistent with prediabetes  >or=6.5%  Consistent with diabetes    High levels of fetal hemoglobin interfere with the HbA1C  assay. Heterozygous hemoglobin variants (HbS, HgC, etc)do  not significantly interfere with this assay.   However, presence of multiple variants may affect accuracy.     08/28/2020 6.8 (H) 4.0 - 5.6 % Final     Comment:     ADA Screening Guidelines:  5.7-6.4%  Consistent with prediabetes  >or=6.5%  Consistent with diabetes  High levels of fetal hemoglobin interfere with the HbA1C  assay. Heterozygous hemoglobin variants (HbS, HgC, etc)do  not significantly interfere with this assay.   However, " presence of multiple variants may affect accuracy.             Chemistry        Component Value Date/Time     (L) 06/25/2021 0829    K 3.6 06/25/2021 0829     06/25/2021 0829    CO2 27 06/25/2021 0829    BUN 14 06/25/2021 0829    CREATININE 1.1 06/25/2021 0829    CREATININE 1.1 06/25/2021 0829     (H) 06/25/2021 0829        Component Value Date/Time    CALCIUM 9.0 06/25/2021 0829    ALKPHOS 61 03/12/2021 1340    AST 17 03/12/2021 1340    ALT 22 03/12/2021 1340    BILITOT 0.5 03/12/2021 1340    ESTGFRAFRICA >60 06/25/2021 0829    ESTGFRAFRICA >60 06/25/2021 0829    EGFRNONAA >60 06/25/2021 0829    EGFRNONAA >60 06/25/2021 0829          Lab Results   Component Value Date    LDLCALC 45.8 (L) 03/12/2021          Ref. Range 3/12/2021 08:13   Cholesterol Latest Ref Range: 120 - 199 mg/dL 105 (L)   HDL Latest Ref Range: 40 - 75 mg/dL 46   HDL/Cholesterol Ratio Latest Ref Range: 20.0 - 50.0 % 43.8   LDL Cholesterol External Latest Ref Range: 63 - 159 mg/dL 45.8 (L)   Non-HDL Cholesterol Latest Units: mg/dL 59   Total Cholesterol/HDL Ratio Latest Ref Range: 2.0 - 5.0  2.3   Triglycerides Latest Ref Range: 30 - 150 mg/dL 66       Lab Results   Component Value Date    MICALBCREAT 51.6 (H) 06/25/2021               ASSESSMENT and PLAN:    A1C GOAL: < 7%    1. Controlled type 2 diabetes mellitus without complication, without long-term current use of insulin  a1c at earliest convenience. Will call with recommendations when A1c result is available.   Advised pt that he can do a trial without insulin for a week - testing BG before and 2 hours after meals. Reviewed glucose goals.   Return to clinic in 6 months with labs prior.     Hemoglobin A1C   2. Essential hypertension  Per PCP's recommendation in Sept -   Schedule an appointment with Sleep Disorders.     Microalbumin/Creatinine Ratio, Urine    Creatinine, Serum   3. Hyperlipidemia, unspecified hyperlipidemia type  Lipid Panel           Orders Placed This  Encounter   Procedures    Hemoglobin A1C     Standing Status:   Future     Standing Expiration Date:   3/22/2023    Lipid Panel     Standing Status:   Future     Standing Expiration Date:   1/21/2023    Microalbumin/Creatinine Ratio, Urine     Standing Status:   Future     Standing Expiration Date:   3/22/2023     Order Specific Question:   Specimen Source     Answer:   Urine    Creatinine, Serum     Standing Status:   Future     Standing Expiration Date:   3/22/2023        Follow up in about 3 months (around 4/21/2022).

## 2022-01-21 NOTE — PATIENT INSTRUCTIONS
A1C goal: <7%  Fasting/premeal blood glucose goal:   2 hour post-meal blood glucose goal: less than 180      Schedule an appointment with Sleep Disorders.   a1c at earliest convenience. Will call with recommendations when A1c result is available.   Advised pt that he can do a trial without insulin for a week - testing BG before and 2 hours after meals. Reviewed glucose goals.   Return to clinic in 6 months with labs prior.

## 2022-01-24 ENCOUNTER — TELEPHONE (OUTPATIENT)
Dept: ENDOCRINOLOGY | Facility: CLINIC | Age: 67
End: 2022-01-24
Payer: MEDICARE

## 2022-01-24 DIAGNOSIS — E11.65 UNCONTROLLED TYPE 2 DIABETES MELLITUS WITH HYPERGLYCEMIA: Primary | ICD-10-CM

## 2022-01-24 NOTE — TELEPHONE ENCOUNTER
----- Message from Katie Jones NP sent at 1/24/2022 10:53 AM CST -----  A1c carla to 7.4%, from 6.7% in June. Please get an A1c in 3 months and schedule. Keep f/u appt with me in 6 mo with labs prior.

## 2022-01-24 NOTE — TELEPHONE ENCOUNTER
Called patient and had him confirm bot identifiers , reviewed lab results of a1c . Informed NP Karen would like a recheck in 3 months . Patient preferred 4/27 at 7 am [facility] lab .

## 2022-02-02 ENCOUNTER — TELEPHONE (OUTPATIENT)
Dept: PRIMARY CARE CLINIC | Facility: CLINIC | Age: 67
End: 2022-02-02
Payer: MEDICARE

## 2022-02-02 DIAGNOSIS — I10 ESSENTIAL HYPERTENSION: ICD-10-CM

## 2022-02-02 DIAGNOSIS — E78.2 MIXED HYPERLIPIDEMIA: ICD-10-CM

## 2022-02-02 NOTE — TELEPHONE ENCOUNTER
----- Message from Hilary Moralez sent at 2/2/2022 11:34 AM CST -----  Contact: Layo @ [phone]  Good Morning  Spouse called Patient is out of all his Rx . Spouse does not know with one. Patient is on the road and would like to know if all Rx can be sent to [id number] - [place], [place] - [address]    Please call and advise or message portal    Thank you

## 2022-02-02 NOTE — TELEPHONE ENCOUNTER
RX refills requested:    hydroCHLOROthiazide (HYDRODIURIL) 12.5 MG Tab    atorvastatin (LIPITOR) 20 MG tablet

## 2022-02-02 NOTE — TELEPHONE ENCOUNTER
Care Due:                  Date            Visit Type   Department     Provider  --------------------------------------------------------------------------------                                EP - PRIMARY [facility]  Last Visit: 09-      CARE ([facility])   PRIMARY CARE   Andrew Busch                              EP - PRIMARY [facility]  Next Visit: 03-      CARE ([facility])   PRIMARY CARE   Andrew Busch                                                            Last  Test          Frequency    Reason                     Performed    Due Date  --------------------------------------------------------------------------------    CMP.........  12 months..  atorvastatin.............  Not Found    Overdue    Lipid Panel.  12 months..  atorvastatin.............  Not Found    Overdue    Powered by [employer]. Reference number: [id number].   2/02/2022 2:32:56 PM CST

## 2022-02-03 RX ORDER — ATORVASTATIN CALCIUM 20 MG/1
20 TABLET, FILM COATED ORAL DAILY
Qty: 90 TABLET | Refills: 0 | Status: SHIPPED | OUTPATIENT
Start: 2022-02-03 | End: 2022-02-22

## 2022-02-03 RX ORDER — HYDROCHLOROTHIAZIDE 12.5 MG/1
12.5 TABLET ORAL DAILY
Qty: 90 TABLET | Refills: 1 | Status: SHIPPED | OUTPATIENT
Start: 2022-02-03 | End: 2022-02-07 | Stop reason: SDUPTHER

## 2022-02-03 NOTE — TELEPHONE ENCOUNTER
Refill Routing Note   Medication(s) are not appropriate for processing by [facility] for the following reason(s):      - Required vitals are abnormal  - Medication not previously prescribed by PCP    [facility] action(s):  Defer Medication-related problems identified: Requires labs        --->Care Gap information included in message below if applicable.   Medication reconciliation completed: No   Automatic Epic Generated Protocol Data:        Requested Prescriptions   Pending Prescriptions Disp Refills    hydroCHLOROthiazide (HYDRODIURIL) 12.5 MG Tab 90 tablet 1     Sig: Take 1 tablet (12.5 mg total) by mouth once daily.       Cardiovascular: Diuretics - Thiazide Failed - 2/2/2022  2:44 PM        Failed - Last BP in normal range within 360 days     BP Readings from Last 1 Encounters:   01/21/22 (!) 148/82               Passed - Patient is at least 18 years old        Passed - Valid encounter within last 15 months     Recent Visits  Date Type Provider Dept   09/17/21 Office Visit Andrew Busch MD [facility]   03/12/21 Office Visit Andrew Busch MD [facility]   02/26/21 Office Visit Andrew Busch MD [facility]   08/28/20 Office Visit Dylan Torrez MD [facility]/ Internal Med/ Peds   02/14/20 Office Visit Dylan Torrze MD [facility] Family Med/ Internal Med/ Peds   Showing recent visits within past 720 days and meeting all other requirements  Future Appointments  No visits were found meeting these conditions.  Showing future appointments within next 150 days and meeting all other requirements      Future Appointments              In 1 month Andrew Busch MD [facility] [address]    In 1 month Joshua Munoz Jr., MD [facility] [address]    In 2 months [facility]    In 2 months [facility], [facility]    In 5 months  Katie Jones NP [facility] [place] - Endocrinology, [facility]                Passed - Cr is 1.39 or below and within 360 days     Lab Results   Component Value Date    CREATININE 1.1 06/25/2021    CREATININE 1.1 06/25/2021    CREATININE 1.0 03/12/2021              Passed - K in normal range and within 360 days     Potassium   Date Value Ref Range Status   06/25/2021 3.6 3.5 - 5.1 mmol/L Final   03/12/2021 4.1 3.5 - 5.1 mmol/L Final   02/14/2020 3.8 3.5 - 5.1 mmol/L Final              Passed - Na is between 130 and 148 and within 360 days     Sodium   Date Value Ref Range Status   06/25/2021 135 (L) 136 - 145 mmol/L Final   03/12/2021 134 (L) 136 - 145 mmol/L Final   02/14/2020 141 136 - 145 mmol/L Final              Passed - eGFR within 360 days     Lab Results   Component Value Date    EGFRNONAA >60 06/25/2021    EGFRNONAA >60 06/25/2021    EGFRNONAA >60.0 03/12/2021                  atorvastatin (LIPITOR) 20 MG tablet 90 tablet 0     Sig: Take 1 tablet (20 mg total) by mouth once daily.       Cardiovascular:  Antilipid - Statins Passed - 2/3/2022  2:59 PM        Passed - Patient is at least 18 years old        Passed - Valid encounter within last 15 months     Recent Visits  Date Type Provider Dept   09/17/21 Office Visit Andrew Busch MD [facility]   03/12/21 Office Visit Andrew Busch MD [facility]   02/26/21 Office Visit Andrew Busch MD [facility]   08/28/20 Office Visit Dylan Torrez MD [facility] Family Med/ Internal Med/ Peds   02/14/20 Office Visit Dylan Torrez MD [facility] Family Med/ Internal Med/ Peds   Showing recent visits within past 720 days and meeting all other requirements  Future Appointments  No visits were found meeting these conditions.  Showing future appointments within next 150 days and meeting all other requirements      Future Appointments              In 1 month Andrew Busch MD [facility], [place]  [facility]    In 1 month Joshua Munoz Jr., MD [facility] [address]    In 2 months LAB, [facility] - Lab (Hospital[facility]    In 2 months LAB, [facility] - Lab (Hospital), [facility]    In 5 months Katie Jones NP [facility] - Endocrinology, [facility]                Passed - ALT is 131 or below and within 360 days     ALT   Date Value Ref Range Status   03/12/2021 22 17 - 63 U/L Final   02/14/2020 19 10 - 44 U/L Final   04/08/2019 24 10 - 44 U/L Final              Passed - AST is 119 or below and within 360 days     AST   Date Value Ref Range Status   03/12/2021 17 15 - 41 U/L Final   02/14/2020 15 10 - 40 U/L Final   04/08/2019 14 10 - 40 U/L Final              Passed - Total Cholesterol within 360 days     Lab Results   Component Value Date    CHOL 105 (L) 03/12/2021    CHOL 120 02/14/2020    CHOL 111 (L) 01/14/2019              Passed - LDL within 360 days     LDL Cholesterol   Date Value Ref Range Status   03/12/2021 45.8 (L) 63.0 - 159.0 mg/dL Final     Comment:     The National Cholesterol Education Program (NCEP) has set the  following guidelines (reference values) for LDL Cholesterol:  Optimal.......................<130 mg/dL  Borderline High...............130-159 mg/dL  High..........................160-189 mg/dL  Very High.....................>190 mg/dL              Passed - HDL within 360 days     HDL   Date Value Ref Range Status   03/12/2021 46 40 - 75 mg/dL Final     Comment:     The National Cholesterol Education Program (NCEP) has set the  following guidelines (reference values) for HDL Cholesterol:  Low...............<40 mg/dL  Optimal...........>60 mg/dL              Passed - Triglycerides within 360 days     Lab Results   Component Value Date    TRIG 66 03/12/2021    TRIG 38 02/14/2020    TRIG 78 01/14/2019                    Appointments  past 12m or future 3m with PCP    Date Provider   Last Visit   9/17/2021 Andrew Busch MD   Next Visit    3/18/2022 Andrew Busch MD   ED visits in past 90 days: 0        Note composed:3:03 PM 02/03/2022

## 2022-02-07 DIAGNOSIS — I10 ESSENTIAL HYPERTENSION: ICD-10-CM

## 2022-02-07 RX ORDER — HYDROCHLOROTHIAZIDE 12.5 MG/1
12.5 TABLET ORAL DAILY
Qty: 90 TABLET | Refills: 1 | Status: SHIPPED | OUTPATIENT
Start: 2022-02-07 | End: 2022-10-25 | Stop reason: SDUPTHER

## 2022-02-07 NOTE — TELEPHONE ENCOUNTER
No new care gaps identified.  Powered by [employer]. Reference number: [id number].   2/07/2022 4:39:56 AM CST

## 2022-02-21 DIAGNOSIS — M54.41 CHRONIC BILATERAL LOW BACK PAIN WITH BILATERAL SCIATICA: ICD-10-CM

## 2022-02-21 DIAGNOSIS — G89.29 CHRONIC BILATERAL LOW BACK PAIN WITH BILATERAL SCIATICA: ICD-10-CM

## 2022-02-21 DIAGNOSIS — M54.42 CHRONIC BILATERAL LOW BACK PAIN WITH BILATERAL SCIATICA: ICD-10-CM

## 2022-02-21 DIAGNOSIS — E78.2 MIXED HYPERLIPIDEMIA: ICD-10-CM

## 2022-02-21 NOTE — TELEPHONE ENCOUNTER
Care Due:                  Date            Visit Type   Department     Provider  --------------------------------------------------------------------------------                                 -                              PRIMARY [facility]  Last Visit: 09-      CARE ([facility])   PRIMARY CARE   Andrew Busch                              EP - [facility]IMARY [facility]  Next Visit: 03-      CARE ([facility])   PRIMARY CARE   Andrew Busch                                                            Last  Test          Frequency    Reason                     Performed    Due Date  --------------------------------------------------------------------------------    CMP.........  12 months..  atorvastatin.............  03- 03-    Lipid Panel.  12 months..  atorvastatin.............  03- 03-    Powered by [employer] [place]. Reference number: [id number].   2/21/2022 11:07:01 AM CST

## 2022-02-22 RX ORDER — ATORVASTATIN CALCIUM 20 MG/1
TABLET, FILM COATED ORAL
Qty: 90 TABLET | Refills: 0 | Status: SHIPPED | OUTPATIENT
Start: 2022-02-22 | End: 2022-05-21

## 2022-02-23 RX ORDER — DICLOFENAC SODIUM 50 MG/1
TABLET, DELAYED RELEASE ORAL
Qty: 60 TABLET | Refills: 1 | Status: SHIPPED | OUTPATIENT
Start: 2022-02-23 | End: 2022-04-19

## 2022-02-23 NOTE — TELEPHONE ENCOUNTER
Refill Routing Note   Medication(s) are not appropriate for processing by [facility] for the following reason(s):      - Outside of protocol    [facility] action(s):  Route  Approve          --->Care Gap information included in message below if applicable.   Medication reconciliation completed: No   Automatic Epic Generated Protocol Data:    Orders Placed This Encounter    atorvastatin (LIPITOR) 20 MG tablet      Requested Prescriptions   Pending Prescriptions Disp Refills    diclofenac (VOLTAREN) 50 MG EC tablet [Pharmacy Med Name: DICLOFENAC SOD DR 50 MG TAB] 60 tablet 1     Sig: TAKE 1 TABLET BY MOUTH TWICE A DAY       NSAIDs Protocol Passed - 2/21/2022 11:06 AM        Passed - Serum Creatinine less than 1.4 on file in the past 12 months     Lab Results   Component Value Date    CREATININE 1.1 06/25/2021    CREATININE 1.1 06/25/2021    CREATININE 1.0 03/12/2021     Lab Results   Component Value Date    EGFRNONAA >60 06/25/2021    EGFRNONAA >60 06/25/2021    EGFRNONAA >60.0 03/12/2021               Passed - Visit with authorizing provider in past 12 months or upcoming 90 days        Passed - HGB greater than 10 or HCT greater than 30 in past 12 months        Passed - AST in past 12 months      Lab Results   Component Value Date    AST 17 03/12/2021    AST 15 02/14/2020    AST 14 04/08/2019              Passed - Serum Potassium less than 5.2 on file in the past 12 months     Lab Results   Component Value Date    K 3.6 06/25/2021    K 4.1 03/12/2021    K 3.8 02/14/2020                  Passed - Blood Pressure below 139/89 on file in past 12 months      BP Readings from Last 3 Encounters:   01/21/22 (!) 148/82   01/13/22 (!) 156/80   10/29/21 (!) 154/84             Passed - ALT less than 95 in past 12 months     Lab Results   Component Value Date    ALT 22 03/12/2021    ALT 19 02/14/2020    ALT 24 04/08/2019               Signed Prescriptions Disp Refills    atorvastatin (LIPITOR) 20 MG tablet 90 tablet 0      Sig: TAKE 1 TABLET BY MOUTH EVERY DAY       Cardiovascular:  Antilipid - Statins Passed - 2/21/2022 11:06 AM        Passed - Patient is at least 18 years old        Passed - Valid encounter within last 15 months     Recent Visits  Date Type Provider Dept   09/17/21 Office Visit Andrew Busch MD [facility]   03/12/21 Office Visit Andrew Busch MD [facility]   02/26/21 Office Visit [facility]ch MD [facility]   08/28/20 Office Visit Dylan Torrez MD [facility] Family Med/ Internal Med/ Peds   Showing recent visits within past 720 days and meeting all other requirements  Future Appointments  No visits were found meeting these conditions.  Showing future appointments within next 150 days and meeting all other requirements      Future Appointments              In 3 weeks Andrew Busch MD [facility] - Primary [facility] [address], [facility]    In 1 month Joshua Munoz Jr., MD [facility] [address], [facility]    In 1 month LAB, [facility] - Lab (Hospital), [facility]    In 1 month LAB, [facility] - Lab ([facility]), [facility]    In 5 months Katie Jones NP [facility] - Endocrinology, [facility] [place]                Passed - ALT is 131 or below and within 360 days     ALT   Date Value Ref Range Status   03/12/2021 22 17 - 63 U/L Final   02/14/2020 19 10 - 44 U/L Final   04/08/2019 24 10 - 44 U/L Final              Passed - AST is 119 or below and within 360 days     AST   Date Value Ref Range Status   03/12/2021 17 15 - 41 U/L Final   02/14/2020 15 10 - 40 U/L Final   04/08/2019 14 10 - 40 U/L Final              Passed - Total Cholesterol within 360 days     Lab Results   Component Value Date    CHOL 105 (L) 03/12/2021    CHOL 120 02/14/2020    CHOL 111 (L) 01/14/2019              Passed - LDL within 360 days     LDL Cholesterol   Date Value Ref Range Status   03/12/2021 45.8 (L) 63.0 - 159.0 mg/dL Final     Comment:     The National  Cholesterol Education Program (NCEP) has set the  following guidelines (reference values) for LDL Cholesterol:  Optimal.......................<130 mg/dL  Borderline High...............130-159 mg/dL  High..........................160-189 mg/dL  Very High.....................>190 mg/dL              Passed - HDL within 360 days     HDL   Date Value Ref Range Status   03/12/2021 46 40 - 75 mg/dL Final     Comment:     The National Cholesterol Education Program (NCEP) has set the  following guidelines (reference values) for HDL Cholesterol:  Low...............<40 mg/dL  Optimal...........>60 mg/dL              Passed - Triglycerides within 360 days     Lab Results   Component Value Date    TRIG 66 03/12/2021    TRIG 38 02/14/2020    TRIG 78 01/14/2019                    Appointments  past 12m or future 3m with PCP    Date Provider   Last Visit   9/17/2021 Andrew Busch MD   Next Visit   3/18/2022 Andrew Busch MD   ED visits in past 90 days: 0        Note composed:6:50 PM 02/22/2022

## 2022-02-25 RX ORDER — INSULIN GLARGINE 300 U/ML
INJECTION, SOLUTION SUBCUTANEOUS
Qty: 3 PEN | Refills: 5 | Status: SHIPPED | OUTPATIENT
Start: 2022-02-25 | End: 2022-08-05 | Stop reason: SDUPTHER

## 2022-02-25 NOTE — TELEPHONE ENCOUNTER
----- Message from Elsie Brody sent at 2/25/2022 12:09 PM CST -----  Type: RX Refill Request    Who Called: [facility] pharmacy     Have you contacted your pharmacy: yes     Refill or New Rx: refill     RX Name and Strength: TOUJEO SOLOSTAR U-300 INSULIN 300 unit/mL (1.5 mL) InPn pen    Preferred Pharmacy with phone number:   [facility]/pharmacy #[id number] - [place] - [address]  [address]  [place] [address]  Phone: [phone] Fax: [phone]    Local or Mail Order: local     Would the patient rather a call back or a response via [facility]? Call back     Best Call Back Number: [phone]

## 2022-03-18 ENCOUNTER — OFFICE VISIT (OUTPATIENT)
Dept: PRIMARY CARE CLINIC | Facility: CLINIC | Age: 67
End: 2022-03-18
Payer: MEDICARE

## 2022-03-18 VITALS
HEIGHT: 74 IN | HEART RATE: 67 BPM | DIASTOLIC BLOOD PRESSURE: 88 MMHG | WEIGHT: 234.25 LBS | BODY MASS INDEX: 30.06 KG/M2 | OXYGEN SATURATION: 99 % | SYSTOLIC BLOOD PRESSURE: 137 MMHG | RESPIRATION RATE: 16 BRPM | TEMPERATURE: 99 F

## 2022-03-18 DIAGNOSIS — E78.5 TYPE 2 DIABETES MELLITUS WITH HYPERLIPIDEMIA: ICD-10-CM

## 2022-03-18 DIAGNOSIS — G89.29 CHRONIC BILATERAL LOW BACK PAIN WITH BILATERAL SCIATICA: ICD-10-CM

## 2022-03-18 DIAGNOSIS — H40.1132 PRIMARY OPEN ANGLE GLAUCOMA (POAG) OF BOTH EYES, MODERATE STAGE: Primary | ICD-10-CM

## 2022-03-18 DIAGNOSIS — Z00.00 ANNUAL PHYSICAL EXAM: Primary | ICD-10-CM

## 2022-03-18 DIAGNOSIS — Z51.81 MEDICATION MONITORING ENCOUNTER: ICD-10-CM

## 2022-03-18 DIAGNOSIS — I10 BENIGN ESSENTIAL HTN: ICD-10-CM

## 2022-03-18 DIAGNOSIS — Z13.6 ENCOUNTER FOR SCREENING FOR CARDIOVASCULAR DISORDERS: ICD-10-CM

## 2022-03-18 DIAGNOSIS — E11.69 TYPE 2 DIABETES MELLITUS WITH HYPERLIPIDEMIA: ICD-10-CM

## 2022-03-18 DIAGNOSIS — M54.42 CHRONIC BILATERAL LOW BACK PAIN WITH BILATERAL SCIATICA: ICD-10-CM

## 2022-03-18 DIAGNOSIS — M54.41 CHRONIC BILATERAL LOW BACK PAIN WITH BILATERAL SCIATICA: ICD-10-CM

## 2022-03-18 PROCEDURE — 3075F PR MOST RECENT SYSTOLIC BLOOD PRESS GE 130-139MM HG: ICD-10-PCS | Mod: CPTII,S$GLB,, | Performed by: STUDENT IN AN ORGANIZED HEALTH CARE EDUCATION/TRAINING PROGRAM

## 2022-03-18 PROCEDURE — 1160F RVW MEDS BY RX/DR IN RCRD: CPT | Mod: CPTII,S$GLB,, | Performed by: STUDENT IN AN ORGANIZED HEALTH CARE EDUCATION/TRAINING PROGRAM

## 2022-03-18 PROCEDURE — 1101F PR PT FALLS ASSESS DOC 0-1 FALLS W/OUT INJ PAST YR: ICD-10-PCS | Mod: CPTII,S$GLB,, | Performed by: STUDENT IN AN ORGANIZED HEALTH CARE EDUCATION/TRAINING PROGRAM

## 2022-03-18 PROCEDURE — 3008F BODY MASS INDEX DOCD: CPT | Mod: CPTII,S$GLB,, | Performed by: STUDENT IN AN ORGANIZED HEALTH CARE EDUCATION/TRAINING PROGRAM

## 2022-03-18 PROCEDURE — 1159F PR MEDICATION LIST DOCUMENTED IN MEDICAL RECORD: ICD-10-PCS | Mod: CPTII,S$GLB,, | Performed by: STUDENT IN AN ORGANIZED HEALTH CARE EDUCATION/TRAINING PROGRAM

## 2022-03-18 PROCEDURE — 1160F PR REVIEW ALL MEDS BY PRESCRIBER/CLIN PHARMACIST DOCUMENTED: ICD-10-PCS | Mod: CPTII,S$GLB,, | Performed by: STUDENT IN AN ORGANIZED HEALTH CARE EDUCATION/TRAINING PROGRAM

## 2022-03-18 PROCEDURE — 1159F MED LIST DOCD IN RCRD: CPT | Mod: CPTII,S$GLB,, | Performed by: STUDENT IN AN ORGANIZED HEALTH CARE EDUCATION/TRAINING PROGRAM

## 2022-03-18 PROCEDURE — 3079F DIAST BP 80-89 MM HG: CPT | Mod: CPTII,S$GLB,, | Performed by: STUDENT IN AN ORGANIZED HEALTH CARE EDUCATION/TRAINING PROGRAM

## 2022-03-18 PROCEDURE — 3051F PR MOST RECENT HEMOGLOBIN A1C LEVEL 7.0 - < 8.0%: ICD-10-PCS | Mod: CPTII,S$GLB,, | Performed by: STUDENT IN AN ORGANIZED HEALTH CARE EDUCATION/TRAINING PROGRAM

## 2022-03-18 PROCEDURE — 4010F ACE/ARB THERAPY RXD/TAKEN: CPT | Mod: CPTII,S$GLB,, | Performed by: STUDENT IN AN ORGANIZED HEALTH CARE EDUCATION/TRAINING PROGRAM

## 2022-03-18 PROCEDURE — 3079F PR MOST RECENT DIASTOLIC BLOOD PRESSURE 80-89 MM HG: ICD-10-PCS | Mod: CPTII,S$GLB,, | Performed by: STUDENT IN AN ORGANIZED HEALTH CARE EDUCATION/TRAINING PROGRAM

## 2022-03-18 PROCEDURE — 99214 OFFICE O/P EST MOD 30 MIN: CPT | Mod: S$GLB,,, | Performed by: STUDENT IN AN ORGANIZED HEALTH CARE EDUCATION/TRAINING PROGRAM

## 2022-03-18 PROCEDURE — 99214 PR OFFICE/OUTPT VISIT, EST, LEVL IV, 30-39 MIN: ICD-10-PCS | Mod: S$GLB,,, | Performed by: STUDENT IN AN ORGANIZED HEALTH CARE EDUCATION/TRAINING PROGRAM

## 2022-03-18 PROCEDURE — 1126F PR PAIN SEVERITY QUANTIFIED, NO PAIN PRESENT: ICD-10-PCS | Mod: CPTII,S$GLB,, | Performed by: STUDENT IN AN ORGANIZED HEALTH CARE EDUCATION/TRAINING PROGRAM

## 2022-03-18 PROCEDURE — 4010F PR ACE/ARB THEARPY RXD/TAKEN: ICD-10-PCS | Mod: CPTII,S$GLB,, | Performed by: STUDENT IN AN ORGANIZED HEALTH CARE EDUCATION/TRAINING PROGRAM

## 2022-03-18 PROCEDURE — 3008F PR BODY MASS INDEX (BMI) DOCUMENTED: ICD-10-PCS | Mod: CPTII,S$GLB,, | Performed by: STUDENT IN AN ORGANIZED HEALTH CARE EDUCATION/TRAINING PROGRAM

## 2022-03-18 PROCEDURE — 3051F HG A1C>EQUAL 7.0%<8.0%: CPT | Mod: CPTII,S$GLB,, | Performed by: STUDENT IN AN ORGANIZED HEALTH CARE EDUCATION/TRAINING PROGRAM

## 2022-03-18 PROCEDURE — 3075F SYST BP GE 130 - 139MM HG: CPT | Mod: CPTII,S$GLB,, | Performed by: STUDENT IN AN ORGANIZED HEALTH CARE EDUCATION/TRAINING PROGRAM

## 2022-03-18 PROCEDURE — 99999 PR PBB SHADOW E&M-EST. PATIENT-LVL V: CPT | Mod: PBBFAC,,, | Performed by: STUDENT IN AN ORGANIZED HEALTH CARE EDUCATION/TRAINING PROGRAM

## 2022-03-18 PROCEDURE — 99999 PR PBB SHADOW E&M-EST. PATIENT-LVL V: ICD-10-PCS | Mod: PBBFAC,,, | Performed by: STUDENT IN AN ORGANIZED HEALTH CARE EDUCATION/TRAINING PROGRAM

## 2022-03-18 PROCEDURE — 3288F FALL RISK ASSESSMENT DOCD: CPT | Mod: CPTII,S$GLB,, | Performed by: STUDENT IN AN ORGANIZED HEALTH CARE EDUCATION/TRAINING PROGRAM

## 2022-03-18 PROCEDURE — 1101F PT FALLS ASSESS-DOCD LE1/YR: CPT | Mod: CPTII,S$GLB,, | Performed by: STUDENT IN AN ORGANIZED HEALTH CARE EDUCATION/TRAINING PROGRAM

## 2022-03-18 PROCEDURE — 3288F PR FALLS RISK ASSESSMENT DOCUMENTED: ICD-10-PCS | Mod: CPTII,S$GLB,, | Performed by: STUDENT IN AN ORGANIZED HEALTH CARE EDUCATION/TRAINING PROGRAM

## 2022-03-18 PROCEDURE — 1126F AMNT PAIN NOTED NONE PRSNT: CPT | Mod: CPTII,S$GLB,, | Performed by: STUDENT IN AN ORGANIZED HEALTH CARE EDUCATION/TRAINING PROGRAM

## 2022-03-18 NOTE — PROGRESS NOTES
"Subjective:       Patient ID: Gerson Phillips III is a 66 y.o. male.    Chief Complaint: Follow-up      HPI:  66 y.o. male presents to [facility] here for follow-up    Acute concerns?: None today. Reports diclofenac is helping with current back pain.      Reports hasn't taken blood pressure medications today, last took night of 3/17/2022. Takes 2 in morning and 1 BP med at night.    Follows with Pain Medicine for back pain. Patient reports had recent COVID exposure prior to PT and had to cancel.    HTN:  Home BP log?: Has been in 120s-130s systolic, all diastolic 80-90 mmHg  Medications: quinapril 40 mg, diltiazem 240 mg, HCTZ 12.5 mg  Compliance?: Never  Diet?: None  Exercise?: None      Review of Systems   Constitutional: Negative for chills, diaphoresis, fatigue and fever.   HENT: Negative for congestion, sinus pressure, sneezing and sore throat.    Respiratory: Negative for cough and shortness of breath.    Cardiovascular: Negative for chest pain and palpitations.   Gastrointestinal: Negative for abdominal pain, diarrhea, nausea and vomiting.   Musculoskeletal: Positive for back pain (lumbar, minor now, reports no longer really a pain). Negative for arthralgias, joint swelling and myalgias.   Skin: Negative for rash and wound.   Neurological: Negative for dizziness, weakness and headaches.       Objective:      Vitals:    03/18/22 0905 03/18/22 0937   BP: (!) 144/92 137/88   BP Location: Right arm Right arm   Patient Position: Sitting Sitting   BP Method: Medium (Manual) Medium (Manual)   Pulse: 67    Resp: 16    Temp: 98.7 °F (37.1 °C)    TempSrc: Oral    SpO2: 99%    Weight: 106.3 kg (234 lb 3.8 oz)    Height: 6' 2" (1.88 m)      Physical Exam  Vitals reviewed.   Constitutional:       General: He is not in acute distress.     Appearance: Normal appearance. He is not ill-appearing.   HENT:      Head: Normocephalic and atraumatic.   Eyes:      General:         Right eye: No discharge.         Left eye: No " discharge.      Conjunctiva/sclera: Conjunctivae normal.   Cardiovascular:      Rate and Rhythm: Normal rate and regular rhythm.      Pulses:           Dorsalis pedis pulses are 1+ on the right side and 1+ on the left side.      Heart sounds: No murmur heard.  Pulmonary:      Effort: Pulmonary effort is normal.      Breath sounds: Normal breath sounds.   Musculoskeletal:         General: No deformity.      Right foot: Normal range of motion. No deformity.      Left foot: Normal range of motion. No deformity.        Feet:    Feet:      Right foot:      Protective Sensation: 6 sites tested. 6 sites sensed.      Skin integrity: Skin integrity normal.      Toenail Condition: Right toenails are normal.      Left foot:      Protective Sensation: 6 sites tested. 6 sites sensed.      Skin integrity: Callus (as shown on illustration. No skin breakdown) present.      Toenail Condition: Left toenails are normal.   Skin:     General: Skin is warm and dry.      Coloration: Skin is not jaundiced.   Neurological:      General: No focal deficit present.      Mental Status: He is alert and oriented to person, place, and time.   Psychiatric:         Mood and Affect: Mood normal.         Behavior: Behavior normal.             Lab Results   Component Value Date     (L) 06/25/2021    K 3.6 06/25/2021     06/25/2021    CO2 27 06/25/2021    BUN 14 06/25/2021    CREATININE 1.1 06/25/2021    CREATININE 1.1 06/25/2021    ANIONGAP 6 (L) 06/25/2021     Lab Results   Component Value Date    HGBA1C 7.4 (H) 01/21/2022     No results found for: BNP, BNPTRIAGEBLO    Lab Results   Component Value Date    WBC 4.40 03/12/2021    HGB 13.9 (L) 03/12/2021    HCT 43.2 03/12/2021     03/12/2021    GRAN 2.0 03/12/2021    GRAN 44.9 03/12/2021     Lab Results   Component Value Date    CHOL 105 (L) 03/12/2021    HDL 46 03/12/2021    LDLCALC 45.8 (L) 03/12/2021    TRIG 66 03/12/2021          Current Outpatient Medications:     atorvastatin  "(LIPITOR) 20 MG tablet, TAKE 1 TABLET BY MOUTH EVERY DAY, Disp: 90 tablet, Rfl: 0    blood sugar diagnostic Strp, To check blood glucose three times daily, to use with insurance preferred meter, Disp: 100 each, Rfl: 3    blood-glucose meter kit, To check blood glucose three times daily, to use with insurance preferred meter, Disp: 1 each, Rfl: 0    diclofenac (VOLTAREN) 50 MG EC tablet, TAKE 1 TABLET BY MOUTH TWICE A DAY, Disp: 60 tablet, Rfl: 1    diltiaZEM (CARDIZEM CD) 240 MG 24 hr capsule, Take 1 capsule (240 mg total) by mouth once daily., Disp: 90 capsule, Rfl: 12    hydroCHLOROthiazide (HYDRODIURIL) 12.5 MG Tab, Take 1 tablet (12.5 mg total) by mouth once daily., Disp: 90 tablet, Rfl: 1    insulin glargine, TOUJEO, (TOUJEO SOLOSTAR U-300 INSULIN) 300 unit/mL (1.5 mL) InPn pen, Inject 24 units once daily, Disp: 3 pen, Rfl: 5    lancets Misc, To check blood glucose three times daily, to use with insurance preferred meter, Disp: 100 each, Rfl: 3    metFORMIN (GLUCOPHAGE) 1000 MG tablet, TAKE 1 TABLET BY MOUTH TWICE A DAY, Disp: 180 tablet, Rfl: 2    pen needle, diabetic (BD ULTRA-FINE FORREST PEN NEEDLE) 32 gauge x 5/32" Ndle, USE 1X/DAY, Disp: 100 each, Rfl: 3    quinapriL (ACCUPRIL) 40 MG tablet, TAKE 1 TABLET BY MOUTH TWICE A DAY, Disp: 180 tablet, Rfl: 12    semaglutide (OZEMPIC) 1 mg/dose (4 mg/3 mL), Inject 1 mg into the skin every 7 days., Disp: 1 pen, Rfl: 11    latanoprost 0.005 % ophthalmic solution, PLACE 1 DROP INTO BOTH EYES EVERY EVENING., Disp: 7.5 mL, Rfl: 1    Current Facility-Administered Medications:     LIDOcaine 2%/EPINEPHrine 1:100,000 injection 2 mL, 2 mL, Intradermal, 1 time in Clinic/HOD, Andrew Busch MD        Assessment:       1. Annual physical exam    2. Chronic bilateral low back pain with bilateral sciatica    3. Benign essential HTN    4. Encounter for screening for cardiovascular disorders    5. Medication monitoring encounter    6. Type 2 diabetes mellitus with " hyperlipidemia           Plan:       Annual physical exam  Medication monitoring encounter  Encounter for screening for cardiovascular disorders  -     Lipid Panel; Future; Expected date: 03/18/2022  -     Comprehensive Metabolic Panel; Future; Expected date: 03/18/2022  -     CBC Auto Differential; Future; Expected date: 03/18/2022    Chronic bilateral low back pain with bilateral sciatica  -     Ambulatory referral/consult to Physical/Occupational Therapy; Future; Expected date: 03/25/2022  - Patient reports good relief on diclofenac, continue current medications    Type 2 diabetes mellitus with hyperlipidemia  Benign essential HTN  -     Foot Exam Performed  -     Ambulatory referral/consult to Ophthalmology; Future; Expected date: 03/25/2022  - Continue current medications     RTC in 6 months

## 2022-03-21 ENCOUNTER — PATIENT OUTREACH (OUTPATIENT)
Dept: ADMINISTRATIVE | Facility: OTHER | Age: 67
End: 2022-03-21
Payer: MEDICARE

## 2022-03-21 NOTE — PROGRESS NOTES
LINKS immunization registry updated  Care Everywhere updated  Health Maintenance updated  Chart reviewed for overdue [facility] (PADMINI) health maintenance testing (CRS, Breast Ca, Diabetic Eye Exam)   Orders entered:N/A

## 2022-04-14 ENCOUNTER — TELEPHONE (OUTPATIENT)
Dept: PRIMARY CARE CLINIC | Facility: CLINIC | Age: 67
End: 2022-04-14
Payer: MEDICARE

## 2022-04-14 NOTE — TELEPHONE ENCOUNTER
----- Message from Shanthi Najera sent at 4/14/2022 11:03 AM CDT -----  Contact: Michelle with [employer] @ [phone]  Diabetic or Medical Supplies.  What supplies are needed: Diabetic testing meter, strips and lancets  What is the brand name of the supplies: [employer]  Is this a refill or new prescription:  NEW Supplies   Who prescribed the supplies:  [facility]   Pharmacy or company name, phone # and location:  [employer]   Would the patient like a call back, or a response through their [facility] portal?: Fax to [phone]    Comments:

## 2022-04-14 NOTE — TELEPHONE ENCOUNTER
"Attempted to verify caller. Caller number is no longer in service. Caller was suppose to be "Michelle" from [employer]. Unable to fax prescription due to not being able to verify caller. MD notified.  "

## 2022-04-14 NOTE — TELEPHONE ENCOUNTER
----- Message from Shanthi Najera sent at 4/14/2022 11:03 AM CDT -----  Contact: Michelle with [employer] @ [phone]  Diabetic or Medical Supplies.  What supplies are needed: Diabetic testing meter, strips and lancets  What is the brand name of the supplies: [employer]rix  Is this a refill or new prescription:  NEW Supplies   Who prescribed the supplies:  [employer]   Pharmacy or company name, phone # and location:  [employer]   Would the patient like a call back, or a response through their [facility] portal?: Fax to [phone]    Comments:

## 2022-04-18 DIAGNOSIS — M54.42 CHRONIC BILATERAL LOW BACK PAIN WITH BILATERAL SCIATICA: ICD-10-CM

## 2022-04-18 DIAGNOSIS — M54.41 CHRONIC BILATERAL LOW BACK PAIN WITH BILATERAL SCIATICA: ICD-10-CM

## 2022-04-18 DIAGNOSIS — G89.29 CHRONIC BILATERAL LOW BACK PAIN WITH BILATERAL SCIATICA: ICD-10-CM

## 2022-04-19 RX ORDER — DICLOFENAC SODIUM 50 MG/1
TABLET, DELAYED RELEASE ORAL
Qty: 60 TABLET | Refills: 1 | Status: SHIPPED | OUTPATIENT
Start: 2022-04-19 | End: 2022-06-24

## 2022-05-21 DIAGNOSIS — E78.2 MIXED HYPERLIPIDEMIA: ICD-10-CM

## 2022-05-21 NOTE — TELEPHONE ENCOUNTER
No new care gaps identified.  [facility] Embedded Care Gaps. Reference number: [id number]. 5/21/2022   4:46:18 PM CDT

## 2022-05-21 NOTE — TELEPHONE ENCOUNTER
No new care gaps identified.  [facility] Embedded Care Gaps. Reference number: [id number]. 5/21/2022   4:46:47 PM CDT

## 2022-05-22 NOTE — TELEPHONE ENCOUNTER
Refill Routing Note   Medication(s) are not appropriate for processing by [facility] for the following reason(s):      - Medication not previously prescribed by PCP    [facility] action(s):  Defer          Medication reconciliation completed: No     Appointments  past 12m or future 3m with PCP    Date Provider   Last Visit   3/18/2022 Andrew Busch MD   Next Visit   5/21/2022 Andrew Busch MD   ED visits in past 90 days: 0        Note composed:7:22 PM 05/21/2022

## 2022-05-22 NOTE — TELEPHONE ENCOUNTER
Refill Routing Note   Medication(s) are not appropriate for processing by [facility] for the following reason(s):      - Drug-Disease Interaction (atorvastatin and Diabetes mellitus type II, uncontrolled; Diabetes mellitus with renal manifestations, uncontrolled; Uncontrolled type 2 diabetes mellitus with hyperglycemia)    [facility] action(s):  Defer Medication-related problems identified: Drug-disease interaction        Medication reconciliation completed: No     Appointments  past 12m or future 3m with PCP    Date Provider   Last Visit   3/18/2022 Andrew Busch MD   Next Visit   9/16/2022 Andrew Busch MD   ED visits in past 90 days: 0        Note composed:7:24 PM 05/21/2022

## 2022-05-23 RX ORDER — ATORVASTATIN CALCIUM 20 MG/1
TABLET, FILM COATED ORAL
Qty: 90 TABLET | Refills: 3 | Status: SHIPPED | OUTPATIENT
Start: 2022-05-23 | End: 2023-05-08

## 2022-05-23 RX ORDER — METFORMIN HYDROCHLORIDE 1000 MG/1
TABLET ORAL
Qty: 180 TABLET | Refills: 1 | Status: SHIPPED | OUTPATIENT
Start: 2022-05-23 | End: 2022-08-05 | Stop reason: SDUPTHER

## 2022-06-17 ENCOUNTER — PES CALL (OUTPATIENT)
Dept: ADMINISTRATIVE | Facility: CLINIC | Age: 67
End: 2022-06-17
Payer: MEDICARE

## 2022-06-24 ENCOUNTER — CLINICAL SUPPORT (OUTPATIENT)
Dept: OPHTHALMOLOGY | Facility: CLINIC | Age: 67
End: 2022-06-24
Payer: MEDICARE

## 2022-06-24 ENCOUNTER — OFFICE VISIT (OUTPATIENT)
Dept: OPTOMETRY | Facility: CLINIC | Age: 67
End: 2022-06-24
Payer: MEDICARE

## 2022-06-24 DIAGNOSIS — Z79.4 TYPE 2 DIABETES MELLITUS WITH DIABETIC CATARACT, WITH LONG-TERM CURRENT USE OF INSULIN: Primary | ICD-10-CM

## 2022-06-24 DIAGNOSIS — H40.1132 PRIMARY OPEN ANGLE GLAUCOMA (POAG) OF BOTH EYES, MODERATE STAGE: ICD-10-CM

## 2022-06-24 DIAGNOSIS — H25.13 NUCLEAR SCLEROSIS OF BOTH EYES: ICD-10-CM

## 2022-06-24 DIAGNOSIS — E11.36 TYPE 2 DIABETES MELLITUS WITH DIABETIC CATARACT, WITH LONG-TERM CURRENT USE OF INSULIN: Primary | ICD-10-CM

## 2022-06-24 DIAGNOSIS — H52.7 REFRACTIVE ERROR: ICD-10-CM

## 2022-06-24 PROCEDURE — 92014 PR EYE EXAM, EST PATIENT,COMPREHESV: ICD-10-PCS | Mod: S$GLB,,, | Performed by: OPTOMETRIST

## 2022-06-24 PROCEDURE — 1101F PR PT FALLS ASSESS DOC 0-1 FALLS W/OUT INJ PAST YR: ICD-10-PCS | Mod: CPTII,S$GLB,, | Performed by: OPTOMETRIST

## 2022-06-24 PROCEDURE — 99999 PR PBB SHADOW E&M-EST. PATIENT-LVL III: ICD-10-PCS | Mod: PBBFAC,,, | Performed by: OPTOMETRIST

## 2022-06-24 PROCEDURE — 3066F PR DOCUMENTATION OF TREATMENT FOR NEPHROPATHY: ICD-10-PCS | Mod: CPTII,S$GLB,, | Performed by: OPTOMETRIST

## 2022-06-24 PROCEDURE — 3051F HG A1C>EQUAL 7.0%<8.0%: CPT | Mod: CPTII,S$GLB,, | Performed by: OPTOMETRIST

## 2022-06-24 PROCEDURE — 3288F PR FALLS RISK ASSESSMENT DOCUMENTED: ICD-10-PCS | Mod: CPTII,S$GLB,, | Performed by: OPTOMETRIST

## 2022-06-24 PROCEDURE — 2023F PR DILATED RETINAL EXAM W/O EVID OF RETINOPATHY: ICD-10-PCS | Mod: CPTII,S$GLB,, | Performed by: OPTOMETRIST

## 2022-06-24 PROCEDURE — 3060F POS MICROALBUMINURIA REV: CPT | Mod: CPTII,S$GLB,, | Performed by: OPTOMETRIST

## 2022-06-24 PROCEDURE — 92015 DETERMINE REFRACTIVE STATE: CPT | Mod: S$GLB,,, | Performed by: OPTOMETRIST

## 2022-06-24 PROCEDURE — 3051F PR MOST RECENT HEMOGLOBIN A1C LEVEL 7.0 - < 8.0%: ICD-10-PCS | Mod: CPTII,S$GLB,, | Performed by: OPTOMETRIST

## 2022-06-24 PROCEDURE — 4010F ACE/ARB THERAPY RXD/TAKEN: CPT | Mod: CPTII,S$GLB,, | Performed by: OPTOMETRIST

## 2022-06-24 PROCEDURE — 1126F AMNT PAIN NOTED NONE PRSNT: CPT | Mod: CPTII,S$GLB,, | Performed by: OPTOMETRIST

## 2022-06-24 PROCEDURE — 99999 PR PBB SHADOW E&M-EST. PATIENT-LVL III: CPT | Mod: PBBFAC,,, | Performed by: OPTOMETRIST

## 2022-06-24 PROCEDURE — 3066F NEPHROPATHY DOC TX: CPT | Mod: CPTII,S$GLB,, | Performed by: OPTOMETRIST

## 2022-06-24 PROCEDURE — 92014 COMPRE OPH EXAM EST PT 1/>: CPT | Mod: S$GLB,,, | Performed by: OPTOMETRIST

## 2022-06-24 PROCEDURE — 92015 PR REFRACTION: ICD-10-PCS | Mod: S$GLB,,, | Performed by: OPTOMETRIST

## 2022-06-24 PROCEDURE — 2023F DILAT RTA XM W/O RTNOPTHY: CPT | Mod: CPTII,S$GLB,, | Performed by: OPTOMETRIST

## 2022-06-24 PROCEDURE — 4010F PR ACE/ARB THEARPY RXD/TAKEN: ICD-10-PCS | Mod: CPTII,S$GLB,, | Performed by: OPTOMETRIST

## 2022-06-24 PROCEDURE — 1126F PR PAIN SEVERITY QUANTIFIED, NO PAIN PRESENT: ICD-10-PCS | Mod: CPTII,S$GLB,, | Performed by: OPTOMETRIST

## 2022-06-24 PROCEDURE — 3060F PR POS MICROALBUMINURIA RESULT DOCUMENTED/REVIEW: ICD-10-PCS | Mod: CPTII,S$GLB,, | Performed by: OPTOMETRIST

## 2022-06-24 PROCEDURE — 1160F PR REVIEW ALL MEDS BY PRESCRIBER/CLIN PHARMACIST DOCUMENTED: ICD-10-PCS | Mod: CPTII,S$GLB,, | Performed by: OPTOMETRIST

## 2022-06-24 PROCEDURE — 1160F RVW MEDS BY RX/DR IN RCRD: CPT | Mod: CPTII,S$GLB,, | Performed by: OPTOMETRIST

## 2022-06-24 PROCEDURE — 1159F PR MEDICATION LIST DOCUMENTED IN MEDICAL RECORD: ICD-10-PCS | Mod: CPTII,S$GLB,, | Performed by: OPTOMETRIST

## 2022-06-24 PROCEDURE — 1159F MED LIST DOCD IN RCRD: CPT | Mod: CPTII,S$GLB,, | Performed by: OPTOMETRIST

## 2022-06-24 PROCEDURE — 1101F PT FALLS ASSESS-DOCD LE1/YR: CPT | Mod: CPTII,S$GLB,, | Performed by: OPTOMETRIST

## 2022-06-24 PROCEDURE — 3288F FALL RISK ASSESSMENT DOCD: CPT | Mod: CPTII,S$GLB,, | Performed by: OPTOMETRIST

## 2022-06-24 RX ORDER — LATANOPROST 50 UG/ML
1 SOLUTION/ DROPS OPHTHALMIC NIGHTLY
Qty: 7.5 ML | Refills: 2 | Status: SHIPPED | OUTPATIENT
Start: 2022-06-24 | End: 2023-12-15

## 2022-06-24 NOTE — PROGRESS NOTES
Visual field test done.  Patient stated no latex allergies used coverlet      Glasses Prescription     Sphere Cylinder Axis Add   Right [place] Sphere  +2.50   Left -0.50 +0.50 015 +2.50   Expiration Date: 6/24/2023

## 2022-06-24 NOTE — PROGRESS NOTES
Subjective:       Patient ID: Gerson Phillips III is a 67 y.o. male      Chief Complaint   Patient presents with    Concerns About Ocular Health    Diabetic Eye Exam    Glaucoma     History of Present Illness  Dls: 4/23/21 Dr. Contreras     66 y/o male presents today for diabetic eye exam and glaucoma ck.   Pt states no changes in vision. Pt wears bifocal glasses.      this am     + tearing  No itching  + burning  No pain  No ha's  No floaters  No flashes    Eye meds  Latanoprost OU Q HS last dose x 2 nights ago     Hemoglobin A1C       Date                     Value               Ref Range             Status                04/22/2022               7.1 (H)             4.0 - 5.6 %           Final                  01/21/2022               7.4 (H)             4.0 - 5.6 %           Final                  06/25/2021               6.7 (H)             4.0 - 5.6 %           Final                  Assessment/Plan:     1. Type 2 diabetes mellitus with diabetic cataract, with long-term current use of insulin  No diabetic retinopathy. Discussed with pt the effects of diabetes on vision, importance of good blood sugar control, compliance with meds, and follow up care with PCP. Return in 1 year for dilated eye exam, sooner PRN.  - Ambulatory referral/consult to Ophthalmology    2. Primary open angle glaucoma (POAG) of both eyes, moderate stage  IOP doing well. OCT and HVF done today. OCT stable compared to previous. Continue latanoprost QHS OU. 6 month IOP check, sooner PRN  - latanoprost 0.005 % ophthalmic solution; Place 1 drop into both eyes every evening.  Dispense: 7.5 mL; Refill: 2    3. Nuclear sclerosis of both eyes  NVS per pt. Educated pt on presence of cataracts and effects on vision. No surgery at this time. Recheck in one year, sooner PRN.    4. Refractive error  Educated patient on refractive error and discussed lens options. Dispensed updated spectacle Rx. Educated about adaptation period to new specs.    Eyeglass  Final Rx     Eyeglass Final Rx       Sphere Cylinder Axis Add    Right [place] Sphere  +2.50    Left -0.50 +0.50 015 +2.50    Expiration Date: 6/24/2023                  Follow up in about 6 months (around 12/24/2022) for IOP check.

## 2022-07-05 ENCOUNTER — PES CALL (OUTPATIENT)
Dept: ADMINISTRATIVE | Facility: CLINIC | Age: 67
End: 2022-07-05
Payer: MEDICARE

## 2022-07-07 ENCOUNTER — PES CALL (OUTPATIENT)
Dept: ADMINISTRATIVE | Facility: CLINIC | Age: 67
End: 2022-07-07
Payer: MEDICARE

## 2022-07-11 ENCOUNTER — TELEPHONE (OUTPATIENT)
Dept: FAMILY MEDICINE | Facility: CLINIC | Age: 67
End: 2022-07-11
Payer: MEDICARE

## 2022-07-11 NOTE — TELEPHONE ENCOUNTER
Left a voicemail message for the Patient to call the office back to be rescheduled for the EAWV appointment (07/15/22).  Sent a message thru [employer] as well.

## 2022-07-20 ENCOUNTER — TELEPHONE (OUTPATIENT)
Dept: ADMINISTRATIVE | Facility: CLINIC | Age: 67
End: 2022-07-20
Payer: MEDICARE

## 2022-07-20 NOTE — TELEPHONE ENCOUNTER
Called pt, informed pt I was calling to remind pt of his in office EAWV on 7/22/22; clinic location provided to patient; pt confirmed appointment

## 2022-07-22 ENCOUNTER — OFFICE VISIT (OUTPATIENT)
Dept: FAMILY MEDICINE | Facility: CLINIC | Age: 67
End: 2022-07-22
Payer: MEDICARE

## 2022-07-22 VITALS
SYSTOLIC BLOOD PRESSURE: 156 MMHG | OXYGEN SATURATION: 99 % | HEIGHT: 74 IN | DIASTOLIC BLOOD PRESSURE: 72 MMHG | WEIGHT: 234.25 LBS | HEART RATE: 76 BPM | BODY MASS INDEX: 30.06 KG/M2

## 2022-07-22 DIAGNOSIS — Z86.010 HISTORY OF COLONIC POLYPS: ICD-10-CM

## 2022-07-22 DIAGNOSIS — E11.59 DIABETES WITH CIRCULATORY DISORDER CAUSING ERECTILE DYSFUNCTION: ICD-10-CM

## 2022-07-22 DIAGNOSIS — E11.36 TYPE 2 DIABETES MELLITUS WITH DIABETIC CATARACT, WITH LONG-TERM CURRENT USE OF INSULIN: ICD-10-CM

## 2022-07-22 DIAGNOSIS — F39 MOOD DISORDER: ICD-10-CM

## 2022-07-22 DIAGNOSIS — E78.5 HYPERLIPIDEMIA, UNSPECIFIED HYPERLIPIDEMIA TYPE: ICD-10-CM

## 2022-07-22 DIAGNOSIS — I10 ESSENTIAL HYPERTENSION: ICD-10-CM

## 2022-07-22 DIAGNOSIS — Z79.4 LONG-TERM INSULIN USE: ICD-10-CM

## 2022-07-22 DIAGNOSIS — Z79.4 TYPE 2 DIABETES MELLITUS WITH DIABETIC CATARACT, WITH LONG-TERM CURRENT USE OF INSULIN: ICD-10-CM

## 2022-07-22 DIAGNOSIS — H25.13 NUCLEAR SCLEROSIS OF BOTH EYES: ICD-10-CM

## 2022-07-22 DIAGNOSIS — E11.22 CKD STAGE 3 DUE TO TYPE 2 DIABETES MELLITUS: ICD-10-CM

## 2022-07-22 DIAGNOSIS — N18.30 CKD STAGE 3 DUE TO TYPE 2 DIABETES MELLITUS: ICD-10-CM

## 2022-07-22 DIAGNOSIS — Z00.00 ENCOUNTER FOR PREVENTIVE HEALTH EXAMINATION: Primary | ICD-10-CM

## 2022-07-22 DIAGNOSIS — E11.21 DIABETES MELLITUS WITH PROTEINURIC DIABETIC NEPHROPATHY: ICD-10-CM

## 2022-07-22 DIAGNOSIS — E11.65 TYPE 2 DIABETES MELLITUS WITH HYPERGLYCEMIA, WITH LONG-TERM CURRENT USE OF INSULIN: ICD-10-CM

## 2022-07-22 DIAGNOSIS — H40.1132 PRIMARY OPEN ANGLE GLAUCOMA (POAG) OF BOTH EYES, MODERATE STAGE: ICD-10-CM

## 2022-07-22 DIAGNOSIS — Z79.4 TYPE 2 DIABETES MELLITUS WITH HYPERGLYCEMIA, WITH LONG-TERM CURRENT USE OF INSULIN: ICD-10-CM

## 2022-07-22 DIAGNOSIS — N52.1 DIABETES WITH CIRCULATORY DISORDER CAUSING ERECTILE DYSFUNCTION: ICD-10-CM

## 2022-07-22 PROCEDURE — 1125F PR PAIN SEVERITY QUANTIFIED, PAIN PRESENT: ICD-10-PCS | Mod: CPTII,S$GLB,, | Performed by: NURSE PRACTITIONER

## 2022-07-22 PROCEDURE — G0439 PPPS, SUBSEQ VISIT: HCPCS | Mod: S$GLB,,, | Performed by: NURSE PRACTITIONER

## 2022-07-22 PROCEDURE — 1160F PR REVIEW ALL MEDS BY PRESCRIBER/CLIN PHARMACIST DOCUMENTED: ICD-10-PCS | Mod: CPTII,S$GLB,, | Performed by: NURSE PRACTITIONER

## 2022-07-22 PROCEDURE — 99499 RISK ADDL DX/OHS AUDIT: ICD-10-PCS | Mod: S$GLB,,, | Performed by: NURSE PRACTITIONER

## 2022-07-22 PROCEDURE — 3288F PR FALLS RISK ASSESSMENT DOCUMENTED: ICD-10-PCS | Mod: CPTII,S$GLB,, | Performed by: NURSE PRACTITIONER

## 2022-07-22 PROCEDURE — 4010F ACE/ARB THERAPY RXD/TAKEN: CPT | Mod: CPTII,S$GLB,, | Performed by: NURSE PRACTITIONER

## 2022-07-22 PROCEDURE — 3066F PR DOCUMENTATION OF TREATMENT FOR NEPHROPATHY: ICD-10-PCS | Mod: CPTII,S$GLB,, | Performed by: NURSE PRACTITIONER

## 2022-07-22 PROCEDURE — 1101F PR PT FALLS ASSESS DOC 0-1 FALLS W/OUT INJ PAST YR: ICD-10-PCS | Mod: CPTII,S$GLB,, | Performed by: NURSE PRACTITIONER

## 2022-07-22 PROCEDURE — 1160F RVW MEDS BY RX/DR IN RCRD: CPT | Mod: CPTII,S$GLB,, | Performed by: NURSE PRACTITIONER

## 2022-07-22 PROCEDURE — 3060F PR POS MICROALBUMINURIA RESULT DOCUMENTED/REVIEW: ICD-10-PCS | Mod: CPTII,S$GLB,, | Performed by: NURSE PRACTITIONER

## 2022-07-22 PROCEDURE — 3060F POS MICROALBUMINURIA REV: CPT | Mod: CPTII,S$GLB,, | Performed by: NURSE PRACTITIONER

## 2022-07-22 PROCEDURE — 3008F PR BODY MASS INDEX (BMI) DOCUMENTED: ICD-10-PCS | Mod: CPTII,S$GLB,, | Performed by: NURSE PRACTITIONER

## 2022-07-22 PROCEDURE — 3078F DIAST BP <80 MM HG: CPT | Mod: CPTII,S$GLB,, | Performed by: NURSE PRACTITIONER

## 2022-07-22 PROCEDURE — 3051F PR MOST RECENT HEMOGLOBIN A1C LEVEL 7.0 - < 8.0%: ICD-10-PCS | Mod: CPTII,S$GLB,, | Performed by: NURSE PRACTITIONER

## 2022-07-22 PROCEDURE — 3077F SYST BP >= 140 MM HG: CPT | Mod: CPTII,S$GLB,, | Performed by: NURSE PRACTITIONER

## 2022-07-22 PROCEDURE — 3008F BODY MASS INDEX DOCD: CPT | Mod: CPTII,S$GLB,, | Performed by: NURSE PRACTITIONER

## 2022-07-22 PROCEDURE — 4010F PR ACE/ARB THEARPY RXD/TAKEN: ICD-10-PCS | Mod: CPTII,S$GLB,, | Performed by: NURSE PRACTITIONER

## 2022-07-22 PROCEDURE — 3066F NEPHROPATHY DOC TX: CPT | Mod: CPTII,S$GLB,, | Performed by: NURSE PRACTITIONER

## 2022-07-22 PROCEDURE — 3051F HG A1C>EQUAL 7.0%<8.0%: CPT | Mod: CPTII,S$GLB,, | Performed by: NURSE PRACTITIONER

## 2022-07-22 PROCEDURE — 1159F PR MEDICATION LIST DOCUMENTED IN MEDICAL RECORD: ICD-10-PCS | Mod: CPTII,S$GLB,, | Performed by: NURSE PRACTITIONER

## 2022-07-22 PROCEDURE — 3078F PR MOST RECENT DIASTOLIC BLOOD PRESSURE < 80 MM HG: ICD-10-PCS | Mod: CPTII,S$GLB,, | Performed by: NURSE PRACTITIONER

## 2022-07-22 PROCEDURE — 99999 PR PBB SHADOW E&M-EST. PATIENT-LVL IV: CPT | Mod: PBBFAC,,, | Performed by: NURSE PRACTITIONER

## 2022-07-22 PROCEDURE — 99499 UNLISTED E&M SERVICE: CPT | Mod: S$GLB,,, | Performed by: NURSE PRACTITIONER

## 2022-07-22 PROCEDURE — G0439 PR MEDICARE ANNUAL WELLNESS SUBSEQUENT VISIT: ICD-10-PCS | Mod: S$GLB,,, | Performed by: NURSE PRACTITIONER

## 2022-07-22 PROCEDURE — 1170F PR FUNCTIONAL STATUS ASSESSED: ICD-10-PCS | Mod: CPTII,S$GLB,, | Performed by: NURSE PRACTITIONER

## 2022-07-22 PROCEDURE — 1101F PT FALLS ASSESS-DOCD LE1/YR: CPT | Mod: CPTII,S$GLB,, | Performed by: NURSE PRACTITIONER

## 2022-07-22 PROCEDURE — 1170F FXNL STATUS ASSESSED: CPT | Mod: CPTII,S$GLB,, | Performed by: NURSE PRACTITIONER

## 2022-07-22 PROCEDURE — 1125F AMNT PAIN NOTED PAIN PRSNT: CPT | Mod: CPTII,S$GLB,, | Performed by: NURSE PRACTITIONER

## 2022-07-22 PROCEDURE — 3288F FALL RISK ASSESSMENT DOCD: CPT | Mod: CPTII,S$GLB,, | Performed by: NURSE PRACTITIONER

## 2022-07-22 PROCEDURE — 1159F MED LIST DOCD IN RCRD: CPT | Mod: CPTII,S$GLB,, | Performed by: NURSE PRACTITIONER

## 2022-07-22 PROCEDURE — 99999 PR PBB SHADOW E&M-EST. PATIENT-LVL IV: ICD-10-PCS | Mod: PBBFAC,,, | Performed by: NURSE PRACTITIONER

## 2022-07-22 PROCEDURE — 3077F PR MOST RECENT SYSTOLIC BLOOD PRESSURE >= 140 MM HG: ICD-10-PCS | Mod: CPTII,S$GLB,, | Performed by: NURSE PRACTITIONER

## 2022-07-22 NOTE — PATIENT INSTRUCTIONS
Counseling and Referral of Other Preventative  (Italic type indicates deductible and co-insurance are waived)    Patient Name: Gerson Phillips  Today's Date: 7/22/2022    Health Maintenance       Date Due Completion Date    Shingles Vaccine (1 of 2) Never done ---    COVID-19 Vaccine (4 - Booster for Pfizer series) 04/17/2022 12/17/2021    Influenza Vaccine (1) 09/01/2022 1/7/2022    Pneumococcal Vaccines (Age 65+) (2 - PPSV23 or PCV20) 01/07/2023 1/7/2022    Hemoglobin A1c 01/08/2023 7/8/2022    Foot Exam 03/18/2023 3/18/2022    Override on 12/9/2019: Done    Diabetes Urine Screening 04/22/2023 4/22/2022    Lipid Panel 04/22/2023 4/22/2022    Low Dose Statin 06/24/2023 6/24/2022    Eye Exam 06/24/2023 6/24/2022    TETANUS VACCINE 09/27/2023 9/27/2013    Colorectal Cancer Screening 03/26/2028 3/26/2018        No orders of the defined types were placed in this encounter.    The following information is provided to all patients.  This information is to help you find resources for any of the problems found today that may be affecting your health:                Living healthy guide: www.[facility].[place].gov      Understanding Diabetes: www.diabetes.org      Eating healthy: www.cdc.gov/healthyweight      CDC home safety checklist: www.cdc.gov/steadi/patient.html      Agency on Aging: www.goea.[place].[facility]      Alcoholics anonymous (AA): www.aa.org      Physical Activity: www.neisha.nih.gov/bq0ggjd      Tobacco use: www.quitwithusla.org

## 2022-07-22 NOTE — PROGRESS NOTES
"  Gerson Phillips presented for a  Medicare AWV and comprehensive Health Risk Assessment today. The following components were reviewed and updated:    · Medical history  · Family History  · Social history  · Allergies and Current Medications  · Health Risk Assessment  · Health Maintenance  · Care Team       ** See Completed Assessments for Annual Wellness Visit within the encounter summary.**       The following assessments were completed:  · Living Situation  · CAGE  · Depression Screening  · Timed Get Up and Go  · Whisper Test  · Cognitive Function Screening  · Nutrition Screening  · ADL Screening  · PAQ Screening          Vitals:    07/22/22 0924   BP: (!) 156/72   BP Location: Left arm   Patient Position: Sitting   BP Method: Medium (Manual)   Pulse: 76   SpO2: 99%   Weight: 106.3 kg (234 lb 3.8 oz)   Height: 6' 2" (1.88 m)     Body mass index is 30.07 kg/m².  Physical Exam  Vitals and nursing note reviewed.   Constitutional:       Appearance: Normal appearance.   Cardiovascular:      Rate and Rhythm: Normal rate.      Pulses: Normal pulses.   Pulmonary:      Effort: Pulmonary effort is normal.      Breath sounds: Normal breath sounds.   Abdominal:      General: Bowel sounds are normal.      Palpations: Abdomen is soft.   Musculoskeletal:         General: Normal range of motion.   Neurological:      Mental Status: He is alert and oriented to person, place, and time.   Psychiatric:         Mood and Affect: Mood normal.         Behavior: Behavior normal.             Diagnoses and health risks identified today and associated recommendations/orders:    1. Encounter for preventive health examination  Pt was seen today for an Annual Wellness visit. Healthcare maintenance and screening recommendations were discussed and updated as indicated. Return in one year for AWV.    Review current opioid prescriptions: n/a  Screen for potential Substance Use Disorders: n/a    2. Type 2 diabetes mellitus with diabetic cataract, with " long-term current use of insulin  3. Diabetes mellitus with proteinuric diabetic nephropathy  4. Diabetes mellitus with renal manifestations, uncontrolled  5. CKD stage 3 due to type 2 diabetes mellitus  6. 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 Type 2 diabetes mellitus with hyperglycemia, with long-term current use of insulin  8. Long-term insulin use  Hgb A1C 7.2 on 7/8/22. Discussed Hgb A1C goal, diet, activity. The current medical regimen is effective;  continue present plan and medications. Followed by Endocrine, PCP.    9. Mood disorder  The current medical regimen is effective;  continue present plan and medications.    10. Essential hypertension  B/p 156/72. Stated takes his meds same time daily around 10am. Had just taken medication PTA. Discussed importance of continuing to take all medications same time daily as ordered for effectiveness. The current medical regimen is effective;  continue present plan and medications.    11. Hyperlipidemia, unspecified hyperlipidemia type  The current medical regimen is effective;  continue present plan and medications.    12. History of colonic polyps  The current medical regimen is effective;  continue present plan and medications.    13. Primary open angle glaucoma (POAG) of both eyes, moderate stage  The current medical regimen is effective;  continue present plan and medications.    14. Nuclear sclerosis of both eyes  The current medical regimen is effective;  continue present plan and medications.        Provided [place] with a 5-10 year written screening schedule and personal prevention plan. Recommendations were developed using the USPSTF age appropriate recommendations. Education, counseling, and referrals were provided as needed. After Visit Summary printed and given to patient which includes a list of additional screenings\tests needed.    Follow up in about 8 weeks (around 9/16/2022) with provider.    TERRY Oro  I offered  to discuss advanced care planning, including how to pick a person who would make decisions for you if you were unable to make them for yourself, called a health care power of , and what kind of decisions you might make such as use of life sustaining treatments such as ventilators and tube feeding when faced with a life limiting illness recorded on a living will that they will need to know. (How you want to be cared for as you near the end of your natural life)     X Patient is interested in learning more about how to make advanced directives.  I provided them paperwork and offered to discuss this with them.

## 2022-08-05 ENCOUNTER — OFFICE VISIT (OUTPATIENT)
Dept: ENDOCRINOLOGY | Facility: CLINIC | Age: 67
End: 2022-08-05
Payer: MEDICARE

## 2022-08-05 VITALS
WEIGHT: 233 LBS | HEART RATE: 78 BPM | DIASTOLIC BLOOD PRESSURE: 85 MMHG | BODY MASS INDEX: 29.92 KG/M2 | SYSTOLIC BLOOD PRESSURE: 146 MMHG | TEMPERATURE: 99 F

## 2022-08-05 DIAGNOSIS — E11.9 CONTROLLED TYPE 2 DIABETES MELLITUS WITHOUT COMPLICATION, WITHOUT LONG-TERM CURRENT USE OF INSULIN: Primary | ICD-10-CM

## 2022-08-05 DIAGNOSIS — I10 ESSENTIAL HYPERTENSION: ICD-10-CM

## 2022-08-05 DIAGNOSIS — E78.5 HYPERLIPIDEMIA, UNSPECIFIED HYPERLIPIDEMIA TYPE: ICD-10-CM

## 2022-08-05 PROCEDURE — 3060F PR POS MICROALBUMINURIA RESULT DOCUMENTED/REVIEW: ICD-10-PCS | Mod: CPTII,S$GLB,, | Performed by: NURSE PRACTITIONER

## 2022-08-05 PROCEDURE — 1159F MED LIST DOCD IN RCRD: CPT | Mod: CPTII,S$GLB,, | Performed by: NURSE PRACTITIONER

## 2022-08-05 PROCEDURE — 3051F PR MOST RECENT HEMOGLOBIN A1C LEVEL 7.0 - < 8.0%: ICD-10-PCS | Mod: CPTII,S$GLB,, | Performed by: NURSE PRACTITIONER

## 2022-08-05 PROCEDURE — 3051F HG A1C>EQUAL 7.0%<8.0%: CPT | Mod: CPTII,S$GLB,, | Performed by: NURSE PRACTITIONER

## 2022-08-05 PROCEDURE — 1160F RVW MEDS BY RX/DR IN RCRD: CPT | Mod: CPTII,S$GLB,, | Performed by: NURSE PRACTITIONER

## 2022-08-05 PROCEDURE — 99999 PR PBB SHADOW E&M-EST. PATIENT-LVL III: CPT | Mod: PBBFAC,,, | Performed by: NURSE PRACTITIONER

## 2022-08-05 PROCEDURE — 99214 OFFICE O/P EST MOD 30 MIN: CPT | Mod: S$GLB,,, | Performed by: NURSE PRACTITIONER

## 2022-08-05 PROCEDURE — 4010F PR ACE/ARB THEARPY RXD/TAKEN: ICD-10-PCS | Mod: CPTII,S$GLB,, | Performed by: NURSE PRACTITIONER

## 2022-08-05 PROCEDURE — 3008F PR BODY MASS INDEX (BMI) DOCUMENTED: ICD-10-PCS | Mod: CPTII,S$GLB,, | Performed by: NURSE PRACTITIONER

## 2022-08-05 PROCEDURE — 1160F PR REVIEW ALL MEDS BY PRESCRIBER/CLIN PHARMACIST DOCUMENTED: ICD-10-PCS | Mod: CPTII,S$GLB,, | Performed by: NURSE PRACTITIONER

## 2022-08-05 PROCEDURE — 99999 PR PBB SHADOW E&M-EST. PATIENT-LVL III: ICD-10-PCS | Mod: PBBFAC,,, | Performed by: NURSE PRACTITIONER

## 2022-08-05 PROCEDURE — 3079F DIAST BP 80-89 MM HG: CPT | Mod: CPTII,S$GLB,, | Performed by: NURSE PRACTITIONER

## 2022-08-05 PROCEDURE — 1126F PR PAIN SEVERITY QUANTIFIED, NO PAIN PRESENT: ICD-10-PCS | Mod: CPTII,S$GLB,, | Performed by: NURSE PRACTITIONER

## 2022-08-05 PROCEDURE — 1126F AMNT PAIN NOTED NONE PRSNT: CPT | Mod: CPTII,S$GLB,, | Performed by: NURSE PRACTITIONER

## 2022-08-05 PROCEDURE — 3079F PR MOST RECENT DIASTOLIC BLOOD PRESSURE 80-89 MM HG: ICD-10-PCS | Mod: CPTII,S$GLB,, | Performed by: NURSE PRACTITIONER

## 2022-08-05 PROCEDURE — 3060F POS MICROALBUMINURIA REV: CPT | Mod: CPTII,S$GLB,, | Performed by: NURSE PRACTITIONER

## 2022-08-05 PROCEDURE — 1159F PR MEDICATION LIST DOCUMENTED IN MEDICAL RECORD: ICD-10-PCS | Mod: CPTII,S$GLB,, | Performed by: NURSE PRACTITIONER

## 2022-08-05 PROCEDURE — 3066F NEPHROPATHY DOC TX: CPT | Mod: CPTII,S$GLB,, | Performed by: NURSE PRACTITIONER

## 2022-08-05 PROCEDURE — 4010F ACE/ARB THERAPY RXD/TAKEN: CPT | Mod: CPTII,S$GLB,, | Performed by: NURSE PRACTITIONER

## 2022-08-05 PROCEDURE — 3008F BODY MASS INDEX DOCD: CPT | Mod: CPTII,S$GLB,, | Performed by: NURSE PRACTITIONER

## 2022-08-05 PROCEDURE — 3077F PR MOST RECENT SYSTOLIC BLOOD PRESSURE >= 140 MM HG: ICD-10-PCS | Mod: CPTII,S$GLB,, | Performed by: NURSE PRACTITIONER

## 2022-08-05 PROCEDURE — 3077F SYST BP >= 140 MM HG: CPT | Mod: CPTII,S$GLB,, | Performed by: NURSE PRACTITIONER

## 2022-08-05 PROCEDURE — 99214 PR OFFICE/OUTPT VISIT, EST, LEVL IV, 30-39 MIN: ICD-10-PCS | Mod: S$GLB,,, | Performed by: NURSE PRACTITIONER

## 2022-08-05 PROCEDURE — 3066F PR DOCUMENTATION OF TREATMENT FOR NEPHROPATHY: ICD-10-PCS | Mod: CPTII,S$GLB,, | Performed by: NURSE PRACTITIONER

## 2022-08-05 RX ORDER — METFORMIN HYDROCHLORIDE 1000 MG/1
1000 TABLET ORAL 2 TIMES DAILY
Qty: 180 TABLET | Refills: 3 | Status: SHIPPED | OUTPATIENT
Start: 2022-08-05 | End: 2023-06-16 | Stop reason: SDUPTHER

## 2022-08-05 RX ORDER — INSULIN GLARGINE 300 U/ML
INJECTION, SOLUTION SUBCUTANEOUS
Qty: 3 PEN | Refills: 5 | Status: SHIPPED | OUTPATIENT
Start: 2022-08-05 | End: 2023-06-16 | Stop reason: SDUPTHER

## 2022-08-05 NOTE — PATIENT INSTRUCTIONS
Inject  Toujeo to 24  units once daily - do not adjust Toujeo on a nightly basis since it's a long acting insulin. Better to adjust based on overall glucose trends.   Reviewed how to rotate insulin injection sites.   Continue metformin and Ozempic.   A1c in 3 months.   Return to clinic in 6 months with a1c  prior.

## 2022-08-05 NOTE — PROGRESS NOTES
CC: This 67 y.o. Black or  male  is here for evaluation of  T2DM along with comorbidities indicated in the Visit Diagnosis section of this encounter.    HPI: Gerson Phillips III was diagnosed with T2DM  > 20 years ago.   Previously followed by Dr. Hoskins with lov in (9/2017).       Interval history  a1c down from 7.4% to now 7.1%.   Pt reports cost of Ozempic is $400. Does not think he'd qualify for financial assistance. He is ok continuing it.   He adjusts Toujeo from 20-25 units once daily depending on BG but takes mostly 23-24   Reports BPs at home 120-130s/80-90s.     LAST DIABETES EDUCATION: years ago     DIABETES MEDICATIONS:   metformin 1000 mg bid with food   Toujeo 24 units daily   Ozempic 1 mg once weekly     Misses medication doses - no     Rotates injection sites - injects insulin to LLQ only   Changes pen needles - yes     DM COMPLICATIONS: nephropathy and impotence    SIGNIFICANT DIABETES MED HISTORY:   Unable to afford Victoza   Vgo stopped d/t ineffectiveness in 7/2018 and MDI (basalgar and novolog)  started   Novolog stopped and Ozempic started 12/2019.       SELF MONITORING BLOOD GLUCOSE: Checks blood glucose at home - 2x/day, fasting and predinner.  Forgot log.               HYPOGLYCEMIC EPISODES: denies.  No overnight symptoms.     CURRENT DIET: drinks Coke Zero and water. Eats 3 meals/day. No snacks. Brings lunch to work or eats out.   Breakfast - pb&j sandwich.     CURRENT EXERCISE: none     OCCUPATION:  Long distance       BP (!) 146/85   Pulse 78   Temp 98.5 °F (36.9 °C)   Wt 105.7 kg (233 lb)   BMI 29.92 kg/m²       ROS:   CONSTITUTIONAL: Appetite ok, no fatigue  GI: denies constipation, diarrhea, nausea       PHYSICAL EXAM:  GENERAL: Well developed, well nourished. No acute distress.   PSYCH: AAOx3, appropriate mood and affect, conversant, well-groomed. Judgement and insight good.   NEURO: Cranial nerves grossly intact. Speech clear, no tremor.   CHEST:  Respirations even and unlabored.         Hemoglobin A1C   Date Value Ref Range Status   07/08/2022 7.2 (H) 4.0 - 5.6 % Final     Comment:     ADA Screening Guidelines:  5.7-6.4%  Consistent with prediabetes  >or=6.5%  Consistent with diabetes    High levels of fetal hemoglobin interfere with the HbA1C  assay. Heterozygous hemoglobin variants (HbS, HgC, etc)do  not significantly interfere with this assay.   However, presence of multiple variants may affect accuracy.     04/22/2022 7.1 (H) 4.0 - 5.6 % Final     Comment:     ADA Screening Guidelines:  5.7-6.4%  Consistent with prediabetes  >or=6.5%  Consistent with diabetes    High levels of fetal hemoglobin interfere with the HbA1C  assay. Heterozygous hemoglobin variants (HbS, HgC, etc)do  not significantly interfere with this assay.   However, presence of multiple variants may affect accuracy.     01/21/2022 7.4 (H) 4.0 - 5.6 % Final     Comment:     ADA Screening Guidelines:  5.7-6.4%  Consistent with prediabetes  >or=6.5%  Consistent with diabetes    High levels of fetal hemoglobin interfere with the HbA1C  assay. Heterozygous hemoglobin variants (HbS, HgC, etc)do  not significantly interfere with this assay.   However, presence of multiple variants may affect accuracy.             Chemistry        Component Value Date/Time     03/18/2022 1001    K 3.7 03/18/2022 1001     03/18/2022 1001    CO2 26 03/18/2022 1001    BUN 10 03/18/2022 1001    CREATININE 1.2 04/22/2022 0722     03/18/2022 1001        Component Value Date/Time    CALCIUM 8.9 03/18/2022 1001    ALKPHOS 77 03/18/2022 1001    AST 18 03/18/2022 1001    ALT 27 03/18/2022 1001    BILITOT 0.4 03/18/2022 1001    ESTGFRAFRICA >60.0 04/22/2022 0722    EGFRNONAA >60.0 04/22/2022 0722          Lab Results   Component Value Date    LDLCALC 51.4 (L) 04/22/2022        Latest Reference Range & Units 04/22/22 07:22   Cholesterol 120 - 199 mg/dL 112 (L)   HDL 40 - 75 mg/dL 45   HDL/Cholesterol Ratio  20.0 - 50.0 % 40.2   LDL Cholesterol External 63.0 - 159.0 mg/dL 51.4 (L)   Non-HDL Cholesterol mg/dL 67   Total Cholesterol/HDL Ratio 2.0 - 5.0  2.5   Triglycerides 30 - 150 mg/dL 78   (L): Data is abnormally low    Lab Results   Component Value Date    MICALBCREAT 58.4 (H) 04/22/2022               ASSESSMENT and PLAN:    A1C GOAL: < 7%    1. Controlled type 2 diabetes mellitus without complication, without long-term current use of insulin  Inject  Toujeo to 24  units once daily - do not adjust Toujeo on a nightly basis since it's a long acting insulin. Better to adjust based on overall glucose trends.   Reviewed how to rotate insulin injection sites.   Continue metformin and Ozempic.   A1c in 3 months.   Return to clinic in 6 months with a1c  prior.       Hemoglobin A1C   2. Essential hypertension  Reviewed BP goal of 140/90.      3. Hyperlipidemia, unspecified hyperlipidemia type  Continue atorvastatin.        Orders Placed This Encounter   Procedures    Hemoglobin A1C     Standing Status:   Future     Standing Expiration Date:   10/4/2023        Follow up in about 6 months (around 2/5/2023).

## 2022-09-27 ENCOUNTER — PATIENT MESSAGE (OUTPATIENT)
Dept: PRIMARY CARE CLINIC | Facility: CLINIC | Age: 67
End: 2022-09-27
Payer: MEDICARE

## 2022-11-04 ENCOUNTER — OFFICE VISIT (OUTPATIENT)
Dept: PRIMARY CARE CLINIC | Facility: CLINIC | Age: 67
End: 2022-11-04
Payer: MEDICARE

## 2022-11-04 VITALS
HEIGHT: 74 IN | RESPIRATION RATE: 18 BRPM | BODY MASS INDEX: 29.99 KG/M2 | WEIGHT: 233.69 LBS | TEMPERATURE: 98 F | HEART RATE: 72 BPM | OXYGEN SATURATION: 98 % | SYSTOLIC BLOOD PRESSURE: 136 MMHG | DIASTOLIC BLOOD PRESSURE: 68 MMHG

## 2022-11-04 DIAGNOSIS — R53.83 FATIGUE, UNSPECIFIED TYPE: Primary | ICD-10-CM

## 2022-11-04 DIAGNOSIS — R06.83 SNORING: ICD-10-CM

## 2022-11-04 DIAGNOSIS — Z23 NEED FOR VACCINATION: ICD-10-CM

## 2022-11-04 PROCEDURE — G0008 ADMIN INFLUENZA VIRUS VAC: HCPCS | Mod: S$GLB,,, | Performed by: STUDENT IN AN ORGANIZED HEALTH CARE EDUCATION/TRAINING PROGRAM

## 2022-11-04 PROCEDURE — 1126F PR PAIN SEVERITY QUANTIFIED, NO PAIN PRESENT: ICD-10-PCS | Mod: CPTII,S$GLB,, | Performed by: STUDENT IN AN ORGANIZED HEALTH CARE EDUCATION/TRAINING PROGRAM

## 2022-11-04 PROCEDURE — 4010F PR ACE/ARB THEARPY RXD/TAKEN: ICD-10-PCS | Mod: CPTII,S$GLB,, | Performed by: STUDENT IN AN ORGANIZED HEALTH CARE EDUCATION/TRAINING PROGRAM

## 2022-11-04 PROCEDURE — G0008 FLU VACCINE - QUADRIVALENT - ADJUVANTED: ICD-10-PCS | Mod: S$GLB,,, | Performed by: STUDENT IN AN ORGANIZED HEALTH CARE EDUCATION/TRAINING PROGRAM

## 2022-11-04 PROCEDURE — 90694 FLU VACCINE - QUADRIVALENT - ADJUVANTED: ICD-10-PCS | Mod: S$GLB,,, | Performed by: STUDENT IN AN ORGANIZED HEALTH CARE EDUCATION/TRAINING PROGRAM

## 2022-11-04 PROCEDURE — 1101F PR PT FALLS ASSESS DOC 0-1 FALLS W/OUT INJ PAST YR: ICD-10-PCS | Mod: CPTII,S$GLB,, | Performed by: STUDENT IN AN ORGANIZED HEALTH CARE EDUCATION/TRAINING PROGRAM

## 2022-11-04 PROCEDURE — 3060F POS MICROALBUMINURIA REV: CPT | Mod: CPTII,S$GLB,, | Performed by: STUDENT IN AN ORGANIZED HEALTH CARE EDUCATION/TRAINING PROGRAM

## 2022-11-04 PROCEDURE — 3078F PR MOST RECENT DIASTOLIC BLOOD PRESSURE < 80 MM HG: ICD-10-PCS | Mod: CPTII,S$GLB,, | Performed by: STUDENT IN AN ORGANIZED HEALTH CARE EDUCATION/TRAINING PROGRAM

## 2022-11-04 PROCEDURE — 1159F PR MEDICATION LIST DOCUMENTED IN MEDICAL RECORD: ICD-10-PCS | Mod: CPTII,S$GLB,, | Performed by: STUDENT IN AN ORGANIZED HEALTH CARE EDUCATION/TRAINING PROGRAM

## 2022-11-04 PROCEDURE — 1126F AMNT PAIN NOTED NONE PRSNT: CPT | Mod: CPTII,S$GLB,, | Performed by: STUDENT IN AN ORGANIZED HEALTH CARE EDUCATION/TRAINING PROGRAM

## 2022-11-04 PROCEDURE — 90694 VACC AIIV4 NO PRSRV 0.5ML IM: CPT | Mod: S$GLB,,, | Performed by: STUDENT IN AN ORGANIZED HEALTH CARE EDUCATION/TRAINING PROGRAM

## 2022-11-04 PROCEDURE — 99999 PR PBB SHADOW E&M-EST. PATIENT-LVL IV: CPT | Mod: PBBFAC,,, | Performed by: STUDENT IN AN ORGANIZED HEALTH CARE EDUCATION/TRAINING PROGRAM

## 2022-11-04 PROCEDURE — 99999 PR PBB SHADOW E&M-EST. PATIENT-LVL IV: ICD-10-PCS | Mod: PBBFAC,,, | Performed by: STUDENT IN AN ORGANIZED HEALTH CARE EDUCATION/TRAINING PROGRAM

## 2022-11-04 PROCEDURE — 3008F BODY MASS INDEX DOCD: CPT | Mod: CPTII,S$GLB,, | Performed by: STUDENT IN AN ORGANIZED HEALTH CARE EDUCATION/TRAINING PROGRAM

## 2022-11-04 PROCEDURE — 3060F PR POS MICROALBUMINURIA RESULT DOCUMENTED/REVIEW: ICD-10-PCS | Mod: CPTII,S$GLB,, | Performed by: STUDENT IN AN ORGANIZED HEALTH CARE EDUCATION/TRAINING PROGRAM

## 2022-11-04 PROCEDURE — 3066F NEPHROPATHY DOC TX: CPT | Mod: CPTII,S$GLB,, | Performed by: STUDENT IN AN ORGANIZED HEALTH CARE EDUCATION/TRAINING PROGRAM

## 2022-11-04 PROCEDURE — 3066F PR DOCUMENTATION OF TREATMENT FOR NEPHROPATHY: ICD-10-PCS | Mod: CPTII,S$GLB,, | Performed by: STUDENT IN AN ORGANIZED HEALTH CARE EDUCATION/TRAINING PROGRAM

## 2022-11-04 PROCEDURE — 1159F MED LIST DOCD IN RCRD: CPT | Mod: CPTII,S$GLB,, | Performed by: STUDENT IN AN ORGANIZED HEALTH CARE EDUCATION/TRAINING PROGRAM

## 2022-11-04 PROCEDURE — 3288F PR FALLS RISK ASSESSMENT DOCUMENTED: ICD-10-PCS | Mod: CPTII,S$GLB,, | Performed by: STUDENT IN AN ORGANIZED HEALTH CARE EDUCATION/TRAINING PROGRAM

## 2022-11-04 PROCEDURE — 3051F PR MOST RECENT HEMOGLOBIN A1C LEVEL 7.0 - < 8.0%: ICD-10-PCS | Mod: CPTII,S$GLB,, | Performed by: STUDENT IN AN ORGANIZED HEALTH CARE EDUCATION/TRAINING PROGRAM

## 2022-11-04 PROCEDURE — 3077F PR MOST RECENT SYSTOLIC BLOOD PRESSURE >= 140 MM HG: ICD-10-PCS | Mod: CPTII,S$GLB,, | Performed by: STUDENT IN AN ORGANIZED HEALTH CARE EDUCATION/TRAINING PROGRAM

## 2022-11-04 PROCEDURE — 1160F PR REVIEW ALL MEDS BY PRESCRIBER/CLIN PHARMACIST DOCUMENTED: ICD-10-PCS | Mod: CPTII,S$GLB,, | Performed by: STUDENT IN AN ORGANIZED HEALTH CARE EDUCATION/TRAINING PROGRAM

## 2022-11-04 PROCEDURE — 99214 PR OFFICE/OUTPT VISIT, EST, LEVL IV, 30-39 MIN: ICD-10-PCS | Mod: S$GLB,,, | Performed by: STUDENT IN AN ORGANIZED HEALTH CARE EDUCATION/TRAINING PROGRAM

## 2022-11-04 PROCEDURE — 99214 OFFICE O/P EST MOD 30 MIN: CPT | Mod: S$GLB,,, | Performed by: STUDENT IN AN ORGANIZED HEALTH CARE EDUCATION/TRAINING PROGRAM

## 2022-11-04 PROCEDURE — 1160F RVW MEDS BY RX/DR IN RCRD: CPT | Mod: CPTII,S$GLB,, | Performed by: STUDENT IN AN ORGANIZED HEALTH CARE EDUCATION/TRAINING PROGRAM

## 2022-11-04 PROCEDURE — 3288F FALL RISK ASSESSMENT DOCD: CPT | Mod: CPTII,S$GLB,, | Performed by: STUDENT IN AN ORGANIZED HEALTH CARE EDUCATION/TRAINING PROGRAM

## 2022-11-04 PROCEDURE — 1101F PT FALLS ASSESS-DOCD LE1/YR: CPT | Mod: CPTII,S$GLB,, | Performed by: STUDENT IN AN ORGANIZED HEALTH CARE EDUCATION/TRAINING PROGRAM

## 2022-11-04 PROCEDURE — 4010F ACE/ARB THERAPY RXD/TAKEN: CPT | Mod: CPTII,S$GLB,, | Performed by: STUDENT IN AN ORGANIZED HEALTH CARE EDUCATION/TRAINING PROGRAM

## 2022-11-04 PROCEDURE — 3051F HG A1C>EQUAL 7.0%<8.0%: CPT | Mod: CPTII,S$GLB,, | Performed by: STUDENT IN AN ORGANIZED HEALTH CARE EDUCATION/TRAINING PROGRAM

## 2022-11-04 PROCEDURE — 3078F DIAST BP <80 MM HG: CPT | Mod: CPTII,S$GLB,, | Performed by: STUDENT IN AN ORGANIZED HEALTH CARE EDUCATION/TRAINING PROGRAM

## 2022-11-04 PROCEDURE — 3008F PR BODY MASS INDEX (BMI) DOCUMENTED: ICD-10-PCS | Mod: CPTII,S$GLB,, | Performed by: STUDENT IN AN ORGANIZED HEALTH CARE EDUCATION/TRAINING PROGRAM

## 2022-11-04 PROCEDURE — 3077F SYST BP >= 140 MM HG: CPT | Mod: CPTII,S$GLB,, | Performed by: STUDENT IN AN ORGANIZED HEALTH CARE EDUCATION/TRAINING PROGRAM

## 2022-11-04 NOTE — PROGRESS NOTES
"Subjective:       Patient ID: Gerson Phillips III is a 67 y.o. male.    Chief Complaint: Follow-up (6 month follow up)      HPI:  67 y.o. male presents to [facility] here for follow-up visit    Acute concerns?: Patient reports has been feeling some fatigue and decreased libido recently. No history of thyroid disease or anemia. Testosterone has never been checked in past. Has been present past few months, persistent, not worsening. 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 Has tried Vitamin B12 and C without improvement.    Snoring?: Doesn't know, wife say he does  Apneic episodes?: No, no history of RACHEL evaluation    Most recent HbA1c: 7.2% 7/8/2022  Home Reads?: 90-130s  Current Meds: Ozempic 1 mg qWeek, metformin 1000 mg bid  On Insulin?: insulin glargine   Compliance: Almost never misses    Diet: Avoiding excess carbs  Exercises: None    Flu vaccine?: Amenable    Review of Systems   Constitutional:  Positive for fatigue. Negative for appetite change, chills, diaphoresis, fever and unexpected weight change.   HENT:  Negative for congestion, sinus pressure, sneezing and sore throat.    Respiratory:  Negative for cough and shortness of breath.    Cardiovascular:  Negative for chest pain and palpitations.   Gastrointestinal:  Negative for abdominal pain, diarrhea, nausea and vomiting.   Musculoskeletal:  Positive for arthralgias (Intermittent, not worsening). Negative for joint swelling and myalgias.   Skin:  Negative for rash and wound.   Neurological:  Negative for dizziness, weakness, numbness and headaches.     Objective:      Vitals:    11/04/22 1051   BP: (!) 144/72   BP Location: Left arm   Patient Position: Sitting   BP Method: Medium (Manual)   Pulse: 72   Resp: 18   Temp: 97.6 °F (36.4 °C)   TempSrc: Skin   SpO2: 98%   Weight: 106 kg (233 lb 11 oz)   Height: 6' 2" (1.88 m)     Physical Exam  Vitals reviewed.   Constitutional:       General: He is not in acute distress.     Appearance: Normal appearance. He " is not ill-appearing.   HENT:      Head: Normocephalic and atraumatic.   Eyes:      General:         Right eye: No discharge.         Left eye: No discharge.      Conjunctiva/sclera: Conjunctivae normal.   Neck:      Thyroid: No thyroid mass, thyromegaly or thyroid tenderness.   Cardiovascular:      Rate and Rhythm: Normal rate and regular rhythm.      Pulses: Normal pulses.      Heart sounds: Normal heart sounds.   Pulmonary:      Effort: Pulmonary effort is normal.      Breath sounds: Normal breath sounds.   Musculoskeletal:         General: No deformity.   Skin:     General: Skin is warm and dry.      Coloration: Skin is not jaundiced.   Neurological:      General: No focal deficit present.      Mental Status: He is alert and oriented to person, place, and time.   Psychiatric:         Mood and Affect: Mood normal.         Behavior: Behavior normal.           Lab Results   Component Value Date     03/18/2022    K 3.7 03/18/2022     03/18/2022    CO2 26 03/18/2022    BUN 10 03/18/2022    CREATININE 1.2 04/22/2022    ANIONGAP 10 03/18/2022     Lab Results   Component Value Date    HGBA1C 7.2 (H) 07/08/2022     No results found for: BNP, BNPTRIAGEBLO    Lab Results   Component Value Date    WBC 4.17 03/18/2022    HGB 13.2 (L) 03/18/2022    HCT 41.7 03/18/2022     03/18/2022    GRAN 2.1 03/18/2022    GRAN 49.4 03/18/2022     Lab Results   Component Value Date    CHOL 112 (L) 04/22/2022    HDL 45 04/22/2022    LDLCALC 51.4 (L) 04/22/2022    TRIG 78 04/22/2022          Current Outpatient Medications:     atorvastatin (LIPITOR) 20 MG tablet, TAKE 1 TABLET BY MOUTH EVERY DAY, Disp: 90 tablet, Rfl: 3    blood sugar diagnostic Strp, To check blood glucose three times daily, to use with insurance preferred meter, Disp: 100 each, Rfl: 3    blood-glucose meter kit, To check blood glucose three times daily, to use with insurance preferred meter, Disp: 1 each, Rfl: 0    diclofenac (VOLTAREN) 50 MG EC tablet,  "TAKE 1 TABLET BY MOUTH TWICE A DAY, Disp: 30 tablet, Rfl: 0    diltiaZEM (CARDIZEM CD) 240 MG 24 hr capsule, Take 1 capsule (240 mg total) by mouth once daily., Disp: 90 capsule, Rfl: 12    insulin glargine, TOUJEO, (TOUJEO SOLOSTAR U-300 INSULIN) 300 unit/mL (1.5 mL) InPn pen, Inject 24 units once daily, Disp: 3 pen, Rfl: 5    lancets Misc, To check blood glucose three times daily, to use with insurance preferred meter, Disp: 100 each, Rfl: 3    latanoprost 0.005 % ophthalmic solution, Place 1 drop into both eyes every evening., Disp: 7.5 mL, Rfl: 2    metFORMIN (GLUCOPHAGE) 1000 MG tablet, Take 1 tablet (1,000 mg total) by mouth 2 (two) times daily., Disp: 180 tablet, Rfl: 3    pen needle, diabetic (BD ULTRA-FINE FORREST PEN NEEDLE) 32 gauge x 5/32" Ndle, USE 1X/DAY, Disp: 100 each, Rfl: 3    quinapriL (ACCUPRIL) 40 MG tablet, TAKE 1 TABLET BY MOUTH TWICE A DAY, Disp: 180 tablet, Rfl: 12    semaglutide (OZEMPIC) 1 mg/dose (4 mg/3 mL), INJECT 1 MG INTO THE SKIN EVERY 7 DAYS., Disp: 3 pen, Rfl: 3    hydroCHLOROthiazide (HYDRODIURIL) 12.5 MG Tab, Take 1 tablet (12.5 mg total) by mouth once daily. (Patient not taking: Reported on 11/4/2022), Disp: 90 tablet, Rfl: 1    Current Facility-Administered Medications:     LIDOcaine 2%/EPINEPHrine 1:100,000 injection 2 mL, 2 mL, Intradermal, 1 time in Clinic/HOD, Andrew Busch MD        Assessment:       1. Fatigue, unspecified type    2. Need for vaccination    3. Snoring           Plan:       Fatigue, unspecified type  Snoring  -     Testosterone; Future; Expected date: 11/04/2022  -     TSH; Future; Expected date: 11/04/2022  -     CBC Auto Differential; Future; Expected date: 11/04/2022  -     Comprehensive Metabolic Panel; Future; Expected date: 11/04/2022  - [place] sleepiness scale 10  - If initial lab work-up unremarkable, consider Sleep Medicine referral    Need for vaccination  -     Influenza (FLUAD) - Quadrivalent (Adjuvanted) *Preferred* (65+) (PF)    RTC in 1 " year

## 2022-11-04 NOTE — PROGRESS NOTES
Identified pt. By name and   Allergies reviewed   Administered flu 65+ vaccine to right deltoid using aseptic technique

## 2022-11-14 ENCOUNTER — TELEPHONE (OUTPATIENT)
Dept: PRIMARY CARE CLINIC | Facility: CLINIC | Age: 67
End: 2022-11-14
Payer: MEDICARE

## 2022-11-14 NOTE — TELEPHONE ENCOUNTER
----- Message from Breanna Stuart sent at 11/14/2022  3:16 PM CST -----  Contact: [facility] [phone]  Requesting a BP Check appointment on any given Friday.    Please call and advise.    Thank You

## 2022-11-22 ENCOUNTER — PATIENT OUTREACH (OUTPATIENT)
Dept: ADMINISTRATIVE | Facility: HOSPITAL | Age: 67
End: 2022-11-22
Payer: MEDICARE

## 2022-11-22 NOTE — PROGRESS NOTES
Health Maintenance Due   Topic Date Due    Shingles Vaccine (1 of 2) Never done    COVID-19 Vaccine (4 - Booster for Pfizer series) 02/11/2022

## 2022-11-25 ENCOUNTER — CLINICAL SUPPORT (OUTPATIENT)
Dept: PRIMARY CARE CLINIC | Facility: CLINIC | Age: 67
End: 2022-11-25
Payer: MEDICARE

## 2022-11-25 VITALS — DIASTOLIC BLOOD PRESSURE: 64 MMHG | OXYGEN SATURATION: 97 % | SYSTOLIC BLOOD PRESSURE: 124 MMHG | HEART RATE: 72 BPM

## 2022-11-25 PROCEDURE — 99999 PR PBB SHADOW E&M-EST. PATIENT-LVL II: ICD-10-PCS | Mod: PBBFAC,,,

## 2022-11-25 PROCEDURE — 99999 PR PBB SHADOW E&M-EST. PATIENT-LVL II: CPT | Mod: PBBFAC,,,

## 2022-11-25 NOTE — PROGRESS NOTES
Patient was seen today for Nurse Visit, Blood pressure check. Patient said that he did take his blood pressure medication. Patient Bp was 124/64.

## 2023-01-20 ENCOUNTER — IMMUNIZATION (OUTPATIENT)
Dept: PRIMARY CARE CLINIC | Facility: CLINIC | Age: 68
End: 2023-01-20
Payer: MEDICARE

## 2023-01-20 DIAGNOSIS — Z23 NEED FOR VACCINATION: Primary | ICD-10-CM

## 2023-01-20 PROCEDURE — 0134A COVID-19, MRNA, LNP-S, BIVALENT BOOSTER, PF, 50 MCG/0.5 ML: CPT | Mod: PBBFAC | Performed by: FAMILY MEDICINE

## 2023-01-20 PROCEDURE — 91313 COVID-19, MRNA, LNP-S, BIVALENT BOOSTER, PF, 50 MCG/0.5 ML: CPT | Mod: S$GLB,,, | Performed by: FAMILY MEDICINE

## 2023-01-20 PROCEDURE — 91313 COVID-19, MRNA, LNP-S, BIVALENT BOOSTER, PF, 50 MCG/0.5 ML: ICD-10-PCS | Mod: S$GLB,,, | Performed by: FAMILY MEDICINE

## 2023-01-27 ENCOUNTER — PES CALL (OUTPATIENT)
Dept: ADMINISTRATIVE | Facility: CLINIC | Age: 68
End: 2023-01-27
Payer: MEDICARE

## 2023-02-06 ENCOUNTER — TELEPHONE (OUTPATIENT)
Dept: FAMILY MEDICINE | Facility: CLINIC | Age: 68
End: 2023-02-06
Payer: MEDICARE

## 2023-02-06 NOTE — TELEPHONE ENCOUNTER
----- Message from Yonny Evans sent at 2/6/2023  3:19 PM CST -----  Regarding: daughter [phone]  .Type:  Sooner Appointment Request    Patient is requesting a sooner appointment.  Patient declined first available appointment listed as well as another facility and provider .  Patient will not accept being placed on the waitlist and is requesting a message be sent to doctor.    Name of Caller:daughter hoang brown     When is the first available appointment?2/24/2023    Symptoms: back pain concerns pt states maybe its his kidneys     Would the patient rather a call back or a response via [facility]? Call back     Best Call Back Number: [phone]      Additional Information:

## 2023-02-23 ENCOUNTER — TELEPHONE (OUTPATIENT)
Dept: ADMINISTRATIVE | Facility: CLINIC | Age: 68
End: 2023-02-23
Payer: MEDICARE

## 2023-02-23 NOTE — TELEPHONE ENCOUNTER
Called pt, no answer; left message informing pt I was calling to remind pt of his in office EAWV on 2/24/23 and to return my call if he had any questions; left my name and number

## 2023-02-24 ENCOUNTER — OFFICE VISIT (OUTPATIENT)
Dept: FAMILY MEDICINE | Facility: CLINIC | Age: 68
End: 2023-02-24
Payer: MEDICARE

## 2023-02-24 VITALS
HEIGHT: 74 IN | SYSTOLIC BLOOD PRESSURE: 124 MMHG | DIASTOLIC BLOOD PRESSURE: 62 MMHG | WEIGHT: 230.06 LBS | HEART RATE: 84 BPM | BODY MASS INDEX: 29.52 KG/M2 | TEMPERATURE: 98 F | OXYGEN SATURATION: 97 %

## 2023-02-24 DIAGNOSIS — E11.59 DIABETES WITH CIRCULATORY DISORDER CAUSING ERECTILE DYSFUNCTION: ICD-10-CM

## 2023-02-24 DIAGNOSIS — E11.22 CKD STAGE 3 DUE TO TYPE 2 DIABETES MELLITUS: ICD-10-CM

## 2023-02-24 DIAGNOSIS — H40.1132 PRIMARY OPEN ANGLE GLAUCOMA (POAG) OF BOTH EYES, MODERATE STAGE: ICD-10-CM

## 2023-02-24 DIAGNOSIS — Z79.4 LONG-TERM INSULIN USE: ICD-10-CM

## 2023-02-24 DIAGNOSIS — M54.50 CHRONIC LOW BACK PAIN, UNSPECIFIED BACK PAIN LATERALITY, UNSPECIFIED WHETHER SCIATICA PRESENT: ICD-10-CM

## 2023-02-24 DIAGNOSIS — Z00.00 ENCOUNTER FOR PREVENTIVE HEALTH EXAMINATION: Primary | ICD-10-CM

## 2023-02-24 DIAGNOSIS — Z79.4 TYPE 2 DIABETES MELLITUS WITH DIABETIC CATARACT, WITH LONG-TERM CURRENT USE OF INSULIN: ICD-10-CM

## 2023-02-24 DIAGNOSIS — N52.1 DIABETES WITH CIRCULATORY DISORDER CAUSING ERECTILE DYSFUNCTION: ICD-10-CM

## 2023-02-24 DIAGNOSIS — I10 ESSENTIAL HYPERTENSION: ICD-10-CM

## 2023-02-24 DIAGNOSIS — E78.5 HYPERLIPIDEMIA, UNSPECIFIED HYPERLIPIDEMIA TYPE: ICD-10-CM

## 2023-02-24 DIAGNOSIS — G89.29 CHRONIC LOW BACK PAIN, UNSPECIFIED BACK PAIN LATERALITY, UNSPECIFIED WHETHER SCIATICA PRESENT: ICD-10-CM

## 2023-02-24 DIAGNOSIS — E11.21 DIABETES MELLITUS WITH PROTEINURIC DIABETIC NEPHROPATHY: ICD-10-CM

## 2023-02-24 DIAGNOSIS — E11.36 TYPE 2 DIABETES MELLITUS WITH DIABETIC CATARACT, WITH LONG-TERM CURRENT USE OF INSULIN: ICD-10-CM

## 2023-02-24 DIAGNOSIS — F39 MOOD DISORDER: ICD-10-CM

## 2023-02-24 DIAGNOSIS — N18.30 CKD STAGE 3 DUE TO TYPE 2 DIABETES MELLITUS: ICD-10-CM

## 2023-02-24 PROCEDURE — 3044F HG A1C LEVEL LT 7.0%: CPT | Mod: CPTII,S$GLB,, | Performed by: NURSE PRACTITIONER

## 2023-02-24 PROCEDURE — 1101F PR PT FALLS ASSESS DOC 0-1 FALLS W/OUT INJ PAST YR: ICD-10-PCS | Mod: CPTII,S$GLB,, | Performed by: NURSE PRACTITIONER

## 2023-02-24 PROCEDURE — 3072F LOW RISK FOR RETINOPATHY: CPT | Mod: CPTII,S$GLB,, | Performed by: NURSE PRACTITIONER

## 2023-02-24 PROCEDURE — 99999 PR PBB SHADOW E&M-EST. PATIENT-LVL V: CPT | Mod: PBBFAC,,, | Performed by: NURSE PRACTITIONER

## 2023-02-24 PROCEDURE — 3078F PR MOST RECENT DIASTOLIC BLOOD PRESSURE < 80 MM HG: ICD-10-PCS | Mod: CPTII,S$GLB,, | Performed by: NURSE PRACTITIONER

## 2023-02-24 PROCEDURE — 1160F PR REVIEW ALL MEDS BY PRESCRIBER/CLIN PHARMACIST DOCUMENTED: ICD-10-PCS | Mod: CPTII,S$GLB,, | Performed by: NURSE PRACTITIONER

## 2023-02-24 PROCEDURE — 99999 PR PBB SHADOW E&M-EST. PATIENT-LVL V: ICD-10-PCS | Mod: PBBFAC,,, | Performed by: NURSE PRACTITIONER

## 2023-02-24 PROCEDURE — 1101F PT FALLS ASSESS-DOCD LE1/YR: CPT | Mod: CPTII,S$GLB,, | Performed by: NURSE PRACTITIONER

## 2023-02-24 PROCEDURE — 4010F ACE/ARB THERAPY RXD/TAKEN: CPT | Mod: CPTII,S$GLB,, | Performed by: NURSE PRACTITIONER

## 2023-02-24 PROCEDURE — 3008F PR BODY MASS INDEX (BMI) DOCUMENTED: ICD-10-PCS | Mod: CPTII,S$GLB,, | Performed by: NURSE PRACTITIONER

## 2023-02-24 PROCEDURE — 4010F PR ACE/ARB THEARPY RXD/TAKEN: ICD-10-PCS | Mod: CPTII,S$GLB,, | Performed by: NURSE PRACTITIONER

## 2023-02-24 PROCEDURE — 1170F FXNL STATUS ASSESSED: CPT | Mod: CPTII,S$GLB,, | Performed by: NURSE PRACTITIONER

## 2023-02-24 PROCEDURE — 3008F BODY MASS INDEX DOCD: CPT | Mod: CPTII,S$GLB,, | Performed by: NURSE PRACTITIONER

## 2023-02-24 PROCEDURE — 1170F PR FUNCTIONAL STATUS ASSESSED: ICD-10-PCS | Mod: CPTII,S$GLB,, | Performed by: NURSE PRACTITIONER

## 2023-02-24 PROCEDURE — 3072F PR LOW RISK FOR RETINOPATHY: ICD-10-PCS | Mod: CPTII,S$GLB,, | Performed by: NURSE PRACTITIONER

## 2023-02-24 PROCEDURE — 1160F RVW MEDS BY RX/DR IN RCRD: CPT | Mod: CPTII,S$GLB,, | Performed by: NURSE PRACTITIONER

## 2023-02-24 PROCEDURE — 1159F PR MEDICATION LIST DOCUMENTED IN MEDICAL RECORD: ICD-10-PCS | Mod: CPTII,S$GLB,, | Performed by: NURSE PRACTITIONER

## 2023-02-24 PROCEDURE — 3044F PR MOST RECENT HEMOGLOBIN A1C LEVEL <7.0%: ICD-10-PCS | Mod: CPTII,S$GLB,, | Performed by: NURSE PRACTITIONER

## 2023-02-24 PROCEDURE — G0439 PPPS, SUBSEQ VISIT: HCPCS | Mod: S$GLB,,, | Performed by: NURSE PRACTITIONER

## 2023-02-24 PROCEDURE — 3074F SYST BP LT 130 MM HG: CPT | Mod: CPTII,S$GLB,, | Performed by: NURSE PRACTITIONER

## 2023-02-24 PROCEDURE — G0439 PR MEDICARE ANNUAL WELLNESS SUBSEQUENT VISIT: ICD-10-PCS | Mod: S$GLB,,, | Performed by: NURSE PRACTITIONER

## 2023-02-24 PROCEDURE — 3074F PR MOST RECENT SYSTOLIC BLOOD PRESSURE < 130 MM HG: ICD-10-PCS | Mod: CPTII,S$GLB,, | Performed by: NURSE PRACTITIONER

## 2023-02-24 PROCEDURE — 3078F DIAST BP <80 MM HG: CPT | Mod: CPTII,S$GLB,, | Performed by: NURSE PRACTITIONER

## 2023-02-24 PROCEDURE — 3288F PR FALLS RISK ASSESSMENT DOCUMENTED: ICD-10-PCS | Mod: CPTII,S$GLB,, | Performed by: NURSE PRACTITIONER

## 2023-02-24 PROCEDURE — 3288F FALL RISK ASSESSMENT DOCD: CPT | Mod: CPTII,S$GLB,, | Performed by: NURSE PRACTITIONER

## 2023-02-24 PROCEDURE — 1126F PR PAIN SEVERITY QUANTIFIED, NO PAIN PRESENT: ICD-10-PCS | Mod: CPTII,S$GLB,, | Performed by: NURSE PRACTITIONER

## 2023-02-24 PROCEDURE — 1126F AMNT PAIN NOTED NONE PRSNT: CPT | Mod: CPTII,S$GLB,, | Performed by: NURSE PRACTITIONER

## 2023-02-24 PROCEDURE — 1159F MED LIST DOCD IN RCRD: CPT | Mod: CPTII,S$GLB,, | Performed by: NURSE PRACTITIONER

## 2023-02-24 NOTE — PROGRESS NOTES
"  Gerson Phillips presented for a  Medicare AWV and comprehensive Health Risk Assessment today. The following components were reviewed and updated:    Medical history  Family History  Social history  Allergies and Current Medications  Health Risk Assessment  Health Maintenance  Care Team       ** See Completed Assessments for Annual Wellness Visit within the encounter summary.**       The following assessments were completed:  Living Situation  CAGE  Depression Screening  Timed Get Up and Go  Whisper Test  Cognitive Function Screening  Nutrition Screening  ADL Screening  PAQ Screening            Vitals:    02/24/23 1000   BP: 124/62   Pulse: 84   Temp: 97.8 °F (36.6 °C)   TempSrc: Oral   SpO2: 97%   Weight: 104.3 kg (230 lb 0.8 oz)   Height: 6' 2" (1.88 m)     Body mass index is 29.54 kg/m².  Physical Exam  Vitals and nursing note reviewed.   Constitutional:       Appearance: Normal appearance.   Cardiovascular:      Rate and Rhythm: Normal rate.      Pulses: Normal pulses.      Heart sounds: Normal heart sounds.   Pulmonary:      Effort: Pulmonary effort is normal.      Breath sounds: Normal breath sounds.   Musculoskeletal:         General: Normal range of motion.   Neurological:      Mental Status: He is alert and oriented to person, place, and time.   Psychiatric:         Mood and Affect: Mood normal.         Behavior: Behavior normal.           Diagnoses and health risks identified today and associated recommendations/orders:    1. Encounter for preventive health examination  Pt was seen today for an Annual Wellness visit. Healthcare maintenance and screening recommendations were discussed and updated as indicated. Return in one year for AWV.    Review current opioid prescriptions:n/a  Screen for potential Substance Use Disorders:n/a    2. Type 2 diabetes mellitus with diabetic cataract, with long-term current use of insulin  3. Diabetes mellitus with proteinuric diabetic nephropathy  4. Diabetes with circulatory " disorder causing erectile dysfunction  5. CKD stage 3 due to type 2 diabetes mellitus  6. Long-term insulin use  The current medical regimen is effective;  continue present plan and medications.  Hemoglobin A1C   Date Value Ref Range Status   02/03/2023 6.9 (H) 4.0 - 5.6 % Final             11/04/2022 7.2 (H) 4.0 - 5.6 % Final             07/08/2022 7.2 (H) 4.0 - 5.6 % Final             7. Mood disorder  The current medical regimen is effective;  continue present plan and medications.    8. Essential hypertension  The current medical regimen is effective;  continue present plan and medications.    9. Hyperlipidemia, unspecified hyperlipidemia type  The current medical regimen is effective;  continue present plan and medications.    10. Primary open angle glaucoma (POAG) of both eyes, moderate stage  The current medical regimen is effective;  continue present plan and medications.    11. Chronic low back pain, unspecified back pain laterality, unspecified whether sciatica present  The current medical regimen is effective;  continue present plan and medications.        Provided Gerson with a 5-10 year written screening schedule and personal prevention plan. Recommendations were developed using the USPSTF age appropriate recommendations. Education, counseling, and referrals were provided as needed. After Visit Summary printed and given to patient which includes a list of additional screenings\tests needed.    Follow up in about 1 year (around 2/24/2024).    TERRY Oro  I offered to discuss advanced care planning, including how to pick a person who would make decisions for you if you were unable to make them for yourself, called a health care power of , and what kind of decisions you might make such as use of life sustaining treatments such as ventilators and tube feeding when faced with a life limiting illness recorded on a living will that they will need to know. (How you want to be cared for as you  near the end of your natural life)     X Patient is interested in learning more about how to make advanced directives.  I provided them paperwork and offered to discuss this with them.

## 2023-02-24 NOTE — PATIENT INSTRUCTIONS
Counseling and Referral of Other Preventative  (Italic type indicates deductible and co-insurance are waived)    Patient Name: Gerson Phillips  Today's Date: 2/24/2023    Health Maintenance         Date Due Completion Date    Shingles Vaccine (1 of 2) Never done ---    Pneumococcal Vaccines (Age 65+) (2 - PPSV23 if available, else PCV20) 03/04/2022 1/7/2022    Foot Exam 03/18/2023 3/18/2022    Override on 12/9/2019: Done    Diabetes Urine Screening 04/22/2023 4/22/2022    Lipid Panel 04/22/2023 4/22/2022    Eye Exam 06/24/2023 6/24/2022    Hemoglobin A1c 08/03/2023 2/3/2023    TETANUS VACCINE 09/27/2023 9/27/2013    Low Dose Statin 11/04/2023 11/4/2022    Colorectal Cancer Screening 03/26/2028 3/26/2018          No orders of the defined types were placed in this encounter.      The following information is provided to all patients.  This information is to help you find resources for any of the problems found today that may be affecting your health:                Living healthy guide: www.[facility].[place].gov      Understanding Diabetes: www.diabetes.org      Eating healthy: www.cdc.gov/healthyweight      CDC home safety checklist: www.cdc.gov/steadi/patient.html      Agency on Aging: www.goea.[place].[facility]      Alcoholics anonymous (AA): www.aa.org      Physical Activity: www.neisha.nih.gov/ru3rjdo      Tobacco use: www.quitwithusla.org       Please call [employer] at [phone] to receive a rewards card for completing your Annual Wellness Visit. Thanks

## 2023-04-21 ENCOUNTER — PATIENT MESSAGE (OUTPATIENT)
Dept: FAMILY MEDICINE | Facility: CLINIC | Age: 68
End: 2023-04-21
Payer: MEDICARE

## 2023-04-27 RX ORDER — SEMAGLUTIDE 1.34 MG/ML
1 INJECTION, SOLUTION SUBCUTANEOUS
Qty: 1 EACH | Refills: 1 | Status: SHIPPED | OUTPATIENT
Start: 2023-04-27 | End: 2023-06-16 | Stop reason: SDUPTHER

## 2023-05-03 DIAGNOSIS — E11.9 TYPE 2 DIABETES MELLITUS WITHOUT COMPLICATION: ICD-10-CM

## 2023-05-06 DIAGNOSIS — E78.2 MIXED HYPERLIPIDEMIA: ICD-10-CM

## 2023-05-06 NOTE — TELEPHONE ENCOUNTER
No care due was identified.  [facility] Embedded Care Due Messages. Reference number: [id number].   5/06/2023 2:02:33 PM CDT

## 2023-05-08 RX ORDER — ATORVASTATIN CALCIUM 20 MG/1
TABLET, FILM COATED ORAL
Qty: 90 TABLET | Refills: 0 | Status: SHIPPED | OUTPATIENT
Start: 2023-05-08 | End: 2023-12-07 | Stop reason: SDUPTHER

## 2023-05-08 NOTE — TELEPHONE ENCOUNTER
Refill Routing Note   Medication(s) are not appropriate for processing by [facility] for the following reason(s):      Required labs outdated    [facility] action(s):  Defer None identified          Appointments  past 12m or future 3m with PCP    Date Provider   Last Visit   11/4/2022 Andrew Busch MD   Next Visit   Visit date not found Andrew Busch MD   ED visits in past 90 days: 0        Note composed:9:15 PM 05/07/2023

## 2023-06-09 ENCOUNTER — LAB VISIT (OUTPATIENT)
Dept: LAB | Facility: HOSPITAL | Age: 68
End: 2023-06-09
Attending: STUDENT IN AN ORGANIZED HEALTH CARE EDUCATION/TRAINING PROGRAM
Payer: MEDICARE

## 2023-06-09 ENCOUNTER — OFFICE VISIT (OUTPATIENT)
Dept: FAMILY MEDICINE | Facility: CLINIC | Age: 68
End: 2023-06-09
Payer: MEDICARE

## 2023-06-09 VITALS
BODY MASS INDEX: 29.07 KG/M2 | WEIGHT: 226.5 LBS | OXYGEN SATURATION: 98 % | TEMPERATURE: 98 F | HEART RATE: 78 BPM | DIASTOLIC BLOOD PRESSURE: 72 MMHG | SYSTOLIC BLOOD PRESSURE: 151 MMHG | HEIGHT: 74 IN

## 2023-06-09 DIAGNOSIS — E11.69 DYSLIPIDEMIA ASSOCIATED WITH TYPE 2 DIABETES MELLITUS: ICD-10-CM

## 2023-06-09 DIAGNOSIS — E11.9 TYPE 2 DIABETES MELLITUS WITHOUT COMPLICATION: ICD-10-CM

## 2023-06-09 DIAGNOSIS — M16.0 PRIMARY OSTEOARTHRITIS OF BOTH HIPS: ICD-10-CM

## 2023-06-09 DIAGNOSIS — E11.22 TYPE 2 DIABETES MELLITUS WITH STAGE 3A CHRONIC KIDNEY DISEASE, WITH LONG-TERM CURRENT USE OF INSULIN: ICD-10-CM

## 2023-06-09 DIAGNOSIS — Z12.5 PROSTATE CANCER SCREENING: ICD-10-CM

## 2023-06-09 DIAGNOSIS — N18.31 TYPE 2 DIABETES MELLITUS WITH STAGE 3A CHRONIC KIDNEY DISEASE, WITH LONG-TERM CURRENT USE OF INSULIN: ICD-10-CM

## 2023-06-09 DIAGNOSIS — B35.1 ONYCHOMYCOSIS: ICD-10-CM

## 2023-06-09 DIAGNOSIS — Z79.4 TYPE 2 DIABETES MELLITUS WITH STAGE 3A CHRONIC KIDNEY DISEASE, WITH LONG-TERM CURRENT USE OF INSULIN: ICD-10-CM

## 2023-06-09 DIAGNOSIS — I70.0 AORTIC ATHEROSCLEROSIS: ICD-10-CM

## 2023-06-09 DIAGNOSIS — M51.36 DDD (DEGENERATIVE DISC DISEASE), LUMBAR: ICD-10-CM

## 2023-06-09 DIAGNOSIS — I10 ESSENTIAL HYPERTENSION: ICD-10-CM

## 2023-06-09 DIAGNOSIS — Z23 PNEUMOCOCCAL VACCINATION GIVEN: ICD-10-CM

## 2023-06-09 DIAGNOSIS — E78.5 DYSLIPIDEMIA ASSOCIATED WITH TYPE 2 DIABETES MELLITUS: ICD-10-CM

## 2023-06-09 DIAGNOSIS — Z00.00 ENCOUNTER FOR MEDICAL EXAMINATION TO ESTABLISH CARE: Primary | ICD-10-CM

## 2023-06-09 LAB
ALBUMIN SERPL BCP-MCNC: 3.9 G/DL (ref 3.5–5.2)
ALP SERPL-CCNC: 77 U/L (ref 55–135)
ALT SERPL W/O P-5'-P-CCNC: 16 U/L (ref 10–44)
ANION GAP SERPL CALC-SCNC: 11 MMOL/L (ref 8–16)
AST SERPL-CCNC: 13 U/L (ref 10–40)
BASOPHILS # BLD AUTO: 0.06 K/UL (ref 0–0.2)
BASOPHILS NFR BLD: 1.2 % (ref 0–1.9)
BILIRUB SERPL-MCNC: 0.5 MG/DL (ref 0.1–1)
BUN SERPL-MCNC: 11 MG/DL (ref 8–23)
CALCIUM SERPL-MCNC: 9.8 MG/DL (ref 8.7–10.5)
CHLORIDE SERPL-SCNC: 98 MMOL/L (ref 95–110)
CHOLEST SERPL-MCNC: 107 MG/DL (ref 120–199)
CHOLEST SERPL-MCNC: 107 MG/DL (ref 120–199)
CHOLEST/HDLC SERPL: 2.1 {RATIO} (ref 2–5)
CHOLEST/HDLC SERPL: 2.1 {RATIO} (ref 2–5)
CO2 SERPL-SCNC: 27 MMOL/L (ref 23–29)
COMPLEXED PSA SERPL-MCNC: 1.7 NG/ML (ref 0–4)
CREAT SERPL-MCNC: 1.1 MG/DL (ref 0.5–1.4)
DIFFERENTIAL METHOD: NORMAL
EOSINOPHIL # BLD AUTO: 0.2 K/UL (ref 0–0.5)
EOSINOPHIL NFR BLD: 4.7 % (ref 0–8)
ERYTHROCYTE [DISTWIDTH] IN BLOOD BY AUTOMATED COUNT: 13.8 % (ref 11.5–14.5)
EST. GFR  (NO RACE VARIABLE): >60 ML/MIN/1.73 M^2
ESTIMATED AVG GLUCOSE: 140 MG/DL (ref 68–131)
GLUCOSE SERPL-MCNC: 97 MG/DL (ref 70–110)
HBA1C MFR BLD: 6.5 % (ref 4–5.6)
HCT VFR BLD AUTO: 43.5 % (ref 40–54)
HDLC SERPL-MCNC: 50 MG/DL (ref 40–75)
HDLC SERPL-MCNC: 50 MG/DL (ref 40–75)
HDLC SERPL: 46.7 % (ref 20–50)
HDLC SERPL: 46.7 % (ref 20–50)
HGB BLD-MCNC: 14 G/DL (ref 14–18)
IMM GRANULOCYTES # BLD AUTO: 0.01 K/UL (ref 0–0.04)
IMM GRANULOCYTES NFR BLD AUTO: 0.2 % (ref 0–0.5)
LDLC SERPL CALC-MCNC: 48.2 MG/DL (ref 63–159)
LDLC SERPL CALC-MCNC: 48.2 MG/DL (ref 63–159)
LYMPHOCYTES # BLD AUTO: 1.9 K/UL (ref 1–4.8)
LYMPHOCYTES NFR BLD: 37.8 % (ref 18–48)
MCH RBC QN AUTO: 28 PG (ref 27–31)
MCHC RBC AUTO-ENTMCNC: 32.2 G/DL (ref 32–36)
MCV RBC AUTO: 87 FL (ref 82–98)
MONOCYTES # BLD AUTO: 0.7 K/UL (ref 0.3–1)
MONOCYTES NFR BLD: 12.9 % (ref 4–15)
NEUTROPHILS # BLD AUTO: 2.2 K/UL (ref 1.8–7.7)
NEUTROPHILS NFR BLD: 43.2 % (ref 38–73)
NONHDLC SERPL-MCNC: 57 MG/DL
NONHDLC SERPL-MCNC: 57 MG/DL
NRBC BLD-RTO: 0 /100 WBC
PLATELET # BLD AUTO: 229 K/UL (ref 150–450)
PMV BLD AUTO: 9.8 FL (ref 9.2–12.9)
POTASSIUM SERPL-SCNC: 4.1 MMOL/L (ref 3.5–5.1)
PROT SERPL-MCNC: 7.2 G/DL (ref 6–8.4)
RBC # BLD AUTO: 5 M/UL (ref 4.6–6.2)
SODIUM SERPL-SCNC: 136 MMOL/L (ref 136–145)
TRIGL SERPL-MCNC: 44 MG/DL (ref 30–150)
TRIGL SERPL-MCNC: 44 MG/DL (ref 30–150)
WBC # BLD AUTO: 5.13 K/UL (ref 3.9–12.7)

## 2023-06-09 PROCEDURE — 3008F BODY MASS INDEX DOCD: CPT | Mod: CPTII,S$GLB,, | Performed by: FAMILY MEDICINE

## 2023-06-09 PROCEDURE — G0009 ADMIN PNEUMOCOCCAL VACCINE: HCPCS | Mod: S$GLB,,, | Performed by: FAMILY MEDICINE

## 2023-06-09 PROCEDURE — 83036 HEMOGLOBIN GLYCOSYLATED A1C: CPT | Performed by: FAMILY MEDICINE

## 2023-06-09 PROCEDURE — 3008F PR BODY MASS INDEX (BMI) DOCUMENTED: ICD-10-PCS | Mod: CPTII,S$GLB,, | Performed by: FAMILY MEDICINE

## 2023-06-09 PROCEDURE — 1160F RVW MEDS BY RX/DR IN RCRD: CPT | Mod: CPTII,S$GLB,, | Performed by: FAMILY MEDICINE

## 2023-06-09 PROCEDURE — 99214 OFFICE O/P EST MOD 30 MIN: CPT | Mod: S$GLB,,, | Performed by: FAMILY MEDICINE

## 2023-06-09 PROCEDURE — 3077F PR MOST RECENT SYSTOLIC BLOOD PRESSURE >= 140 MM HG: ICD-10-PCS | Mod: CPTII,S$GLB,, | Performed by: FAMILY MEDICINE

## 2023-06-09 PROCEDURE — 84153 ASSAY OF PSA TOTAL: CPT | Performed by: FAMILY MEDICINE

## 2023-06-09 PROCEDURE — 3288F PR FALLS RISK ASSESSMENT DOCUMENTED: ICD-10-PCS | Mod: CPTII,S$GLB,, | Performed by: FAMILY MEDICINE

## 2023-06-09 PROCEDURE — 99999 PR PBB SHADOW E&M-EST. PATIENT-LVL V: ICD-10-PCS | Mod: PBBFAC,,, | Performed by: FAMILY MEDICINE

## 2023-06-09 PROCEDURE — G0009 PNEUMOCOCCAL CONJUGATE VACCINE 20-VALENT: ICD-10-PCS | Mod: S$GLB,,, | Performed by: FAMILY MEDICINE

## 2023-06-09 PROCEDURE — 99214 PR OFFICE/OUTPT VISIT, EST, LEVL IV, 30-39 MIN: ICD-10-PCS | Mod: S$GLB,,, | Performed by: FAMILY MEDICINE

## 2023-06-09 PROCEDURE — 4010F ACE/ARB THERAPY RXD/TAKEN: CPT | Mod: CPTII,S$GLB,, | Performed by: FAMILY MEDICINE

## 2023-06-09 PROCEDURE — 36415 COLL VENOUS BLD VENIPUNCTURE: CPT | Mod: PO | Performed by: FAMILY MEDICINE

## 2023-06-09 PROCEDURE — 1101F PR PT FALLS ASSESS DOC 0-1 FALLS W/OUT INJ PAST YR: ICD-10-PCS | Mod: CPTII,S$GLB,, | Performed by: FAMILY MEDICINE

## 2023-06-09 PROCEDURE — 4010F PR ACE/ARB THEARPY RXD/TAKEN: ICD-10-PCS | Mod: CPTII,S$GLB,, | Performed by: FAMILY MEDICINE

## 2023-06-09 PROCEDURE — 1159F MED LIST DOCD IN RCRD: CPT | Mod: CPTII,S$GLB,, | Performed by: FAMILY MEDICINE

## 2023-06-09 PROCEDURE — 90677 PNEUMOCOCCAL CONJUGATE VACCINE 20-VALENT: ICD-10-PCS | Mod: S$GLB,,, | Performed by: FAMILY MEDICINE

## 2023-06-09 PROCEDURE — 3078F PR MOST RECENT DIASTOLIC BLOOD PRESSURE < 80 MM HG: ICD-10-PCS | Mod: CPTII,S$GLB,, | Performed by: FAMILY MEDICINE

## 2023-06-09 PROCEDURE — 3077F SYST BP >= 140 MM HG: CPT | Mod: CPTII,S$GLB,, | Performed by: FAMILY MEDICINE

## 2023-06-09 PROCEDURE — 1125F AMNT PAIN NOTED PAIN PRSNT: CPT | Mod: CPTII,S$GLB,, | Performed by: FAMILY MEDICINE

## 2023-06-09 PROCEDURE — 80053 COMPREHEN METABOLIC PANEL: CPT | Performed by: FAMILY MEDICINE

## 2023-06-09 PROCEDURE — 3072F PR LOW RISK FOR RETINOPATHY: ICD-10-PCS | Mod: CPTII,S$GLB,, | Performed by: FAMILY MEDICINE

## 2023-06-09 PROCEDURE — 90677 PCV20 VACCINE IM: CPT | Mod: S$GLB,,, | Performed by: FAMILY MEDICINE

## 2023-06-09 PROCEDURE — 99999 PR PBB SHADOW E&M-EST. PATIENT-LVL V: CPT | Mod: PBBFAC,,, | Performed by: FAMILY MEDICINE

## 2023-06-09 PROCEDURE — 3044F PR MOST RECENT HEMOGLOBIN A1C LEVEL <7.0%: ICD-10-PCS | Mod: CPTII,S$GLB,, | Performed by: FAMILY MEDICINE

## 2023-06-09 PROCEDURE — 3072F LOW RISK FOR RETINOPATHY: CPT | Mod: CPTII,S$GLB,, | Performed by: FAMILY MEDICINE

## 2023-06-09 PROCEDURE — 1101F PT FALLS ASSESS-DOCD LE1/YR: CPT | Mod: CPTII,S$GLB,, | Performed by: FAMILY MEDICINE

## 2023-06-09 PROCEDURE — 1160F PR REVIEW ALL MEDS BY PRESCRIBER/CLIN PHARMACIST DOCUMENTED: ICD-10-PCS | Mod: CPTII,S$GLB,, | Performed by: FAMILY MEDICINE

## 2023-06-09 PROCEDURE — 3044F HG A1C LEVEL LT 7.0%: CPT | Mod: CPTII,S$GLB,, | Performed by: FAMILY MEDICINE

## 2023-06-09 PROCEDURE — 1125F PR PAIN SEVERITY QUANTIFIED, PAIN PRESENT: ICD-10-PCS | Mod: CPTII,S$GLB,, | Performed by: FAMILY MEDICINE

## 2023-06-09 PROCEDURE — 3078F DIAST BP <80 MM HG: CPT | Mod: CPTII,S$GLB,, | Performed by: FAMILY MEDICINE

## 2023-06-09 PROCEDURE — 1159F PR MEDICATION LIST DOCUMENTED IN MEDICAL RECORD: ICD-10-PCS | Mod: CPTII,S$GLB,, | Performed by: FAMILY MEDICINE

## 2023-06-09 PROCEDURE — 3288F FALL RISK ASSESSMENT DOCD: CPT | Mod: CPTII,S$GLB,, | Performed by: FAMILY MEDICINE

## 2023-06-09 PROCEDURE — 85025 COMPLETE CBC W/AUTO DIFF WBC: CPT | Performed by: FAMILY MEDICINE

## 2023-06-09 PROCEDURE — 80061 LIPID PANEL: CPT | Performed by: STUDENT IN AN ORGANIZED HEALTH CARE EDUCATION/TRAINING PROGRAM

## 2023-06-09 RX ORDER — GABAPENTIN 100 MG/1
100 CAPSULE ORAL 2 TIMES DAILY PRN
Qty: 60 CAPSULE | Refills: 2 | Status: SHIPPED | OUTPATIENT
Start: 2023-06-09 | End: 2023-08-14

## 2023-06-09 NOTE — PATIENT INSTRUCTIONS
Call [phone] to schedule an appointment with Pain Management  You are due for your shingles vaccines  You are due for your eye exam

## 2023-06-09 NOTE — Clinical Note
Good morning,  Found you a patient! Actually he is already established with you for chronic LBP. He last saw you in January for which PT was recommended with consideration of MBB/RFA if no improvement after 8 weeks. He states that his work schedule will not allow him to do PT. I told him to avoid NSAIDs due to CKD and I started him on gabapentin, but I also suggested that he should follow up with you. Just sending you an update at an fyi. I gave him your office number to call and schedule a f/u but perhaps your staff might want to reach out to him as a courtesy.  Have a good weekend! Dr. Alisa Chester, DO Family Medicine

## 2023-06-09 NOTE — PROGRESS NOTES
Assessment & Plan:    Encounter for medical examination to establish care    Type 2 diabetes mellitus with stage 3a chronic kidney disease, with long-term current use of insulin  Onychomycosis  -     CBC Auto Differential; Future; Expected date: 06/09/2023  -     Comprehensive Metabolic Panel; Future; Expected date: 06/09/2023  -     Hemoglobin A1C; Future; Expected date: 06/09/2023  -     Lipid Panel; Future; Expected date: 06/09/2023  -     Microalbumin/Creatinine Ratio, Urine; Future; Expected date: 06/09/2023    Patient reports range 80-140s at home.  Foot exam performed, revealing fungal disease of the b/l great toes. This does not concern patient and therefore no treatment will be initiated.   Encouraged yearly eye and dental exams.  Fasting labs to be performed.     Dyslipidemia associated with type 2 diabetes mellitus  -     Lipid Panel; Future; Expected date: 06/09/2023    Aortic atherosclerosis  -     Lipid Panel; Future; Expected date: 06/09/2023    Essential hypertension  -     CBC Auto Differential; Future; Expected date: 06/09/2023  -     Comprehensive Metabolic Panel; Future; Expected date: 06/09/2023  -     Hemoglobin A1C; Future; Expected date: 06/09/2023  -     Lipid Panel; Future; Expected date: 06/09/2023    BP elevated. Compliant with medications.  Advised to check blood pressure 2 hours after taking medication daily on a log to review at nursing blood pressure check in 2 weeks.    DDD (degenerative disc disease), lumbar  Primary osteoarthritis of both hips  -     gabapentin (NEURONTIN) 100 MG capsule; Take 1 capsule (100 mg total) by mouth 2 (two) times daily as needed (pain).  Dispense: 60 capsule; Refill: 2    Avoid NSAIDs due to renal function.  Start low-dose gabapentin, discussed most common side effects and notify if they occur.  Encouraged patient to follow-up with Pain Management to discuss benefit of possible intervention.    Prostate cancer screening  -     PSA, Screening; Future;  "Expected date: 06/09/2023    Pneumococcal vaccination given  -     (In Office Administered) Pneumococcal Conjugate Vaccine (20 Valent) (IM)      Health maintenance reviewed and addressed above.  Recommended to have shingles vaccine at pharmacy due to cost.       Follow-up: Follow up in about 2 weeks (around 6/23/2023) for nursing blood pressure check, 6 months for chronic follow-up.  ______________________________________________________________________    Chief Complaint  Chief Complaint   Patient presents with    Establish Care       HPI  Gerson Phillips III is a 68 y.o. male with medical diagnoses as listed in the medical history and problem list that presents to the office to establish care. He c/o chronic low back pain and b/l hip pain that began years ago. He was following up with Pain Management, last appointment in January 2022, for which PT was recommended. Patient is a  and therefore has been unable to do PT because of restrictions with his work schedule. He takes diclofenac and Goody powder with limited relief. It was recommended to consider a MBB/RFA if PT was ineffective.     MRI lumbar spine (11/18/21) significant findings:    "Discs: Partial fusion across the L2-L3 disc space.  Severe disc height loss at L5-S1, mild at L4-L5.     Degenerative findings:     L3-L4: Circumferential disc bulge and mild facet arthropathy result in mild left neural foraminal narrowing.     L4-L5: Circumferential disc bulge and moderate facet arthropathy result in mild effacement of the thecal sac and moderate right, mild left neural foraminal narrowing.     L5-S1: Circumferential disc bulge and mild facet arthropathy result in mild right neural foraminal narrowing.     Paraspinal muscles & soft tissues: Moderate paraspinal muscle atrophy.     Impression:   1. Multilevel degenerative changes of the lumbar spine.  Moderate right neural foraminal narrowing noted at L4-L5."    X-ray b/l hips done in 9/21 showed " degenerative changes.         Health Maintenance         Date Due Completion Date    Shingles Vaccine (1 of 2) Never done ---    Lipid Panel 04/22/2023 4/22/2022    COVID-19 Vaccine (5 - Pfizer series) 05/20/2023 1/20/2023    Diabetes Urine Screening 04/22/2023 4/22/2022    Eye Exam 06/24/2023 6/24/2022    Hemoglobin A1c 08/03/2023 2/3/2023    TETANUS VACCINE 09/27/2023 9/27/2013    High Dose Statin 06/09/2024 6/9/2023    Foot Exam 06/09/2024 6/9/2023 (Done)    Override on 6/9/2023: Done    Override on 12/9/2019: Done    Colorectal Cancer Screening 03/26/2028 3/26/2018              PAST MEDICAL HISTORY:  Past Medical History:   Diagnosis Date    Abnormal liver function tests 6/18/2013    Anemia 6/18/2013    Cervical radiculopathy 3/12/2013    CKD stage 3 due to type 2 diabetes mellitus 3/4/2016    Colon polyp     Diabetes mellitus type II, uncontrolled 10/18/2012    Diabetes mellitus with renal manifestations, uncontrolled 9/27/2013    History of colonic polyps 10/5/2015    HTN (hypertension) 10/18/2012    Hyperlipidemia 10/18/2012    Lateral epicondylitis 6/18/2013    Long-term insulin use 3/4/2016    Low back pain 1/29/2013    Microalbuminuria 3/30/2015    Nuclear sclerosis of both eyes 2/14/2020    Open-angle glaucoma     Primary open angle glaucoma (POAG) of both eyes, moderate stage 2/14/2020    Uncontrolled type 2 diabetes mellitus with proteinuria or albuminuria 3/30/2015    Uncontrolled type 2 diabetes mellitus with proteinuria or microalbuminuria 3/30/2015       PAST SURGICAL HISTORY:  Past Surgical History:   Procedure Laterality Date    COLONOSCOPY N/A 9/1/2017    Procedure: COLONOSCOPY;  Surgeon: Gopal Ny MD;  Location: [facility] ENDO;  Service: Endoscopy;  Laterality: N/A;    COLONOSCOPY N/A 3/26/2018    Procedure: COLONOSCOPY;  Surgeon: Jamar Batista MD;  Location: [facility] ENDO ([address] [place] [address]);  Service: Endoscopy;  Laterality: N/A;       SOCIAL HISTORY:  Social History     Socioeconomic History     Marital status:    Tobacco Use    Smoking status: Never     Passive exposure: Never    Smokeless tobacco: Never   Substance and Sexual Activity    Alcohol use: Yes     Alcohol/week: 4.0 - 5.0 standard drinks     Types: 3 - 4 Cans of beer, 1 Standard drinks or equivalent per week    Drug use: Not Currently    Sexual activity: Yes     Partners: Female       FAMILY HISTORY:  Family History   Problem Relation Age of Onset    Diabetes Mother     Cancer Mother     Cancer Father     Diabetes Father     Cataracts Sister     Diabetes Sister     Diabetes Sister     Cancer Brother     No Known Problems Brother     No Known Problems Maternal Aunt     No Known Problems Maternal Uncle     No Known Problems Paternal Aunt     No Known Problems Paternal Uncle     No Known Problems Maternal Grandmother     No Known Problems Maternal Grandfather     No Known Problems Paternal Grandmother     Diabetes Paternal Grandfather     Amblyopia Neg Hx     Blindness Neg Hx     Glaucoma Neg Hx     Hypertension Neg Hx     Macular degeneration Neg Hx     Retinal detachment Neg Hx     Strabismus Neg Hx     Stroke Neg Hx     Thyroid disease Neg Hx        ALLERGIES AND MEDICATIONS: updated and reviewed.  Review of patient's allergies indicates:  No Known Allergies  Current Outpatient Medications   Medication Sig Dispense Refill    atorvastatin (LIPITOR) 20 MG tablet TAKE 1 TABLET BY MOUTH EVERY DAY 90 tablet 0    blood sugar diagnostic Strp To check blood glucose three times daily, to use with insurance preferred meter 100 each 3    blood-glucose meter kit To check blood glucose three times daily, to use with insurance preferred meter 1 each 0    diltiaZEM (CARDIZEM CD) 240 MG 24 hr capsule Take 1 capsule (240 mg total) by mouth once daily. 90 capsule 3    hydroCHLOROthiazide (HYDRODIURIL) 12.5 MG Tab TAKE 1 TABLET BY MOUTH EVERY DAY 90 tablet 1    insulin glargine, TOUJEO, (TOUJEO SOLOSTAR U-300 INSULIN) 300 unit/mL (1.5 mL) InPn pen Inject 24  "units once daily 3 pen 5    lancets Misc To check blood glucose three times daily, to use with insurance preferred meter 100 each 3    latanoprost 0.005 % ophthalmic solution Place 1 drop into both eyes every evening. 7.5 mL 2    metFORMIN (GLUCOPHAGE) 1000 MG tablet Take 1 tablet (1,000 mg total) by mouth 2 (two) times daily. 180 tablet 3    pen needle, diabetic (BD ULTRA-FINE FORREST PEN NEEDLE) 32 gauge x 5/32" Ndle USE 1X/ each 3    quinapriL (ACCUPRIL) 40 MG tablet TAKE 1 TABLET BY MOUTH TWICE A  tablet 12    semaglutide (OZEMPIC) 1 mg/dose (4 mg/3 mL) INJECT 1 MG INTO THE SKIN EVERY 7 DAYS. 1 each 1    gabapentin (NEURONTIN) 100 MG capsule Take 1 capsule (100 mg total) by mouth 2 (two) times daily as needed (pain). 60 capsule 2     Current Facility-Administered Medications   Medication Dose Route Frequency Provider Last Rate Last Admin    LIDOcaine 2%/EPINEPHrine 1:100,000 injection 2 mL  2 mL Intradermal 1 time in Clinic/HOD Andrew Busch MD             ROS  Review of Systems   Constitutional:  Negative for activity change and unexpected weight change.   Musculoskeletal:  Positive for arthralgias and back pain.         Physical Exam  Vitals:    06/09/23 1054 06/09/23 1114   BP: (!) 144/76 (!) 151/72   BP Location: Right arm    Patient Position: Sitting    BP Method: Medium (Manual)    Pulse: 78    Temp: 98.3 °F (36.8 °C)    TempSrc: Oral    SpO2: 98%    Weight: 102.7 kg (226 lb 8.4 oz)    Height: 6' 2" (1.88 m)     Body mass index is 29.08 kg/m².  Weight: 102.7 kg (226 lb 8.4 oz)   Height: 6' 2" (188 cm)   Physical Exam  Constitutional:       General: He is not in acute distress.     Appearance: Normal appearance.   HENT:      Head: Normocephalic and atraumatic.   Neck:      Thyroid: No thyromegaly.      Vascular: No carotid bruit.   Cardiovascular:      Rate and Rhythm: Normal rate and regular rhythm.      Pulses: Normal pulses.      Heart sounds: Normal heart sounds.   Pulmonary:      " Effort: Pulmonary effort is normal. No respiratory distress.      Breath sounds: Normal breath sounds.   Musculoskeletal:      Cervical back: Neck supple.      Right lower leg: No edema.      Left lower leg: No edema.   Feet:      Comments: Protective Sensation (w/ 10 gram monofilament):  Right: Intact  Left: Intact    Visual Inspection:  Fungal disease in the b/l great toenails. Toenails are long.    Pedal Pulses:   Right: Present  Left: Present    Posterior Tibialis Pulses:   Right:Present  Left: Present      Lymphadenopathy:      Cervical: No cervical adenopathy.   Skin:     General: Skin is warm and dry.      Findings: No rash.   Neurological:      General: No focal deficit present.      Mental Status: He is alert and oriented to person, place, and time.   Psychiatric:         Mood and Affect: Mood normal.         Behavior: Behavior normal.         Thought Content: Thought content normal.

## 2023-06-11 ENCOUNTER — PATIENT MESSAGE (OUTPATIENT)
Dept: FAMILY MEDICINE | Facility: CLINIC | Age: 68
End: 2023-06-11
Payer: MEDICARE

## 2023-06-11 DIAGNOSIS — E11.29 CONTROLLED TYPE 2 DIABETES MELLITUS WITH MICROALBUMINURIA, UNSPECIFIED WHETHER LONG TERM INSULIN USE: Primary | ICD-10-CM

## 2023-06-11 DIAGNOSIS — R80.9 CONTROLLED TYPE 2 DIABETES MELLITUS WITH MICROALBUMINURIA, UNSPECIFIED WHETHER LONG TERM INSULIN USE: Primary | ICD-10-CM

## 2023-06-12 NOTE — TELEPHONE ENCOUNTER
Patient was informed of result(s) via MyChart.  Please schedule fasting labs and urine before f/u in Dec.

## 2023-06-16 ENCOUNTER — OFFICE VISIT (OUTPATIENT)
Dept: ENDOCRINOLOGY | Facility: CLINIC | Age: 68
End: 2023-06-16
Payer: MEDICARE

## 2023-06-16 VITALS
SYSTOLIC BLOOD PRESSURE: 157 MMHG | DIASTOLIC BLOOD PRESSURE: 86 MMHG | WEIGHT: 227 LBS | TEMPERATURE: 98 F | BODY MASS INDEX: 29.15 KG/M2 | HEART RATE: 79 BPM

## 2023-06-16 DIAGNOSIS — I10 ESSENTIAL HYPERTENSION: ICD-10-CM

## 2023-06-16 DIAGNOSIS — E78.5 HYPERLIPIDEMIA, UNSPECIFIED HYPERLIPIDEMIA TYPE: ICD-10-CM

## 2023-06-16 DIAGNOSIS — E11.9 CONTROLLED TYPE 2 DIABETES MELLITUS WITHOUT COMPLICATION, WITHOUT LONG-TERM CURRENT USE OF INSULIN: Primary | ICD-10-CM

## 2023-06-16 PROCEDURE — 3060F PR POS MICROALBUMINURIA RESULT DOCUMENTED/REVIEW: ICD-10-PCS | Mod: CPTII,S$GLB,, | Performed by: NURSE PRACTITIONER

## 2023-06-16 PROCEDURE — 3079F DIAST BP 80-89 MM HG: CPT | Mod: CPTII,S$GLB,, | Performed by: NURSE PRACTITIONER

## 2023-06-16 PROCEDURE — 99999 PR PBB SHADOW E&M-EST. PATIENT-LVL III: ICD-10-PCS | Mod: PBBFAC,,, | Performed by: NURSE PRACTITIONER

## 2023-06-16 PROCEDURE — 3066F PR DOCUMENTATION OF TREATMENT FOR NEPHROPATHY: ICD-10-PCS | Mod: CPTII,S$GLB,, | Performed by: NURSE PRACTITIONER

## 2023-06-16 PROCEDURE — 3072F LOW RISK FOR RETINOPATHY: CPT | Mod: CPTII,S$GLB,, | Performed by: NURSE PRACTITIONER

## 2023-06-16 PROCEDURE — 1160F RVW MEDS BY RX/DR IN RCRD: CPT | Mod: CPTII,S$GLB,, | Performed by: NURSE PRACTITIONER

## 2023-06-16 PROCEDURE — 3066F NEPHROPATHY DOC TX: CPT | Mod: CPTII,S$GLB,, | Performed by: NURSE PRACTITIONER

## 2023-06-16 PROCEDURE — 3044F HG A1C LEVEL LT 7.0%: CPT | Mod: CPTII,S$GLB,, | Performed by: NURSE PRACTITIONER

## 2023-06-16 PROCEDURE — 99214 PR OFFICE/OUTPT VISIT, EST, LEVL IV, 30-39 MIN: ICD-10-PCS | Mod: S$GLB,,, | Performed by: NURSE PRACTITIONER

## 2023-06-16 PROCEDURE — 1159F PR MEDICATION LIST DOCUMENTED IN MEDICAL RECORD: ICD-10-PCS | Mod: CPTII,S$GLB,, | Performed by: NURSE PRACTITIONER

## 2023-06-16 PROCEDURE — 3077F SYST BP >= 140 MM HG: CPT | Mod: CPTII,S$GLB,, | Performed by: NURSE PRACTITIONER

## 2023-06-16 PROCEDURE — 3008F PR BODY MASS INDEX (BMI) DOCUMENTED: ICD-10-PCS | Mod: CPTII,S$GLB,, | Performed by: NURSE PRACTITIONER

## 2023-06-16 PROCEDURE — 4010F PR ACE/ARB THEARPY RXD/TAKEN: ICD-10-PCS | Mod: CPTII,S$GLB,, | Performed by: NURSE PRACTITIONER

## 2023-06-16 PROCEDURE — 99999 PR PBB SHADOW E&M-EST. PATIENT-LVL III: CPT | Mod: PBBFAC,,, | Performed by: NURSE PRACTITIONER

## 2023-06-16 PROCEDURE — 1160F PR REVIEW ALL MEDS BY PRESCRIBER/CLIN PHARMACIST DOCUMENTED: ICD-10-PCS | Mod: CPTII,S$GLB,, | Performed by: NURSE PRACTITIONER

## 2023-06-16 PROCEDURE — 3008F BODY MASS INDEX DOCD: CPT | Mod: CPTII,S$GLB,, | Performed by: NURSE PRACTITIONER

## 2023-06-16 PROCEDURE — 1159F MED LIST DOCD IN RCRD: CPT | Mod: CPTII,S$GLB,, | Performed by: NURSE PRACTITIONER

## 2023-06-16 PROCEDURE — 3079F PR MOST RECENT DIASTOLIC BLOOD PRESSURE 80-89 MM HG: ICD-10-PCS | Mod: CPTII,S$GLB,, | Performed by: NURSE PRACTITIONER

## 2023-06-16 PROCEDURE — 99214 OFFICE O/P EST MOD 30 MIN: CPT | Mod: S$GLB,,, | Performed by: NURSE PRACTITIONER

## 2023-06-16 PROCEDURE — 3060F POS MICROALBUMINURIA REV: CPT | Mod: CPTII,S$GLB,, | Performed by: NURSE PRACTITIONER

## 2023-06-16 PROCEDURE — 4010F ACE/ARB THERAPY RXD/TAKEN: CPT | Mod: CPTII,S$GLB,, | Performed by: NURSE PRACTITIONER

## 2023-06-16 PROCEDURE — 3044F PR MOST RECENT HEMOGLOBIN A1C LEVEL <7.0%: ICD-10-PCS | Mod: CPTII,S$GLB,, | Performed by: NURSE PRACTITIONER

## 2023-06-16 PROCEDURE — 3072F PR LOW RISK FOR RETINOPATHY: ICD-10-PCS | Mod: CPTII,S$GLB,, | Performed by: NURSE PRACTITIONER

## 2023-06-16 PROCEDURE — 3077F PR MOST RECENT SYSTOLIC BLOOD PRESSURE >= 140 MM HG: ICD-10-PCS | Mod: CPTII,S$GLB,, | Performed by: NURSE PRACTITIONER

## 2023-06-16 RX ORDER — INSULIN GLARGINE 300 U/ML
INJECTION, SOLUTION SUBCUTANEOUS
Qty: 3 PEN | Refills: 5 | Status: SHIPPED | OUTPATIENT
Start: 2023-06-16 | End: 2023-10-20 | Stop reason: SDUPTHER

## 2023-06-16 RX ORDER — METFORMIN HYDROCHLORIDE 1000 MG/1
1000 TABLET ORAL 2 TIMES DAILY
Qty: 180 TABLET | Refills: 3 | Status: SHIPPED | OUTPATIENT
Start: 2023-06-16 | End: 2024-02-23 | Stop reason: SDUPTHER

## 2023-06-16 RX ORDER — SEMAGLUTIDE 1.34 MG/ML
1 INJECTION, SOLUTION SUBCUTANEOUS
Qty: 1 EACH | Refills: 5 | Status: SHIPPED | OUTPATIENT
Start: 2023-06-16 | End: 2023-10-20 | Stop reason: SDUPTHER

## 2023-06-16 NOTE — PATIENT INSTRUCTIONS
Continue current therapy.   If glucoses continue to drop less than 80 or you continue to have lows overnight, then reduce insulin from 24 to 20 units once daily.   Test glucose 2x/day.   Return to clinic in 4 months with labs prior.  - in-person visit

## 2023-06-16 NOTE — PROGRESS NOTES
CC: This 68 y.o. Black or  male  is here for evaluation of  T2DM along with comorbidities indicated in the Visit Diagnosis section of this encounter.    HPI: Gerson Phillips III was diagnosed with T2DM  > 20 years ago.   Previously followed by Dr. Hoskins with lov in (9/2017).       Prior visit 8/2023  a1c down from 7.4% to now 7.1%.   Pt reports cost of Ozempic is $400. Does not think he'd qualify for financial assistance. He is ok continuing it.   He adjusts Toujeo from 20-25 units once daily depending on BG but takes mostly 23-24   Reports BPs at home 120-130s/80-90s.   Plan inject  Toujeo to 24  units once daily - do not adjust Toujeo on a nightly basis since it's a long acting insulin. Better to adjust based on overall glucose trends.   Reviewed how to rotate insulin injection sites.   Continue metformin and Ozempic.   A1c in 3 months.   Return to clinic in 6 months with a1c  prior.     Interval hx  A1c is down from 7.2% in Nov to 6.9% in Feb, now at 6.5%.   Pt reports cost of Ozempic is $287.   + 6 lb weight loss since lov.     LAST DIABETES EDUCATION: years ago     DIABETES MEDICATIONS:   metformin 1000 mg bid with food  Toujeo 24 units daily   Ozempic 1 mg once weekly     Misses medication doses - no     Rotates injection sites - injects insulin to LLQ only   Changes pen needles - yes     DM COMPLICATIONS: nephropathy and impotence    SIGNIFICANT DIABETES MED HISTORY:   Unable to afford Victoza   Vgo stopped d/t ineffectiveness in 7/2018 and MDI (basalgar and novolog)  started   Novolog stopped and Ozempic started 12/2019.       SELF MONITORING BLOOD GLUCOSE: Checks blood glucose at home - 2x/day, fasting and predinner.  Forgot log.        FBGs high 80s to 160s, higher depending on food. Averages 90-130s  Before dinner - about the same.       HYPOGLYCEMIC EPISODES: woke up overnight last week with symptoms, didn't test BG. Corrected with soda.       CURRENT DIET: drinks Coke Zero and water.  Eats 3 meals/day. No snacks. Brings lunch to work or eats out.     CURRENT EXERCISE: none     OCCUPATION:  Long distance       BP (!) 157/86   Pulse 79   Temp 98.2 °F (36.8 °C)   Wt 103 kg (227 lb)   BMI 29.15 kg/m²       ROS:   CONSTITUTIONAL: Appetite ok, no fatigue  GI: denies constipation, diarrhea, nausea       PHYSICAL EXAM:  GENERAL: Well developed, well nourished. No acute distress.   PSYCH: AAOx3, appropriate mood and affect, conversant, well-groomed. Judgement and insight good.   NEURO: Cranial nerves grossly intact. Speech clear, no tremor.   CHEST: Respirations even and unlabored.         Hemoglobin A1C   Date Value Ref Range Status   06/09/2023 6.5 (H) 4.0 - 5.6 % Final     Comment:     ADA Screening Guidelines:  5.7-6.4%  Consistent with prediabetes  >or=6.5%  Consistent with diabetes    High levels of fetal hemoglobin interfere with the HbA1C  assay. Heterozygous hemoglobin variants (HbS, HgC, etc)do  not significantly interfere with this assay.   However, presence of multiple variants may affect accuracy.     02/03/2023 6.9 (H) 4.0 - 5.6 % Final     Comment:     ADA Screening Guidelines:  5.7-6.4%  Consistent with prediabetes  >or=6.5%  Consistent with diabetes    High levels of fetal hemoglobin interfere with the HbA1C  assay. Heterozygous hemoglobin variants (HbS, HgC, etc)do  not significantly interfere with this assay.   However, presence of multiple variants may affect accuracy.     11/04/2022 7.2 (H) 4.0 - 5.6 % Final     Comment:     ADA Screening Guidelines:  5.7-6.4%  Consistent with prediabetes  >or=6.5%  Consistent with diabetes    High levels of fetal hemoglobin interfere with the HbA1C  assay. Heterozygous hemoglobin variants (HbS, HgC, etc)do  not significantly interfere with this assay.   However, presence of multiple variants may affect accuracy.             Chemistry        Component Value Date/Time     06/09/2023 1157    K 4.1 06/09/2023 1157    CL 98  06/09/2023 1157    CO2 27 06/09/2023 1157    BUN 11 06/09/2023 1157    CREATININE 1.1 06/09/2023 1157    GLU 97 06/09/2023 1157        Component Value Date/Time    CALCIUM 9.8 06/09/2023 1157    ALKPHOS 77 06/09/2023 1157    AST 13 06/09/2023 1157    ALT 16 06/09/2023 1157    BILITOT 0.5 06/09/2023 1157    ESTGFRAFRICA >60.0 04/22/2022 0722    EGFRNONAA >60.0 04/22/2022 0722           Latest Reference Range & Units 06/09/23 11:57   BUN 8 - 23 mg/dL 11   Creatinine 0.5 - 1.4 mg/dL 1.1   eGFR >60 mL/min/1.73 m^2 >60.0       Lab Results   Component Value Date    LDLCALC 48.2 (L) 06/09/2023    LDLCALC 48.2 (L) 06/09/2023      Latest Reference Range & Units 06/09/23 11:57   Cholesterol 120 - 199 mg/dL  120 - 199 mg/dL 107 (L)  107 (L)   HDL 40 - 75 mg/dL  40 - 75 mg/dL 50  50   HDL/Cholesterol Ratio 20.0 - 50.0 %  20.0 - 50.0 % 46.7  46.7   LDL Cholesterol External 63.0 - 159.0 mg/dL  63.0 - 159.0 mg/dL 48.2 (L)  48.2 (L)   Non-HDL Cholesterol mg/dL  mg/dL 57  57   Total Cholesterol/HDL Ratio 2.0 - 5.0   2.0 - 5.0  2.1  2.1   Triglycerides 30 - 150 mg/dL  30 - 150 mg/dL 44  44   (L): Data is abnormally low    Lab Results   Component Value Date    MICALBCREAT 162.8 (H) 06/09/2023               ASSESSMENT and PLAN:    A1C GOAL: < 7%    1. Controlled type 2 diabetes mellitus without complication, without long-term current use of insulin  Continue current therapy.   If glucoses continue to drop less than 80 or you continue to have lows overnight, then reduce insulin from 24 to 20 units once daily.   Test glucose 2x/day.   Return to clinic in 4 months with labs prior.  - in-person visit       insulin glargine, TOUJEO, (TOUJEO SOLOSTAR U-300 INSULIN) 300 unit/mL (1.5 mL) InPn pen    metFORMIN (GLUCOPHAGE) 1000 MG tablet    semaglutide (OZEMPIC) 1 mg/dose (4 mg/3 mL)    Hemoglobin A1C      2. Essential hypertension  Continue f/u with Primary.       3. Hyperlipidemia, unspecified hyperlipidemia type  Continue atorvastatin          Orders Placed This Encounter   Procedures    Hemoglobin A1C     Standing Status:   Future     Standing Expiration Date:   8/14/2024        Follow up in about 4 months (around 10/16/2023).

## 2023-06-23 ENCOUNTER — CLINICAL SUPPORT (OUTPATIENT)
Dept: FAMILY MEDICINE | Facility: CLINIC | Age: 68
End: 2023-06-23
Payer: MEDICARE

## 2023-06-23 VITALS — SYSTOLIC BLOOD PRESSURE: 132 MMHG | DIASTOLIC BLOOD PRESSURE: 62 MMHG | OXYGEN SATURATION: 97 % | HEART RATE: 65 BPM

## 2023-06-23 DIAGNOSIS — I10 ESSENTIAL HYPERTENSION: Primary | ICD-10-CM

## 2023-06-23 PROCEDURE — 99499 NO LOS: ICD-10-PCS | Mod: S$GLB,,, | Performed by: FAMILY MEDICINE

## 2023-06-23 PROCEDURE — 99999 PR PBB SHADOW E&M-EST. PATIENT-LVL I: CPT | Mod: PBBFAC,,,

## 2023-06-23 PROCEDURE — 99499 UNLISTED E&M SERVICE: CPT | Mod: S$GLB,,, | Performed by: FAMILY MEDICINE

## 2023-06-23 PROCEDURE — 99999 PR PBB SHADOW E&M-EST. PATIENT-LVL I: ICD-10-PCS | Mod: PBBFAC,,,

## 2023-06-23 NOTE — PROGRESS NOTES
"Gerson Phillips III 68 y.o. male is here today for Blood Pressure check.   History of HTN yes.    Review of patient's allergies indicates:  No Known Allergies  Creatinine   Date Value Ref Range Status   06/09/2023 1.1 0.5 - 1.4 mg/dL Final     Sodium   Date Value Ref Range Status   06/09/2023 136 136 - 145 mmol/L Final     Potassium   Date Value Ref Range Status   06/09/2023 4.1 3.5 - 5.1 mmol/L Final   ]  Patient verifies taking blood pressure medications on a regular basis at the same time of the day.     Current Outpatient Medications:     atorvastatin (LIPITOR) 20 MG tablet, TAKE 1 TABLET BY MOUTH EVERY DAY, Disp: 90 tablet, Rfl: 0    blood sugar diagnostic Strp, To check blood glucose three times daily, to use with insurance preferred meter, Disp: 100 each, Rfl: 3    blood-glucose meter kit, To check blood glucose three times daily, to use with insurance preferred meter, Disp: 1 each, Rfl: 0    diltiaZEM (CARDIZEM CD) 240 MG 24 hr capsule, Take 1 capsule (240 mg total) by mouth once daily., Disp: 90 capsule, Rfl: 3    gabapentin (NEURONTIN) 100 MG capsule, Take 1 capsule (100 mg total) by mouth 2 (two) times daily as needed (pain)., Disp: 60 capsule, Rfl: 2    hydroCHLOROthiazide (HYDRODIURIL) 12.5 MG Tab, TAKE 1 TABLET BY MOUTH EVERY DAY, Disp: 90 tablet, Rfl: 1    insulin glargine, TOUJEO, (TOUJEO SOLOSTAR U-300 INSULIN) 300 unit/mL (1.5 mL) InPn pen, Inject 24 units once daily, Disp: 3 pen, Rfl: 5    lancets Misc, To check blood glucose three times daily, to use with insurance preferred meter, Disp: 100 each, Rfl: 3    latanoprost 0.005 % ophthalmic solution, Place 1 drop into both eyes every evening., Disp: 7.5 mL, Rfl: 2    metFORMIN (GLUCOPHAGE) 1000 MG tablet, Take 1 tablet (1,000 mg total) by mouth 2 (two) times daily., Disp: 180 tablet, Rfl: 3    pen needle, diabetic (BD ULTRA-FINE FORREST PEN NEEDLE) 32 gauge x 5/32" Ndle, USE 1X/DAY, Disp: 100 each, Rfl: 3    quinapriL (ACCUPRIL) 40 MG tablet, TAKE 1 " TABLET BY MOUTH TWICE A DAY, Disp: 180 tablet, Rfl: 12    semaglutide (OZEMPIC) 1 mg/dose (4 mg/3 mL), Inject 1 mg into the skin every 7 days., Disp: 1 each, Rfl: 5    Current Facility-Administered Medications:     LIDOcaine 2%/EPINEPHrine 1:100,000 injection 2 mL, 2 mL, Intradermal, 1 time in Clinic/HOD, Andrew Busch MD  Does patient have record of home blood pressure readings yes. Readings have been averaging 6/19///87.   Last dose of blood pressure medication was taken at 0700 this am.  Patient is asymptomatic.   Complains of N/A.    BP: 132/62 , Pulse: 65 .      Dr. Chester notified.

## 2023-07-10 DIAGNOSIS — I10 ESSENTIAL HYPERTENSION: Primary | ICD-10-CM

## 2023-07-10 RX ORDER — AMOXICILLIN 500 MG/1
500 CAPSULE ORAL EVERY 6 HOURS
COMMUNITY
Start: 2023-06-16

## 2023-07-10 RX ORDER — QUINAPRIL 40 MG/1
40 TABLET ORAL 2 TIMES DAILY
Qty: 180 TABLET | Refills: 12 | Status: CANCELLED | OUTPATIENT
Start: 2023-07-10

## 2023-07-10 RX ORDER — LISINOPRIL 40 MG/1
40 TABLET ORAL DAILY
Qty: 30 TABLET | Refills: 11 | Status: SHIPPED | OUTPATIENT
Start: 2023-07-10 | End: 2024-07-09

## 2023-07-10 NOTE — TELEPHONE ENCOUNTER
No care due was identified.  [facility] Embedded Care Due Messages. Reference number: [id number].   7/10/2023 11:48:49 AM CDT

## 2023-08-05 DIAGNOSIS — I10 ESSENTIAL HYPERTENSION: ICD-10-CM

## 2023-08-05 NOTE — TELEPHONE ENCOUNTER
No care due was identified.  [facility] Embedded Care Due Messages. Reference number: [id number].   8/05/2023 5:45:14 PM CDT

## 2023-08-07 RX ORDER — HYDROCHLOROTHIAZIDE 12.5 MG/1
TABLET ORAL
Qty: 90 TABLET | Refills: 3 | Status: SHIPPED | OUTPATIENT
Start: 2023-08-07

## 2023-08-07 NOTE — TELEPHONE ENCOUNTER
Refill Routing Note   Medication(s) are not appropriate for processing by [facility] for the following reason(s):      No active prescription written by provider    [facility] action(s):  Defer Care Due:  None identified            Appointments  past 12m or future 3m with PCP    Date Provider   Last Visit   6/9/2023 Alisa Chester, DO   Next Visit   12/15/2023 Alisa Chester, DO   ED visits in past 90 days: 0        Note composed:12:38 PM 08/07/2023

## 2023-08-14 DIAGNOSIS — M51.36 DDD (DEGENERATIVE DISC DISEASE), LUMBAR: ICD-10-CM

## 2023-08-14 RX ORDER — GABAPENTIN 100 MG/1
100 CAPSULE ORAL 2 TIMES DAILY PRN
Qty: 60 CAPSULE | Refills: 2 | Status: SHIPPED | OUTPATIENT
Start: 2023-08-14 | End: 2023-08-18 | Stop reason: SDUPTHER

## 2023-08-14 NOTE — TELEPHONE ENCOUNTER
No care due was identified.  [facility] Embedded Care Due Messages. Reference number: [id number].   8/14/2023 12:56:14 PM CDT

## 2023-08-18 ENCOUNTER — TELEPHONE (OUTPATIENT)
Dept: FAMILY MEDICINE | Facility: CLINIC | Age: 68
End: 2023-08-18
Payer: MEDICARE

## 2023-08-18 DIAGNOSIS — M51.36 DDD (DEGENERATIVE DISC DISEASE), LUMBAR: ICD-10-CM

## 2023-08-18 RX ORDER — GABAPENTIN 300 MG/1
300 CAPSULE ORAL 2 TIMES DAILY PRN
Qty: 60 CAPSULE | Refills: 1 | Status: SHIPPED | OUTPATIENT
Start: 2023-08-18 | End: 2023-11-10

## 2023-08-18 NOTE — TELEPHONE ENCOUNTER
----- Message from Emy Brady sent at 8/18/2023  2:50 PM CDT -----  Regarding: patient call back  Type: Patient Call Back    Who called: Self     What is the request in detail: Asked for a call back from the nurse about his back pain.     Can the clinic reply by [facility]? No     Would the patient rather a call back or a response via [facility]? Call     Best call back number: .[phone]

## 2023-08-18 NOTE — TELEPHONE ENCOUNTER
We can increase his Neurontin to a higher dose - sent 300 mg to his pharmacy. My understanding is that he had a Pain Management specialist that he saw but I can put in a referral to a new one if he is interested.

## 2023-08-18 NOTE — TELEPHONE ENCOUNTER
Patient said that he was placed on Neurontin on 6/9/2023 and it is not helping with the patient. He wanted to know if there is someone [place] that can accommodate his work schedule.

## 2023-08-21 ENCOUNTER — TELEPHONE (OUTPATIENT)
Dept: FAMILY MEDICINE | Facility: CLINIC | Age: 68
End: 2023-08-21
Payer: MEDICARE

## 2023-08-21 NOTE — TELEPHONE ENCOUNTER
"Spoke to patient in reference to patient being referred for a new referral to pain department. Patient stated "someone has already called him".  "

## 2023-08-25 ENCOUNTER — OFFICE VISIT (OUTPATIENT)
Dept: PAIN MEDICINE | Facility: CLINIC | Age: 68
End: 2023-08-25
Payer: MEDICARE

## 2023-08-25 VITALS — OXYGEN SATURATION: 98 % | HEART RATE: 78 BPM | SYSTOLIC BLOOD PRESSURE: 141 MMHG | DIASTOLIC BLOOD PRESSURE: 81 MMHG

## 2023-08-25 DIAGNOSIS — M51.36 DDD (DEGENERATIVE DISC DISEASE), LUMBAR: ICD-10-CM

## 2023-08-25 DIAGNOSIS — M54.9 DORSALGIA, UNSPECIFIED: ICD-10-CM

## 2023-08-25 DIAGNOSIS — M47.816 LUMBAR SPONDYLOSIS: Primary | ICD-10-CM

## 2023-08-25 PROCEDURE — 1160F PR REVIEW ALL MEDS BY PRESCRIBER/CLIN PHARMACIST DOCUMENTED: ICD-10-PCS | Mod: CPTII,S$GLB,,

## 2023-08-25 PROCEDURE — 3079F DIAST BP 80-89 MM HG: CPT | Mod: CPTII,S$GLB,,

## 2023-08-25 PROCEDURE — 3044F PR MOST RECENT HEMOGLOBIN A1C LEVEL <7.0%: ICD-10-PCS | Mod: CPTII,S$GLB,,

## 2023-08-25 PROCEDURE — 3072F PR LOW RISK FOR RETINOPATHY: ICD-10-PCS | Mod: CPTII,S$GLB,,

## 2023-08-25 PROCEDURE — 1101F PR PT FALLS ASSESS DOC 0-1 FALLS W/OUT INJ PAST YR: ICD-10-PCS | Mod: CPTII,S$GLB,,

## 2023-08-25 PROCEDURE — 99214 PR OFFICE/OUTPT VISIT, EST, LEVL IV, 30-39 MIN: ICD-10-PCS | Mod: S$GLB,,,

## 2023-08-25 PROCEDURE — 4010F ACE/ARB THERAPY RXD/TAKEN: CPT | Mod: CPTII,S$GLB,,

## 2023-08-25 PROCEDURE — 3060F POS MICROALBUMINURIA REV: CPT | Mod: CPTII,S$GLB,,

## 2023-08-25 PROCEDURE — 1160F RVW MEDS BY RX/DR IN RCRD: CPT | Mod: CPTII,S$GLB,,

## 2023-08-25 PROCEDURE — 3288F PR FALLS RISK ASSESSMENT DOCUMENTED: ICD-10-PCS | Mod: CPTII,S$GLB,,

## 2023-08-25 PROCEDURE — 1159F MED LIST DOCD IN RCRD: CPT | Mod: CPTII,S$GLB,,

## 2023-08-25 PROCEDURE — 1125F PR PAIN SEVERITY QUANTIFIED, PAIN PRESENT: ICD-10-PCS | Mod: CPTII,S$GLB,,

## 2023-08-25 PROCEDURE — 1125F AMNT PAIN NOTED PAIN PRSNT: CPT | Mod: CPTII,S$GLB,,

## 2023-08-25 PROCEDURE — 99999 PR PBB SHADOW E&M-EST. PATIENT-LVL IV: ICD-10-PCS | Mod: PBBFAC,,,

## 2023-08-25 PROCEDURE — 1159F PR MEDICATION LIST DOCUMENTED IN MEDICAL RECORD: ICD-10-PCS | Mod: CPTII,S$GLB,,

## 2023-08-25 PROCEDURE — 3060F PR POS MICROALBUMINURIA RESULT DOCUMENTED/REVIEW: ICD-10-PCS | Mod: CPTII,S$GLB,,

## 2023-08-25 PROCEDURE — 3066F NEPHROPATHY DOC TX: CPT | Mod: CPTII,S$GLB,,

## 2023-08-25 PROCEDURE — 3288F FALL RISK ASSESSMENT DOCD: CPT | Mod: CPTII,S$GLB,,

## 2023-08-25 PROCEDURE — 3077F SYST BP >= 140 MM HG: CPT | Mod: CPTII,S$GLB,,

## 2023-08-25 PROCEDURE — 99214 OFFICE O/P EST MOD 30 MIN: CPT | Mod: S$GLB,,,

## 2023-08-25 PROCEDURE — 3077F PR MOST RECENT SYSTOLIC BLOOD PRESSURE >= 140 MM HG: ICD-10-PCS | Mod: CPTII,S$GLB,,

## 2023-08-25 PROCEDURE — 3072F LOW RISK FOR RETINOPATHY: CPT | Mod: CPTII,S$GLB,,

## 2023-08-25 PROCEDURE — 1101F PT FALLS ASSESS-DOCD LE1/YR: CPT | Mod: CPTII,S$GLB,,

## 2023-08-25 PROCEDURE — 3066F PR DOCUMENTATION OF TREATMENT FOR NEPHROPATHY: ICD-10-PCS | Mod: CPTII,S$GLB,,

## 2023-08-25 PROCEDURE — 3079F PR MOST RECENT DIASTOLIC BLOOD PRESSURE 80-89 MM HG: ICD-10-PCS | Mod: CPTII,S$GLB,,

## 2023-08-25 PROCEDURE — 4010F PR ACE/ARB THEARPY RXD/TAKEN: ICD-10-PCS | Mod: CPTII,S$GLB,,

## 2023-08-25 PROCEDURE — 99999 PR PBB SHADOW E&M-EST. PATIENT-LVL IV: CPT | Mod: PBBFAC,,,

## 2023-08-25 PROCEDURE — 3044F HG A1C LEVEL LT 7.0%: CPT | Mod: CPTII,S$GLB,,

## 2023-08-25 NOTE — PROGRESS NOTES
Subjective:     Patient ID: Gerson Phillips III is a 68 y.o. male    Chief Complaint: Low-back Pain and Hip Pain      Referred by: Alisa Chester DO      HPI:    Interval History PA (08/25/2023):  Patient returns to clinic for follow up.  Patient notes worsening of chronic bilateral lower back pain over the past few months.  States issue has been present for over 20+ years.  Previously well managed with conservative measures and medications.  Notes pain is located across his lower lumbar paraspinal region, also with midline lower lumbar pain.  Occasionally pain radiating into his right lateral hip otherwise denies any radiation into his lower extremities.  Denies any numbness, tingling, weakness, bowel or bladder dysfunction.  States pain is constant and achy.  Worsened with certain activities.  Also worse in the morning when getting out of bed, associated with stiffness.  Recently has followed up with his primary care physician who increase his gabapentin dosage.  Notes that he plans on picking this prescription to the pharmacy later today.    Interval History (1/13/22):  He returns today for follow up.  He reports that diclofenac prescribed by his primary care physician has been very helpful for the bilateral low back pain.  He states that he still has some pain but the diclofenac controlled very well.  He was scheduled to start physical therapy, but his visit was canceled and he was never contacted to reschedule.  Otherwise he denies any changes in the quality or location was pain.  He denies any new worsening symptoms.      Initial Encounter (10/21/21):  Gerson Phillips III is a 68 y.o. male who presents today with chronic bilateral low back pain.  This pain has been present for over 20 years.  No specific inciting events or injuries noted.  The pain is located across the lower lumbar spine.  Pain does not radiate to lower extremities.  Patient does report some bilateral groin pain when ambulating or standing for  prolonged periods of time.  He describes his pain as burning but is unsure if it was related to his back.  He denies any associated lower extremity numbness, tingling, weakness, bowel bladder dysfunction..   This pain is described in detail below.    Physical Therapy:  Not recently.    Non-pharmacologic Treatment:  Rest helps         TENS?  No    Pain Medications:         Currently taking:  Diclofenac, gabapentin    Has tried in the past:  NSAIDs, Tylenol    Has not tried:  Opioids, Muscle relaxants, TCAs, SNRIs, topical creams    Blood thinners:  None    Interventional Therapies:   7/13/13 - right L4 and L5 transforaminal epidural steroid injection - no significant relief noted    Relevant Surgeries:  None    Affecting sleep?  Yes    Affecting daily activities? yes    Depressive symptoms? no          SI/HI? No    Work status: Retired    Pain Scores:    Best:       3/10  Worst:     9/10  Usually:   3/10  Today:    3/10    Pain Disability Index  Family/Home Responsibilities:: 8  Recreation:: 10  Social Activity:: 8  Occupation:: 10  Self Care:: 0  Life-Support Activities:: 0(     Review of Systems   Constitutional:  Negative for activity change, appetite change, chills, fatigue, fever and unexpected weight change.   HENT:  Negative for hearing loss.    Eyes:  Negative for visual disturbance.   Respiratory:  Negative for chest tightness and shortness of breath.    Cardiovascular:  Negative for chest pain.   Gastrointestinal:  Negative for abdominal pain, constipation, diarrhea, nausea and vomiting.   Genitourinary:  Negative for difficulty urinating.   Musculoskeletal:  Positive for arthralgias, back pain, gait problem and myalgias. Negative for neck pain.   Skin:  Negative for rash.   Neurological:  Negative for dizziness, weakness, light-headedness, numbness and headaches.   Psychiatric/Behavioral:  Positive for sleep disturbance. Negative for hallucinations and suicidal ideas. The patient is not nervous/anxious.         Past Medical History:   Diagnosis Date    Abnormal liver function tests 6/18/2013    Anemia 6/18/2013    Cervical radiculopathy 3/12/2013    CKD stage 3 due to type 2 diabetes mellitus 3/4/2016    Colon polyp     Diabetes mellitus type II, uncontrolled 10/18/2012    Diabetes mellitus with renal manifestations, uncontrolled 9/27/2013    History of colonic polyps 10/5/2015    HTN (hypertension) 10/18/2012    Hyperlipidemia 10/18/2012    Lateral epicondylitis 6/18/2013    Long-term insulin use 3/4/2016    Low back pain 1/29/2013    Microalbuminuria 3/30/2015    Nuclear sclerosis of both eyes 2/14/2020    Open-angle glaucoma     Primary open angle glaucoma (POAG) of both eyes, moderate stage 2/14/2020    Uncontrolled type 2 diabetes mellitus with proteinuria or albuminuria 3/30/2015    Uncontrolled type 2 diabetes mellitus with proteinuria or microalbuminuria 3/30/2015       Past Surgical History:   Procedure Laterality Date    COLONOSCOPY N/A 9/1/2017    Procedure: COLONOSCOPY;  Surgeon: Gopal Ny MD;  Location: [facility];  Service: Endoscopy;  Laterality: N/A;    COLONOSCOPY N/A 3/26/2018    Procedure: COLONOSCOPY;  Surgeon: Jamar Batista MD;  Location: [facility] ([address] [place] [address]);  Service: Endoscopy;  Laterality: N/A;       Social History     Socioeconomic History    Marital status:    Tobacco Use    Smoking status: Never     Passive exposure: Never    Smokeless tobacco: Never   Substance and Sexual Activity    Alcohol use: Yes     Alcohol/week: 4.0 - 5.0 standard drinks of alcohol     Types: 3 - 4 Cans of beer, 1 Standard drinks or equivalent per week    Drug use: Not Currently    Sexual activity: Yes     Partners: Female       Review of patient's allergies indicates:  No Known Allergies    Current Outpatient Medications on File Prior to Visit   Medication Sig Dispense Refill    amoxicillin (AMOXIL) 500 MG capsule Take 500 mg by mouth every 6 (six) hours.      atorvastatin (LIPITOR) 20 MG tablet  "TAKE 1 TABLET BY MOUTH EVERY DAY 90 tablet 0    blood sugar diagnostic Strp To check blood glucose three times daily, to use with insurance preferred meter 100 each 3    diltiaZEM (CARDIZEM CD) 240 MG 24 hr capsule Take 1 capsule (240 mg total) by mouth once daily. 90 capsule 3    gabapentin (NEURONTIN) 300 MG capsule Take 1 capsule (300 mg total) by mouth 2 (two) times daily as needed (pain). 60 capsule 1    hydroCHLOROthiazide (HYDRODIURIL) 12.5 MG Tab TAKE 1 TABLET BY MOUTH EVERY DAY 90 tablet 3    insulin glargine, TOUJEO, (TOUJEO SOLOSTAR U-300 INSULIN) 300 unit/mL (1.5 mL) InPn pen Inject 24 units once daily 3 pen 5    lancets Misc To check blood glucose three times daily, to use with insurance preferred meter 100 each 3    lisinopriL (PRINIVIL,ZESTRIL) 40 MG tablet Take 1 tablet (40 mg total) by mouth once daily. 30 tablet 11    metFORMIN (GLUCOPHAGE) 1000 MG tablet Take 1 tablet (1,000 mg total) by mouth 2 (two) times daily. 180 tablet 3    pen needle, diabetic (BD ULTRA-FINE FORREST PEN NEEDLE) 32 gauge x 5/32" Ndle USE 1X/ each 3    semaglutide (OZEMPIC) 1 mg/dose (4 mg/3 mL) Inject 1 mg into the skin every 7 days. 1 each 5    blood-glucose meter kit To check blood glucose three times daily, to use with insurance preferred meter 1 each 0    latanoprost 0.005 % ophthalmic solution Place 1 drop into both eyes every evening. 7.5 mL 2     Current Facility-Administered Medications on File Prior to Visit   Medication Dose Route Frequency Provider Last Rate Last Admin    LIDOcaine 2%/EPINEPHrine 1:100,000 injection 2 mL  2 mL Intradermal 1 time in Clinic/HOD Andrew Busch MD           Objective:      BP (!) 141/81   Pulse 78   SpO2 98%     Exam:  GEN:  Well developed, well nourished.  No acute distress.  Normal pain behavior.  HEENT:  No trauma.  Mucous membranes moist.  Nares patent bilaterally.  PSYCH: Normal affect. Thought content appropriate.  CHEST:  Breathing symmetric.  No audible " wheezing.  ABD: Soft, non-distended.  SKIN:  Warm, pink, dry.  No rash on exposed areas.    EXT:  No cyanosis, clubbing, or edema.  No color change or changes in nail or hair growth.  NEURO/MUSCULOSKELETAL:  Fully alert, oriented, and appropriate. Speech normal chip. No cranial nerve deficits.   Gait:  Antalgic.  No trendelenburg sign bilaterally.   Motor Strength:  5/5 motor strength throughout lower extremities.   Sensory:  No sensory deficit in the lower extremities.   L-Spine:  Limited ROM with pain on extension worse than flexion.  Positive pain with axial/facet loading bilaterally.  Negative SLR bilaterally.    SI Joint/hip:  No pain with internal rotation of right hip  No TTP over lumbar paraspinals, bilateral SI joints, hips, piriformis muscles, or GTB.              Imaging:      Narrative & Impression    EXAMINATION:  MRI LUMBAR SPINE WITHOUT CONTRAST     CLINICAL HISTORY:  lumbar DDD; Other intervertebral disc degeneration, lumbar region     TECHNIQUE:  Multiplanar, multisequence MR images were acquired from the thoracolumbar junction to the sacrum without contrast.     COMPARISON:  09/17/2021     FINDINGS:  Alignment: Normal.     Vertebrae: Degenerative endplate changes are seen in the lower lumbar spine.  No fracture or marrow infiltrative process.     Discs: Partial fusion across the L2-L3 disc space.  Severe disc height loss at L5-S1, mild at L4-L5.  No evidence for discitis.     Cord: Normal.  Conus terminates at L1.     Degenerative findings:     T12-L1: No spinal canal stenosis or neural foraminal narrowing.     L1-L2: No spinal canal stenosis or neural foraminal narrowing.     L2-L3: No spinal canal stenosis or neural foraminal narrowing.     L3-L4: Circumferential disc bulge and mild facet arthropathy result in mild left neural foraminal narrowing.     L4-L5: Circumferential disc bulge and moderate facet arthropathy result in mild effacement of the thecal sac and moderate right, mild left  neural foraminal narrowing.     L5-S1: Circumferential disc bulge and mild facet arthropathy result in mild right neural foraminal narrowing.     Paraspinal muscles & soft tissues: Moderate paraspinal muscle atrophy.     Impression:     1. Multilevel degenerative changes of the lumbar spine.  Moderate right neural foraminal narrowing noted at L4-L5.        Electronically signed by: Ezra Vences MD  Date:                                            11/18/2021  Time:                                           10:35           Narrative & Impression    EXAMINATION:  XR HIPS BILATERAL 2 VIEW INCL AP PELVIS     CLINICAL HISTORY:  Lumbago with sciatica, left side     TECHNIQUE:  AP view of the pelvis and frogleg lateral views of both hips were performed.     COMPARISON:  None.     FINDINGS:  Mild bilateral hip joint space narrowing.     No significant osteophyte formation, sclerosis or geodes.     Normal bilateral femoral head contour.     The bilateral sacroiliac joints appear within normal limits.     No fracture, no osseous lesions.     Atherosclerotic changes of the aorta and its branches.     The soft tissues appear normal.     Impression:     Mild degenerative changes of the bilateral hip joints.        Electronically signed by: Briana Marquez MD  Date:                                            09/17/2021  Time:                                           12:36         Narrative & Impression    EXAMINATION:  XR LUMBAR SPINE COMPLETE 5 VIEW     CLINICAL HISTORY:  Back pain or radiculopathy, > 6 wks;Dorsalgia, unspecified     TECHNIQUE:  AP, lateral, spot and bilateral oblique views of the lumbar spine were performed.     COMPARISON:  09/03/2015     FINDINGS:  Mild dextroscoliosis of the lumbar spine.     The vertebral body heights are well maintained.     Mild disc space narrowing L5-S1.     The oblique views demonstrate no evidence of spondylolysis.     The bilateral sacroiliac joints appear symmetrical on the  AP view.     Prominent right lateral bridging osteophyte at L1-L2 and L2-L3.     The facet joints appear within normal limits.     Impression:     Spondylosis of the lumbar spine. No acute process seen.        Electronically signed by: Briana Marquez MD  Date:                                            09/17/2021  Time:                                           12:34         Assessment:       Encounter Diagnoses   Name Primary?    DDD (degenerative disc disease), lumbar     Lumbar spondylosis Yes    Dorsalgia, unspecified          Plan:       Gerson was seen today for low-back pain and hip pain.    Diagnoses and all orders for this visit:    Lumbar spondylosis  -     Ambulatory referral/consult to Physical/Occupational Therapy; Future    DDD (degenerative disc disease), lumbar  -     Ambulatory referral/consult to Pain Clinic  -     Ambulatory referral/consult to Physical/Occupational Therapy; Future    Dorsalgia, unspecified        Gerson Phillips III is a 68 y.o. male with chronic bilateral low back pain.  Exact etiology somewhat unclear though does appear to be mostly axial and likely related to lower lumbar facet joints.  Does have multilevel degenerative disc disease and may have some pain related to spinal stenosis as well.  Patient does report right lateral hip pain, does have degeneration noted on bilateral hip x-rays.  No hip pain reproduced with internal rotation.    Prior records reviewed.  Pertinent imaging studies reviewed by me. Imaging results were discussed with patient.  Referred to physical therapy for ROM, strengthening, stretching and home exercise program.  Continue gabapentin.  Recently increased by PCP to 300 mg b.i.d..  Advised patient continue with this plan and we can discuss efficacy of medication at patient's follow up.  Return to clinic in 8 weeks or sooner if needed.  At that time we will discuss efficacy of physical therapy/home exercise program.  May consider updating lumbar MRI  and possibly performing lumbar medial branch blocks/RFA in the future.    Prince Jaffe PA-C  [facility]  Interventional Pain Management   08/25/2023    This note was created by combination of typed  and M-Modal dictation.  Transcription and phonetic errors may be present.  If there are any questions, please contact me.

## 2023-08-28 ENCOUNTER — TELEPHONE (OUTPATIENT)
Dept: PAIN MEDICINE | Facility: CLINIC | Age: 68
End: 2023-08-28
Payer: MEDICARE

## 2023-09-18 ENCOUNTER — PATIENT MESSAGE (OUTPATIENT)
Dept: PRIMARY CARE CLINIC | Facility: CLINIC | Age: 68
End: 2023-09-18
Payer: MEDICARE

## 2023-10-05 DIAGNOSIS — H40.1132 PRIMARY OPEN ANGLE GLAUCOMA (POAG) OF BOTH EYES, MODERATE STAGE: Primary | ICD-10-CM

## 2023-10-13 ENCOUNTER — CLINICAL SUPPORT (OUTPATIENT)
Dept: OPHTHALMOLOGY | Facility: CLINIC | Age: 68
End: 2023-10-13
Payer: MEDICARE

## 2023-10-13 ENCOUNTER — OFFICE VISIT (OUTPATIENT)
Dept: OPTOMETRY | Facility: CLINIC | Age: 68
End: 2023-10-13
Payer: MEDICARE

## 2023-10-13 DIAGNOSIS — H25.13 NUCLEAR SCLEROSIS OF BOTH EYES: ICD-10-CM

## 2023-10-13 DIAGNOSIS — H52.7 REFRACTIVE ERROR: ICD-10-CM

## 2023-10-13 DIAGNOSIS — H40.1132 PRIMARY OPEN ANGLE GLAUCOMA (POAG) OF BOTH EYES, MODERATE STAGE: ICD-10-CM

## 2023-10-13 DIAGNOSIS — H40.1132 PRIMARY OPEN ANGLE GLAUCOMA (POAG) OF BOTH EYES, MODERATE STAGE: Primary | ICD-10-CM

## 2023-10-13 DIAGNOSIS — E11.36 TYPE 2 DIABETES MELLITUS WITH DIABETIC CATARACT, WITH LONG-TERM CURRENT USE OF INSULIN: ICD-10-CM

## 2023-10-13 DIAGNOSIS — Z79.4 TYPE 2 DIABETES MELLITUS WITH DIABETIC CATARACT, WITH LONG-TERM CURRENT USE OF INSULIN: ICD-10-CM

## 2023-10-13 PROCEDURE — 1101F PT FALLS ASSESS-DOCD LE1/YR: CPT | Mod: CPTII,S$GLB,, | Performed by: OPTOMETRIST

## 2023-10-13 PROCEDURE — 99999 PR PBB SHADOW E&M-EST. PATIENT-LVL II: CPT | Mod: PBBFAC,,, | Performed by: OPTOMETRIST

## 2023-10-13 PROCEDURE — 99214 OFFICE O/P EST MOD 30 MIN: CPT | Mod: S$GLB,,, | Performed by: OPTOMETRIST

## 2023-10-13 PROCEDURE — 1126F AMNT PAIN NOTED NONE PRSNT: CPT | Mod: CPTII,S$GLB,, | Performed by: OPTOMETRIST

## 2023-10-13 PROCEDURE — 3288F PR FALLS RISK ASSESSMENT DOCUMENTED: ICD-10-PCS | Mod: CPTII,S$GLB,, | Performed by: OPTOMETRIST

## 2023-10-13 PROCEDURE — 99999 PR PBB SHADOW E&M-EST. PATIENT-LVL II: ICD-10-PCS | Mod: PBBFAC,,, | Performed by: OPTOMETRIST

## 2023-10-13 PROCEDURE — 3066F NEPHROPATHY DOC TX: CPT | Mod: CPTII,S$GLB,, | Performed by: OPTOMETRIST

## 2023-10-13 PROCEDURE — 2023F PR DILATED RETINAL EXAM W/O EVID OF RETINOPATHY: ICD-10-PCS | Mod: CPTII,S$GLB,, | Performed by: OPTOMETRIST

## 2023-10-13 PROCEDURE — 1159F MED LIST DOCD IN RCRD: CPT | Mod: CPTII,S$GLB,, | Performed by: OPTOMETRIST

## 2023-10-13 PROCEDURE — 3044F PR MOST RECENT HEMOGLOBIN A1C LEVEL <7.0%: ICD-10-PCS | Mod: CPTII,S$GLB,, | Performed by: OPTOMETRIST

## 2023-10-13 PROCEDURE — 99214 PR OFFICE/OUTPT VISIT, EST, LEVL IV, 30-39 MIN: ICD-10-PCS | Mod: S$GLB,,, | Performed by: OPTOMETRIST

## 2023-10-13 PROCEDURE — 3060F PR POS MICROALBUMINURIA RESULT DOCUMENTED/REVIEW: ICD-10-PCS | Mod: CPTII,S$GLB,, | Performed by: OPTOMETRIST

## 2023-10-13 PROCEDURE — 1159F PR MEDICATION LIST DOCUMENTED IN MEDICAL RECORD: ICD-10-PCS | Mod: CPTII,S$GLB,, | Performed by: OPTOMETRIST

## 2023-10-13 PROCEDURE — 4010F PR ACE/ARB THEARPY RXD/TAKEN: ICD-10-PCS | Mod: CPTII,S$GLB,, | Performed by: OPTOMETRIST

## 2023-10-13 PROCEDURE — 3044F HG A1C LEVEL LT 7.0%: CPT | Mod: CPTII,S$GLB,, | Performed by: OPTOMETRIST

## 2023-10-13 PROCEDURE — 1126F PR PAIN SEVERITY QUANTIFIED, NO PAIN PRESENT: ICD-10-PCS | Mod: CPTII,S$GLB,, | Performed by: OPTOMETRIST

## 2023-10-13 PROCEDURE — 3060F POS MICROALBUMINURIA REV: CPT | Mod: CPTII,S$GLB,, | Performed by: OPTOMETRIST

## 2023-10-13 PROCEDURE — 1160F RVW MEDS BY RX/DR IN RCRD: CPT | Mod: CPTII,S$GLB,, | Performed by: OPTOMETRIST

## 2023-10-13 PROCEDURE — 1160F PR REVIEW ALL MEDS BY PRESCRIBER/CLIN PHARMACIST DOCUMENTED: ICD-10-PCS | Mod: CPTII,S$GLB,, | Performed by: OPTOMETRIST

## 2023-10-13 PROCEDURE — 3288F FALL RISK ASSESSMENT DOCD: CPT | Mod: CPTII,S$GLB,, | Performed by: OPTOMETRIST

## 2023-10-13 PROCEDURE — 4010F ACE/ARB THERAPY RXD/TAKEN: CPT | Mod: CPTII,S$GLB,, | Performed by: OPTOMETRIST

## 2023-10-13 PROCEDURE — 3066F PR DOCUMENTATION OF TREATMENT FOR NEPHROPATHY: ICD-10-PCS | Mod: CPTII,S$GLB,, | Performed by: OPTOMETRIST

## 2023-10-13 PROCEDURE — 1101F PR PT FALLS ASSESS DOC 0-1 FALLS W/OUT INJ PAST YR: ICD-10-PCS | Mod: CPTII,S$GLB,, | Performed by: OPTOMETRIST

## 2023-10-13 PROCEDURE — 2023F DILAT RTA XM W/O RTNOPTHY: CPT | Mod: CPTII,S$GLB,, | Performed by: OPTOMETRIST

## 2023-10-13 NOTE — PROGRESS NOTES
Subjective:       Patient ID: Gerson Phillips III is a 68 y.o. male      Chief Complaint   Patient presents with    Concerns About Ocular Health    Diabetic Eye Exam    Glaucoma     History of Present Illness  Dls: 6/24/22 Dr. Contreras     69 y/o male presents today for diabetic eye exam and glaucoma ck.    Pt c/o film over ou off/on. Pt c/o blurry vision at near ou. Pt wears pal's.      x1 day     + ou tearing  No itching  + ou off/on burning  No pain  No ha's  No floaters  No flashes    Eye meds  Latanoprost OU Q HS last dose x 2 nights ago     Pohx:  Glaucoma     Fohx:   Cat - brother     Hemoglobin A1C       Date                     Value               Ref Range             Status                06/09/2023               6.5 (H)             4.0 - 5.6 %           Final                  02/03/2023               6.9 (H)             4.0 - 5.6 %           Final                   11/04/2022               7.2 (H)             4.0 - 5.6 %           Final                   Assessment/Plan:     1. Primary open angle glaucoma (POAG) of both eyes, moderate stage  IOP doing well. HVF/OCT done today. Continue latanoprost QHS OU. 6 month IOP check.     2. Type 2 diabetes mellitus with diabetic cataract, with long-term current use of insulin  No diabetic retinopathy. Discussed with pt the effects of diabetes on vision, importance of good blood sugar control, compliance with meds, and follow up care with PCP. Return in 1 year for dilated eye exam, sooner PRN.    3. Nuclear sclerosis of both eyes  NVS per pt. Educated pt on presence of cataracts and effects on vision. No surgery at this time. Recheck in one year, sooner PRN.    4. Refractive error  Educated patient on refractive error and discussed lens options. Dispensed updated spectacle Rx. Educated about adaptation period to new specs.    Eyeglass Final Rx       Eyeglass Final Rx         Sphere Cylinder Axis Add    Right -0.50 +0.75 080 +2.50    Left [place] +0.25 015 +2.50       Expiration Date: 10/13/2024                      Follow up in about 6 months (around 4/13/2024) for IOP check.

## 2023-10-13 NOTE — PROGRESS NOTES
Oct done ou      24-2  sf done ou     Rel & Fix =  good od                      Fair os      Coop =    good     Patient has no allergies to latex or adhesives at this time  Patient had a few fixation loss with os     Jthomas    Mrx    Pl          od     -.50 + .50 x 15   os

## 2023-10-18 ENCOUNTER — PATIENT MESSAGE (OUTPATIENT)
Dept: CARDIOLOGY | Facility: CLINIC | Age: 68
End: 2023-10-18
Payer: MEDICARE

## 2023-10-20 ENCOUNTER — OFFICE VISIT (OUTPATIENT)
Dept: ENDOCRINOLOGY | Facility: CLINIC | Age: 68
End: 2023-10-20
Payer: MEDICARE

## 2023-10-20 ENCOUNTER — LAB VISIT (OUTPATIENT)
Dept: LAB | Facility: HOSPITAL | Age: 68
End: 2023-10-20
Attending: NURSE PRACTITIONER
Payer: MEDICARE

## 2023-10-20 VITALS
DIASTOLIC BLOOD PRESSURE: 80 MMHG | HEART RATE: 78 BPM | TEMPERATURE: 99 F | WEIGHT: 230 LBS | SYSTOLIC BLOOD PRESSURE: 138 MMHG | BODY MASS INDEX: 29.53 KG/M2

## 2023-10-20 DIAGNOSIS — E78.5 HYPERLIPIDEMIA, UNSPECIFIED HYPERLIPIDEMIA TYPE: ICD-10-CM

## 2023-10-20 DIAGNOSIS — E11.9 CONTROLLED TYPE 2 DIABETES MELLITUS WITHOUT COMPLICATION, WITHOUT LONG-TERM CURRENT USE OF INSULIN: ICD-10-CM

## 2023-10-20 DIAGNOSIS — E11.9 CONTROLLED TYPE 2 DIABETES MELLITUS WITHOUT COMPLICATION, WITHOUT LONG-TERM CURRENT USE OF INSULIN: Primary | ICD-10-CM

## 2023-10-20 DIAGNOSIS — R80.9 MICROALBUMINURIA: ICD-10-CM

## 2023-10-20 DIAGNOSIS — I10 ESSENTIAL HYPERTENSION: ICD-10-CM

## 2023-10-20 LAB
ESTIMATED AVG GLUCOSE: 160 MG/DL (ref 68–131)
HBA1C MFR BLD: 7.2 % (ref 4–5.6)

## 2023-10-20 PROCEDURE — 4010F ACE/ARB THERAPY RXD/TAKEN: CPT | Mod: CPTII,S$GLB,, | Performed by: NURSE PRACTITIONER

## 2023-10-20 PROCEDURE — 3288F PR FALLS RISK ASSESSMENT DOCUMENTED: ICD-10-PCS | Mod: CPTII,S$GLB,, | Performed by: NURSE PRACTITIONER

## 2023-10-20 PROCEDURE — 99214 PR OFFICE/OUTPT VISIT, EST, LEVL IV, 30-39 MIN: ICD-10-PCS | Mod: S$GLB,,, | Performed by: NURSE PRACTITIONER

## 2023-10-20 PROCEDURE — 3008F BODY MASS INDEX DOCD: CPT | Mod: CPTII,S$GLB,, | Performed by: NURSE PRACTITIONER

## 2023-10-20 PROCEDURE — 3075F SYST BP GE 130 - 139MM HG: CPT | Mod: CPTII,S$GLB,, | Performed by: NURSE PRACTITIONER

## 2023-10-20 PROCEDURE — 3075F PR MOST RECENT SYSTOLIC BLOOD PRESS GE 130-139MM HG: ICD-10-PCS | Mod: CPTII,S$GLB,, | Performed by: NURSE PRACTITIONER

## 2023-10-20 PROCEDURE — 3060F POS MICROALBUMINURIA REV: CPT | Mod: CPTII,S$GLB,, | Performed by: NURSE PRACTITIONER

## 2023-10-20 PROCEDURE — 3066F NEPHROPATHY DOC TX: CPT | Mod: CPTII,S$GLB,, | Performed by: NURSE PRACTITIONER

## 2023-10-20 PROCEDURE — 1159F PR MEDICATION LIST DOCUMENTED IN MEDICAL RECORD: ICD-10-PCS | Mod: CPTII,S$GLB,, | Performed by: NURSE PRACTITIONER

## 2023-10-20 PROCEDURE — 83036 HEMOGLOBIN GLYCOSYLATED A1C: CPT | Performed by: NURSE PRACTITIONER

## 2023-10-20 PROCEDURE — 3044F PR MOST RECENT HEMOGLOBIN A1C LEVEL <7.0%: ICD-10-PCS | Mod: CPTII,S$GLB,, | Performed by: NURSE PRACTITIONER

## 2023-10-20 PROCEDURE — 3044F HG A1C LEVEL LT 7.0%: CPT | Mod: CPTII,S$GLB,, | Performed by: NURSE PRACTITIONER

## 2023-10-20 PROCEDURE — 3288F FALL RISK ASSESSMENT DOCD: CPT | Mod: CPTII,S$GLB,, | Performed by: NURSE PRACTITIONER

## 2023-10-20 PROCEDURE — 99999 PR PBB SHADOW E&M-EST. PATIENT-LVL IV: CPT | Mod: PBBFAC,,, | Performed by: NURSE PRACTITIONER

## 2023-10-20 PROCEDURE — 99214 OFFICE O/P EST MOD 30 MIN: CPT | Mod: S$GLB,,, | Performed by: NURSE PRACTITIONER

## 2023-10-20 PROCEDURE — 36415 COLL VENOUS BLD VENIPUNCTURE: CPT | Performed by: NURSE PRACTITIONER

## 2023-10-20 PROCEDURE — 3066F PR DOCUMENTATION OF TREATMENT FOR NEPHROPATHY: ICD-10-PCS | Mod: CPTII,S$GLB,, | Performed by: NURSE PRACTITIONER

## 2023-10-20 PROCEDURE — 1159F MED LIST DOCD IN RCRD: CPT | Mod: CPTII,S$GLB,, | Performed by: NURSE PRACTITIONER

## 2023-10-20 PROCEDURE — 1101F PR PT FALLS ASSESS DOC 0-1 FALLS W/OUT INJ PAST YR: ICD-10-PCS | Mod: CPTII,S$GLB,, | Performed by: NURSE PRACTITIONER

## 2023-10-20 PROCEDURE — 1160F PR REVIEW ALL MEDS BY PRESCRIBER/CLIN PHARMACIST DOCUMENTED: ICD-10-PCS | Mod: CPTII,S$GLB,, | Performed by: NURSE PRACTITIONER

## 2023-10-20 PROCEDURE — 3079F DIAST BP 80-89 MM HG: CPT | Mod: CPTII,S$GLB,, | Performed by: NURSE PRACTITIONER

## 2023-10-20 PROCEDURE — 99999 PR PBB SHADOW E&M-EST. PATIENT-LVL IV: ICD-10-PCS | Mod: PBBFAC,,, | Performed by: NURSE PRACTITIONER

## 2023-10-20 PROCEDURE — 1101F PT FALLS ASSESS-DOCD LE1/YR: CPT | Mod: CPTII,S$GLB,, | Performed by: NURSE PRACTITIONER

## 2023-10-20 PROCEDURE — 3079F PR MOST RECENT DIASTOLIC BLOOD PRESSURE 80-89 MM HG: ICD-10-PCS | Mod: CPTII,S$GLB,, | Performed by: NURSE PRACTITIONER

## 2023-10-20 PROCEDURE — 4010F PR ACE/ARB THEARPY RXD/TAKEN: ICD-10-PCS | Mod: CPTII,S$GLB,, | Performed by: NURSE PRACTITIONER

## 2023-10-20 PROCEDURE — 3008F PR BODY MASS INDEX (BMI) DOCUMENTED: ICD-10-PCS | Mod: CPTII,S$GLB,, | Performed by: NURSE PRACTITIONER

## 2023-10-20 PROCEDURE — 1160F RVW MEDS BY RX/DR IN RCRD: CPT | Mod: CPTII,S$GLB,, | Performed by: NURSE PRACTITIONER

## 2023-10-20 PROCEDURE — 3060F PR POS MICROALBUMINURIA RESULT DOCUMENTED/REVIEW: ICD-10-PCS | Mod: CPTII,S$GLB,, | Performed by: NURSE PRACTITIONER

## 2023-10-20 RX ORDER — INSULIN GLARGINE 300 U/ML
INJECTION, SOLUTION SUBCUTANEOUS
Qty: 3 PEN | Refills: 5 | Status: SHIPPED | OUTPATIENT
Start: 2023-10-20 | End: 2023-12-24 | Stop reason: SDUPTHER

## 2023-10-20 RX ORDER — BLOOD-GLUCOSE,RECEIVER,CONT
EACH MISCELLANEOUS
Qty: 1 EACH | Refills: 0 | Status: SHIPPED | OUTPATIENT
Start: 2023-10-20 | End: 2023-11-20 | Stop reason: SDUPTHER

## 2023-10-20 RX ORDER — BLOOD-GLUCOSE SENSOR
EACH MISCELLANEOUS
Qty: 12 EACH | Refills: 3 | Status: SHIPPED | OUTPATIENT
Start: 2023-10-20

## 2023-10-20 RX ORDER — SEMAGLUTIDE 1.34 MG/ML
1 INJECTION, SOLUTION SUBCUTANEOUS
Qty: 1 EACH | Refills: 5 | Status: SHIPPED | OUTPATIENT
Start: 2023-10-20 | End: 2023-12-15

## 2023-10-20 NOTE — PATIENT INSTRUCTIONS
A1c today. Pt will check result online.   Start Dexcom G7. See Diabetes Education for training.   Maintain healthy eating habits.   Return to clinic in 4 months with a1c prior.

## 2023-10-20 NOTE — PROGRESS NOTES
CC: This 68 y.o. Black or  male  is here for evaluation of  T2DM along with comorbidities indicated in the Visit Diagnosis section of this encounter.    HPI: Gerson Phillips III was diagnosed with T2DM  > 20 years ago.   Previously followed by Dr. Hoskins with lov in (9/2017).         Prior visit 6/2023  A1c is down from 7.2% in Nov to 6.9% in Feb, now at 6.5%.   Pt reports cost of Ozempic is $287.   + 6 lb weight loss since lov.   Plan Continue current therapy.   If glucoses continue to drop less than 80 or you continue to have lows overnight, then reduce insulin from 24 to 20 units once daily.   Test glucose 2x/day.   Return to clinic in 4 months with labs prior.  - in-person visit     Interval hx  No recent a1c available.     LAST DIABETES EDUCATION: years ago     DIABETES MEDICATIONS:   metformin 1000 mg bid with food  Toujeo 24 units daily   Ozempic 1 mg once weekly     Misses medication doses - no     Rotates injection sites - injects insulin to LLQ only   Changes pen needles - yes     DM COMPLICATIONS: nephropathy and impotence    SIGNIFICANT DIABETES MED HISTORY:   Unable to afford Victoza   Vgo stopped d/t ineffectiveness in 7/2018 and MDI (basalgar and novolog)  started   Novolog stopped and Ozempic started 12/2019.       SELF MONITORING BLOOD GLUCOSE: Checks blood glucose at home - 2x/day, fasting and predinner.  Forgot log.        FBGs high 140-160s  Before dinner -       HYPOGLYCEMIC EPISODES: infrequent - last occurred after supper which consisted of fried chicken, lemonade, and mashed potatoes       CURRENT DIET: drinks Coke Zero and water. Eats 3 meals/day. No snacks. Brings lunch to work or eats out.   No bedtime snacks.     CURRENT EXERCISE: none     OCCUPATION:  Long distance       /80   Pulse 78   Temp 98.5 °F (36.9 °C) (Oral)   Wt 104.3 kg (230 lb)   BMI 29.53 kg/m²       ROS:   CONSTITUTIONAL: Appetite ok, no fatigue  GI: denies constipation, diarrhea,  nausea       PHYSICAL EXAM:  GENERAL: Well developed, well nourished. No acute distress.   PSYCH: AAOx3, appropriate mood and affect, conversant, well-groomed. Judgement and insight good.   NEURO: Cranial nerves grossly intact. Speech clear, no tremor.   CHEST: Respirations even and unlabored.         Hemoglobin A1C   Date Value Ref Range Status   06/09/2023 6.5 (H) 4.0 - 5.6 % Final     Comment:     ADA Screening Guidelines:  5.7-6.4%  Consistent with prediabetes  >or=6.5%  Consistent with diabetes    High levels of fetal hemoglobin interfere with the HbA1C  assay. Heterozygous hemoglobin variants (HbS, HgC, etc)do  not significantly interfere with this assay.   However, presence of multiple variants may affect accuracy.     02/03/2023 6.9 (H) 4.0 - 5.6 % Final     Comment:     ADA Screening Guidelines:  5.7-6.4%  Consistent with prediabetes  >or=6.5%  Consistent with diabetes    High levels of fetal hemoglobin interfere with the HbA1C  assay. Heterozygous hemoglobin variants (HbS, HgC, etc)do  not significantly interfere with this assay.   However, presence of multiple variants may affect accuracy.     11/04/2022 7.2 (H) 4.0 - 5.6 % Final     Comment:     ADA Screening Guidelines:  5.7-6.4%  Consistent with prediabetes  >or=6.5%  Consistent with diabetes    High levels of fetal hemoglobin interfere with the HbA1C  assay. Heterozygous hemoglobin variants (HbS, HgC, etc)do  not significantly interfere with this assay.   However, presence of multiple variants may affect accuracy.             Component Value Date/Time     06/09/2023 1157    K 4.1 06/09/2023 1157    CL 98 06/09/2023 1157    CO2 27 06/09/2023 1157    BUN 11 06/09/2023 1157    CREATININE 1.1 06/09/2023 1157    GLU 97 06/09/2023 1157    CALCIUM 9.8 06/09/2023 1157    ALKPHOS 77 06/09/2023 1157    AST 13 06/09/2023 1157    ALT 16 06/09/2023 1157    BILITOT 0.5 06/09/2023 1157    EGFRNORACEVR >60.0 06/09/2023 1157    ESTGFRAFRICA >60.0 04/22/2022 0732            Lab Results   Component Value Date    LDLCALC 48.2 (L) 06/09/2023    LDLCALC 48.2 (L) 06/09/2023      Latest Reference Range & Units 06/09/23 11:57   Cholesterol 120 - 199 mg/dL  120 - 199 mg/dL 107 (L)  107 (L)   HDL 40 - 75 mg/dL  40 - 75 mg/dL 50  50   HDL/Cholesterol Ratio 20.0 - 50.0 %  20.0 - 50.0 % 46.7  46.7   LDL Cholesterol External 63.0 - 159.0 mg/dL  63.0 - 159.0 mg/dL 48.2 (L)  48.2 (L)   Non-HDL Cholesterol mg/dL  mg/dL 57  57   Total Cholesterol/HDL Ratio 2.0 - 5.0   2.0 - 5.0  2.1  2.1   Triglycerides 30 - 150 mg/dL  30 - 150 mg/dL 44  44   (L): Data is abnormally low    Lab Results   Component Value Date    MICALBCREAT 162.8 (H) 06/09/2023               ASSESSMENT and PLAN:    A1C GOAL: < 7%    1. Controlled type 2 diabetes mellitus without complication, without long-term current use of insulin  A1c today. Pt will check result online.   Start Dexcom G7. See Diabetes Education for training.   Maintain healthy eating habits.   Return to clinic in 4 months with a1c prior.     Hemoglobin A1C    Hemoglobin A1C    Ambulatory referral/consult to Diabetes Education    insulin glargine, TOUJEO, (TOUJEO SOLOSTAR U-300 INSULIN) 300 unit/mL (1.5 mL) InPn pen    semaglutide (OZEMPIC) 1 mg/dose (4 mg/3 mL)      2. Essential hypertension  controlled      3. Hyperlipidemia, unspecified hyperlipidemia type  Continue atorvastatin       4. Microalbuminuria  Continue lisinopril.             Orders Placed This Encounter   Procedures    Hemoglobin A1C     Standing Status:   Future     Standing Expiration Date:   12/18/2024    Hemoglobin A1C     Standing Status:   Future     Standing Expiration Date:   12/18/2024    Ambulatory referral/consult to Diabetes Education     Standing Status:   Future     Standing Expiration Date:   10/20/2024     Referral Priority:   Routine     Referral Type:   Consultation     Referral Reason:   Specialty Services Required     Requested Specialty:   Endocrinology     Number of  Visits Requested:   1     Expiration Date:   10/20/2024        Follow up in about 4 months (around 2/20/2024).

## 2023-10-23 ENCOUNTER — TELEPHONE (OUTPATIENT)
Dept: ENDOCRINOLOGY | Facility: CLINIC | Age: 68
End: 2023-10-23
Payer: MEDICARE

## 2023-10-23 NOTE — TELEPHONE ENCOUNTER
----- Message from Katie Jones NP sent at 10/23/2023  8:42 AM CDT -----  A1c is up from 6.5 to 7.2% which shows uncontrolled diabetes control. Improve diet and start Dexcom as we discussed.   Please schedule f/u appt with labs prior. 4 months.

## 2023-11-10 ENCOUNTER — NUTRITION (OUTPATIENT)
Dept: DIABETES | Facility: CLINIC | Age: 68
End: 2023-11-10
Payer: MEDICARE

## 2023-11-10 DIAGNOSIS — E11.9 CONTROLLED TYPE 2 DIABETES MELLITUS WITHOUT COMPLICATION, WITHOUT LONG-TERM CURRENT USE OF INSULIN: ICD-10-CM

## 2023-11-10 DIAGNOSIS — M51.36 DDD (DEGENERATIVE DISC DISEASE), LUMBAR: ICD-10-CM

## 2023-11-10 PROCEDURE — 99999 PR PBB SHADOW E&M-EST. PATIENT-LVL I: ICD-10-PCS | Mod: PBBFAC,,, | Performed by: DIETITIAN, REGISTERED

## 2023-11-10 PROCEDURE — 95249 CONT GLUC MNTR PT PROV EQP: CPT | Mod: S$GLB,,, | Performed by: DIETITIAN, REGISTERED

## 2023-11-10 PROCEDURE — G0108 PR DIAB MANAGE TRN  PER INDIV: ICD-10-PCS | Mod: S$GLB,,, | Performed by: DIETITIAN, REGISTERED

## 2023-11-10 PROCEDURE — G0108 DIAB MANAGE TRN  PER INDIV: HCPCS | Mod: S$GLB,,, | Performed by: DIETITIAN, REGISTERED

## 2023-11-10 PROCEDURE — 95249 PR GLUCOSE MONITORING, 72 HRS, SUB-Q SENSOR, PATIENT PROVIDED: ICD-10-PCS | Mod: S$GLB,,, | Performed by: DIETITIAN, REGISTERED

## 2023-11-10 PROCEDURE — 99999 PR PBB SHADOW E&M-EST. PATIENT-LVL I: CPT | Mod: PBBFAC,,, | Performed by: DIETITIAN, REGISTERED

## 2023-11-10 RX ORDER — GABAPENTIN 300 MG/1
300 CAPSULE ORAL 2 TIMES DAILY PRN
Qty: 60 CAPSULE | Refills: 1 | Status: SHIPPED | OUTPATIENT
Start: 2023-11-10 | End: 2023-12-15

## 2023-11-10 NOTE — TELEPHONE ENCOUNTER
No care due was identified.  Health [facility] Embedded Care Due Messages. Reference number: [id number].   11/10/2023 3:08:55 PM CST

## 2023-11-12 ENCOUNTER — PATIENT MESSAGE (OUTPATIENT)
Dept: DIABETES | Facility: CLINIC | Age: 68
End: 2023-11-12
Payer: MEDICARE

## 2023-11-15 NOTE — PROGRESS NOTES
Diabetes Care Specialist Progress Note  Author: Veronica Olsen RD, CDE  Date: 11/15/2023    Program Intake  Reason for Diabetes Program Visit:: Initial Diabetes Assessment  Type of Intervention:: Individual  Individual: Device Training  Device Training: Personal CGM  Current diabetes risk level:: low (HgbA1c 7.2)  In the last 12 months, have you:: none  Permission to speak with others about care:: no    Lab Results   Component Value Date    LABA1C 9.4 (H) 04/20/2017    HGBA1C 7.2 (H) 10/20/2023       Clinical                          There is no height or weight on file to calculate BMI.    Patient Health Rating  Compared to other people your age, how would you rate your health?: Good    Problem Review  Reviewed Problem List with Patient: yes  Active comorbidities affecting diabetes self-care.: yes  Comorbidities: Chronic Kidney Disease, Other (comment) (DM Circulatory Disorder)  Reviewed health maintenance: yes    Clinical Assessment  Current Diabetes Treatment: Oral Medication, Diet, Injectable, Insulin (toujeo, metformin, and ozempic)  Have you ever experienced hypoglycemia (low blood sugar)?: yes  In the last month, how often have you experienced low blood sugar?: other (see comments) (infrequently)  Are you able to tell when your blood sugar is low?: Yes  What symptoms do you experience?: Shaky, Sweaty  Have you ever been hospitalized because your blood sugar was too low?: no  How do you treat hypoglycemia (low blood sugar)?: 1/2 can soda/fruit juice (drinks some soda)  Have you ever experienced hyperglycemia (high blood sugar)?: no    Medication Information  How do you obtain your medications?: Family picks up, Patient drives  How many days a week do you miss your medications?: 2  Do you use a pill box or medication chart to help you manage your medications?: Pill box  Do you sometimes have difficulty refilling your medications?: Yes (see comment) (cost during the coverage gap - will refer to pharmacy  assistance)  Medication adherence impacting ability to self-manage diabetes?: Yes    Labs  Do you have regular lab work to monitor your medications?: Yes  Type of Regular Lab Work: A1c, Cholesterol, Microalbumin, CBC, BMP  Where do you get your labs drawn?: [place]  Lab Compliance Barriers: No    Nutritional Status  Diet: Regular  Meal Plan 24 Hour Recall: Breakfast, Lunch, Dinner, Snack  Meal Plan 24 Hour Recall - Breakfast: PB&J [place]  Meal Plan 24 Hour Recall - Lunch: something out or packs lunch like a sandwich  Meal Plan 24 Hour Recall - Dinner: something like chicken and mashed potatoes  Meal Plan 24 Hour Recall - Snack: lemonade, coke zero, and water  Change in appetite?: No  Dentation:: Intact  Recent Changes in Weight: No Recent Weight Change  Current nutritional status an area of need that is impacting patient's ability to self-manage diabetes?: No    Additional Social History    Support  Does anyone support you with your diabetes care?: yes  Who supports you?: spouse, self, family member  Who takes you to your medical appointments?: self  Does the current support meet the patient's needs?: Yes  Is Support an area impacting ability to self-manage diabetes?: No    Access to Mass Media & Technology  Does the patient have access to any of the following devices or technologies?: Smart phone  Media or technology needs impacting ability to self-manage diabetes?: No    Cognitive/Behavioral Health  Alert and Oriented: Yes  Difficulty Thinking: No  Requires Prompting: No  Requires assistance for routine expression?: No  Cognitive or behavioral barriers impacting ability to self-manage diabetes?: No    Culture/Jew  Culture or Muslim beliefs that may impact ability to access healthcare: No    Communication  Language preference: English  Hearing Problems: No  Vision Problems: No  Communication needs impacting ability to self-manage diabetes?: No    Health Literacy  Preferred Learning Method: Face to Face,  Demonstration  How often do you need to have someone help you read instructions, pamphlets, or written material from your doctor or pharmacy?: Rarely  Health literacy needs impacting ability to self-manage diabetes?: No      Diabetes Self-Management Skills Assessment    Diabetes Disease Process/Treatment Options  Patient/caregiver able to state what happens when someone has diabetes.: yes  Patient/caregiver knows what type of diabetes they have.: yes  Diabetes Type : Type II  Diabetes Disease Process/Treatment Options: Skills Assessment Completed: Yes  Assessment indicates:: Adequate understanding  Area of need?: No    Nutrition/Healthy Eating  Challenges to healthy eating:: eating out, going to parties  Method of carbohydrate measurement:: eyeballing/guessing  Patient can identify foods that impact blood sugar.: yes  Patient-identified foods:: starchy vegetables (corn, peas, beans), starches (bread, pasta, rice, cereal), soda, sweets  Nutrition/Healthy Eating Skills Assessment Completed:: Yes  Assessment indicates:: Adequate understanding, Instruction Needed  Area of need?: Yes    Physical Activity/Exercise  Patient's daily activity level:: sedentary  Patient formally exercises outside of work.: no  Reasons for not exercising:: work schedule, time constraints  Patient can identify forms of physical activity.: yes  Stated forms of physical activity:: other (see comments) (weight lifting, walking, running)  Physical Activity/Exercise Skills Assessment Completed: : Yes  Assessment indicates:: Adequate understanding  Area of need?: No    Medications  Patient is able to describe current diabetes management routine.: yes  Diabetes management routine:: injectable medications, insulin, oral medications (ozempic, toujeo, metformin)  Patient is able to identify current diabetes medications, dosages, and appropriate timing of medications.: yes  Patient understands the purpose of the medications taken for diabetes.:  yes  Patient reports problems or concerns with current medication regimen.: yes  Medication regimen problems/concerns:: financial concerns  Medication Skills Assessment Completed:: Yes  Assessment indicates:: Adequate understanding  Area of need?: Yes (referring to pharmacy assistance)    Home Blood Glucose Monitoring  Patient states that blood sugar is checked at home daily.: yes  Monitoring Method:: home glucometer, personal continuous glucose monitor  How often do you check your blood sugar?: Twice a day  When do you check your blood sugar?: Before breakfast, Before dinner  When you check what is your typical blood sugar range? : FBS: 140-160, evening BS: , with an occassional hypoglycemia after a meal  Blood glucose logs:: no  Blood glucose logs reviewed today?: no  Personal CGM type:: Dexcom G7  Patient is able to use personal CGM appropriately.: no  CGM Report reviewed?: no  Unable to download personal CGM: starting today at time of visit  Home Blood Glucose Monitoring Skills Assessment Completed: : Yes  Assessment indicates:: Knowledge deficit, Instruction Needed  Area of need?: Yes    Acute Complications  Patient is able to identify types of acute complications: Yes  Patient Identified:: Hypoglycemia, Hyperglycemia  Patient is able to state the basic meaning of hypoglycemia?: Yes  Able to state the blood sugar range for hypoglycemia?: yes  Patient stated range:: <70  Patient can identify general symptoms of hypoglycemia: yes  Patient identified:: shakiness  Able to state proper treatment of hypoglycemia?: yes  Patient identified:: 1/2 can soda/fruit juice (drinks soda)  Patient is able to state the basic meaning of hyperglycemia?: Yes  Able to state the blood sugar range for hyperglycemia?: yes  Patient stated range:: >200  Patient able to state proper treatment of hyperglycemia?: yes  Patient identified:: take medication as recommended, monitor blood sugar  Acute Complications Skills Assessment  "Completed: : Yes  Assessment indicates:: Adequate understanding  Area of need?: No    Chronic Complications  Chronic Complications Skills Assessment Completed: : No  Deferred due to:: Time    Psychosocial/Coping  Psychosocial/Coping Skills Assessment Completed: : No  Deffered due to:: Time      Assessment Summary and Plan    Based on today's diabetes care assessment, the following areas of need were identified:          11/10/2023    12:01 AM   Social   Support No   Access to Mass Media/Tech No   Cognitive/Behavioral Health No   Culture/Latter-day No   Communication No   Health Literacy No            11/10/2023    12:01 AM   Clinical   Medication Adherence Yes   Lab Compliance No   Nutritional Status No            11/10/2023    12:01 AM   Diabetes Self-Management Skills   Diabetes Disease Process/Treatment Options No   Nutrition/Healthy Eating Yes, patient could use a refresher, reviewed general guidelines and what foods are considered carbohydrates   Physical Activity/Exercise No   Medication Yes, discussed pharmacy assistance with patient, referral placed   Home Blood Glucose Monitoring Yes, started the dexcom G7 at time of visit  Dexcom G7 Education  Patient is here in clinic today for initial start of Dexcom continuous glucose monitoring system (CGMA). Reviewed with patient how to insert sensor. Patient inserted sensor on right upper arm. Time and date was set on monitor and settings were set. Hypoglycemia trigger set for 70 and hyperglycemia set for 250. Patient provided with phone number for 24-hour help line if needed. Discussed various types of possible alarms and what to do. Questions addressed. Initialization finished. No futher questions.  Dexcom G7 mobile kvng downloaded to phone. Education was provided using "Quick Start Guide" per Dexcom protocol.     Pt will be using their phone/ as the primary .  Overview:  5min glucose reading updates, trending arrows, BG graph screens, battery life " indicator, Blue Tooth Symbol.  Menus: trend Graph, start sensor, enter BG, events, Alerts, Settings, Shutdown, Stop Sensor   Settings:                          * Urgent Low: 55 mg/dL                          * Urgent Low Soon: Off                          * Low alert: 70                          * High alert: 250                          * Rise rate: Off                          * Fall Rate: Off                          * Signal Loss: 30 min                          * No Reading: Off                          * Always sound: On                             Reviewed additional setting options with patient, including Graph Height. Sensor was paired with /phone.    Reviewed where to find sensor insertion time and sensor expiration date.   Discussed no need to calibrate sensor during the entirety of the 10 day wear. Discussed that pt can calibrate sensor if desired, Reviewed appropriate calibration techniques.  Reviewed sensor site selection. Site selected and prepped using aseptic technique Inserted to right upper arm.  Practiced sensor removal from site and disposal.  Patient able to demonstrate without difficulty.  Encouraged to follow-up prior to starting another sensor.   Reviewed problem solving aspects of sensor transmission/variables that can disrupt RF transmission.  range 20 feet, but the first 3 hrs keep within 3 feet of transmitter.  Pt instructed on lag time of interstitial fluid from CBG and was advised to tx hypoglycemia and dose insulin based on SMBG values.  Link to video provided and written instructions provided for patient to review before 10 day sensor change  Dexcom technical support contact number given and examples of when to contact them discussed.    Acute Complications No          Today's interventions were provided through individual discussion, instruction, and written materials were provided.      Patient verbalized understanding of instruction and written materials.   Pt was able to return back demonstration of instructions today. Patient understood key points, needs reinforcement and further instruction.     Diabetes Self-Management Care Plan:    Today's Diabetes Self-Management Care Plan was developed with Gerson's input. Gerson has agreed to work toward the following goal(s) to improve his/her overall diabetes control.      Care Plan: Diabetes Management   Updates made since 10/16/2023 12:00 AM        Problem: Blood Glucose Self-Monitoring         Goal: Patient agrees to monitor blood glucose a minimum of twice a day using the dexcom G7 for the next 3 months    Start Date: 11/10/2023   Expected End Date: 2/15/2024   This Visit's Progress: Deferred   Priority: High   Barriers: No Barriers Identified        Task: Provided patient with reference to understand the trend arrows and CGM value, reviewed the fact that the CGM probe is inserted into the interstitial fluid not the blood. Completed 11/10/2023        Task: Provided patient will schedule of when to change the sensor (every 10 days) Completed 11/10/2023        Task: Educated on parameters on when to notify provider and advised patient to bring reader/ to all appts with Endocrinologist and Diabetes Care Specialist. Completed 11/10/2023        Task: Instructed on how to self-monitor blood glucose using a personal CGM, how to properly dispose of used sensor and transmitter, and how to appropriately store supplies Completed 11/10/2023        Task: Discussed when to use FSG to for treatment rather than CGM - when there is no arrow, when symptoms don't match the reading, and when no data is available. Completed 11/10/2023        Task: Instructed on trend arrows and CGM value vs FSG value that with the arrow to the side FSG could be as much as a 20-point difference and if arrow is up or down there could be as much as a 50-point difference Completed 11/10/2023        Task: Instructed to choose events, blood glucose, then  use to calibrate, add FSG value and confirm. This will help Dexcom to be more in sync with FSG values. Stressed to only calibrate if arrow is ? Completed 11/10/2023        Task: Discussed appropriate insertion sites to place the sensor (i.e. upper arm, abdomen) Completed 11/10/2023        Task: Set up Dexcom G6/G7 and Clarity apps on patient's phone, allowed provider access to Clarity reports. Completed 11/10/2023        Task: Reviewed Dexcom download with patient and provider, adjustments made to treatment plan by provider and communicated to patient as directed, patient states understanding.           Follow Up Plan     Follow up in about 10 days (around 11/20/2023) for Change Personal CGM and Upload Data.    Today's care plan and follow up schedule was discussed with patient.  [place] verbalized understanding of the care plan, goals, and agrees to follow up plan.        The patient was encouraged to communicate with his/her health care provider/physician and care team regarding his/her condition(s) and treatment.  I provided the patient with my contact information today and encouraged to contact me via phone or [facility]'s Patient Portal as needed.     Length of Visit   Total Time: 60 Minutes

## 2023-11-20 RX ORDER — BLOOD-GLUCOSE,RECEIVER,CONT
EACH MISCELLANEOUS
Qty: 1 EACH | Refills: 0 | Status: SHIPPED | OUTPATIENT
Start: 2023-11-20

## 2023-11-24 ENCOUNTER — NUTRITION (OUTPATIENT)
Dept: DIABETES | Facility: CLINIC | Age: 68
End: 2023-11-24
Payer: MEDICARE

## 2023-11-24 VITALS — WEIGHT: 232 LBS | BODY MASS INDEX: 29.77 KG/M2 | HEIGHT: 74 IN

## 2023-11-24 DIAGNOSIS — E11.9 CONTROLLED TYPE 2 DIABETES MELLITUS WITHOUT COMPLICATION, WITHOUT LONG-TERM CURRENT USE OF INSULIN: Primary | ICD-10-CM

## 2023-11-24 PROCEDURE — 95251 CONT GLUC MNTR ANALYSIS I&R: CPT | Mod: S$GLB,,, | Performed by: NURSE PRACTITIONER

## 2023-11-24 PROCEDURE — 99999 PR PBB SHADOW E&M-EST. PATIENT-LVL II: ICD-10-PCS | Mod: PBBFAC,,, | Performed by: DIETITIAN, REGISTERED

## 2023-11-24 PROCEDURE — 95251 PR GLUCOSE MONITOR, 72 HOUR, PHYS INTERP: ICD-10-PCS | Mod: S$GLB,,, | Performed by: NURSE PRACTITIONER

## 2023-11-24 PROCEDURE — G0108 PR DIAB MANAGE TRN  PER INDIV: ICD-10-PCS | Mod: S$GLB,,, | Performed by: DIETITIAN, REGISTERED

## 2023-11-24 PROCEDURE — G0108 DIAB MANAGE TRN  PER INDIV: HCPCS | Mod: S$GLB,,, | Performed by: DIETITIAN, REGISTERED

## 2023-11-24 PROCEDURE — 95249 CONT GLUC MNTR PT PROV EQP: CPT | Mod: S$GLB,,, | Performed by: DIETITIAN, REGISTERED

## 2023-11-24 PROCEDURE — 99999 PR PBB SHADOW E&M-EST. PATIENT-LVL II: CPT | Mod: PBBFAC,,, | Performed by: DIETITIAN, REGISTERED

## 2023-11-24 PROCEDURE — 95249 PR GLUCOSE MONITORING, 72 HRS, SUB-Q SENSOR, PATIENT PROVIDED: ICD-10-PCS | Mod: S$GLB,,, | Performed by: DIETITIAN, REGISTERED

## 2023-11-24 NOTE — PROGRESS NOTES
"Diabetes Care Specialist Follow-up Note  Author: Veronica Olsen RD, CDE  Date: 11/24/2023    Program Intake  Reason for Diabetes Program Visit:: Intervention  Type of Intervention:: Individual  Individual: Education  Education: Pattern Management  Device Training: Personal CGM  Current diabetes risk level:: low (HgbA1c 7.2)  In the last 12 months, have you:: none  Permission to speak with others about care:: no    Lab Results   Component Value Date    LABA1C 9.4 (H) 04/20/2017    HGBA1C 7.2 (H) 10/20/2023     A1c Pre Diabetes Care Specialist Intervention:  9.4%    Clinical    Weight: 105.2 kg (232 lb)   Height: 6' 2" (188 cm)   Body mass index is 29.79 kg/m².      Patient Health Rating  Compared to other people your age, how would you rate your health?: Good    Medication Information  How do you obtain your medications?: Patient drives, Family picks up  How many days a week do you miss your medications?:  (occassionally - trying to be better)  Do you use a pill box or medication chart to help you manage your medications?: Pill box  Do you sometimes have difficulty refilling your medications?: Yes (see comment)  Medication adherence impacting ability to self-manage diabetes?: Yes    Labs  Lab Compliance Barriers: No    Nutritional Status  Diet: Regular  Meal Plan 24 Hour Recall: Breakfast, Lunch, Dinner, Snack  Meal Plan 24 Hour Recall - Breakfast: Waffles with syrup  Meal Plan 24 Hour Recall - Lunch: something out or packs lunch like a sandwich  Meal Plan 24 Hour Recall - Dinner: something like chicken and mashed potatoes  Meal Plan 24 Hour Recall - Snack: snacks on nuts or seeds, drinks lemonade, coke zero, and water  Current nutritional status an area of need that is impacting patient's ability to self-manage diabetes?: No    Physical activity/Exercise:   No much at this time    SMBG: using the dexcom G7  Report reviewed with patient, downloaded to chart and tagged provider            Additional Social " History    Support  Is Support an area impacting ability to self-manage diabetes?: No    Access to Mass Media & Technology  Media or technology needs impacting ability to self-manage diabetes?: No    Cognitive/Behavioral Health  Cognitive or behavioral barriers impacting ability to self-manage diabetes?: No    Culture/Jain  Culture or Evangelical beliefs that may impact ability to access healthcare: No    Communication  Communication needs impacting ability to self-manage diabetes?: No    Health Literacy  Health literacy needs impacting ability to self-manage diabetes?: No      Diabetes Self-Management Skills Assessment     Diabetes Disease Process/Treatment Options  Diabetes Disease Process/Treatment Options: Skills Assessment Completed: No  Assessment indicates:: Adequate understanding  Deferred due to:: Other (comment) (previously completed, there has been no change and patient has adequate understanding)  Area of need?: No    Nutrition/Healthy Eating  Challenges to healthy eating:: eating out, going to parties  Method of carbohydrate measurement:: eyeballing/guessing  Patient can identify foods that impact blood sugar.: yes  Patient-identified foods:: starchy vegetables (corn, peas, beans), starches (bread, pasta, rice, cereal), soda, sweets, fruit/fruit juice, milk, yogurt  Nutrition/Healthy Eating Skills Assessment Completed:: Yes  Assessment indicates:: Adequate understanding  Area of need?: Yes    Physical Activity/Exercise  Physical Activity/Exercise Skills Assessment Completed: : No  Assessment indicates:: Adequate understanding  Deffered due to:: Other (comment) (previously completed, there has been no change and patient has adequate understanding)  Area of need?: No    Medications  Medication Skills Assessment Completed:: No  Assessment indicates:: Adequate understanding  Deffered due to:: Other (comment) (previously completed, there has been no change and patient has adequate understanding)  Area of  need?: No    Home Blood Glucose Monitoring  Patient states that blood sugar is checked at home daily.: yes  Monitoring Method:: personal continuous glucose monitor  Personal CGM type:: Dexcom G7  Patient is able to use personal CGM appropriately.: yes  CGM Report reviewed?: yes  Home Blood Glucose Monitoring Skills Assessment Completed: : Yes  Assessment indicates:: Adequate understanding  Area of need?: No    Acute Complications  Acute Complications Skills Assessment Completed: : No  Assessment indicates:: Adequate understanding  Deffered due to:: Other (comment) (previously completed, there has been no change and patient has adequate understanding)  Area of need?: No    Chronic Complications  Patient can identify major chronic complications of diabetes.: yes  Stated chronic complications:: kidney disease, neuropathy/nerve damage, retinopathy, sexual dysfunction  Patient can identify ways to prevent or delay diabetes complications.: yes  Stated ways to prevent complications:: controlling blood sugar  Patient is aware that having diabetes increases risk of heart disease?: No  Patient is aware that heart disease is the leading cause of death and disability in people with diabetes?: No  Patient able to state risk factors for heart disease?: Yes  Patient stated risk factors for heart disease:: High cholesterol  Patient is taking statin?: Yes (lipitor)  Chronic Complications Skills Assessment Completed: : Yes  Assessment indicates:: Adequate understanding, Instruction Needed  Area of need?: Yes    Psychosocial/Coping  Patient can identify ways of coping with chronic disease.: yes  Patient-stated ways of coping with chronic disease:: support from loved ones  Psychosocial/Coping Skills Assessment Completed: : Yes  Assessment indicates:: Adequate understanding  Area of need?: No      During today's follow-up visit,  the following areas required further assessment and content was provided/reviewed.    Based on today's  diabetes care assessment, the following areas of need were identified:          11/24/2023    12:01 AM   Social   Support No   Access to Mass Media/Tech No   Cognitive/Behavioral Health No   Culture/Denominational No   Communication No   Health Literacy No            11/24/2023    12:01 AM   Clinical   Medication Adherence Yes   Lab Compliance No   Nutritional Status No            11/24/2023    12:01 AM   Diabetes Self-Management Skills   Diabetes Disease Process/Treatment Options No   Nutrition/Healthy Eating Yes, Reviewed carb counting, portion control, importance of spacing meals throughout the day to prevent post prandial elevations.  Recommended low saturated fat, low sodium diet to aid in control of hypertension and cholesterol. Reviewed plate method and portion control, dining out tips, meal planning, reading a food label, healthy snack options, benefits of physical activity   Physical Activity/Exercise No   Medication No   Home Blood Glucose Monitoring No   Acute Complications No  Comparison of previous CGM data to current    Average Glucose 155 increased from 146  Standard Deviation 39 no change from 39  GMI 7.0 from NA    Time in Range  2% of time patient was in Very High Range decreased from 3%  21% of time patient was in High Range increased from 12%  76% of time patient was in Range decreased from 85%  <1% of time patient was in Low Range increased from 0%  0% of time patient was in Very Low Range no change from 0%    With the Thanksgiving holiday patient was eating differently. States that when he ate the waffle with syrup he watched his glucose go up and stay up and learned that eating a waffle with syrup is not a good idea.   Chronic Complications Yes  Diabetes Care Schedule   A1c every 3 to 6 months  Lipid Panel every year  Microalbumin (urine test) once per year  Comprehensive Foot Exam once per year  Dilated Eye Exam at least once per year  Dental exam every 6 months  Flu Vaccination yearly   Shingles  and Pneumonia Vaccination as recommended by physician      Reviewed diabetes care schedule, foot care guidelines, diabetes and retinopathy screening, s/s hypo and hyperglycemia, long/short term complication of uncontrolled DM, importance of compliance with treatment plan    Reviewed risk of heart disease  High blood pressure  High cholesterol  Diet  Limited activity  Medication non-adherence  Having diabetes    Reviewed that heart disease is the leading cause of death in people with uncontrolled diabetes  Reviewed ways to prevent complications:  Avoid smoking and other types of tobacco  Checking feet daily and having routine comprehensive foot exams  Controlling blood sugar  Controlling cholesterol and triglycerides  Having regular diabetic eye exams  Healthy eating and regular activity  Maintaining optimal blood glucose control   Psychosocial/Coping No        Today's interventions were provided through individual discussion, instruction, and written materials were provided.    Patient verbalized understanding of instruction and written materials.  Pt was able to return back demonstration of instructions today. Patient understood key points, needs reinforcement and further instruction.     Diabetes Self-Management Care Plan Review and Evaluation of Progress:    During today's follow-up Carlys Diabetes Self-Management Care Plan progress was reviewed and progress was evaluated including his/her input. Gerson has agreed to continue his/her journey to improve/maintain overall diabetes control by continuing to set health goals. See care plan progress below.      Care Plan: Diabetes Management   Updates made since 10/25/2023 12:00 AM        Problem: Blood Glucose Self-Monitoring         Goal: Patient agrees to monitor blood glucose a minimum of twice a day using the dexcom G7 for the next 3 months    Start Date: 11/10/2023   Expected End Date: 2/15/2024   This Visit's Progress: On track   Recent Progress: Deferred    Priority: High   Barriers: No Barriers Identified        Task: Provided patient with reference to understand the trend arrows and CGM value, reviewed the fact that the CGM probe is inserted into the interstitial fluid not the blood. Completed 11/10/2023        Task: Provided patient will schedule of when to change the sensor (every 10 days) Completed 11/10/2023        Task: Educated on parameters on when to notify provider and advised patient to bring reader/ to all appts with Endocrinologist and Diabetes Care Specialist. Completed 11/10/2023        Task: Instructed on how to self-monitor blood glucose using a personal CGM, how to properly dispose of used sensor and transmitter, and how to appropriately store supplies Completed 11/10/2023        Task: Discussed when to use FSG to for treatment rather than CGM - when there is no arrow, when symptoms don't match the reading, and when no data is available. Completed 11/10/2023        Task: Instructed on trend arrows and CGM value vs FSG value that with the arrow to the side FSG could be as much as a 20-point difference and if arrow is up or down there could be as much as a 50-point difference Completed 11/10/2023        Task: Instructed to choose events, blood glucose, then use to calibrate, add FSG value and confirm. This will help Dexcom to be more in sync with FSG values. Stressed to only calibrate if arrow is ? Completed 11/10/2023        Task: Discussed appropriate insertion sites to place the sensor (i.e. upper arm, abdomen) Completed 11/10/2023        Task: Set up Dexcom G6/G7 and Clarity apps on patient's phone, allowed provider access to Clarity reports. Completed 11/10/2023        Task: Reviewed Dexcom download with patient and provider, adjustments made to treatment plan by provider and communicated to patient as directed, patient states understanding. Completed 11/24/2023          Follow Up Plan     Follow up in about 3 months (around 2/24/2024)  for Personal CGM Upload, General Follow-up.    Today's care plan and follow up schedule was discussed with patient.  Gerson verbalized understanding of the care plan, goals, and agrees to follow up plan.        The patient was encouraged to communicate with his/her health care provider/physician and care team regarding his/her condition(s) and treatment.  I provided the patient with my contact information today and encouraged to contact me via phone or [facility] as needed.     Length of Visit   Total Time: 45 Minutes

## 2023-11-27 NOTE — PROGRESS NOTES
CGM interpretation: glucoses overall at goal with noted fluctuations. Pt having postprandial spikes occasionally likely d/t inconsistent diet.

## 2023-12-03 NOTE — TELEPHONE ENCOUNTER
No care due was identified.  [facility] Embedded Care Due Messages. Reference number: [id number].   12/02/2023 9:38:30 PM CST

## 2023-12-04 RX ORDER — DILTIAZEM HYDROCHLORIDE 240 MG/1
240 CAPSULE, COATED, EXTENDED RELEASE ORAL
Qty: 90 CAPSULE | Refills: 1 | Status: SHIPPED | OUTPATIENT
Start: 2023-12-04

## 2023-12-04 NOTE — TELEPHONE ENCOUNTER
Refill Routing Note   Medication(s) are not appropriate for processing by [facility] for the following reason(s):        No active prescription written by provider    [facility] action(s):  Defer               Appointments  past 12m or future 3m with PCP    Date Provider   Last Visit   6/9/2023 Alisa Chester, DO   Next Visit   12/15/2023 Alisa Chester, DO   ED visits in past 90 days: 0        Note composed:11:59 PM 12/03/2023

## 2023-12-07 DIAGNOSIS — E78.2 MIXED HYPERLIPIDEMIA: ICD-10-CM

## 2023-12-07 RX ORDER — ATORVASTATIN CALCIUM 20 MG/1
20 TABLET, FILM COATED ORAL DAILY
Qty: 90 TABLET | Refills: 3 | Status: SHIPPED | OUTPATIENT
Start: 2023-12-07

## 2023-12-07 NOTE — TELEPHONE ENCOUNTER
No care due was identified.  [facility] Embedded Care Due Messages. Reference number: [id number].   12/07/2023 9:27:22 AM CST  
Please see the attached refill request.  
Ambulatory

## 2023-12-15 ENCOUNTER — OFFICE VISIT (OUTPATIENT)
Dept: FAMILY MEDICINE | Facility: CLINIC | Age: 68
End: 2023-12-15
Payer: MEDICARE

## 2023-12-15 VITALS
BODY MASS INDEX: 28.96 KG/M2 | HEART RATE: 82 BPM | DIASTOLIC BLOOD PRESSURE: 64 MMHG | OXYGEN SATURATION: 97 % | WEIGHT: 225.63 LBS | SYSTOLIC BLOOD PRESSURE: 120 MMHG | HEIGHT: 74 IN | TEMPERATURE: 98 F

## 2023-12-15 DIAGNOSIS — I10 ESSENTIAL HYPERTENSION: ICD-10-CM

## 2023-12-15 DIAGNOSIS — I70.0 AORTIC ATHEROSCLEROSIS: ICD-10-CM

## 2023-12-15 DIAGNOSIS — M51.36 DDD (DEGENERATIVE DISC DISEASE), LUMBAR: ICD-10-CM

## 2023-12-15 DIAGNOSIS — E11.65 TYPE 2 DIABETES MELLITUS WITH HYPERGLYCEMIA, WITHOUT LONG-TERM CURRENT USE OF INSULIN: Primary | ICD-10-CM

## 2023-12-15 DIAGNOSIS — R80.9 MICROALBUMINURIA DUE TO TYPE 2 DIABETES MELLITUS: ICD-10-CM

## 2023-12-15 DIAGNOSIS — E11.69 DYSLIPIDEMIA ASSOCIATED WITH TYPE 2 DIABETES MELLITUS: ICD-10-CM

## 2023-12-15 DIAGNOSIS — E78.5 DYSLIPIDEMIA ASSOCIATED WITH TYPE 2 DIABETES MELLITUS: ICD-10-CM

## 2023-12-15 DIAGNOSIS — E11.29 MICROALBUMINURIA DUE TO TYPE 2 DIABETES MELLITUS: ICD-10-CM

## 2023-12-15 PROCEDURE — 1101F PR PT FALLS ASSESS DOC 0-1 FALLS W/OUT INJ PAST YR: ICD-10-PCS | Mod: CPTII,S$GLB,, | Performed by: FAMILY MEDICINE

## 2023-12-15 PROCEDURE — 3060F POS MICROALBUMINURIA REV: CPT | Mod: CPTII,S$GLB,, | Performed by: FAMILY MEDICINE

## 2023-12-15 PROCEDURE — 1159F MED LIST DOCD IN RCRD: CPT | Mod: CPTII,S$GLB,, | Performed by: FAMILY MEDICINE

## 2023-12-15 PROCEDURE — 99999 PR PBB SHADOW E&M-EST. PATIENT-LVL IV: ICD-10-PCS | Mod: PBBFAC,,, | Performed by: FAMILY MEDICINE

## 2023-12-15 PROCEDURE — 3078F PR MOST RECENT DIASTOLIC BLOOD PRESSURE < 80 MM HG: ICD-10-PCS | Mod: CPTII,S$GLB,, | Performed by: FAMILY MEDICINE

## 2023-12-15 PROCEDURE — 3288F FALL RISK ASSESSMENT DOCD: CPT | Mod: CPTII,S$GLB,, | Performed by: FAMILY MEDICINE

## 2023-12-15 PROCEDURE — 4010F PR ACE/ARB THEARPY RXD/TAKEN: ICD-10-PCS | Mod: CPTII,S$GLB,, | Performed by: FAMILY MEDICINE

## 2023-12-15 PROCEDURE — 1160F PR REVIEW ALL MEDS BY PRESCRIBER/CLIN PHARMACIST DOCUMENTED: ICD-10-PCS | Mod: CPTII,S$GLB,, | Performed by: FAMILY MEDICINE

## 2023-12-15 PROCEDURE — 3288F PR FALLS RISK ASSESSMENT DOCUMENTED: ICD-10-PCS | Mod: CPTII,S$GLB,, | Performed by: FAMILY MEDICINE

## 2023-12-15 PROCEDURE — 3066F NEPHROPATHY DOC TX: CPT | Mod: CPTII,S$GLB,, | Performed by: FAMILY MEDICINE

## 2023-12-15 PROCEDURE — 3051F PR MOST RECENT HEMOGLOBIN A1C LEVEL 7.0 - < 8.0%: ICD-10-PCS | Mod: CPTII,S$GLB,, | Performed by: FAMILY MEDICINE

## 2023-12-15 PROCEDURE — 99214 OFFICE O/P EST MOD 30 MIN: CPT | Mod: S$GLB,,, | Performed by: FAMILY MEDICINE

## 2023-12-15 PROCEDURE — 99999 PR PBB SHADOW E&M-EST. PATIENT-LVL IV: CPT | Mod: PBBFAC,,, | Performed by: FAMILY MEDICINE

## 2023-12-15 PROCEDURE — 3074F PR MOST RECENT SYSTOLIC BLOOD PRESSURE < 130 MM HG: ICD-10-PCS | Mod: CPTII,S$GLB,, | Performed by: FAMILY MEDICINE

## 2023-12-15 PROCEDURE — 3051F HG A1C>EQUAL 7.0%<8.0%: CPT | Mod: CPTII,S$GLB,, | Performed by: FAMILY MEDICINE

## 2023-12-15 PROCEDURE — 1101F PT FALLS ASSESS-DOCD LE1/YR: CPT | Mod: CPTII,S$GLB,, | Performed by: FAMILY MEDICINE

## 2023-12-15 PROCEDURE — 99214 PR OFFICE/OUTPT VISIT, EST, LEVL IV, 30-39 MIN: ICD-10-PCS | Mod: S$GLB,,, | Performed by: FAMILY MEDICINE

## 2023-12-15 PROCEDURE — 3008F BODY MASS INDEX DOCD: CPT | Mod: CPTII,S$GLB,, | Performed by: FAMILY MEDICINE

## 2023-12-15 PROCEDURE — 1126F AMNT PAIN NOTED NONE PRSNT: CPT | Mod: CPTII,S$GLB,, | Performed by: FAMILY MEDICINE

## 2023-12-15 PROCEDURE — 3078F DIAST BP <80 MM HG: CPT | Mod: CPTII,S$GLB,, | Performed by: FAMILY MEDICINE

## 2023-12-15 PROCEDURE — 1160F RVW MEDS BY RX/DR IN RCRD: CPT | Mod: CPTII,S$GLB,, | Performed by: FAMILY MEDICINE

## 2023-12-15 PROCEDURE — 3074F SYST BP LT 130 MM HG: CPT | Mod: CPTII,S$GLB,, | Performed by: FAMILY MEDICINE

## 2023-12-15 PROCEDURE — 1126F PR PAIN SEVERITY QUANTIFIED, NO PAIN PRESENT: ICD-10-PCS | Mod: CPTII,S$GLB,, | Performed by: FAMILY MEDICINE

## 2023-12-15 PROCEDURE — 3060F PR POS MICROALBUMINURIA RESULT DOCUMENTED/REVIEW: ICD-10-PCS | Mod: CPTII,S$GLB,, | Performed by: FAMILY MEDICINE

## 2023-12-15 PROCEDURE — 1159F PR MEDICATION LIST DOCUMENTED IN MEDICAL RECORD: ICD-10-PCS | Mod: CPTII,S$GLB,, | Performed by: FAMILY MEDICINE

## 2023-12-15 PROCEDURE — 4010F ACE/ARB THERAPY RXD/TAKEN: CPT | Mod: CPTII,S$GLB,, | Performed by: FAMILY MEDICINE

## 2023-12-15 PROCEDURE — 3066F PR DOCUMENTATION OF TREATMENT FOR NEPHROPATHY: ICD-10-PCS | Mod: CPTII,S$GLB,, | Performed by: FAMILY MEDICINE

## 2023-12-15 PROCEDURE — 3008F PR BODY MASS INDEX (BMI) DOCUMENTED: ICD-10-PCS | Mod: CPTII,S$GLB,, | Performed by: FAMILY MEDICINE

## 2023-12-15 RX ORDER — SEMAGLUTIDE 2.68 MG/ML
2 INJECTION, SOLUTION SUBCUTANEOUS
Qty: 3 ML | Refills: 11 | Status: SHIPPED | OUTPATIENT
Start: 2023-12-15 | End: 2024-01-23 | Stop reason: SDUPTHER

## 2023-12-15 RX ORDER — PREGABALIN 25 MG/1
25 CAPSULE ORAL 2 TIMES DAILY PRN
Qty: 60 CAPSULE | Refills: 0 | Status: SHIPPED | OUTPATIENT
Start: 2023-12-15 | End: 2024-01-16

## 2023-12-15 NOTE — PROGRESS NOTES
Assessment & Plan:    Type 2 diabetes mellitus with hyperglycemia, without long-term current use of insulin  -     semaglutide (OZEMPIC) 2 mg/dose (8 mg/3 mL) PnIj; Inject 2 mg into the skin every 7 days.  Dispense: 3 mL; Refill: 11    Uncontrolled. Increase Ozempic to 2 mg weekly.  F/u w/Endo in February.    Dyslipidemia associated with type 2 diabetes mellitus  Aortic atherosclerosis  Fasting labs to be performed.    Essential hypertension  Controlled. Continue current therapy.     Microalbuminuria due to type 2 diabetes mellitus  See above.    DDD (degenerative disc disease), lumbar  -     pregabalin (LYRICA) 25 MG capsule; Take 1 capsule (25 mg total) by mouth 2 (two) times daily as needed (pain).  Dispense: 60 capsule; Refill: 0    D/C gabapentin. Start Lyrica for pain control.   Advised to follow up with Pain Management regarding MBB/RFA.    Encouraged RSV vaccine, tetanus booster, and shingles vaccines.    Follow-up: Follow up in about 6 months (around 6/15/2024).  ______________________________________________________________________    Chief Complaint  Chief Complaint   Patient presents with    Diabetes       HPI  Gerson Phillips III is a 68 y.o. male with medical diagnoses as listed in the medical history and problem list that presents to the office to follow up on his chronic conditions. He c/o persistent right-sided LBP with radiation down his right leg. He follows up with Pain Management who referred him to PT but he states this is not helping. Gabapentin 300 mg tid is also not helping. He would like to get a steroid injection. Review of their OV in August states that they would re-evaluate with an MRI and consider MBB/RFA if pain does not improve.     He checks his BG with the Dexcom and average is 140-150s.     Most recent pertinent workup:     Last A1C  Lab Results   Component Value Date    HGBA1C 7.2 (H) 10/20/2023         Health Maintenance         Date Due Completion Date    Shingles Vaccine (1 of  2) Never done ---    RSV Vaccine (Age 60+ and Pregnant patients) (1 - 1-dose 60+ series) Never done ---    COVID-19 Vaccine (6 - 2023-24 season) 09/01/2023 1/20/2023    TETANUS VACCINE 09/27/2023 9/27/2013    Hemoglobin A1c 04/20/2024 10/20/2023    Diabetes Urine Screening 06/09/2024 6/9/2023    Foot Exam 06/09/2024 6/9/2023    Override on 6/9/2023: Done    Override on 12/9/2019: Done    Lipid Panel 06/09/2024 6/9/2023    Eye Exam 10/13/2024 10/13/2023    High Dose Statin 12/07/2024 12/7/2023    Colorectal Cancer Screening 03/26/2028 3/26/2018              PAST MEDICAL HISTORY:  Past Medical History:   Diagnosis Date    Abnormal liver function tests 6/18/2013    Anemia 6/18/2013    Cervical radiculopathy 3/12/2013    CKD stage 3 due to type 2 diabetes mellitus 3/4/2016    Colon polyp     Diabetes mellitus type II, uncontrolled 10/18/2012    Diabetes mellitus with renal manifestations, uncontrolled 9/27/2013    History of colonic polyps 10/5/2015    HTN (hypertension) 10/18/2012    Hyperlipidemia 10/18/2012    Lateral epicondylitis 6/18/2013    Long-term insulin use 3/4/2016    Low back pain 1/29/2013    Microalbuminuria 3/30/2015    Nuclear sclerosis of both eyes 2/14/2020    Open-angle glaucoma     Primary open angle glaucoma (POAG) of both eyes, moderate stage 2/14/2020    Uncontrolled type 2 diabetes mellitus with proteinuria or albuminuria 3/30/2015    Uncontrolled type 2 diabetes mellitus with proteinuria or microalbuminuria 3/30/2015       PAST SURGICAL HISTORY:  Past Surgical History:   Procedure Laterality Date    COLONOSCOPY N/A 9/1/2017    Procedure: COLONOSCOPY;  Surgeon: Gopal Ny MD;  Location: [facility];  Service: Endoscopy;  Laterality: N/A;    COLONOSCOPY N/A 3/26/2018    Procedure: COLONOSCOPY;  Surgeon: Jamar Batista MD;  Location: [address]);  Service: Endoscopy;  Laterality: N/A;       SOCIAL HISTORY:  Social History     Socioeconomic History    Marital status:     Tobacco Use    Smoking status: Never     Passive exposure: Never    Smokeless tobacco: Never   Substance and Sexual Activity    Alcohol use: Yes     Alcohol/week: 4.0 - 5.0 standard drinks of alcohol     Types: 3 - 4 Cans of beer, 1 Standard drinks or equivalent per week    Drug use: Not Currently    Sexual activity: Yes     Partners: Female       FAMILY HISTORY:  Family History   Problem Relation Age of Onset    Diabetes Mother     Cancer Mother     Cancer Father     Diabetes Father     Cataracts Sister     Diabetes Sister     Diabetes Sister     Cancer Brother     Cataracts Brother     No Known Problems Brother     No Known Problems Maternal Aunt     No Known Problems Maternal Uncle     No Known Problems Paternal Aunt     No Known Problems Paternal Uncle     No Known Problems Maternal Grandmother     No Known Problems Maternal Grandfather     No Known Problems Paternal Grandmother     Diabetes Paternal Grandfather     Amblyopia Neg Hx     Blindness Neg Hx     Glaucoma Neg Hx     Hypertension Neg Hx     Macular degeneration Neg Hx     Retinal detachment Neg Hx     Strabismus Neg Hx     Stroke Neg Hx     Thyroid disease Neg Hx        ALLERGIES AND MEDICATIONS: updated and reviewed.  Review of patient's allergies indicates:  No Known Allergies  Current Outpatient Medications   Medication Sig Dispense Refill    amoxicillin (AMOXIL) 500 MG capsule Take 500 mg by mouth every 6 (six) hours.      atorvastatin (LIPITOR) 20 MG tablet Take 1 tablet (20 mg total) by mouth once daily. 90 tablet 3    blood sugar diagnostic Strp To check blood glucose three times daily, to use with insurance preferred meter 100 each 3    blood-glucose meter kit To check blood glucose three times daily, to use with insurance preferred meter 1 each 0    blood-glucose meter,continuous (DEXCOM G7 ) Misc Use with Dexcom G7 sensors 1 each 0    blood-glucose sensor (DEXCOM G7 SENSOR) Ashley Change every 10 days 12 each 3    diltiaZEM (CARDIZEM  "CD) 240 MG 24 hr capsule TAKE 1 CAPSULE BY MOUTH ONCE DAILY. 90 capsule 1    hydroCHLOROthiazide (HYDRODIURIL) 12.5 MG Tab TAKE 1 TABLET BY MOUTH EVERY DAY 90 tablet 3    insulin glargine, TOUJEO, (TOUJEO SOLOSTAR U-300 INSULIN) 300 unit/mL (1.5 mL) InPn pen Inject 24 units once daily 3 pen 5    lancets Misc To check blood glucose three times daily, to use with insurance preferred meter 100 each 3    latanoprost 0.005 % ophthalmic solution Place 1 drop into both eyes every evening. 7.5 mL 2    lisinopriL (PRINIVIL,ZESTRIL) 40 MG tablet Take 1 tablet (40 mg total) by mouth once daily. 30 tablet 11    metFORMIN (GLUCOPHAGE) 1000 MG tablet Take 1 tablet (1,000 mg total) by mouth 2 (two) times daily. 180 tablet 3    pen needle, diabetic (BD ULTRA-FINE FORREST PEN NEEDLE) 32 gauge x 5/32" Ndle USE 1X/ each 3    pregabalin (LYRICA) 25 MG capsule Take 1 capsule (25 mg total) by mouth 2 (two) times daily as needed (pain). 60 capsule 0    semaglutide (OZEMPIC) 2 mg/dose (8 mg/3 mL) PnIj Inject 2 mg into the skin every 7 days. 3 mL 11     Current Facility-Administered Medications   Medication Dose Route Frequency Provider Last Rate Last Admin    LIDOcaine 2%/EPINEPHrine 1:100,000 injection 2 mL  2 mL Intradermal 1 time in Clinic/HOD Andrew Busch MD             ROS  Review of Systems   Constitutional:  Negative for activity change.   Musculoskeletal:  Positive for back pain.           Physical Exam  Vitals:    12/15/23 1017   BP: 120/64   BP Location: Right arm   Patient Position: Sitting   BP Method: Large (Manual)   Pulse: 82   Temp: 98.3 °F (36.8 °C)   TempSrc: Oral   SpO2: 97%   Weight: 102.3 kg (225 lb 10.3 oz)   Height: 6' 2" (1.88 m)    Body mass index is 28.97 kg/m².  Weight: 102.3 kg (225 lb 10.3 oz)   Height: 6' 2" (188 cm)   Physical Exam  Constitutional:       General: He is not in acute distress.     Appearance: Normal appearance.   HENT:      Head: Normocephalic and atraumatic.   Neck:      Thyroid: No " thyromegaly.      Vascular: No carotid bruit.   Cardiovascular:      Rate and Rhythm: Normal rate and regular rhythm.      Pulses: Normal pulses.      Heart sounds: Normal heart sounds.   Pulmonary:      Effort: Pulmonary effort is normal. No respiratory distress.      Breath sounds: Normal breath sounds.   Musculoskeletal:      Cervical back: Neck supple.      Right lower leg: No edema.      Left lower leg: No edema.   Lymphadenopathy:      Cervical: No cervical adenopathy.   Skin:     General: Skin is warm and dry.      Findings: No rash.   Neurological:      General: No focal deficit present.      Mental Status: He is alert and oriented to person, place, and time.   Psychiatric:         Mood and Affect: Mood normal.         Behavior: Behavior normal.         Thought Content: Thought content normal.

## 2023-12-15 NOTE — PATIENT INSTRUCTIONS
Prince Jaffe PA-C  [facility]  Interventional Pain Management       You are due for your RSV shot, tetanus booster, and shingles vaccines

## 2023-12-22 ENCOUNTER — LAB VISIT (OUTPATIENT)
Dept: LAB | Facility: HOSPITAL | Age: 68
End: 2023-12-22
Attending: FAMILY MEDICINE
Payer: MEDICARE

## 2023-12-22 DIAGNOSIS — R80.9 CONTROLLED TYPE 2 DIABETES MELLITUS WITH MICROALBUMINURIA, UNSPECIFIED WHETHER LONG TERM INSULIN USE: ICD-10-CM

## 2023-12-22 DIAGNOSIS — E11.29 CONTROLLED TYPE 2 DIABETES MELLITUS WITH MICROALBUMINURIA, UNSPECIFIED WHETHER LONG TERM INSULIN USE: ICD-10-CM

## 2023-12-22 LAB
ALBUMIN SERPL BCP-MCNC: 3.8 G/DL (ref 3.5–5.2)
ALP SERPL-CCNC: 78 U/L (ref 55–135)
ALT SERPL W/O P-5'-P-CCNC: 21 U/L (ref 10–44)
ANION GAP SERPL CALC-SCNC: 6 MMOL/L (ref 8–16)
AST SERPL-CCNC: 18 U/L (ref 10–40)
BASOPHILS # BLD AUTO: 0.05 K/UL (ref 0–0.2)
BASOPHILS NFR BLD: 0.9 % (ref 0–1.9)
BILIRUB SERPL-MCNC: 0.4 MG/DL (ref 0.1–1)
BUN SERPL-MCNC: 13 MG/DL (ref 8–23)
CALCIUM SERPL-MCNC: 9 MG/DL (ref 8.7–10.5)
CHLORIDE SERPL-SCNC: 103 MMOL/L (ref 95–110)
CHOLEST SERPL-MCNC: 106 MG/DL (ref 120–199)
CHOLEST/HDLC SERPL: 2.3 {RATIO} (ref 2–5)
CO2 SERPL-SCNC: 26 MMOL/L (ref 23–29)
CREAT SERPL-MCNC: 1.1 MG/DL (ref 0.5–1.4)
DIFFERENTIAL METHOD: ABNORMAL
EOSINOPHIL # BLD AUTO: 0.2 K/UL (ref 0–0.5)
EOSINOPHIL NFR BLD: 3.8 % (ref 0–8)
ERYTHROCYTE [DISTWIDTH] IN BLOOD BY AUTOMATED COUNT: 14.1 % (ref 11.5–14.5)
EST. GFR  (NO RACE VARIABLE): >60 ML/MIN/1.73 M^2
ESTIMATED AVG GLUCOSE: 148 MG/DL (ref 68–131)
GLUCOSE SERPL-MCNC: 79 MG/DL (ref 70–110)
HBA1C MFR BLD: 6.8 % (ref 4–5.6)
HCT VFR BLD AUTO: 41.2 % (ref 40–54)
HDLC SERPL-MCNC: 47 MG/DL (ref 40–75)
HDLC SERPL: 44.3 % (ref 20–50)
HGB BLD-MCNC: 13.3 G/DL (ref 14–18)
IMM GRANULOCYTES # BLD AUTO: 0.01 K/UL (ref 0–0.04)
IMM GRANULOCYTES NFR BLD AUTO: 0.2 % (ref 0–0.5)
LDLC SERPL CALC-MCNC: 51.2 MG/DL (ref 63–159)
LYMPHOCYTES # BLD AUTO: 2.1 K/UL (ref 1–4.8)
LYMPHOCYTES NFR BLD: 38.5 % (ref 18–48)
MCH RBC QN AUTO: 28 PG (ref 27–31)
MCHC RBC AUTO-ENTMCNC: 32.3 G/DL (ref 32–36)
MCV RBC AUTO: 87 FL (ref 82–98)
MONOCYTES # BLD AUTO: 0.7 K/UL (ref 0.3–1)
MONOCYTES NFR BLD: 12.1 % (ref 4–15)
NEUTROPHILS # BLD AUTO: 2.4 K/UL (ref 1.8–7.7)
NEUTROPHILS NFR BLD: 44.5 % (ref 38–73)
NONHDLC SERPL-MCNC: 59 MG/DL
NRBC BLD-RTO: 0 /100 WBC
PLATELET # BLD AUTO: 194 K/UL (ref 150–450)
PMV BLD AUTO: 10.9 FL (ref 9.2–12.9)
POTASSIUM SERPL-SCNC: 3.6 MMOL/L (ref 3.5–5.1)
PROT SERPL-MCNC: 6.9 G/DL (ref 6–8.4)
RBC # BLD AUTO: 4.75 M/UL (ref 4.6–6.2)
SODIUM SERPL-SCNC: 135 MMOL/L (ref 136–145)
TRIGL SERPL-MCNC: 39 MG/DL (ref 30–150)
WBC # BLD AUTO: 5.46 K/UL (ref 3.9–12.7)

## 2023-12-22 PROCEDURE — 80053 COMPREHEN METABOLIC PANEL: CPT | Performed by: FAMILY MEDICINE

## 2023-12-22 PROCEDURE — 85025 COMPLETE CBC W/AUTO DIFF WBC: CPT | Performed by: FAMILY MEDICINE

## 2023-12-22 PROCEDURE — 83036 HEMOGLOBIN GLYCOSYLATED A1C: CPT | Performed by: FAMILY MEDICINE

## 2023-12-22 PROCEDURE — 36415 COLL VENOUS BLD VENIPUNCTURE: CPT | Mod: PO | Performed by: FAMILY MEDICINE

## 2023-12-22 PROCEDURE — 80061 LIPID PANEL: CPT | Performed by: FAMILY MEDICINE

## 2023-12-24 DIAGNOSIS — E11.9 CONTROLLED TYPE 2 DIABETES MELLITUS WITHOUT COMPLICATION, WITHOUT LONG-TERM CURRENT USE OF INSULIN: ICD-10-CM

## 2023-12-26 ENCOUNTER — PATIENT MESSAGE (OUTPATIENT)
Dept: FAMILY MEDICINE | Facility: CLINIC | Age: 68
End: 2023-12-26
Payer: MEDICARE

## 2023-12-26 DIAGNOSIS — E11.65 TYPE 2 DIABETES MELLITUS WITH HYPERGLYCEMIA, WITHOUT LONG-TERM CURRENT USE OF INSULIN: Primary | ICD-10-CM

## 2023-12-26 RX ORDER — INSULIN GLARGINE 300 U/ML
INJECTION, SOLUTION SUBCUTANEOUS
Qty: 3 PEN | Refills: 5 | Status: SHIPPED | OUTPATIENT
Start: 2023-12-26 | End: 2024-02-23 | Stop reason: SDUPTHER

## 2023-12-26 NOTE — TELEPHONE ENCOUNTER
Patient was informed of result(s) via [facility]. Please contact them to schedule fasting labs/urine before his next appt.

## 2024-01-16 DIAGNOSIS — M51.36 DDD (DEGENERATIVE DISC DISEASE), LUMBAR: ICD-10-CM

## 2024-01-16 RX ORDER — PREGABALIN 25 MG/1
25 CAPSULE ORAL 2 TIMES DAILY PRN
Qty: 60 CAPSULE | Refills: 5 | Status: SHIPPED | OUTPATIENT
Start: 2024-01-16

## 2024-01-23 DIAGNOSIS — E11.65 TYPE 2 DIABETES MELLITUS WITH HYPERGLYCEMIA, WITHOUT LONG-TERM CURRENT USE OF INSULIN: ICD-10-CM

## 2024-01-23 RX ORDER — SEMAGLUTIDE 2.68 MG/ML
2 INJECTION, SOLUTION SUBCUTANEOUS
Qty: 3 ML | Refills: 11 | Status: SHIPPED | OUTPATIENT
Start: 2024-01-23 | End: 2025-01-22

## 2024-01-23 NOTE — TELEPHONE ENCOUNTER
No care due was identified.  Health [facility] Embedded Care Due Messages. Reference number: [id number].   1/23/2024 11:47:52 AM CST

## 2024-02-16 ENCOUNTER — LAB VISIT (OUTPATIENT)
Dept: LAB | Facility: HOSPITAL | Age: 69
End: 2024-02-16
Attending: NURSE PRACTITIONER
Payer: MEDICARE

## 2024-02-16 DIAGNOSIS — E11.9 CONTROLLED TYPE 2 DIABETES MELLITUS WITHOUT COMPLICATION, WITHOUT LONG-TERM CURRENT USE OF INSULIN: ICD-10-CM

## 2024-02-16 LAB
ESTIMATED AVG GLUCOSE: 134 MG/DL (ref 68–131)
HBA1C MFR BLD: 6.3 % (ref 4–5.6)

## 2024-02-16 PROCEDURE — 36415 COLL VENOUS BLD VENIPUNCTURE: CPT | Performed by: NURSE PRACTITIONER

## 2024-02-16 PROCEDURE — 83036 HEMOGLOBIN GLYCOSYLATED A1C: CPT | Performed by: NURSE PRACTITIONER

## 2024-02-23 ENCOUNTER — OFFICE VISIT (OUTPATIENT)
Dept: PAIN MEDICINE | Facility: CLINIC | Age: 69
End: 2024-02-23
Payer: MEDICARE

## 2024-02-23 ENCOUNTER — OFFICE VISIT (OUTPATIENT)
Dept: ENDOCRINOLOGY | Facility: CLINIC | Age: 69
End: 2024-02-23
Payer: MEDICARE

## 2024-02-23 VITALS
DIASTOLIC BLOOD PRESSURE: 78 MMHG | HEART RATE: 72 BPM | WEIGHT: 220 LBS | BODY MASS INDEX: 28.23 KG/M2 | HEIGHT: 74 IN | OXYGEN SATURATION: 99 % | SYSTOLIC BLOOD PRESSURE: 142 MMHG | RESPIRATION RATE: 18 BRPM

## 2024-02-23 VITALS
OXYGEN SATURATION: 99 % | SYSTOLIC BLOOD PRESSURE: 136 MMHG | RESPIRATION RATE: 18 BRPM | DIASTOLIC BLOOD PRESSURE: 74 MMHG | HEART RATE: 75 BPM

## 2024-02-23 DIAGNOSIS — M54.9 DORSALGIA, UNSPECIFIED: ICD-10-CM

## 2024-02-23 DIAGNOSIS — R80.9 CONTROLLED TYPE 2 DIABETES MELLITUS WITH MICROALBUMINURIA, WITHOUT LONG-TERM CURRENT USE OF INSULIN: Primary | ICD-10-CM

## 2024-02-23 DIAGNOSIS — E78.5 HYPERLIPIDEMIA, UNSPECIFIED HYPERLIPIDEMIA TYPE: ICD-10-CM

## 2024-02-23 DIAGNOSIS — M47.816 LUMBAR SPONDYLOSIS: ICD-10-CM

## 2024-02-23 DIAGNOSIS — M51.36 DDD (DEGENERATIVE DISC DISEASE), LUMBAR: Primary | ICD-10-CM

## 2024-02-23 DIAGNOSIS — E11.29 CONTROLLED TYPE 2 DIABETES MELLITUS WITH MICROALBUMINURIA, WITHOUT LONG-TERM CURRENT USE OF INSULIN: Primary | ICD-10-CM

## 2024-02-23 PROCEDURE — 99999 PR PBB SHADOW E&M-EST. PATIENT-LVL IV: CPT | Mod: PBBFAC,,, | Performed by: NURSE PRACTITIONER

## 2024-02-23 PROCEDURE — 99999 PR PBB SHADOW E&M-EST. PATIENT-LVL IV: CPT | Mod: PBBFAC,,, | Performed by: PAIN MEDICINE

## 2024-02-23 PROCEDURE — 99214 OFFICE O/P EST MOD 30 MIN: CPT | Mod: S$GLB,,, | Performed by: NURSE PRACTITIONER

## 2024-02-23 PROCEDURE — 99214 OFFICE O/P EST MOD 30 MIN: CPT | Mod: S$GLB,,, | Performed by: PAIN MEDICINE

## 2024-02-23 PROCEDURE — 95251 CONT GLUC MNTR ANALYSIS I&R: CPT | Mod: S$GLB,,, | Performed by: NURSE PRACTITIONER

## 2024-02-23 RX ORDER — INSULIN GLARGINE 300 U/ML
INJECTION, SOLUTION SUBCUTANEOUS
Qty: 3 PEN | Refills: 5 | Status: SHIPPED | OUTPATIENT
Start: 2024-02-23

## 2024-02-23 RX ORDER — METFORMIN HYDROCHLORIDE 1000 MG/1
1000 TABLET ORAL 2 TIMES DAILY
Qty: 180 TABLET | Refills: 2 | Status: SHIPPED | OUTPATIENT
Start: 2024-02-23

## 2024-02-23 NOTE — PROGRESS NOTES
Subjective:     Patient ID: Gerson Phillips III is a 68 y.o. male    Chief Complaint: Low-back Pain (Right sided constant achy pain )      Referred by: No ref. provider found      HPI:    Interval History (2/23/24):  He returns today for follow up.  He reports that he continues to have midline/bilateral low back pain.  Pain is focused in the midline lower lumbar region.  Will radiate somewhat to the bilateral lumbar paraspinal regions but is worse on the right side.  Pain will also extend to the right lateral hip.  Pain does not extend distally to this.  Pain is constant and worsened with activity.  He did attend outpatient physical therapy.  He attended roughly 10 sessions without patient physical therapy and has continued a regular home exercise program.  He does not find this has improved his pain and may have even worsened it.  Overall he denies any significant changes in the quality or location was pain recently.  Denies any new symptoms.        Interval History PA (08/25/2023):  Patient returns to clinic for follow up.  Patient notes worsening of chronic bilateral lower back pain over the past few months.  States issue has been present for over 20+ years.  Previously well managed with conservative measures and medications.  Notes pain is located across his lower lumbar paraspinal region, also with midline lower lumbar pain.  Occasionally pain radiating into his right lateral hip otherwise denies any radiation into his lower extremities.  Denies any numbness, tingling, weakness, bowel or bladder dysfunction.  States pain is constant and achy.  Worsened with certain activities.  Also worse in the morning when getting out of bed, associated with stiffness.  Recently has followed up with his primary care physician who increase his gabapentin dosage.  Notes that he plans on picking this prescription to the pharmacy later today.    Interval History (1/13/22):  He returns today for follow up.  He reports that diclofenac  prescribed by his primary care physician has been very helpful for the bilateral low back pain.  He states that he still has some pain but the diclofenac controlled very well.  He was scheduled to start physical therapy, but his visit was canceled and he was never contacted to reschedule.  Otherwise he denies any changes in the quality or location was pain.  He denies any new worsening symptoms.      Initial Encounter (10/21/21):  Gerson Phillips III is a 68 y.o. male who presents today with chronic bilateral low back pain.  This pain has been present for over 20 years.  No specific inciting events or injuries noted.  The pain is located across the lower lumbar spine.  Pain does not radiate to lower extremities.  Patient does report some bilateral groin pain when ambulating or standing for prolonged periods of time.  He describes his pain as burning but is unsure if it was related to his back.  He denies any associated lower extremity numbness, tingling, weakness, bowel bladder dysfunction..   This pain is described in detail below.    Physical Therapy:  Yes.  Continues regular home exercise program.  Not effective.    Non-pharmacologic Treatment:  Rest helps         TENS?  No    Pain Medications:         Currently taking:  Lyrica    Has tried in the past:  NSAIDs, Tylenol    Has not tried:  Opioids, Muscle relaxants, TCAs, SNRIs, topical creams    Blood thinners:  None    Interventional Therapies:   7/13/13 - right L4 and L5 transforaminal epidural steroid injection - no significant relief noted    Relevant Surgeries:  None    Affecting sleep?  Yes    Affecting daily activities? yes    Depressive symptoms? no          SI/HI? No    Work status: Retired    Pain Scores:    Best:       8/10  Worst:     10/10  Usually:   8/10  Today:    8/10    Pain Disability Index  Family/Home Responsibilities:: 10  Recreation:: 9  Social Activity:: 9  Occupation:: 9  Sexual Behavior:: 8  Self Care:: 8  Life-Support Activities:: 9  Pain  Disability Index (PDI): 62    Review of Systems   Constitutional:  Negative for activity change, appetite change, chills, fatigue, fever and unexpected weight change.   HENT:  Negative for hearing loss.    Eyes:  Negative for visual disturbance.   Respiratory:  Negative for chest tightness and shortness of breath.    Cardiovascular:  Negative for chest pain.   Gastrointestinal:  Negative for abdominal pain, constipation, diarrhea, nausea and vomiting.   Genitourinary:  Negative for difficulty urinating.   Musculoskeletal:  Positive for arthralgias, back pain, gait problem and myalgias. Negative for neck pain.   Skin:  Negative for rash.   Neurological:  Negative for dizziness, weakness, light-headedness, numbness and headaches.   Psychiatric/Behavioral:  Positive for sleep disturbance. Negative for hallucinations and suicidal ideas. The patient is not nervous/anxious.        Past Medical History:   Diagnosis Date    Abnormal liver function tests 6/18/2013    Anemia 6/18/2013    Cervical radiculopathy 3/12/2013    CKD stage 3 due to type 2 diabetes mellitus 3/4/2016    Colon polyp     Diabetes mellitus type II, uncontrolled 10/18/2012    Diabetes mellitus with renal manifestations, uncontrolled 9/27/2013    History of colonic polyps 10/5/2015    HTN (hypertension) 10/18/2012    Hyperlipidemia 10/18/2012    Lateral epicondylitis 6/18/2013    Long-term insulin use 3/4/2016    Low back pain 1/29/2013    Microalbuminuria 3/30/2015    Nuclear sclerosis of both eyes 2/14/2020    Open-angle glaucoma     Primary open angle glaucoma (POAG) of both eyes, moderate stage 2/14/2020    Uncontrolled type 2 diabetes mellitus with proteinuria or albuminuria 3/30/2015    Uncontrolled type 2 diabetes mellitus with proteinuria or microalbuminuria 3/30/2015       Past Surgical History:   Procedure Laterality Date    COLONOSCOPY N/A 9/1/2017    Procedure: COLONOSCOPY;  Surgeon: Gopal Ny MD;  Location: [facility];  Service:  "Endoscopy;  Laterality: N/A;    COLONOSCOPY N/A 3/26/2018    Procedure: COLONOSCOPY;  Surgeon: Jamar Batista MD;  Location: [address];  Service: Endoscopy;  Laterality: N/A;       Social History     Socioeconomic History    Marital status:    Tobacco Use    Smoking status: Never     Passive exposure: Never    Smokeless tobacco: Never   Substance and Sexual Activity    Alcohol use: Yes     Alcohol/week: 4.0 - 5.0 standard drinks of alcohol     Types: 3 - 4 Cans of beer, 1 Standard drinks or equivalent per week    Drug use: Not Currently    Sexual activity: Yes     Partners: Female       Review of patient's allergies indicates:  No Known Allergies    Current Outpatient Medications on File Prior to Visit   Medication Sig Dispense Refill    atorvastatin (LIPITOR) 20 MG tablet Take 1 tablet (20 mg total) by mouth once daily. 90 tablet 3    blood sugar diagnostic Strp To check blood glucose three times daily, to use with insurance preferred meter 100 each 3    blood-glucose meter,continuous (DEXCOM G7 ) Misc Use with Dexcom G7 sensors 1 each 0    blood-glucose sensor (DEXCOM G7 SENSOR) Ashley Change every 10 days 12 each 3    diltiaZEM (CARDIZEM CD) 240 MG 24 hr capsule TAKE 1 CAPSULE BY MOUTH ONCE DAILY. 90 capsule 1    hydroCHLOROthiazide (HYDRODIURIL) 12.5 MG Tab TAKE 1 TABLET BY MOUTH EVERY DAY 90 tablet 3    insulin glargine, TOUJEO, (TOUJEO SOLOSTAR U-300 INSULIN) 300 unit/mL (1.5 mL) InPn pen Inject 24 units once daily 3 pen 5    lancets Misc To check blood glucose three times daily, to use with insurance preferred meter 100 each 3    lisinopriL (PRINIVIL,ZESTRIL) 40 MG tablet Take 1 tablet (40 mg total) by mouth once daily. 30 tablet 11    metFORMIN (GLUCOPHAGE) 1000 MG tablet Take 1 tablet (1,000 mg total) by mouth 2 (two) times daily. 180 tablet 2    pen needle, diabetic (BD ULTRA-FINE FORREST PEN NEEDLE) 32 gauge x 5/32" Ndle USE 1X/ each 3    pregabalin (LYRICA) 25 MG capsule " TAKE 1 CAPSULE (25 MG TOTAL) BY MOUTH 2 (TWO) TIMES DAILY AS NEEDED (PAIN). 60 capsule 5    semaglutide (OZEMPIC) 2 mg/dose (8 mg/3 mL) PnIj Inject 2 mg into the skin every 7 days. 3 mL 11    amoxicillin (AMOXIL) 500 MG capsule Take 500 mg by mouth every 6 (six) hours.      blood-glucose meter kit To check blood glucose three times daily, to use with insurance preferred meter 1 each 0    latanoprost 0.005 % ophthalmic solution Place 1 drop into both eyes every evening. 7.5 mL 2    [DISCONTINUED] insulin glargine, TOUJEO, (TOUJEO SOLOSTAR U-300 INSULIN) 300 unit/mL (1.5 mL) InPn pen Inject 24 units once daily 3 pen 5    [DISCONTINUED] metFORMIN (GLUCOPHAGE) 1000 MG tablet Take 1 tablet (1,000 mg total) by mouth 2 (two) times daily. 180 tablet 3     Current Facility-Administered Medications on File Prior to Visit   Medication Dose Route Frequency Provider Last Rate Last Admin    LIDOcaine 2%/EPINEPHrine 1:100,000 injection 2 mL  2 mL Intradermal 1 time in Clinic/HOD Andrew Busch MD           Objective:      /74 (BP Location: Right arm, Patient Position: Sitting, BP Method: Medium (Automatic))   Pulse 75   Resp 18   SpO2 99%     Exam:  GEN:  Well developed, well nourished.  No acute distress.  Normal pain behavior.  HEENT:  No trauma.  Mucous membranes moist.  Nares patent bilaterally.  PSYCH: Normal affect. Thought content appropriate.  CHEST:  Breathing symmetric.  No audible wheezing.  ABD: Soft, non-distended.  SKIN:  Warm, pink, dry.  No rash on exposed areas.    EXT:  No cyanosis, clubbing, or edema.  No color change or changes in nail or hair growth.  NEURO/MUSCULOSKELETAL:  Fully alert, oriented, and appropriate. Speech normal chip. No cranial nerve deficits.   Gait:  Normal.  No trendelenburg sign bilaterally.   Motor Strength:  5/5 motor strength throughout lower extremities.   Sensory:  No sensory deficit in the lower extremities.   Reflexes:  1+ and symmetric patellar DTRs.  Unable to  elicit bilateral Achilles DTRs.  No clonus or spasticity.  L-Spine:  Full ROM with pain on flexion more than extension.  Positive pain with axial/facet loading bilaterally.  Negative SLR bilaterally.    No TTP over lumbar paraspinals, bilateral SI joints, hips, piriformis muscles, or GTB.              Imaging:      Narrative & Impression    EXAMINATION:  MRI LUMBAR SPINE WITHOUT CONTRAST     CLINICAL HISTORY:  lumbar DDD; Other intervertebral disc degeneration, lumbar region     TECHNIQUE:  Multiplanar, multisequence MR images were acquired from the thoracolumbar junction to the sacrum without contrast.     COMPARISON:  09/17/2021     FINDINGS:  Alignment: Normal.     Vertebrae: Degenerative endplate changes are seen in the lower lumbar spine.  No fracture or marrow infiltrative process.     Discs: Partial fusion across the L2-L3 disc space.  Severe disc height loss at L5-S1, mild at L4-L5.  No evidence for discitis.     Cord: Normal.  Conus terminates at L1.     Degenerative findings:     T12-L1: No spinal canal stenosis or neural foraminal narrowing.     L1-L2: No spinal canal stenosis or neural foraminal narrowing.     L2-L3: No spinal canal stenosis or neural foraminal narrowing.     L3-L4: Circumferential disc bulge and mild facet arthropathy result in mild left neural foraminal narrowing.     L4-L5: Circumferential disc bulge and moderate facet arthropathy result in mild effacement of the thecal sac and moderate right, mild left neural foraminal narrowing.     L5-S1: Circumferential disc bulge and mild facet arthropathy result in mild right neural foraminal narrowing.     Paraspinal muscles & soft tissues: Moderate paraspinal muscle atrophy.     Impression:     1. Multilevel degenerative changes of the lumbar spine.  Moderate right neural foraminal narrowing noted at L4-L5.        Electronically signed by: Ezra Vences MD  Date:                                            11/18/2021  Time:                                            10:35           Narrative & Impression    EXAMINATION:  XR HIPS BILATERAL 2 VIEW INCL AP PELVIS     CLINICAL HISTORY:  Lumbago with sciatica, left side     TECHNIQUE:  AP view of the pelvis and frogleg lateral views of both hips were performed.     COMPARISON:  None.     FINDINGS:  Mild bilateral hip joint space narrowing.     No significant osteophyte formation, sclerosis or geodes.     Normal bilateral femoral head contour.     The bilateral sacroiliac joints appear within normal limits.     No fracture, no osseous lesions.     Atherosclerotic changes of the aorta and its branches.     The soft tissues appear normal.     Impression:     Mild degenerative changes of the bilateral hip joints.        Electronically signed by: Briana Marquez MD  Date:                                            09/17/2021  Time:                                           12:36         Narrative & Impression    EXAMINATION:  XR LUMBAR SPINE COMPLETE 5 VIEW     CLINICAL HISTORY:  Back pain or radiculopathy, > 6 wks;Dorsalgia, unspecified     TECHNIQUE:  AP, lateral, spot and bilateral oblique views of the lumbar spine were performed.     COMPARISON:  09/03/2015     FINDINGS:  Mild dextroscoliosis of the lumbar spine.     The vertebral body heights are well maintained.     Mild disc space narrowing L5-S1.     The oblique views demonstrate no evidence of spondylolysis.     The bilateral sacroiliac joints appear symmetrical on the AP view.     Prominent right lateral bridging osteophyte at L1-L2 and L2-L3.     The facet joints appear within normal limits.     Impression:     Spondylosis of the lumbar spine. No acute process seen.        Electronically signed by: Briana Marquez MD  Date:                                            09/17/2021  Time:                                           12:34         Assessment:       Encounter Diagnoses   Name Primary?    DDD (degenerative disc disease), lumbar Yes     Lumbar spondylosis     Dorsalgia, unspecified      Plan:       Gerson was seen today for low-back pain.    Diagnoses and all orders for this visit:    DDD (degenerative disc disease), lumbar  -     MRI Lumbar Spine Without Contrast; Future    Lumbar spondylosis  -     MRI Lumbar Spine Without Contrast; Future    Dorsalgia, unspecified  -     MRI Lumbar Spine Without Contrast; Future      [place] Alan ELENA is a 68 y.o. male with chronic bilateral low back pain.  Exact etiology somewhat unclear though does appear to be mostly axial and likely related to lower lumbar facet joints.  Does have multilevel degenerative disc disease and may have some pain related to spinal stenosis as well.  Patient does report right lateral hip pain, does have degeneration noted on bilateral hip x-rays.  No hip pain reproduced with internal rotation.    Pertinent imaging studies reviewed by me. Imaging results were discussed with patient.  Lumbar MRI to get updated imaging prior to considering interventional procedures.  Return to clinic after MRI.  At that time we will review MRI results and consider interventional procedures.  Possibly lumbar medial branch blocks/RFA..     This note was created by combination of typed  and M-Modal dictation.  Transcription and phonetic errors may be present.  If there are any questions, please contact me.

## 2024-02-23 NOTE — PROGRESS NOTES
CC: This 68 y.o. Black or  male  is here for evaluation of  T2DM along with comorbidities indicated in the Visit Diagnosis section of this encounter.    HPI: Gerson Phillips III was diagnosed with T2DM  > 20 years ago.   Previously followed by Dr. Hoskins with lov in (9/2017).       Prior visit 10/2023  No recent a1c available.   Plan A1c today. Pt will check result online.   Start Dexcom G7. See Diabetes Education for training.   Maintain healthy eating habits.   Return to clinic in 4 months with a1c prior.       Interval hx  A1c is down form 7.2% in October to now 6.3%.   Ozempic was increased to 2 mg in Dec by his PCP. Tolerating it well. He has lost 5 lb since increasing Ozempic dose and a total of 10 lb since lov in October.   He has started Dexcom CGM as well.         LAST DIABETES EDUCATION: 11/24/23 for Dexcom training     DIABETES MEDICATIONS:   metformin 1000 mg bid with food  Toujeo 24 units daily   Ozempic 2 mg once weekly Saturdays     Misses medication doses - skips Toujeo about 1x/week when bedtime BG is in the 70s.     Rotates injection sites - abdomen   Changes pen needles - yes     DM COMPLICATIONS: nephropathy and impotence    SIGNIFICANT DIABETES MED HISTORY:   Unable to afford Victoza   Vgo stopped d/t ineffectiveness in 7/2018 and MDI (basalgar and novolog)  started   Novolog stopped and Ozempic started 12/2019.       SELF MONITORING BLOOD GLUCOSE: Dexcom G7 kvng     CGM interpretation: BGs overall at goal, minimal fluctuation. A few days with postprandial excursions especially around breakfast, pt unable to recall details why. One morning with BGs in the 70s          HYPOGLYCEMIC EPISODES: infrequent - no symptoms with FBG of 70s.       CURRENT DIET: drinks Coke Zero and water. Eats 2 meals/day - no lunch but will eat a snack like fruit. Brings lunch to work or eats out.   CURRENT EXERCISE: none     OCCUPATION:  Long distance       BP (!) 142/78   Pulse 72   Resp 18   " Ht 6' 2" (1.88 m)   Wt 99.8 kg (220 lb)   SpO2 99%   BMI 28.25 kg/m²       ROS:   CONSTITUTIONAL: Appetite low, no fatigue  GI: denies constipation, diarrhea, nausea       PHYSICAL EXAM:  GENERAL: Well developed, well nourished. No acute distress.   PSYCH: AAOx3, appropriate mood and affect, conversant, well-groomed. Judgement and insight good.   NEURO: Cranial nerves grossly intact. Speech clear, no tremor.   CHEST: Respirations even and unlabored.         Hemoglobin A1C   Date Value Ref Range Status   02/16/2024 6.3 (H) 4.0 - 5.6 % Final     Comment:     ADA Screening Guidelines:  5.7-6.4%  Consistent with prediabetes  >or=6.5%  Consistent with diabetes    High levels of fetal hemoglobin interfere with the HbA1C  assay. Heterozygous hemoglobin variants (HbS, HgC, etc)do  not significantly interfere with this assay.   However, presence of multiple variants may affect accuracy.     12/22/2023 6.8 (H) 4.0 - 5.6 % Final     Comment:     ADA Screening Guidelines:  5.7-6.4%  Consistent with prediabetes  >or=6.5%  Consistent with diabetes    High levels of fetal hemoglobin interfere with the HbA1C  assay. Heterozygous hemoglobin variants (HbS, HgC, etc)do  not significantly interfere with this assay.   However, presence of multiple variants may affect accuracy.     10/20/2023 7.2 (H) 4.0 - 5.6 % Final     Comment:     ADA Screening Guidelines:  5.7-6.4%  Consistent with prediabetes  >or=6.5%  Consistent with diabetes    High levels of fetal hemoglobin interfere with the HbA1C  assay. Heterozygous hemoglobin variants (HbS, HgC, etc)do  not significantly interfere with this assay.   However, presence of multiple variants may affect accuracy.             Component Value Date/Time     (L) 12/22/2023 0804    K 3.6 12/22/2023 0804     12/22/2023 0804    CO2 26 12/22/2023 0804    BUN 13 12/22/2023 0804    CREATININE 1.1 12/22/2023 0804    GLU 79 12/22/2023 0804    CALCIUM 9.0 12/22/2023 0804    ALKPHOS 78 " 12/22/2023 0804    AST 18 12/22/2023 0804    ALT 21 12/22/2023 0804    BILITOT 0.4 12/22/2023 0804    EGFRNORACEVR >60.0 12/22/2023 0804    ESTGFRAFRICA >60.0 04/22/2022 0722       Lab Results   Component Value Date    CHOL 106 (L) 12/22/2023    CHOL 107 (L) 06/09/2023    CHOL 107 (L) 06/09/2023     Lab Results   Component Value Date    HDL 47 12/22/2023    HDL 50 06/09/2023    HDL 50 06/09/2023     Lab Results   Component Value Date    LDLCALC 51.2 (L) 12/22/2023    LDLCALC 48.2 (L) 06/09/2023    LDLCALC 48.2 (L) 06/09/2023     Lab Results   Component Value Date    TRIG 39 12/22/2023    TRIG 44 06/09/2023    TRIG 44 06/09/2023       Lab Results   Component Value Date    CHOLHDL 44.3 12/22/2023    CHOLHDL 46.7 06/09/2023    CHOLHDL 46.7 06/09/2023         Lab Results   Component Value Date    MICALBCREAT 94.1 (H) 12/22/2023               ASSESSMENT and PLAN:    A1C GOAL: < 7%      1. Controlled type 2 diabetes mellitus with microalbuminuria, without long-term current use of insulin  Reduce Toujeo fro 24 to 20 units.   Continue Ozempic 2 mg and metformin.   Maintain healthy eating habits.   Return to clinic in 6 months with A1c prior.   A1c in 3 months.     insulin glargine, TOUJEO, (TOUJEO SOLOSTAR U-300 INSULIN) 300 unit/mL (1.5 mL) InPn pen    metFORMIN (GLUCOPHAGE) 1000 MG tablet    Hemoglobin A1C      2. Hyperlipidemia, unspecified hyperlipidemia type  Continue atorvastatin                 Orders Placed This Encounter   Procedures    Hemoglobin A1C     Standing Status:   Standing     Number of Occurrences:   2     Standing Expiration Date:   4/23/2025        Follow up in about 6 months (around 8/23/2024).

## 2024-02-23 NOTE — PATIENT INSTRUCTIONS
Reduce Toujeo fro 24 to 20 units.   Continue Ozempic 2 mg and metformin.   Maintain healthy eating habits.   Return to clinic in 6 months with A1c prior.   A1c in 3 months

## 2024-03-04 ENCOUNTER — PATIENT MESSAGE (OUTPATIENT)
Dept: ADMINISTRATIVE | Facility: HOSPITAL | Age: 69
End: 2024-03-04
Payer: MEDICARE

## 2024-03-04 DIAGNOSIS — N28.1 RENAL CYST: Primary | ICD-10-CM

## 2024-03-04 NOTE — TELEPHONE ENCOUNTER
Good Afternoon Dr. Chester!    Please review the patient's MyChart message and advise.    Thanks in advance,  Neva

## 2024-03-08 ENCOUNTER — TELEPHONE (OUTPATIENT)
Dept: FAMILY MEDICINE | Facility: CLINIC | Age: 69
End: 2024-03-08
Payer: MEDICARE

## 2024-03-08 ENCOUNTER — OFFICE VISIT (OUTPATIENT)
Dept: PAIN MEDICINE | Facility: CLINIC | Age: 69
End: 2024-03-08
Payer: MEDICARE

## 2024-03-08 VITALS — SYSTOLIC BLOOD PRESSURE: 144 MMHG | HEART RATE: 65 BPM | DIASTOLIC BLOOD PRESSURE: 69 MMHG | OXYGEN SATURATION: 98 %

## 2024-03-08 DIAGNOSIS — M54.9 DORSALGIA, UNSPECIFIED: ICD-10-CM

## 2024-03-08 DIAGNOSIS — M51.36 DDD (DEGENERATIVE DISC DISEASE), LUMBAR: ICD-10-CM

## 2024-03-08 DIAGNOSIS — M47.816 LUMBAR SPONDYLOSIS: Primary | ICD-10-CM

## 2024-03-08 PROCEDURE — 99999 PR PBB SHADOW E&M-EST. PATIENT-LVL III: CPT | Mod: PBBFAC,,,

## 2024-03-08 PROCEDURE — 99214 OFFICE O/P EST MOD 30 MIN: CPT | Mod: S$GLB,,,

## 2024-03-08 NOTE — TELEPHONE ENCOUNTER
----- Message from Alisa Chester DO sent at 3/8/2024  1:23 PM CST -----  Regarding: US Result  Please inform patient that his US shows a benign cyst on the R kidney and an incidental finding of an enlarged prostate. No further management from me is required. However, if he has any urinary symptoms from the enlarged prostate, then I recommend to follow up with his Urologist.

## 2024-03-08 NOTE — PROGRESS NOTES
Mr. Phillips will be scheduled for bilat L3-5 MBB #1 on 3/22/24.  Reviewed the pre-procedure instructions listed below with the patient- copy also provided.  Instructed patient to notify clinic if he begins feeling ill, runs a fever, is prescribed antibiotics, and/or has any outpatient procedures (colonoscopy, endoscopy, OBGYN, dental work, etc.), and/or any vaccinations or booster shots within the two weeks leading up to this procedure.  Instructed patient to report to [facility], 2nd Floor Endoscopy Registration Desk.  All questions answered- patient verbalized understanding.      Day of Procedure  Ensure you have obtained an arrival time from the Pain Management Staff  You will be contacted the week before your scheduled date to review these instructions and receive your arrival time.  Please check any voicemails received.  If you arrive past your scheduled procedure time, you may be asked to reschedule your procedure.     Ensure you have a  with you.  If you arrive without a responsible adult to stay with you and drive you home, you may be asked to reschedule your procedure.      Take all of your prescribed medications that you routinely take for blood pressure, heart medications, thyroid, cholesterol, etc.  You will be informed if blood thinners should be held.     This is NOT a fasting procedure, you may eat the day of your procedure.     Wear comfortable clothing (loose fitting pants).    You may wear glasses, dentures, contact lenses, and/or hearing aids.      Notify the nurse during the intake process if you are allergic to any medications, if you are diabetic, or if you are not feeling well        Diabetic Patients  Please check your blood sugar prior to coming in for your procedure.  If the value is greater than 200, contact the clinic [phone] as the procedure may need to be rescheduled.  The procedure may not be performed if your blood sugar is above 200 even after checking into  the hospital.        IF you are taking blood thinners, please make sure our staff is aware so that we can obtain clearance and have you hold the medication accordingly.

## 2024-03-08 NOTE — H&P (VIEW-ONLY)
Subjective:     Patient ID: Gerson Phillips III is a 68 y.o. male    Chief Complaint: Follow-up (MRI results)      Referred by: No ref. provider found      HPI:    Interval History PA (03/08/2024):  Patient returns to clinic for follow up of midline/bilateral lower back pain and MRI review.  Patient denies any changes in the quality or location of his pain.  Continues to note pain located in his midline lower lumbar spine radiating to his bilateral lumbar paraspinal region, worse on the right.  He does note some pain radiating from his midline lower lumbar spine to his right lateral hip, denies any radiation distal this point.  Denies any numbness, tingling, weakness, bowel or bladder dysfunction.  Notes pain is constant, achy, worsened with certain activities.  Also worsened in the morning when getting out of bed with associated stiffness.    Interval History (2/23/24):  He returns today for follow up.  He reports that he continues to have midline/bilateral low back pain.  Pain is focused in the midline lower lumbar region.  Will radiate somewhat to the bilateral lumbar paraspinal regions but is worse on the right side.  Pain will also extend to the right lateral hip.  Pain does not extend distally to this.  Pain is constant and worsened with activity.  He did attend outpatient physical therapy.  He attended roughly 10 sessions without patient physical therapy and has continued a regular home exercise program.  He does not find this has improved his pain and may have even worsened it.  Overall he denies any significant changes in the quality or location was pain recently.  Denies any new symptoms.        Interval History PA (08/25/2023):  Patient returns to clinic for follow up.  Patient notes worsening of chronic bilateral lower back pain over the past few months.  States issue has been present for over 20+ years.  Previously well managed with conservative measures and medications.  Notes pain is located across his  lower lumbar paraspinal region, also with midline lower lumbar pain.  Occasionally pain radiating into his right lateral hip otherwise denies any radiation into his lower extremities.  Denies any numbness, tingling, weakness, bowel or bladder dysfunction.  States pain is constant and achy.  Worsened with certain activities.  Also worse in the morning when getting out of bed, associated with stiffness.  Recently has followed up with his primary care physician who increase his gabapentin dosage.  Notes that he plans on picking this prescription to the pharmacy later today.    Interval History (1/13/22):  He returns today for follow up.  He reports that diclofenac prescribed by his primary care physician has been very helpful for the bilateral low back pain.  He states that he still has some pain but the diclofenac controlled very well.  He was scheduled to start physical therapy, but his visit was canceled and he was never contacted to reschedule.  Otherwise he denies any changes in the quality or location was pain.  He denies any new worsening symptoms.      Initial Encounter (10/21/21):  Gerson Phillips III is a 68 y.o. male who presents today with chronic bilateral low back pain.  This pain has been present for over 20 years.  No specific inciting events or injuries noted.  The pain is located across the lower lumbar spine.  Pain does not radiate to lower extremities.  Patient does report some bilateral groin pain when ambulating or standing for prolonged periods of time.  He describes his pain as burning but is unsure if it was related to his back.  He denies any associated lower extremity numbness, tingling, weakness, bowel bladder dysfunction..   This pain is described in detail below.    Physical Therapy:  Yes.  Continues regular home exercise program.  Not effective.    Non-pharmacologic Treatment:  Rest helps         TENS?  No    Pain Medications:         Currently taking:  Lyrica    Has tried in the past:   NSAIDs, Tylenol    Has not tried:  Opioids, Muscle relaxants, TCAs, SNRIs, topical creams    Blood thinners:  None    Interventional Therapies:   7/13/13 - right L4 and L5 transforaminal epidural steroid injection - no significant relief noted    Relevant Surgeries:  None    Affecting sleep?  Yes    Affecting daily activities? yes    Depressive symptoms? no          SI/HI? No    Work status: Retired    Pain Scores:    Best:       6/10  Worst:     9/10  Usually:   8/10  Today:    8/10    Pain Disability Index  Family/Home Responsibilities:: 7  Recreation:: 7  Social Activity:: 7  Occupation:: 8  Sexual Behavior:: 8  Self Care:: 7  Life-Support Activities:: 3  Pain Disability Index (PDI): 47    Review of Systems   Constitutional:  Negative for activity change, appetite change, chills, fatigue, fever and unexpected weight change.   HENT:  Negative for hearing loss.    Eyes:  Negative for visual disturbance.   Respiratory:  Negative for chest tightness and shortness of breath.    Cardiovascular:  Negative for chest pain.   Gastrointestinal:  Negative for abdominal pain, constipation, diarrhea, nausea and vomiting.   Genitourinary:  Negative for difficulty urinating.   Musculoskeletal:  Positive for arthralgias, back pain, gait problem and myalgias. Negative for neck pain.   Skin:  Negative for rash.   Neurological:  Negative for dizziness, weakness, light-headedness, numbness and headaches.   Psychiatric/Behavioral:  Positive for sleep disturbance. Negative for hallucinations and suicidal ideas. The patient is not nervous/anxious.        Past Medical History:   Diagnosis Date    Abnormal liver function tests 6/18/2013    Anemia 6/18/2013    Cervical radiculopathy 3/12/2013    CKD stage 3 due to type 2 diabetes mellitus 3/4/2016    Colon polyp     Diabetes mellitus type II, uncontrolled 10/18/2012    Diabetes mellitus with renal manifestations, uncontrolled 9/27/2013    History of colonic polyps 10/5/2015    HTN  (hypertension) 10/18/2012    Hyperlipidemia 10/18/2012    Lateral epicondylitis 6/18/2013    Long-term insulin use 3/4/2016    Low back pain 1/29/2013    Microalbuminuria 3/30/2015    Nuclear sclerosis of both eyes 2/14/2020    Open-angle glaucoma     Primary open angle glaucoma (POAG) of both eyes, moderate stage 2/14/2020    Uncontrolled type 2 diabetes mellitus with proteinuria or albuminuria 3/30/2015    Uncontrolled type 2 diabetes mellitus with proteinuria or microalbuminuria 3/30/2015       Past Surgical History:   Procedure Laterality Date    COLONOSCOPY N/A 9/1/2017    Procedure: COLONOSCOPY;  Surgeon: Gopal Ny MD;  Location: [facility] ENDO;  Service: Endoscopy;  Laterality: N/A;    COLONOSCOPY N/A 3/26/2018    Procedure: COLONOSCOPY;  Surgeon: Jamar Batista MD;  Location: [facility] ENDO ([facility] FLR);  Service: Endoscopy;  Laterality: N/A;       Social History     Socioeconomic History    Marital status:    Tobacco Use    Smoking status: Never     Passive exposure: Never    Smokeless tobacco: Never   Substance and Sexual Activity    Alcohol use: Yes     Alcohol/week: 4.0 - 5.0 standard drinks of alcohol     Types: 3 - 4 Cans of beer, 1 Standard drinks or equivalent per week    Drug use: Not Currently    Sexual activity: Yes     Partners: Female       Review of patient's allergies indicates:  No Known Allergies    Current Outpatient Medications on File Prior to Visit   Medication Sig Dispense Refill    amoxicillin (AMOXIL) 500 MG capsule Take 500 mg by mouth every 6 (six) hours.      atorvastatin (LIPITOR) 20 MG tablet Take 1 tablet (20 mg total) by mouth once daily. 90 tablet 3    blood sugar diagnostic Strp To check blood glucose three times daily, to use with insurance preferred meter 100 each 3    blood-glucose meter,continuous (DEXCOM G7 ) Misc Use with Dexcom G7 sensors 1 each 0    blood-glucose sensor (DEXCOM G7 SENSOR) Ashley Change every 10 days 12 each 3    diltiaZEM (CARDIZEM CD) 240  "MG 24 hr capsule TAKE 1 CAPSULE BY MOUTH ONCE DAILY. 90 capsule 1    hydroCHLOROthiazide (HYDRODIURIL) 12.5 MG Tab TAKE 1 TABLET BY MOUTH EVERY DAY 90 tablet 3    insulin glargine, TOUJEO, (TOUJEO SOLOSTAR U-300 INSULIN) 300 unit/mL (1.5 mL) InPn pen Inject 24 units once daily 3 pen 5    lancets Misc To check blood glucose three times daily, to use with insurance preferred meter 100 each 3    lisinopriL (PRINIVIL,ZESTRIL) 40 MG tablet Take 1 tablet (40 mg total) by mouth once daily. 30 tablet 11    metFORMIN (GLUCOPHAGE) 1000 MG tablet Take 1 tablet (1,000 mg total) by mouth 2 (two) times daily. 180 tablet 2    pen needle, diabetic (BD ULTRA-FINE FORREST PEN NEEDLE) 32 gauge x 5/32" Ndle USE 1X/ each 3    pregabalin (LYRICA) 25 MG capsule TAKE 1 CAPSULE (25 MG TOTAL) BY MOUTH 2 (TWO) TIMES DAILY AS NEEDED (PAIN). 60 capsule 5    semaglutide (OZEMPIC) 2 mg/dose (8 mg/3 mL) PnIj Inject 2 mg into the skin every 7 days. 3 mL 11    blood-glucose meter kit To check blood glucose three times daily, to use with insurance preferred meter 1 each 0    latanoprost 0.005 % ophthalmic solution Place 1 drop into both eyes every evening. 7.5 mL 2     Current Facility-Administered Medications on File Prior to Visit   Medication Dose Route Frequency Provider Last Rate Last Admin    LIDOcaine 2%/EPINEPHrine 1:100,000 injection 2 mL  2 mL Intradermal 1 time in Clinic/HOD Andrew Busch MD           Objective:      BP (!) 144/69 (BP Location: Right arm, Patient Position: Sitting, BP Method: Medium (Automatic))   Pulse 65   SpO2 98%     Exam:  GEN:  Well developed, well nourished.  No acute distress.  Normal pain behavior.  HEENT:  No trauma.  Mucous membranes moist.  Nares patent bilaterally.  PSYCH: Normal affect. Thought content appropriate.  CHEST:  Breathing symmetric.  No audible wheezing.  ABD: Soft, non-distended.  SKIN:  Warm, pink, dry.  No rash on exposed areas.    EXT:  No cyanosis, clubbing, or edema.  No color " change or changes in nail or hair growth.  NEURO/MUSCULOSKELETAL:  Fully alert, oriented, and appropriate. Speech normal chip. No cranial nerve deficits.   Gait:  Normal.  No trendelenburg sign bilaterally.   Motor Strength:  5/5 motor strength throughout lower extremities.   L-Spine:  Full ROM with pain on flexion and extension.  Positive pain with axial/facet loading bilaterally.  Negative SLR bilaterally.    No TTP over lumbar paraspinals, bilateral SI joints, hips, piriformis muscles, or GTB.              Imaging:  Narrative & Impression  EXAMINATION:  MRI LUMBAR SPINE WITHOUT CONTRAST     CLINICAL HISTORY:  Low back pain, symptoms persist with > 6wks conservative treatment; Other intervertebral disc degeneration, lumbar region     TECHNIQUE:  Sagittal T1, T2, stir and axial T1-T2 imaging of the lumbar spine without contrast.     COMPARISON:  11/18/2021     FINDINGS:  The lumbar sagittal alignment is similar to prior with trace grade 1 retrolisthesis of L3 on L4.  Continued degenerative disc disease with disc desiccation height loss and endplate degeneration most pronounced at L4/L5 and L5/S1 levels.  Vertebral body heights and contours relatively stable allowing for degenerative change without evidence for acute fracture..     The distal spinal cord and conus is normal in signal and contour tip of the conus approximates the inferior L1 level.     No aneurysmal dilatation of the visualized abdominal aorta.  There are few scattered small fluid signal foci in the kidneys bilaterally largest along the lower pole on the left measuring 1.3 cm suggestive for probable renal cysts.     T12/L1 through L1/L2: No significant disc bulge, central canal or neural foraminal stenosis.     L2/L3: No significant disc bulge central canal or neural foraminal stenosis.     L3/L4:Continued posterior disc osteophyte with ligamentum flavum hypertrophy and facet arthropathy without significant central canal stenosis with mild  moderate bilateral neural foraminal stenosis.     L4/L5:Continued posterior disc osteophyte with ligamentum flavum hypertrophy and facet arthropathy mild central canal narrowing with moderate right greater than left neural foraminal stenosis.     L5/S1: Posterior disc osteophyte with ligamentum flavum hypertrophy and facet arthropathy without significant central canal stenosis and mild moderate bilateral neural foraminal stenosis.     Impression:     Continued degenerative change lumbar spine most pronounced at L4/L5 with posterior disc osteophyte, uncovertebral joint hypertrophy and facet arthropathy with mild central canal stenosis and moderate bilateral neural foraminal stenosis right greater than left     Several small fluid signal foci within the visualized kidneys partially included in the study while nonspecific suggestive for probable bilateral renal cysts.  These could be further evaluated with dedicated renal imaging.     Please see above for additional details.        Electronically signed by: Asaf Alonso DO  Date:                                            03/01/2024  Time:                                           14:24    Narrative & Impression    EXAMINATION:  MRI LUMBAR SPINE WITHOUT CONTRAST     CLINICAL HISTORY:  lumbar DDD; Other intervertebral disc degeneration, lumbar region     TECHNIQUE:  Multiplanar, multisequence MR images were acquired from the thoracolumbar junction to the sacrum without contrast.     COMPARISON:  09/17/2021     FINDINGS:  Alignment: Normal.     Vertebrae: Degenerative endplate changes are seen in the lower lumbar spine.  No fracture or marrow infiltrative process.     Discs: Partial fusion across the L2-L3 disc space.  Severe disc height loss at L5-S1, mild at L4-L5.  No evidence for discitis.     Cord: Normal.  Conus terminates at L1.     Degenerative findings:     T12-L1: No spinal canal stenosis or neural foraminal narrowing.     L1-L2: No spinal canal stenosis or  neural foraminal narrowing.     L2-L3: No spinal canal stenosis or neural foraminal narrowing.     L3-L4: Circumferential disc bulge and mild facet arthropathy result in mild left neural foraminal narrowing.     L4-L5: Circumferential disc bulge and moderate facet arthropathy result in mild effacement of the thecal sac and moderate right, mild left neural foraminal narrowing.     L5-S1: Circumferential disc bulge and mild facet arthropathy result in mild right neural foraminal narrowing.     Paraspinal muscles & soft tissues: Moderate paraspinal muscle atrophy.     Impression:     1. Multilevel degenerative changes of the lumbar spine.  Moderate right neural foraminal narrowing noted at L4-L5.        Electronically signed by: Ezra Vences MD  Date:                                            11/18/2021  Time:                                           10:35           Narrative & Impression    EXAMINATION:  XR HIPS BILATERAL 2 VIEW INCL AP PELVIS     CLINICAL HISTORY:  Lumbago with sciatica, left side     TECHNIQUE:  AP view of the pelvis and frogleg lateral views of both hips were performed.     COMPARISON:  None.     FINDINGS:  Mild bilateral hip joint space narrowing.     No significant osteophyte formation, sclerosis or geodes.     Normal bilateral femoral head contour.     The bilateral sacroiliac joints appear within normal limits.     No fracture, no osseous lesions.     Atherosclerotic changes of the aorta and its branches.     The soft tissues appear normal.     Impression:     Mild degenerative changes of the bilateral hip joints.        Electronically signed by: Briana Marquez MD  Date:                                            09/17/2021  Time:                                           12:36         Narrative & Impression    EXAMINATION:  XR LUMBAR SPINE COMPLETE 5 VIEW     CLINICAL HISTORY:  Back pain or radiculopathy, > 6 wks;Dorsalgia, unspecified     TECHNIQUE:  AP, lateral, spot and bilateral  oblique views of the lumbar spine were performed.     COMPARISON:  09/03/2015     FINDINGS:  Mild dextroscoliosis of the lumbar spine.     The vertebral body heights are well maintained.     Mild disc space narrowing L5-S1.     The oblique views demonstrate no evidence of spondylolysis.     The bilateral sacroiliac joints appear symmetrical on the AP view.     Prominent right lateral bridging osteophyte at L1-L2 and L2-L3.     The facet joints appear within normal limits.     Impression:     Spondylosis of the lumbar spine. No acute process seen.        Electronically signed by: Briana Marquez MD  Date:                                            09/17/2021  Time:                                           12:34         Assessment:       Encounter Diagnoses   Name Primary?    Lumbar spondylosis Yes    DDD (degenerative disc disease), lumbar     Dorsalgia, unspecified        Plan:       Gerson was seen today for follow-up.    Diagnoses and all orders for this visit:    Lumbar spondylosis  -     Case Request Endoscopy: Bilateral L3, L4, L5 Medial Branch Blocks #1    DDD (degenerative disc disease), lumbar    Dorsalgia, unspecified        Gerson Phillips III is a 68 y.o. male with chronic bilateral low back pain.  Exact etiology somewhat unclear though does appear to be mostly axial and likely related to lower lumbar facet joints.  Lumbar MRI does show multilevel disc degeneration.  Most notable at L4-5 with significant facet joint arthropathy and to a lesser degree at L5-S1 and L3-4.  He does also appear to have moderate right-sided foraminal narrowing L4-5.  Patient does report right lateral hip pain in his noted to have degeneration noted bilateral hip x-ray.  No pain reproduced with internal or external rotation of hips.    Prior records reviewed.  Pertinent imaging studies reviewed by me. Imaging results were discussed with patient.  Schedule for bilateral L3, L4, L5 medial branch blocks.  If successful can  proceed with radiofrequency ablation.  Stressed the importance of maintaining regular home exercise program and being mindful of how they use their back throughout the day.  Patient expressed understanding and agreement.   Return to clinic 2 weeks postprocedure to discuss efficacy    Prince Jaffe PA-C  [facility]  Interventional Pain Management   03/08/2024    This note was created by combination of typed  and M-Modal dictation.  Transcription and phonetic errors may be present.  If there are any questions, please contact me.

## 2024-03-15 ENCOUNTER — TELEPHONE (OUTPATIENT)
Dept: PAIN MEDICINE | Facility: CLINIC | Age: 69
End: 2024-03-15
Payer: MEDICARE

## 2024-03-15 NOTE — TELEPHONE ENCOUNTER
Reviewed pre-procedure instructions with Mr. Phillips, as well as provided arrival time of 12:45p for 3/22/24.  All questions answered- verbalized understanding.

## 2024-03-19 NOTE — DISCHARGE INSTRUCTIONS
Lumbar/Cervical/Joint Medial Branch Block Pain Diary    Patient Name:  :    Pre-Procedure Pain Score: _____/10    Post-Procedure Pain Score:_____/10    Please fill out the chart below to the best of your ability. We need to know how much percentage of relief in your pain that you have while the numbing medication is active. Please be mindful that this is a diagnostic test and may not last for a long time. If you are asleep during one of the times listed below, you do not have to record that time.     Time After the   Procedure % of pain relief   achieved Pain Score (0-10)   1 hour post-procedure     2 hours post-procedure     3 hours post-procedure     4 hours post-procedure     5 hours post-procedure     6 hours post-procedure     12 hours post-procedure     24 hours post-procedure         The staff will be calling the day following your procedure to discuss your pain score post procedure and to answer any questions or concerns.    If you have any questions or concerns please call-  [phone]    2. If you have any questions about completing this diary, please call us at [phone]      Home Care Instructions Pain Management:    1. DIET:   You may resume your normal diet today.   2. BATHING:   You may shower with luke warm water.  3. DRESSING:   You may remove your bandage today.   4. ACTIVITY LEVEL:   You may resume your normal activities 24 hrs after your procedure.  5. MEDICATIONS:   You may resume your normal medications today.   6. SPECIAL INSTRUCTIONS:   No heat to the injection site for 24 hrs including, bath or shower, heating pad, moist heat, or hot tubs.    Use ice pack to injection site for any pain or discomfort.  Apply ice packs for 20 minute intervals as needed.   If you have received any sedatives by mouth today you may not drive for 12 hours.    If you have received any sedation through your IV, you may not drive for 24 hrs.     PLEASE CALL YOUR DOCTOR IF:  1. Redness or swelling around  the injection site.  2. Fever of 101 degrees  3. Drainage (pus) from the injection site.  4. For any continuous bleeding (some dried blood over the incision is normal.)    FOR EMERGENCIES:   If any unusual problems or difficulties occur during clinic hours, call [phone] or [phone].

## 2024-03-22 ENCOUNTER — HOSPITAL ENCOUNTER (OUTPATIENT)
Facility: HOSPITAL | Age: 69
Discharge: HOME | End: 2024-03-22
Attending: PAIN MEDICINE | Admitting: PAIN MEDICINE
Payer: MEDICARE

## 2024-03-22 VITALS
OXYGEN SATURATION: 98 % | RESPIRATION RATE: 16 BRPM | SYSTOLIC BLOOD PRESSURE: 158 MMHG | DIASTOLIC BLOOD PRESSURE: 77 MMHG | TEMPERATURE: 99 F | HEART RATE: 73 BPM

## 2024-03-22 DIAGNOSIS — M47.816 LUMBAR SPONDYLOSIS: Primary | ICD-10-CM

## 2024-03-22 PROCEDURE — 64493 INJ PARAVERT F JNT L/S 1 LEV: CPT | Mod: 50 | Performed by: PAIN MEDICINE

## 2024-03-22 PROCEDURE — 25000003 PHARM REV CODE 250: Performed by: PAIN MEDICINE

## 2024-03-22 PROCEDURE — 64494 INJ PARAVERT F JNT L/S 2 LEV: CPT | Mod: 50,,, | Performed by: PAIN MEDICINE

## 2024-03-22 PROCEDURE — 64494 INJ PARAVERT F JNT L/S 2 LEV: CPT | Mod: 50 | Performed by: PAIN MEDICINE

## 2024-03-22 PROCEDURE — 64493 INJ PARAVERT F JNT L/S 1 LEV: CPT | Mod: 50,,, | Performed by: PAIN MEDICINE

## 2024-03-22 PROCEDURE — 63600175 PHARM REV CODE 636 W HCPCS: Mod: JZ,JG | Performed by: PAIN MEDICINE

## 2024-03-22 RX ORDER — LIDOCAINE HYDROCHLORIDE 10 MG/ML
20 INJECTION INFILTRATION; PERINEURAL ONCE
Status: COMPLETED | OUTPATIENT
Start: 2024-03-22 | End: 2024-03-22

## 2024-03-22 RX ORDER — BUPIVACAINE HYDROCHLORIDE 2.5 MG/ML
10 INJECTION, SOLUTION EPIDURAL; INFILTRATION; INTRACAUDAL ONCE
Status: COMPLETED | OUTPATIENT
Start: 2024-03-22 | End: 2024-03-22

## 2024-03-22 NOTE — DISCHARGE SUMMARY
[facility] [place] - Pain Management  Discharge Note  Short Stay    Procedure(s) (LRB):  Bilateral L3, L4, L5 Medial Branch Blocks #1 (Bilateral)      OUTCOME: Patient tolerated treatment/procedure well without complication and is now ready for discharge.    DISPOSITION: Home or Self Care    FINAL DIAGNOSIS:  <principal problem not specified>    FOLLOWUP: In clinic    DISCHARGE INSTRUCTIONS:  No discharge procedures on file.       Discharge Diagnosis:Lumbar spondylosis [M47.816]  Condition on Discharge: Stable.  Diet on Discharge: Same as before.  Activity: as per instruction sheet.  Discharge to: Home with a responsible adult.  Follow up: as per Discharge instructions

## 2024-03-22 NOTE — OP NOTE
LUMBAR Medial Branch Block Under Fluoroscopy  Time-out taken to identify patient and procedure side prior to starting the procedure.   I attest that I have reviewed the patient's home medications prior to the procedure and no contraindication have been identified. I  re-evaluated the patient after the patient was positioned for the procedure in the procedure room immediately before the procedural time-out. The vital signs are current and represent the current state of the patient which has not significantly changed since the preprocedure assessment.            Date of Service: 03/22/2024    PCP: Alisa Chester DO          Referring Physician:                                                           PROCEDURE:  Bilateral  L3, L4 and L5 medial branch block    REASON FOR PROCEDURE: Lumbar Spondylosis    PHYSICIAN: Joshua Munoz MD    ASSISTANTS: None    MEDICATIONS INJECTED: Bupivicaine 0.25% 1.5ml at each level    LOCAL ANESTHETIC USED: Xylocaine 1% 3ml each site.    SEDATION MEDICATIONS: None    ESTIMATED BLOOD LOSS:  None.    COMPLICATIONS:  None.    TECHNIQUE: Laying in a prone position, the patient was prepped and draped in the usual sterile fashion using ChloraPrep and fenestrated drape.  The level was determined under fluoroscopic guidance.  Local anesthetic was given by going down to the hub of the 27-gauge 1.25in needle and raising a wheal.  A 25-gauge 4.75 inch needle was introduced to the anatomic site of the medial branch at the lateral mass utilizing live fluoroscopy. Medication was then injected slowly at each level as stated above. The patient tolerated the procedure well.       The patient was monitored after the procedure.  Patient was given post procedure and discharge instructions to follow at home.  We will see the patient back in two weeks or the patient may call to inform of status. The patient was discharged in a stable condition

## 2024-03-25 ENCOUNTER — TELEPHONE (OUTPATIENT)
Dept: PAIN MEDICINE | Facility: CLINIC | Age: 69
End: 2024-03-25
Payer: MEDICARE

## 2024-03-25 DIAGNOSIS — M47.816 LUMBAR SPONDYLOSIS: Primary | ICD-10-CM

## 2024-03-25 NOTE — TELEPHONE ENCOUNTER
Mr. Phillips reports 90% reduction in pain and improvement in ADLs following the bilat L3-5 MBB #1 performed Friday.  Pt reports pain 1/10 during the post-procedure time period. His pain has since returned to baseline 8/10.  He would like to proceed with the 2nd MBB on 4/12/24- provider updated.

## 2024-03-26 DIAGNOSIS — Z00.00 ENCOUNTER FOR MEDICARE ANNUAL WELLNESS EXAM: ICD-10-CM

## 2024-04-04 ENCOUNTER — TELEPHONE (OUTPATIENT)
Dept: PAIN MEDICINE | Facility: CLINIC | Age: 69
End: 2024-04-04
Payer: MEDICARE

## 2024-04-04 NOTE — TELEPHONE ENCOUNTER
Reviewed pre-procedure instructions with Mr. Phillips, as well as provided arrival time of 11:45a for 4/12/24.  All questions answered- verbalized understanding.

## 2024-04-11 NOTE — DISCHARGE INSTRUCTIONS
Lumbar/Cervical/Joint Medial Branch Block Pain Diary    Patient Name:  :    Pre-Procedure Pain Score: _____/10    Post-Procedure Pain Score:_____/10    Please fill out the chart below to the best of your ability. We need to know how much percentage of relief in your pain that you have while the numbing medication is active. Please be mindful that this is a diagnostic test and may not last for a long time. If you are asleep during one of the times listed below, you do not have to record that time.     Time After the   Procedure % of pain relief   achieved Pain Score (0-10)   1 hour post-procedure     2 hours post-procedure     3 hours post-procedure     4 hours post-procedure     5 hours post-procedure     6 hours post-procedure     12 hours post-procedure     24 hours post-procedure         The staff will be calling the day following your procedure to discuss your pain score post procedure and to answer any questions or concerns.    If you have any questions or concerns please call-  [phone]    2. If you have any questions about completing this diary, please call us at [phone]      Home Care Instructions Pain Management:    1. DIET:   You may resume your normal diet today.   2. BATHING:   You may shower with luke warm water.  3. DRESSING:   You may remove your bandage today.   4. ACTIVITY LEVEL:   You may resume your normal activities 24 hrs after your procedure.  5. MEDICATIONS:   You may resume your normal medications today.   6. SPECIAL INSTRUCTIONS:   No heat to the injection site for 24 hrs including, bath or shower, heating pad, moist heat, or hot tubs.    Use ice pack to injection site for any pain or discomfort.  Apply ice packs for 20 minute intervals as needed.   If you have received any sedatives by mouth today you may not drive for 12 hours.    If you have received any sedation through your IV, you may not drive for 24 hrs.     PLEASE CALL YOUR DOCTOR IF:  1. Redness or swelling around  the injection site.  2. Fever of 101 degrees  3. Drainage (pus) from the injection site.  4. For any continuous bleeding (some dried blood over the incision is normal.)    FOR EMERGENCIES:   If any unusual problems or difficulties occur during clinic hours, call [phone] or [phone].

## 2024-04-12 ENCOUNTER — HOSPITAL ENCOUNTER (OUTPATIENT)
Facility: HOSPITAL | Age: 69
Discharge: HOME | End: 2024-04-12
Attending: PAIN MEDICINE | Admitting: PAIN MEDICINE
Payer: MEDICARE

## 2024-04-12 VITALS
DIASTOLIC BLOOD PRESSURE: 82 MMHG | TEMPERATURE: 99 F | HEART RATE: 73 BPM | OXYGEN SATURATION: 99 % | RESPIRATION RATE: 16 BRPM | SYSTOLIC BLOOD PRESSURE: 165 MMHG

## 2024-04-12 DIAGNOSIS — M47.816 LUMBAR SPONDYLOSIS: Primary | ICD-10-CM

## 2024-04-12 PROCEDURE — 64493 INJ PARAVERT F JNT L/S 1 LEV: CPT | Mod: 50,,, | Performed by: PAIN MEDICINE

## 2024-04-12 PROCEDURE — 25000003 PHARM REV CODE 250: Performed by: PAIN MEDICINE

## 2024-04-12 PROCEDURE — 64493 INJ PARAVERT F JNT L/S 1 LEV: CPT | Mod: 50 | Performed by: PAIN MEDICINE

## 2024-04-12 PROCEDURE — 64494 INJ PARAVERT F JNT L/S 2 LEV: CPT | Mod: 50,,, | Performed by: PAIN MEDICINE

## 2024-04-12 PROCEDURE — 64494 INJ PARAVERT F JNT L/S 2 LEV: CPT | Mod: 50 | Performed by: PAIN MEDICINE

## 2024-04-12 PROCEDURE — 63600175 PHARM REV CODE 636 W HCPCS: Mod: JZ,JG | Performed by: PAIN MEDICINE

## 2024-04-12 RX ORDER — LIDOCAINE HYDROCHLORIDE 10 MG/ML
20 INJECTION INFILTRATION; PERINEURAL ONCE
Status: COMPLETED | OUTPATIENT
Start: 2024-04-12 | End: 2024-04-12

## 2024-04-12 RX ORDER — BUPIVACAINE HYDROCHLORIDE 2.5 MG/ML
10 INJECTION, SOLUTION EPIDURAL; INFILTRATION; INTRACAUDAL ONCE
Status: COMPLETED | OUTPATIENT
Start: 2024-04-12 | End: 2024-04-12

## 2024-04-12 RX ORDER — BUPIVACAINE HYDROCHLORIDE 2.5 MG/ML
INJECTION, SOLUTION EPIDURAL; INFILTRATION; INTRACAUDAL
Status: DISCONTINUED
Start: 2024-04-12 | End: 2024-04-12 | Stop reason: HOSPADM

## 2024-04-12 RX ORDER — LIDOCAINE HYDROCHLORIDE 10 MG/ML
INJECTION INFILTRATION; PERINEURAL
Status: DISCONTINUED
Start: 2024-04-12 | End: 2024-04-12 | Stop reason: HOSPADM

## 2024-04-12 NOTE — H&P (VIEW-ONLY)
Subjective:     Patient ID: Gerson Phillips III is a 68 y.o. male    Chief Complaint: Low back pain    Referred by: Prince Jaffe PA-C      HPI:    Gerson Phillisp III is a 68 y.o. male who presents today for b/l L3, L4 and L5 MBBs. She denies any changes in the quality or location of the pain.        Review of Systems   Constitutional:  Negative for activity change, appetite change, chills, fatigue, fever and unexpected weight change.   HENT:  Negative for hearing loss.    Eyes:  Negative for visual disturbance.   Respiratory:  Negative for chest tightness and shortness of breath.    Cardiovascular:  Negative for chest pain.   Gastrointestinal:  Negative for abdominal pain, constipation, diarrhea, nausea and vomiting.   Genitourinary:  Negative for difficulty urinating.   Musculoskeletal:  Positive for back pain, gait problem and myalgias. Negative for neck pain.   Skin:  Negative for rash.   Neurological:  Negative for dizziness, weakness, light-headedness, numbness and headaches.   Psychiatric/Behavioral:  Positive for sleep disturbance. Negative for hallucinations and suicidal ideas. The patient is not nervous/anxious.        Past Medical History:   Diagnosis Date    Abnormal liver function tests 6/18/2013    Anemia 6/18/2013    Cervical radiculopathy 3/12/2013    CKD stage 3 due to type 2 diabetes mellitus 3/4/2016    Colon polyp     Diabetes mellitus type II, uncontrolled 10/18/2012    Diabetes mellitus with renal manifestations, uncontrolled 9/27/2013    History of colonic polyps 10/5/2015    HTN (hypertension) 10/18/2012    Hyperlipidemia 10/18/2012    Lateral epicondylitis 6/18/2013    Long-term insulin use 3/4/2016    Low back pain 1/29/2013    Microalbuminuria 3/30/2015    Nuclear sclerosis of both eyes 2/14/2020    Open-angle glaucoma     Primary open angle glaucoma (POAG) of both eyes, moderate stage 2/14/2020    Uncontrolled type 2 diabetes mellitus with proteinuria or albuminuria 3/30/2015     Uncontrolled type 2 diabetes mellitus with proteinuria or microalbuminuria 3/30/2015       Past Surgical History:   Procedure Laterality Date    COLONOSCOPY N/A 9/1/2017    Procedure: COLONOSCOPY;  Surgeon: Gopal Ny MD;  Location: [facility] ENDO;  Service: Endoscopy;  Laterality: N/A;    COLONOSCOPY N/A 3/26/2018    Procedure: COLONOSCOPY;  Surgeon: Jamar Batista MD;  Location: [facility] ENDO (4TH FLR);  Service: Endoscopy;  Laterality: N/A;    INJECTION OF ANESTHETIC AGENT AROUND MEDIAL BRANCH NERVES INNERVATING LUMBAR FACET JOINT Bilateral 3/22/2024    Procedure: Bilateral L3, L4, L5 Medial Branch Blocks #1;  Surgeon: Joshua Munoz Jr., MD;  Location: [facility] PAIN MANAGEMENT;  Service: Pain Management;  Laterality: Bilateral;  @1245  No ATC  Check BG prior to RF  MD Sign.       Social History     Socioeconomic History    Marital status:    Tobacco Use    Smoking status: Never     Passive exposure: Never    Smokeless tobacco: Never   Substance and Sexual Activity    Alcohol use: Yes     Alcohol/week: 4.0 - 5.0 standard drinks of alcohol     Types: 3 - 4 Cans of beer, 1 Standard drinks or equivalent per week    Drug use: Not Currently    Sexual activity: Yes     Partners: Female       Review of patient's allergies indicates:  No Known Allergies    No current facility-administered medications on file prior to encounter.     Current Outpatient Medications on File Prior to Encounter   Medication Sig Dispense Refill    amoxicillin (AMOXIL) 500 MG capsule Take 500 mg by mouth every 6 (six) hours.      atorvastatin (LIPITOR) 20 MG tablet Take 1 tablet (20 mg total) by mouth once daily. 90 tablet 3    blood sugar diagnostic Strp To check blood glucose three times daily, to use with insurance preferred meter 100 each 3    blood-glucose meter kit To check blood glucose three times daily, to use with insurance preferred meter 1 each 0    blood-glucose meter,continuous (DEXCOM G7 ) Misc Use with Dexcom  "G7 sensors 1 each 0    blood-glucose sensor (DEXCOM G7 SENSOR) Ashley Change every 10 days 12 each 3    diltiaZEM (CARDIZEM CD) 240 MG 24 hr capsule TAKE 1 CAPSULE BY MOUTH ONCE DAILY. 90 capsule 1    hydroCHLOROthiazide (HYDRODIURIL) 12.5 MG Tab TAKE 1 TABLET BY MOUTH EVERY DAY 90 tablet 3    insulin glargine, TOUJEO, (TOUJEO SOLOSTAR U-300 INSULIN) 300 unit/mL (1.5 mL) InPn pen Inject 24 units once daily 3 pen 5    lancets Misc To check blood glucose three times daily, to use with insurance preferred meter 100 each 3    latanoprost 0.005 % ophthalmic solution Place 1 drop into both eyes every evening. 7.5 mL 2    lisinopriL (PRINIVIL,ZESTRIL) 40 MG tablet Take 1 tablet (40 mg total) by mouth once daily. 30 tablet 11    metFORMIN (GLUCOPHAGE) 1000 MG tablet Take 1 tablet (1,000 mg total) by mouth 2 (two) times daily. 180 tablet 2    pen needle, diabetic (BD ULTRA-FINE FORREST PEN NEEDLE) 32 gauge x 5/32" Ndle USE 1X/ each 3    pregabalin (LYRICA) 25 MG capsule TAKE 1 CAPSULE (25 MG TOTAL) BY MOUTH 2 (TWO) TIMES DAILY AS NEEDED (PAIN). 60 capsule 5    semaglutide (OZEMPIC) 2 mg/dose (8 mg/3 mL) PnIj Inject 2 mg into the skin every 7 days. 3 mL 11       Objective:      BP (!) 142/83   Pulse 73   Temp 98.6 °F (37 °C)   Resp 16   SpO2 96%     Exam:  AAOx3 NAD  Mood and affect normal  Memory and language intact  Breathing non labored      Assessment:       Lumbar spondylosis      Plan:         Gerson Phillips III is a 68 y.o. male with lumbar facet pain.    Proceed with MBBs as planned.     "

## 2024-04-12 NOTE — H&P
Subjective:     Patient ID: Gerson Phillips III is a 68 y.o. male    Chief Complaint: Low back pain    Referred by: Prince Jaffe PA-C      HPI:    Gerson Phillips III is a 68 y.o. male who presents today for b/l L3, L4 and L5 MBBs. She denies any changes in the quality or location of the pain.        Review of Systems   Constitutional:  Negative for activity change, appetite change, chills, fatigue, fever and unexpected weight change.   HENT:  Negative for hearing loss.    Eyes:  Negative for visual disturbance.   Respiratory:  Negative for chest tightness and shortness of breath.    Cardiovascular:  Negative for chest pain.   Gastrointestinal:  Negative for abdominal pain, constipation, diarrhea, nausea and vomiting.   Genitourinary:  Negative for difficulty urinating.   Musculoskeletal:  Positive for back pain, gait problem and myalgias. Negative for neck pain.   Skin:  Negative for rash.   Neurological:  Negative for dizziness, weakness, light-headedness, numbness and headaches.   Psychiatric/Behavioral:  Positive for sleep disturbance. Negative for hallucinations and suicidal ideas. The patient is not nervous/anxious.        Past Medical History:   Diagnosis Date    Abnormal liver function tests 6/18/2013    Anemia 6/18/2013    Cervical radiculopathy 3/12/2013    CKD stage 3 due to type 2 diabetes mellitus 3/4/2016    Colon polyp     Diabetes mellitus type II, uncontrolled 10/18/2012    Diabetes mellitus with renal manifestations, uncontrolled 9/27/2013    History of colonic polyps 10/5/2015    HTN (hypertension) 10/18/2012    Hyperlipidemia 10/18/2012    Lateral epicondylitis 6/18/2013    Long-term insulin use 3/4/2016    Low back pain 1/29/2013    Microalbuminuria 3/30/2015    Nuclear sclerosis of both eyes 2/14/2020    Open-angle glaucoma     Primary open angle glaucoma (POAG) of both eyes, moderate stage 2/14/2020    Uncontrolled type 2 diabetes mellitus with proteinuria or albuminuria 3/30/2015     Uncontrolled type 2 diabetes mellitus with proteinuria or microalbuminuria 3/30/2015       Past Surgical History:   Procedure Laterality Date    COLONOSCOPY N/A 9/1/2017    Procedure: COLONOSCOPY;  Surgeon: Gopal Ny MD;  Location: [facility] ENDO;  Service: Endoscopy;  Laterality: N/A;    COLONOSCOPY N/A 3/26/2018    Procedure: COLONOSCOPY;  Surgeon: Jamar Batista MD;  Location: [facility] ENDO (4TH FLR);  Service: Endoscopy;  Laterality: N/A;    INJECTION OF ANESTHETIC AGENT AROUND MEDIAL BRANCH NERVES INNERVATING LUMBAR FACET JOINT Bilateral 3/22/2024    Procedure: Bilateral L3, L4, L5 Medial Branch Blocks #1;  Surgeon: Joshua Munoz Jr., MD;  Location: [facility] PAIN MANAGEMENT;  Service: Pain Management;  Laterality: Bilateral;  @1245  No ATC  Check BG prior to RF  MD Sign.       Social History     Socioeconomic History    Marital status:    Tobacco Use    Smoking status: Never     Passive exposure: Never    Smokeless tobacco: Never   Substance and Sexual Activity    Alcohol use: Yes     Alcohol/week: 4.0 - 5.0 standard drinks of alcohol     Types: 3 - 4 Cans of beer, 1 Standard drinks or equivalent per week    Drug use: Not Currently    Sexual activity: Yes     Partners: Female       Review of patient's allergies indicates:  No Known Allergies    No current facility-administered medications on file prior to encounter.     Current Outpatient Medications on File Prior to Encounter   Medication Sig Dispense Refill    amoxicillin (AMOXIL) 500 MG capsule Take 500 mg by mouth every 6 (six) hours.      atorvastatin (LIPITOR) 20 MG tablet Take 1 tablet (20 mg total) by mouth once daily. 90 tablet 3    blood sugar diagnostic Strp To check blood glucose three times daily, to use with insurance preferred meter 100 each 3    blood-glucose meter kit To check blood glucose three times daily, to use with insurance preferred meter 1 each 0    blood-glucose meter,continuous (DEXCOM G7 ) Misc Use with Dexcom  "G7 sensors 1 each 0    blood-glucose sensor (DEXCOM G7 SENSOR) Ashley Change every 10 days 12 each 3    diltiaZEM (CARDIZEM CD) 240 MG 24 hr capsule TAKE 1 CAPSULE BY MOUTH ONCE DAILY. 90 capsule 1    hydroCHLOROthiazide (HYDRODIURIL) 12.5 MG Tab TAKE 1 TABLET BY MOUTH EVERY DAY 90 tablet 3    insulin glargine, TOUJEO, (TOUJEO SOLOSTAR U-300 INSULIN) 300 unit/mL (1.5 mL) InPn pen Inject 24 units once daily 3 pen 5    lancets Misc To check blood glucose three times daily, to use with insurance preferred meter 100 each 3    latanoprost 0.005 % ophthalmic solution Place 1 drop into both eyes every evening. 7.5 mL 2    lisinopriL (PRINIVIL,ZESTRIL) 40 MG tablet Take 1 tablet (40 mg total) by mouth once daily. 30 tablet 11    metFORMIN (GLUCOPHAGE) 1000 MG tablet Take 1 tablet (1,000 mg total) by mouth 2 (two) times daily. 180 tablet 2    pen needle, diabetic (BD ULTRA-FINE FORREST PEN NEEDLE) 32 gauge x 5/32" Ndle USE 1X/ each 3    pregabalin (LYRICA) 25 MG capsule TAKE 1 CAPSULE (25 MG TOTAL) BY MOUTH 2 (TWO) TIMES DAILY AS NEEDED (PAIN). 60 capsule 5    semaglutide (OZEMPIC) 2 mg/dose (8 mg/3 mL) PnIj Inject 2 mg into the skin every 7 days. 3 mL 11       Objective:      BP (!) 142/83   Pulse 73   Temp 98.6 °F (37 °C)   Resp 16   SpO2 96%     Exam:  AAOx3 NAD  Mood and affect normal  Memory and language intact  Breathing non labored      Assessment:       Lumbar spondylosis      Plan:         Gerson Phillips III is a 68 y.o. male with lumbar facet pain.    Proceed with MBBs as planned.     "

## 2024-04-12 NOTE — H&P (VIEW-ONLY)
Subjective:     Patient ID: Gerson Phillips III is a 68 y.o. male    Chief Complaint: Low back pain    Referred by: Prince Jaffe PA-C      HPI:    Gerson Phillips III is a 68 y.o. male who presents today for b/l L3, L4 and L5 MBBs. She denies any changes in the quality or location of the pain.        Review of Systems   Constitutional:  Negative for activity change, appetite change, chills, fatigue, fever and unexpected weight change.   HENT:  Negative for hearing loss.    Eyes:  Negative for visual disturbance.   Respiratory:  Negative for chest tightness and shortness of breath.    Cardiovascular:  Negative for chest pain.   Gastrointestinal:  Negative for abdominal pain, constipation, diarrhea, nausea and vomiting.   Genitourinary:  Negative for difficulty urinating.   Musculoskeletal:  Positive for back pain, gait problem and myalgias. Negative for neck pain.   Skin:  Negative for rash.   Neurological:  Negative for dizziness, weakness, light-headedness, numbness and headaches.   Psychiatric/Behavioral:  Positive for sleep disturbance. Negative for hallucinations and suicidal ideas. The patient is not nervous/anxious.        Past Medical History:   Diagnosis Date    Abnormal liver function tests 6/18/2013    Anemia 6/18/2013    Cervical radiculopathy 3/12/2013    CKD stage 3 due to type 2 diabetes mellitus 3/4/2016    Colon polyp     Diabetes mellitus type II, uncontrolled 10/18/2012    Diabetes mellitus with renal manifestations, uncontrolled 9/27/2013    History of colonic polyps 10/5/2015    HTN (hypertension) 10/18/2012    Hyperlipidemia 10/18/2012    Lateral epicondylitis 6/18/2013    Long-term insulin use 3/4/2016    Low back pain 1/29/2013    Microalbuminuria 3/30/2015    Nuclear sclerosis of both eyes 2/14/2020    Open-angle glaucoma     Primary open angle glaucoma (POAG) of both eyes, moderate stage 2/14/2020    Uncontrolled type 2 diabetes mellitus with proteinuria or albuminuria 3/30/2015     Uncontrolled type 2 diabetes mellitus with proteinuria or microalbuminuria 3/30/2015       Past Surgical History:   Procedure Laterality Date    COLONOSCOPY N/A 9/1/2017    Procedure: COLONOSCOPY;  Surgeon: Gopal Ny MD;  Location: [facility] ENDO;  Service: Endoscopy;  Laterality: N/A;    COLONOSCOPY N/A 3/26/2018    Procedure: COLONOSCOPY;  Surgeon: Jamar Batista MD;  Location: [facility] ENDO (4TH FLR);  Service: Endoscopy;  Laterality: N/A;    INJECTION OF ANESTHETIC AGENT AROUND MEDIAL BRANCH NERVES INNERVATING LUMBAR FACET JOINT Bilateral 3/22/2024    Procedure: Bilateral L3, L4, L5 Medial Branch Blocks #1;  Surgeon: Jsohua Munoz Jr., MD;  Location: [facility] PAIN MANAGEMENT;  Service: Pain Management;  Laterality: Bilateral;  @1245  No ATC  Check BG prior to RF  MD Sign.       Social History     Socioeconomic History    Marital status:    Tobacco Use    Smoking status: Never     Passive exposure: Never    Smokeless tobacco: Never   Substance and Sexual Activity    Alcohol use: Yes     Alcohol/week: 4.0 - 5.0 standard drinks of alcohol     Types: 3 - 4 Cans of beer, 1 Standard drinks or equivalent per week    Drug use: Not Currently    Sexual activity: Yes     Partners: Female       Review of patient's allergies indicates:  No Known Allergies    No current facility-administered medications on file prior to encounter.     Current Outpatient Medications on File Prior to Encounter   Medication Sig Dispense Refill    amoxicillin (AMOXIL) 500 MG capsule Take 500 mg by mouth every 6 (six) hours.      atorvastatin (LIPITOR) 20 MG tablet Take 1 tablet (20 mg total) by mouth once daily. 90 tablet 3    blood sugar diagnostic Strp To check blood glucose three times daily, to use with insurance preferred meter 100 each 3    blood-glucose meter kit To check blood glucose three times daily, to use with insurance preferred meter 1 each 0    blood-glucose meter,continuous (DEXCOM G7 ) Misc Use with Dexcom  "G7 sensors 1 each 0    blood-glucose sensor (DEXCOM G7 SENSOR) Ashley Change every 10 days 12 each 3    diltiaZEM (CARDIZEM CD) 240 MG 24 hr capsule TAKE 1 CAPSULE BY MOUTH ONCE DAILY. 90 capsule 1    hydroCHLOROthiazide (HYDRODIURIL) 12.5 MG Tab TAKE 1 TABLET BY MOUTH EVERY DAY 90 tablet 3    insulin glargine, TOUJEO, (TOUJEO SOLOSTAR U-300 INSULIN) 300 unit/mL (1.5 mL) InPn pen Inject 24 units once daily 3 pen 5    lancets Misc To check blood glucose three times daily, to use with insurance preferred meter 100 each 3    latanoprost 0.005 % ophthalmic solution Place 1 drop into both eyes every evening. 7.5 mL 2    lisinopriL (PRINIVIL,ZESTRIL) 40 MG tablet Take 1 tablet (40 mg total) by mouth once daily. 30 tablet 11    metFORMIN (GLUCOPHAGE) 1000 MG tablet Take 1 tablet (1,000 mg total) by mouth 2 (two) times daily. 180 tablet 2    pen needle, diabetic (BD ULTRA-FINE FORREST PEN NEEDLE) 32 gauge x 5/32" Ndle USE 1X/ each 3    pregabalin (LYRICA) 25 MG capsule TAKE 1 CAPSULE (25 MG TOTAL) BY MOUTH 2 (TWO) TIMES DAILY AS NEEDED (PAIN). 60 capsule 5    semaglutide (OZEMPIC) 2 mg/dose (8 mg/3 mL) PnIj Inject 2 mg into the skin every 7 days. 3 mL 11       Objective:      BP (!) 142/83   Pulse 73   Temp 98.6 °F (37 °C)   Resp 16   SpO2 96%     Exam:  AAOx3 NAD  Mood and affect normal  Memory and language intact  Breathing non labored      Assessment:       Lumbar spondylosis      Plan:         Gerson Phillips III is a 68 y.o. male with lumbar facet pain.    Proceed with MBBs as planned.     "

## 2024-04-12 NOTE — DISCHARGE SUMMARY
[facility] - Pain Management  Discharge Note  Short Stay    Procedure(s) (LRB):  Bilateral L3, L4, L5 Medial Branch Blocks #2 (Bilateral)      OUTCOME: Patient tolerated treatment/procedure well without complication and is now ready for discharge.    DISPOSITION: Home or Self Care    FINAL DIAGNOSIS:  <principal problem not specified>    FOLLOWUP: In clinic    DISCHARGE INSTRUCTIONS:  No discharge procedures on file.       Discharge Diagnosis:Lumbar spondylosis [M47.816]  Condition on Discharge: Stable.  Diet on Discharge: Same as before.  Activity: as per instruction sheet.  Discharge to: Home with a responsible adult.  Follow up: as per Discharge instructions

## 2024-04-15 ENCOUNTER — TELEPHONE (OUTPATIENT)
Dept: PAIN MEDICINE | Facility: CLINIC | Age: 69
End: 2024-04-15
Payer: MEDICARE

## 2024-04-15 DIAGNOSIS — M47.816 LUMBAR SPONDYLOSIS: Primary | ICD-10-CM

## 2024-04-15 NOTE — TELEPHONE ENCOUNTER
Mr. Phillips reports 90% reduction in pain and improvement in ADLs following the bilat L3-5 MBB #2 performed Friday.  Pt reports pain 1/10 during the post-procedure time period. His pain has since returned to baseline 8/10.  He would like to proceed with the left (4/26) and right (5/10) RFAs- provider updated.     Reviewed pre-procedure instructions with Mr. Phillips, as well as provided arrival time of 11:00a for 4/26/24.  All questions answered- verbalized understanding.

## 2024-04-18 ENCOUNTER — TELEPHONE (OUTPATIENT)
Dept: ADMINISTRATIVE | Facility: CLINIC | Age: 69
End: 2024-04-18
Payer: MEDICARE

## 2024-04-18 NOTE — TELEPHONE ENCOUNTER
Called pt, informed pt I was calling to remind pt of his in office EAWV on 4/19/24; clinic location provided to patient; pt confirmed appointment

## 2024-04-19 ENCOUNTER — OFFICE VISIT (OUTPATIENT)
Dept: FAMILY MEDICINE | Facility: CLINIC | Age: 69
End: 2024-04-19
Payer: MEDICARE

## 2024-04-19 VITALS
DIASTOLIC BLOOD PRESSURE: 82 MMHG | HEIGHT: 74 IN | OXYGEN SATURATION: 97 % | TEMPERATURE: 98 F | SYSTOLIC BLOOD PRESSURE: 150 MMHG | HEART RATE: 72 BPM | BODY MASS INDEX: 28.15 KG/M2 | WEIGHT: 219.38 LBS

## 2024-04-19 DIAGNOSIS — E11.22 CKD STAGE 3 DUE TO TYPE 2 DIABETES MELLITUS: ICD-10-CM

## 2024-04-19 DIAGNOSIS — E11.36 TYPE 2 DIABETES MELLITUS WITH DIABETIC CATARACT, WITH LONG-TERM CURRENT USE OF INSULIN: ICD-10-CM

## 2024-04-19 DIAGNOSIS — E11.22 TYPE 2 DIABETES MELLITUS WITH STAGE 3A CHRONIC KIDNEY DISEASE, WITH LONG-TERM CURRENT USE OF INSULIN: ICD-10-CM

## 2024-04-19 DIAGNOSIS — E11.59 DIABETES WITH CIRCULATORY DISORDER CAUSING ERECTILE DYSFUNCTION: ICD-10-CM

## 2024-04-19 DIAGNOSIS — M54.12 CERVICAL RADICULOPATHY: ICD-10-CM

## 2024-04-19 DIAGNOSIS — F39 MOOD DISORDER: ICD-10-CM

## 2024-04-19 DIAGNOSIS — Z79.4 TYPE 2 DIABETES MELLITUS WITH DIABETIC CATARACT, WITH LONG-TERM CURRENT USE OF INSULIN: ICD-10-CM

## 2024-04-19 DIAGNOSIS — R80.9 MICROALBUMINURIA: ICD-10-CM

## 2024-04-19 DIAGNOSIS — Z86.010 HISTORY OF COLONIC POLYPS: ICD-10-CM

## 2024-04-19 DIAGNOSIS — Z23 ENCOUNTER FOR VACCINATION: ICD-10-CM

## 2024-04-19 DIAGNOSIS — M77.10 LATERAL EPICONDYLITIS, UNSPECIFIED LATERALITY: ICD-10-CM

## 2024-04-19 DIAGNOSIS — I10 ESSENTIAL HYPERTENSION: ICD-10-CM

## 2024-04-19 DIAGNOSIS — Z79.4 LONG-TERM INSULIN USE: ICD-10-CM

## 2024-04-19 DIAGNOSIS — H25.13 NUCLEAR SCLEROSIS OF BOTH EYES: ICD-10-CM

## 2024-04-19 DIAGNOSIS — M51.36 DDD (DEGENERATIVE DISC DISEASE), LUMBAR: ICD-10-CM

## 2024-04-19 DIAGNOSIS — Z00.00 ENCOUNTER FOR PREVENTIVE HEALTH EXAMINATION: ICD-10-CM

## 2024-04-19 DIAGNOSIS — N52.1 DIABETES WITH CIRCULATORY DISORDER CAUSING ERECTILE DYSFUNCTION: ICD-10-CM

## 2024-04-19 DIAGNOSIS — N18.31 TYPE 2 DIABETES MELLITUS WITH STAGE 3A CHRONIC KIDNEY DISEASE, WITH LONG-TERM CURRENT USE OF INSULIN: ICD-10-CM

## 2024-04-19 DIAGNOSIS — G47.00 INSOMNIA, UNSPECIFIED TYPE: ICD-10-CM

## 2024-04-19 DIAGNOSIS — N18.30 CKD STAGE 3 DUE TO TYPE 2 DIABETES MELLITUS: ICD-10-CM

## 2024-04-19 DIAGNOSIS — M48.062 SPINAL STENOSIS OF LUMBAR REGION WITH NEUROGENIC CLAUDICATION: ICD-10-CM

## 2024-04-19 DIAGNOSIS — Z00.00 ENCOUNTER FOR MEDICARE ANNUAL WELLNESS EXAM: Primary | ICD-10-CM

## 2024-04-19 DIAGNOSIS — R79.89 ABNORMAL LIVER FUNCTION TESTS: ICD-10-CM

## 2024-04-19 DIAGNOSIS — H52.7 REFRACTIVE ERROR: ICD-10-CM

## 2024-04-19 DIAGNOSIS — M54.16 LUMBAR RADICULOPATHY: ICD-10-CM

## 2024-04-19 DIAGNOSIS — M47.816 LUMBAR SPONDYLOSIS: ICD-10-CM

## 2024-04-19 DIAGNOSIS — I70.0 AORTIC ATHEROSCLEROSIS: ICD-10-CM

## 2024-04-19 DIAGNOSIS — H40.1132 PRIMARY OPEN ANGLE GLAUCOMA (POAG) OF BOTH EYES, MODERATE STAGE: ICD-10-CM

## 2024-04-19 DIAGNOSIS — F32.A DEPRESSION, UNSPECIFIED DEPRESSION TYPE: ICD-10-CM

## 2024-04-19 DIAGNOSIS — Z79.4 TYPE 2 DIABETES MELLITUS WITH STAGE 3A CHRONIC KIDNEY DISEASE, WITH LONG-TERM CURRENT USE OF INSULIN: ICD-10-CM

## 2024-04-19 DIAGNOSIS — E11.21 DIABETES MELLITUS WITH PROTEINURIC DIABETIC NEPHROPATHY: ICD-10-CM

## 2024-04-19 DIAGNOSIS — D64.9 ANEMIA, UNSPECIFIED TYPE: ICD-10-CM

## 2024-04-19 PROBLEM — Z12.11 SPECIAL SCREENING FOR MALIGNANT NEOPLASMS, COLON: Status: RESOLVED | Noted: 2018-03-26 | Resolved: 2024-04-19

## 2024-04-19 PROCEDURE — 3288F FALL RISK ASSESSMENT DOCD: CPT | Mod: CPTII,S$GLB,, | Performed by: NURSE PRACTITIONER

## 2024-04-19 PROCEDURE — 3072F LOW RISK FOR RETINOPATHY: CPT | Mod: CPTII,S$GLB,, | Performed by: NURSE PRACTITIONER

## 2024-04-19 PROCEDURE — 3079F DIAST BP 80-89 MM HG: CPT | Mod: CPTII,S$GLB,, | Performed by: NURSE PRACTITIONER

## 2024-04-19 PROCEDURE — 1160F RVW MEDS BY RX/DR IN RCRD: CPT | Mod: CPTII,S$GLB,, | Performed by: NURSE PRACTITIONER

## 2024-04-19 PROCEDURE — 99999 PR PBB SHADOW E&M-EST. PATIENT-LVL V: CPT | Mod: PBBFAC,,, | Performed by: NURSE PRACTITIONER

## 2024-04-19 PROCEDURE — 1159F MED LIST DOCD IN RCRD: CPT | Mod: CPTII,S$GLB,, | Performed by: NURSE PRACTITIONER

## 2024-04-19 PROCEDURE — 1125F AMNT PAIN NOTED PAIN PRSNT: CPT | Mod: CPTII,S$GLB,, | Performed by: NURSE PRACTITIONER

## 2024-04-19 PROCEDURE — 4010F ACE/ARB THERAPY RXD/TAKEN: CPT | Mod: CPTII,S$GLB,, | Performed by: NURSE PRACTITIONER

## 2024-04-19 PROCEDURE — 3044F HG A1C LEVEL LT 7.0%: CPT | Mod: CPTII,S$GLB,, | Performed by: NURSE PRACTITIONER

## 2024-04-19 PROCEDURE — 3077F SYST BP >= 140 MM HG: CPT | Mod: CPTII,S$GLB,, | Performed by: NURSE PRACTITIONER

## 2024-04-19 PROCEDURE — 1101F PT FALLS ASSESS-DOCD LE1/YR: CPT | Mod: CPTII,S$GLB,, | Performed by: NURSE PRACTITIONER

## 2024-04-19 PROCEDURE — 1158F ADVNC CARE PLAN TLK DOCD: CPT | Mod: CPTII,S$GLB,, | Performed by: NURSE PRACTITIONER

## 2024-04-19 PROCEDURE — G0439 PPPS, SUBSEQ VISIT: HCPCS | Mod: S$GLB,,, | Performed by: NURSE PRACTITIONER

## 2024-04-19 RX ORDER — ZOSTER VACCINE RECOMBINANT, ADJUVANTED 50 MCG/0.5
0.5 KIT INTRAMUSCULAR ONCE
Qty: 1 EACH | Refills: 0 | Status: SHIPPED | OUTPATIENT
Start: 2024-04-19 | End: 2024-04-19

## 2024-04-19 NOTE — PROGRESS NOTES
"  Gerson Phillips presented for a  Medicare AWV and comprehensive Health Risk Assessment today. The following components were reviewed and updated:    Medical history  Family History  Social history  Allergies and Current Medications  Health Risk Assessment  Health Maintenance  Care Team         See Completed Assessments for Annual Wellness Visit within the encounter summary.         The following assessments were completed:  Living Situation  CAGE  Depression Screening  Timed Get Up and Go  Whisper Test  Cognitive Function Screening  Nutrition Screening  ADL Screening  PAQ Screening      Opioid documentation:      Patient does not have a current opioid prescription.        Vitals:    04/19/24 1023   BP: (!) 150/82   Pulse: 72   Temp: 98.1 °F (36.7 °C)   TempSrc: Oral   SpO2: 97%   Weight: 99.5 kg (219 lb 5.7 oz)   Height: 6' 2" (1.88 m)     Body mass index is 28.16 kg/m².  Physical Exam  Vitals and nursing note reviewed.   Constitutional:       General: He is not in acute distress.     Appearance: Normal appearance.   HENT:      Head: Normocephalic and atraumatic.   Eyes:      Conjunctiva/sclera: Conjunctivae normal.      Pupils: Pupils are equal, round, and reactive to light.   Cardiovascular:      Rate and Rhythm: Normal rate and regular rhythm.      Heart sounds: Normal heart sounds. No murmur heard.  Pulmonary:      Effort: Pulmonary effort is normal. No respiratory distress.   Musculoskeletal:      Cervical back: Normal range of motion.   Skin:     General: Skin is warm and dry.   Neurological:      Mental Status: He is alert and oriented to person, place, and time.   Psychiatric:         Mood and Affect: Mood normal.         Behavior: Behavior normal.             Diagnoses and health risks identified today and associated recommendations/orders:    1. Encounter for Medicare annual wellness exam  Screenings performed as noted above.  Personal preventative testing needs reviewed.  - Ambulatory Referral/Consult to " Enhanced Annual Wellness Visit (eAWV)  - varicella-zoster gE-AS01B, PF, (SHINGRIX, PF,) 50 mcg/0.5 mL injection; Inject 0.5 mLs into the muscle once. for 1 dose  Dispense: 1 each; Refill: 0    2. Encounter for preventive health examination    3. Type 2 diabetes mellitus with stage 3a chronic kidney disease, with long-term current use of insulin  Stable, The current medical regimen is effective;  continue present plan and medications.  Followed by PCP.     4. Mood disorder  Stable, The current medical regimen is effective;  continue present plan and medications.   Followed by PCP.     5. Aortic atherosclerosis  Stable, The current medical regimen is effective;  continue present plan and medications.  On statin therapy.   Followed by PCP.     6. Cervical radiculopathy  Stable, The current medical regimen is effective;  continue present plan and medications.  Followed by PCP.     7. DDD (degenerative disc disease), lumbar  Stable, The current medical regimen is effective;  continue present plan and medications.  Followed by PCP.     8. Lumbar radiculopathy  Stable, The current medical regimen is effective;  continue present plan and medications.  Followed by PCP.     9. Lumbar spondylosis  Stable, The current medical regimen is effective;  continue present plan and medications.  Followed by PCP.     10. Spinal stenosis of lumbar region with neurogenic claudication  Stable, The current medical regimen is effective;  continue present plan and medications.  Followed by PCP.     11. Depression, unspecified depression type  Stable, The current medical regimen is effective;  continue present plan and medications.  Followed by PCP.     12. Nuclear sclerosis of both eyes  Stable, The current medical regimen is effective;  continue present plan and medications.  Followed by Ophthalmology.     13. Primary open angle glaucoma (POAG) of both eyes, moderate stage  Stable, The current medical regimen is effective;  continue present  plan and medications.  Followed by Ophthalmology.     14. Refractive error  Stable, The current medical regimen is effective;  continue present plan and medications.  Followed by Ophthalmology.     15. Essential hypertension  Above goal today. Patient reports he did not take medication this morning.   The current medical regimen is effective;  continue present plan and medications.    16. CKD stage 3 due to type 2 diabetes mellitus  Stable, The current medical regimen is effective;  continue present plan and medications.  Followed by PCP.     17. 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  Followed by PCP.     18. Microalbuminuria  Stable, The current medical regimen is effective;  continue present plan and medications.  Followed by PCP.     19. Anemia, unspecified type  Stable, The current medical regimen is effective;  continue present plan and medications.  Followed by PCP.     20. Diabetes mellitus with proteinuric diabetic nephropathy  Stable, The current medical regimen is effective;  continue present plan and medications.  Followed by PCP.     21. Long-term insulin use  Stable.       22. Type 2 diabetes mellitus with diabetic cataract, with long-term current use of insulin  Stable, The current medical regimen is effective;  continue present plan and medications.  Followed by PCP.     23. Abnormal liver function tests  Monitor labs.     24. History of colonic polyps  Stable, continue colonoscopy as recommended.     25. Lateral epicondylitis, unspecified laterality  Stable, The current medical regimen is effective;  continue present plan and medications.  Followed by PCP.     26. Insomnia, unspecified type  Stable, The current medical regimen is effective;  continue present plan and medications.  Followed by PCP.     27. Encounter for vaccination  - varicella-zoster gE-AS01B, PF, (SHINGRIX, PF,) 50  mcg/0.5 mL injection; Inject 0.5 mLs into the muscle once. for 1 dose  Dispense: 1 each; Refill: 0      Provided Gerson with a 5-10 year written screening schedule and personal prevention plan. Recommendations were developed using the USPSTF age appropriate recommendations. Education, counseling, and referrals were provided as needed. After Visit Summary printed and given to patient which includes a list of additional screenings\tests needed.      I offered to discuss advanced care planning, including how to pick a person who would make decisions for you if you were unable to make them for yourself, called a health care power of , and what kind of decisions you might make such as use of life sustaining treatments such as ventilators and tube feeding when faced with a life limiting illness recorded on a living will that they will need to know. (How you want to be cared for as you near the end of your natural life)     X Patient is interested in learning more about how to make advanced directives.  I provided them paperwork and offered to discuss this with them.    Follow up in about 1 year (around 4/19/2025) for for health prevention/screening.    Kristine Ovalle, DNP

## 2024-04-19 NOTE — PATIENT INSTRUCTIONS
Counseling and Referral of Other Preventative  (Italic type indicates deductible and co-insurance are waived)    Patient Name: Gerson Phillips  Today's Date: 4/19/2024    Health Maintenance         Date Due Completion Date    Shingles Vaccine (1 of 2) Never done ---    COVID-19 Vaccine (6 - 2023-24 season) 09/01/2023 1/20/2023    TETANUS VACCINE 09/27/2023 9/27/2013    Foot Exam 06/09/2024 6/9/2023    Override on 6/9/2023: Done    Override on 12/9/2019: Done    Hemoglobin A1c 08/16/2024 2/16/2024    Eye Exam 10/13/2024 10/13/2023    Diabetes Urine Screening 12/22/2024 12/22/2023    Lipid Panel 12/22/2024 12/22/2023    High Dose Statin 04/19/2025 4/19/2024    Colorectal Cancer Screening 03/26/2028 3/26/2018          No orders of the defined types were placed in this encounter.      The following information is provided to all patients.  This information is to help you find resources for any of the problems found today that may be affecting your health:                  Living healthy guide: www.[facility].[place].gov      Understanding Diabetes: www.diabetes.org      Eating healthy: www.cdc.gov/healthyweight      CDC home safety checklist: www.cdc.gov/steadi/patient.html      Agency on Aging: www.goea.[place].[facility]      Alcoholics anonymous (AA): www.aa.org      Physical Activity: www.neisha.nih.gov/xm4rylg      Tobacco use: www.quitwithusla.org

## 2024-04-26 ENCOUNTER — HOSPITAL ENCOUNTER (OUTPATIENT)
Facility: HOSPITAL | Age: 69
Discharge: HOME | End: 2024-04-26
Attending: PAIN MEDICINE | Admitting: PAIN MEDICINE
Payer: MEDICARE

## 2024-04-26 VITALS
OXYGEN SATURATION: 96 % | RESPIRATION RATE: 18 BRPM | SYSTOLIC BLOOD PRESSURE: 147 MMHG | HEART RATE: 75 BPM | DIASTOLIC BLOOD PRESSURE: 75 MMHG | TEMPERATURE: 98 F

## 2024-04-26 DIAGNOSIS — M47.816 LUMBAR SPONDYLOSIS: Primary | ICD-10-CM

## 2024-04-26 PROCEDURE — 64635 DESTROY LUMB/SAC FACET JNT: CPT | Mod: LT | Performed by: PAIN MEDICINE

## 2024-04-26 PROCEDURE — 63600175 PHARM REV CODE 636 W HCPCS: Performed by: PAIN MEDICINE

## 2024-04-26 PROCEDURE — 64635 DESTROY LUMB/SAC FACET JNT: CPT | Mod: LT,,, | Performed by: PAIN MEDICINE

## 2024-04-26 PROCEDURE — 64636 DESTROY L/S FACET JNT ADDL: CPT | Mod: LT | Performed by: PAIN MEDICINE

## 2024-04-26 PROCEDURE — 64636 DESTROY L/S FACET JNT ADDL: CPT | Mod: LT,,, | Performed by: PAIN MEDICINE

## 2024-04-26 PROCEDURE — 25000003 PHARM REV CODE 250: Performed by: PAIN MEDICINE

## 2024-04-26 RX ORDER — FENTANYL CITRATE 50 UG/ML
INJECTION, SOLUTION INTRAMUSCULAR; INTRAVENOUS
Status: DISCONTINUED
Start: 2024-04-26 | End: 2024-04-26 | Stop reason: HOSPADM

## 2024-04-26 RX ORDER — FENTANYL CITRATE 50 UG/ML
100 INJECTION, SOLUTION INTRAMUSCULAR; INTRAVENOUS
Status: DISCONTINUED | OUTPATIENT
Start: 2024-04-26 | End: 2024-04-26 | Stop reason: HOSPADM

## 2024-04-26 RX ORDER — MIDAZOLAM HYDROCHLORIDE 2 MG/2ML
INJECTION, SOLUTION INTRAMUSCULAR; INTRAVENOUS
Status: DISCONTINUED
Start: 2024-04-26 | End: 2024-04-26 | Stop reason: HOSPADM

## 2024-04-26 RX ORDER — TRIAMCINOLONE ACETONIDE 40 MG/ML
40 INJECTION, SUSPENSION INTRA-ARTICULAR; INTRAMUSCULAR ONCE
Status: COMPLETED | OUTPATIENT
Start: 2024-04-26 | End: 2024-04-26

## 2024-04-26 RX ORDER — LIDOCAINE HYDROCHLORIDE 10 MG/ML
20 INJECTION INFILTRATION; PERINEURAL ONCE
Status: COMPLETED | OUTPATIENT
Start: 2024-04-26 | End: 2024-04-26

## 2024-04-26 RX ORDER — SODIUM CHLORIDE 9 MG/ML
INJECTION, SOLUTION INTRAVENOUS CONTINUOUS
Status: DISCONTINUED | OUTPATIENT
Start: 2024-04-26 | End: 2024-04-26 | Stop reason: HOSPADM

## 2024-04-26 RX ORDER — TRIAMCINOLONE ACETONIDE 40 MG/ML
INJECTION, SUSPENSION INTRA-ARTICULAR; INTRAMUSCULAR
Status: DISCONTINUED
Start: 2024-04-26 | End: 2024-04-26 | Stop reason: HOSPADM

## 2024-04-26 RX ORDER — BUPIVACAINE HYDROCHLORIDE 2.5 MG/ML
INJECTION, SOLUTION EPIDURAL; INFILTRATION; INTRACAUDAL
Status: DISCONTINUED
Start: 2024-04-26 | End: 2024-04-26 | Stop reason: HOSPADM

## 2024-04-26 RX ORDER — LIDOCAINE HYDROCHLORIDE 10 MG/ML
INJECTION INFILTRATION; PERINEURAL
Status: DISCONTINUED
Start: 2024-04-26 | End: 2024-04-26 | Stop reason: HOSPADM

## 2024-04-26 RX ORDER — BUPIVACAINE HYDROCHLORIDE 2.5 MG/ML
10 INJECTION, SOLUTION EPIDURAL; INFILTRATION; INTRACAUDAL ONCE
Status: COMPLETED | OUTPATIENT
Start: 2024-04-26 | End: 2024-04-26

## 2024-04-26 RX ORDER — MIDAZOLAM HYDROCHLORIDE 2 MG/2ML
5 INJECTION, SOLUTION INTRAMUSCULAR; INTRAVENOUS
Status: DISCONTINUED | OUTPATIENT
Start: 2024-04-26 | End: 2024-04-26 | Stop reason: HOSPADM

## 2024-04-26 RX ADMIN — SODIUM CHLORIDE: 9 INJECTION, SOLUTION INTRAVENOUS at 10:04

## 2024-04-26 NOTE — OP NOTE
LUMBAR Medial Branch Radiofrequency Ablation Under Fluoroscopy  Time-out taken to identify patient and procedure side prior to starting the procedure.   I attest that I have reviewed the patient's home medications prior to the procedure and no contraindication have been identified. I  re-evaluated the patient after the patient was positioned for the procedure in the procedure room immediately before the procedural time-out. The vital signs are current and represent the current state of the patient which has not significantly changed since the preprocedure assessment.                         Date of Service: 04/26/2024    PCP: Alisa Chester DO          Referring Physician:                                                PROCEDURE:  left L3, L4 and L5 medial branch radiofrequency ablation    REASON FOR PROCEDURE: Lumbar spondylosis    INDICATIONS:  Patient has completed 2 separate medial branch blocks at the specified levels.  Patient reported at least 80% relief of pain/symptoms for the expected duration of local anesthetic used.    PHYSICIAN: Joshua Munoz MD    ASSISTANTS: None    MEDICATIONS INJECTED:  0.5 ml of Kenalog 40mg/ml, and Bupivacaine 0.25% 10ml. Of that, 1.5-3ml injected per level before & after the ablation.    LOCAL ANESTHETIC USED: Xylocaine 1% 3ml each site.    SEDATION MEDICATIONS: Versed 1 mg and fentanyl 50 mcg IV.  Conscious sedation provided by MD and monitored by RN. Total sedation time:  13 minutes.  (See nurse documentation and case log for sedation time)    ESTIMATED BLOOD LOSS:  None.    COMPLICATIONS:  None.    TECHNIQUE:   Laying in a prone position, the patient was prepped and draped in the usual sterile fashion using ChloraPrep and fenestrated drape.  The level was determined under fluoroscopic guidance.  Local anesthetic was given by going down to the hub of the 27-gauge 1.25in needle and raising a wheal.  The 20-gauge needle was introduced to the anatomic local of the median  branch at the lateral mass utilizing live fluoroscopy at each level stated above. Motor stimulation done to confirm no motor nerve ablation takes place up to 2 Volt 2Hz..  Sensory stimulation done to detect similarities in pain location 1.5 Volts 50Hz.. Medication was then injected slowly.  Ablation then done per level utilizing Halyard radiofrequency generator 80°C for 90 seconds. The patient tolerated the procedure well.       The patient was monitored after the procedure.  Patient was given post procedure and discharge instructions to follow at home.  We will see the patient back in two weeks or the patient may call to inform of status. The patient was discharged in a stable condition

## 2024-04-26 NOTE — OR NURSING
PROCEDURE AND RECOVERY COMPLETED. DISCHARGE INSTRUCTIONS GIVEN AND UNDERSTANDING VERBALIZED; READY FOR DISCHARGE MOVING ALL EXTREMITIES EQUALLY; DENIES ANY DISCOMFORTS.

## 2024-04-26 NOTE — DISCHARGE INSTRUCTIONS
Home Care Instructions Pain Management:    1. DIET:   You may resume your normal diet today.   2. BATHING:   You may shower with luke warm water.  3. DRESSING:   You may remove your bandage today.   4. ACTIVITY LEVEL:   You may resume your normal activities 24 hrs after your procedure.  5. MEDICATIONS:   You may resume your normal medications today.   6. SPECIAL INSTRUCTIONS:   No heat to the injection site for 24 hrs including, bath or shower, heating pad, moist heat, or hot tubs.    Use ice pack to injection site for any pain or discomfort.  Apply ice packs for 20 minute intervals as needed.   If you have received any sedatives by mouth today you may not drive for 12 hours.    If you have received any sedation through your IV, you may not drive for 24 hrs.     PLEASE CALL YOUR DOCTOR IF:  1. Redness or swelling around the injection site.  2. Fever of 101 degrees  3. Drainage (pus) from the injection site.  4. For any continuous bleeding (some dried blood over the incision is normal.)    FOR EMERGENCIES:   If any unusual problems or difficulties occur during clinic hours, call [phone] or [phone]. Adult Procedural Sedation Instructions    Recovery After Procedural Sedation (Adult)  You have been given medicine by vein to make you sleep during your surgery. This may have included both a pain medicine and sleeping medicine. Most of the effects have worn off. But you may still have some drowsiness for the next 6 to 8 hours.  Home care  Follow these guidelines when you get home:  For the next 8 hours, you should be watched by a responsible adult. This person should make sure your condition is not getting worse.  Don't drink any alcohol for the next 24 hours.  Don't drive, operate dangerous machinery, or make important business or personal decisions during the next 24 hours.  Note: Your healthcare provider may tell you not to take any medicine by mouth for pain or sleep in the next 4 hours. These medicines may react  with the medicines you were given in the hospital. This could cause a much stronger response than usual.  Follow-up care  Follow up with your healthcare provider if you are not alert and back to your usual level of activity within 12 hours.  When to seek medical advice  Call your healthcare provider right away if any of these occur:  Drowsiness gets worse  Weakness or dizziness gets worse  Repeated vomiting  You can't be awakened   Date Last Reviewed: 10/18/2016  © 0130-9406 [employer]. [address] [place] [address]. All rights reserved. This information is not intended as a substitute for professional medical care. Always follow your healthcare professional's instructions.

## 2024-04-26 NOTE — DISCHARGE SUMMARY
[facility] - Pain Management  Discharge Note  Short Stay    Procedure(s) (LRB):  Left L3, L4, L5 Radiofrequency Thermocoagulation of Medial Branches (Left)      OUTCOME: Patient tolerated treatment/procedure well without complication and is now ready for discharge.    DISPOSITION: Home or Self Care    FINAL DIAGNOSIS:  <principal problem not specified>    FOLLOWUP: In clinic    DISCHARGE INSTRUCTIONS:  No discharge procedures on file.       Discharge Diagnosis:Lumbar spondylosis [M47.816]  Condition on Discharge: Stable.  Diet on Discharge: Same as before.  Activity: as per instruction sheet.  Discharge to: Home with a responsible adult.  Follow up: as per Discharge instructions

## 2024-04-29 LAB — POCT GLUCOSE: 85 MG/DL (ref 70–110)

## 2024-05-02 ENCOUNTER — TELEPHONE (OUTPATIENT)
Dept: PAIN MEDICINE | Facility: CLINIC | Age: 69
End: 2024-05-02
Payer: MEDICARE

## 2024-05-02 NOTE — TELEPHONE ENCOUNTER
Reviewed pre-procedure instructions with Mr. Phillips, as well as provided arrival time of 11:30a for 5/10/24.  All questions answered- verbalized understanding.

## 2024-05-09 ENCOUNTER — PATIENT MESSAGE (OUTPATIENT)
Dept: PAIN MEDICINE | Facility: CLINIC | Age: 69
End: 2024-05-09
Payer: MEDICARE

## 2024-05-10 ENCOUNTER — HOSPITAL ENCOUNTER (OUTPATIENT)
Facility: HOSPITAL | Age: 69
Discharge: HOME | End: 2024-05-10
Attending: PAIN MEDICINE | Admitting: PAIN MEDICINE
Payer: MEDICARE

## 2024-05-10 VITALS
TEMPERATURE: 98 F | RESPIRATION RATE: 18 BRPM | DIASTOLIC BLOOD PRESSURE: 81 MMHG | SYSTOLIC BLOOD PRESSURE: 146 MMHG | HEART RATE: 77 BPM | OXYGEN SATURATION: 97 %

## 2024-05-10 DIAGNOSIS — M47.816 LUMBAR SPONDYLOSIS: Primary | ICD-10-CM

## 2024-05-10 LAB — POCT GLUCOSE: 93 MG/DL (ref 70–110)

## 2024-05-10 PROCEDURE — 63600175 PHARM REV CODE 636 W HCPCS: Performed by: PAIN MEDICINE

## 2024-05-10 PROCEDURE — 25000003 PHARM REV CODE 250: Performed by: PAIN MEDICINE

## 2024-05-10 PROCEDURE — 64635 DESTROY LUMB/SAC FACET JNT: CPT | Mod: RT,,, | Performed by: PAIN MEDICINE

## 2024-05-10 PROCEDURE — 64636 DESTROY L/S FACET JNT ADDL: CPT | Mod: RT,,, | Performed by: PAIN MEDICINE

## 2024-05-10 PROCEDURE — 64636 DESTROY L/S FACET JNT ADDL: CPT | Mod: RT | Performed by: PAIN MEDICINE

## 2024-05-10 PROCEDURE — 64635 DESTROY LUMB/SAC FACET JNT: CPT | Mod: RT | Performed by: PAIN MEDICINE

## 2024-05-10 RX ORDER — MIDAZOLAM HYDROCHLORIDE 2 MG/2ML
5 INJECTION, SOLUTION INTRAMUSCULAR; INTRAVENOUS
Status: DISCONTINUED | OUTPATIENT
Start: 2024-05-10 | End: 2024-05-10 | Stop reason: HOSPADM

## 2024-05-10 RX ORDER — FENTANYL CITRATE 50 UG/ML
INJECTION, SOLUTION INTRAMUSCULAR; INTRAVENOUS
Status: DISCONTINUED
Start: 2024-05-10 | End: 2024-05-10 | Stop reason: HOSPADM

## 2024-05-10 RX ORDER — MIDAZOLAM HYDROCHLORIDE 2 MG/2ML
INJECTION, SOLUTION INTRAMUSCULAR; INTRAVENOUS
Status: DISCONTINUED
Start: 2024-05-10 | End: 2024-05-10 | Stop reason: HOSPADM

## 2024-05-10 RX ORDER — LIDOCAINE HYDROCHLORIDE 10 MG/ML
INJECTION INFILTRATION; PERINEURAL
Status: DISCONTINUED
Start: 2024-05-10 | End: 2024-05-10 | Stop reason: HOSPADM

## 2024-05-10 RX ORDER — TRIAMCINOLONE ACETONIDE 40 MG/ML
INJECTION, SUSPENSION INTRA-ARTICULAR; INTRAMUSCULAR
Status: DISCONTINUED
Start: 2024-05-10 | End: 2024-05-10 | Stop reason: HOSPADM

## 2024-05-10 RX ORDER — SODIUM CHLORIDE 9 MG/ML
INJECTION, SOLUTION INTRAVENOUS CONTINUOUS
Status: DISCONTINUED | OUTPATIENT
Start: 2024-05-10 | End: 2024-05-10 | Stop reason: HOSPADM

## 2024-05-10 RX ORDER — BUPIVACAINE HYDROCHLORIDE 2.5 MG/ML
10 INJECTION, SOLUTION EPIDURAL; INFILTRATION; INTRACAUDAL ONCE
Status: COMPLETED | OUTPATIENT
Start: 2024-05-10 | End: 2024-05-10

## 2024-05-10 RX ORDER — TRIAMCINOLONE ACETONIDE 40 MG/ML
40 INJECTION, SUSPENSION INTRA-ARTICULAR; INTRAMUSCULAR ONCE
Status: COMPLETED | OUTPATIENT
Start: 2024-05-10 | End: 2024-05-10

## 2024-05-10 RX ORDER — BUPIVACAINE HYDROCHLORIDE 2.5 MG/ML
INJECTION, SOLUTION EPIDURAL; INFILTRATION; INTRACAUDAL
Status: DISCONTINUED
Start: 2024-05-10 | End: 2024-05-10 | Stop reason: HOSPADM

## 2024-05-10 RX ORDER — FENTANYL CITRATE 50 UG/ML
100 INJECTION, SOLUTION INTRAMUSCULAR; INTRAVENOUS
Status: DISCONTINUED | OUTPATIENT
Start: 2024-05-10 | End: 2024-05-10 | Stop reason: HOSPADM

## 2024-05-10 RX ORDER — LIDOCAINE HYDROCHLORIDE 10 MG/ML
20 INJECTION INFILTRATION; PERINEURAL ONCE
Status: COMPLETED | OUTPATIENT
Start: 2024-05-10 | End: 2024-05-10

## 2024-05-10 RX ADMIN — SODIUM CHLORIDE: 9 INJECTION, SOLUTION INTRAVENOUS at 11:05

## 2024-05-10 NOTE — OP NOTE
LUMBAR Medial Branch Radiofrequency Ablation Under Fluoroscopy  Time-out taken to identify patient and procedure side prior to starting the procedure.   I attest that I have reviewed the patient's home medications prior to the procedure and no contraindication have been identified. I  re-evaluated the patient after the patient was positioned for the procedure in the procedure room immediately before the procedural time-out. The vital signs are current and represent the current state of the patient which has not significantly changed since the preprocedure assessment.                         Date of Service: 05/10/2024    PCP: Alisa Chester DO          Referring Physician:                                                PROCEDURE:  right L3, L4 and L5 medial branch radiofrequency ablation    REASON FOR PROCEDURE: Lumbar spondylosis    INDICATIONS:  Patient has completed 2 separate medial branch blocks at the specified levels.  Patient reported at least 80% relief of pain/symptoms for the expected duration of local anesthetic used.    PHYSICIAN: Joshua Munoz MD    ASSISTANTS: None    MEDICATIONS INJECTED:  0.5 ml of Kenalog 40mg/ml, and Bupivacaine 0.25% 10ml. Of that, 1.5-3ml injected per level before & after the ablation.    LOCAL ANESTHETIC USED: Xylocaine 1% 3ml each site.    SEDATION MEDICATIONS: Versed 1 mg and fentanyl 50 mcg IV.  Conscious sedation provided by MD and monitored by RN. Total sedation time:  15 minutes.  (See nurse documentation and case log for sedation time)    ESTIMATED BLOOD LOSS:  None.    COMPLICATIONS:  None.    TECHNIQUE:   Laying in a prone position, the patient was prepped and draped in the usual sterile fashion using ChloraPrep and fenestrated drape.  The level was determined under fluoroscopic guidance.  Local anesthetic was given by going down to the hub of the 27-gauge 1.25in needle and raising a wheal.  The 20-gauge needle was introduced to the anatomic local of the median  branch at the lateral mass utilizing live fluoroscopy at each level stated above. Motor stimulation done to confirm no motor nerve ablation takes place up to 2 Volt 2Hz..  Sensory stimulation done to detect similarities in pain location 1.5 Volts 50Hz.. Medication was then injected slowly.  Ablation then done per level utilizing Halyard radiofrequency generator 80°C for 90 seconds. The patient tolerated the procedure well.       The patient was monitored after the procedure.  Patient was given post procedure and discharge instructions to follow at home.  We will see the patient back in two weeks or the patient may call to inform of status. The patient was discharged in a stable condition

## 2024-05-10 NOTE — DISCHARGE INSTRUCTIONS

## 2024-05-10 NOTE — DISCHARGE SUMMARY
[facility] - Pain Management  Discharge Note  Short Stay    Procedure(s) (LRB):  Right L3, L4, L5 Radiofrequency Thermocoagulation of Medial Branches (Right)      OUTCOME: Patient tolerated treatment/procedure well without complication and is now ready for discharge.    DISPOSITION: Home or Self Care    FINAL DIAGNOSIS:  <principal problem not specified>    FOLLOWUP: In clinic    DISCHARGE INSTRUCTIONS:  No discharge procedures on file.       Discharge Diagnosis:Lumbar spondylosis [M47.816]  Condition on Discharge: Stable.  Diet on Discharge: Same as before.  Activity: as per instruction sheet.  Discharge to: Home with a responsible adult.  Follow up: as per Discharge instructions    
Yes

## 2024-05-10 NOTE — OR NURSING
PROCEDURE AND RECOVERY COMPLETED; MOVING ALL EXTREMITIES EQUALLY; DENIES ANY PAIN ; DSG TO  RIGHT BACK DRY AND INTACT; DISCHARGE INSTRUCTIONS; UNDERSTANDING VERBALIZED; READY FOR DISCHARGE.

## 2024-05-22 ENCOUNTER — OFFICE VISIT (OUTPATIENT)
Dept: PAIN MEDICINE | Facility: CLINIC | Age: 69
End: 2024-05-22
Payer: MEDICARE

## 2024-05-22 VITALS — DIASTOLIC BLOOD PRESSURE: 68 MMHG | OXYGEN SATURATION: 97 % | HEART RATE: 78 BPM | SYSTOLIC BLOOD PRESSURE: 138 MMHG

## 2024-05-22 DIAGNOSIS — M54.9 DORSALGIA, UNSPECIFIED: ICD-10-CM

## 2024-05-22 DIAGNOSIS — M51.36 DDD (DEGENERATIVE DISC DISEASE), LUMBAR: Primary | ICD-10-CM

## 2024-05-22 DIAGNOSIS — I10 ESSENTIAL HYPERTENSION: ICD-10-CM

## 2024-05-22 DIAGNOSIS — M47.816 LUMBAR SPONDYLOSIS: ICD-10-CM

## 2024-05-22 PROCEDURE — 3072F LOW RISK FOR RETINOPATHY: CPT | Mod: CPTII,S$GLB,,

## 2024-05-22 PROCEDURE — 1125F AMNT PAIN NOTED PAIN PRSNT: CPT | Mod: CPTII,S$GLB,,

## 2024-05-22 PROCEDURE — 1159F MED LIST DOCD IN RCRD: CPT | Mod: CPTII,S$GLB,,

## 2024-05-22 PROCEDURE — 99999 PR PBB SHADOW E&M-EST. PATIENT-LVL III: CPT | Mod: PBBFAC,,,

## 2024-05-22 PROCEDURE — 1160F RVW MEDS BY RX/DR IN RCRD: CPT | Mod: CPTII,S$GLB,,

## 2024-05-22 PROCEDURE — 4010F ACE/ARB THERAPY RXD/TAKEN: CPT | Mod: CPTII,S$GLB,,

## 2024-05-22 PROCEDURE — 3075F SYST BP GE 130 - 139MM HG: CPT | Mod: CPTII,S$GLB,,

## 2024-05-22 PROCEDURE — 3044F HG A1C LEVEL LT 7.0%: CPT | Mod: CPTII,S$GLB,,

## 2024-05-22 PROCEDURE — 3078F DIAST BP <80 MM HG: CPT | Mod: CPTII,S$GLB,,

## 2024-05-22 PROCEDURE — 99213 OFFICE O/P EST LOW 20 MIN: CPT | Mod: S$GLB,,,

## 2024-05-22 PROCEDURE — 1101F PT FALLS ASSESS-DOCD LE1/YR: CPT | Mod: CPTII,S$GLB,,

## 2024-05-22 PROCEDURE — 3288F FALL RISK ASSESSMENT DOCD: CPT | Mod: CPTII,S$GLB,,

## 2024-05-22 NOTE — PROGRESS NOTES
Subjective:     Patient ID: Gerson Phillips III is a 69 y.o. male    Chief Complaint: Follow-up      Referred by: No ref. provider found      HPI:    Interval History PA (05/22/2024):  Patient returns to clinic for follow up of bilateral lower back pain.  Patient is s/p bilateral than right L3, L4, L5 radiofrequency ablation with 95% relief.  Patient noting overall significant improvement in both his pain as well as ADLs.  Able to do more activities including walking with reduced pain.  No other concerns at this time.    Interval History PA (03/08/2024):  Patient returns to clinic for follow up of midline/bilateral lower back pain and MRI review.  Patient denies any changes in the quality or location of his pain.  Continues to note pain located in his midline lower lumbar spine radiating to his bilateral lumbar paraspinal region, worse on the right.  He does note some pain radiating from his midline lower lumbar spine to his right lateral hip, denies any radiation distal this point.  Denies any numbness, tingling, weakness, bowel or bladder dysfunction.  Notes pain is constant, achy, worsened with certain activities.  Also worsened in the morning when getting out of bed with associated stiffness.    Interval History (2/23/24):  He returns today for follow up.  He reports that he continues to have midline/bilateral low back pain.  Pain is focused in the midline lower lumbar region.  Will radiate somewhat to the bilateral lumbar paraspinal regions but is worse on the right side.  Pain will also extend to the right lateral hip.  Pain does not extend distally to this.  Pain is constant and worsened with activity.  He did attend outpatient physical therapy.  He attended roughly 10 sessions without patient physical therapy and has continued a regular home exercise program.  He does not find this has improved his pain and may have even worsened it.  Overall he denies any significant changes in the quality or location was pain  recently.  Denies any new symptoms.        Interval History PA (08/25/2023):  Patient returns to clinic for follow up.  Patient notes worsening of chronic bilateral lower back pain over the past few months.  States issue has been present for over 20+ years.  Previously well managed with conservative measures and medications.  Notes pain is located across his lower lumbar paraspinal region, also with midline lower lumbar pain.  Occasionally pain radiating into his right lateral hip otherwise denies any radiation into his lower extremities.  Denies any numbness, tingling, weakness, bowel or bladder dysfunction.  States pain is constant and achy.  Worsened with certain activities.  Also worse in the morning when getting out of bed, associated with stiffness.  Recently has followed up with his primary care physician who increase his gabapentin dosage.  Notes that he plans on picking this prescription to the pharmacy later today.    Interval History (1/13/22):  He returns today for follow up.  He reports that diclofenac prescribed by his primary care physician has been very helpful for the bilateral low back pain.  He states that he still has some pain but the diclofenac controlled very well.  He was scheduled to start physical therapy, but his visit was canceled and he was never contacted to reschedule.  Otherwise he denies any changes in the quality or location was pain.  He denies any new worsening symptoms.      Initial Encounter (10/21/21):  Gerson Phillips III is a 69 y.o. male who presents today with chronic bilateral low back pain.  This pain has been present for over 20 years.  No specific inciting events or injuries noted.  The pain is located across the lower lumbar spine.  Pain does not radiate to lower extremities.  Patient does report some bilateral groin pain when ambulating or standing for prolonged periods of time.  He describes his pain as burning but is unsure if it was related to his back.  He denies any  associated lower extremity numbness, tingling, weakness, bowel bladder dysfunction..   This pain is described in detail below.    Physical Therapy:  Yes.  Continues regular home exercise program.  Not effective.    Non-pharmacologic Treatment:  Rest helps         TENS?  No    Pain Medications:         Currently taking:  Lyrica    Has tried in the past:  NSAIDs, Tylenol    Has not tried:  Opioids, Muscle relaxants, TCAs, SNRIs, topical creams    Blood thinners:  None    Interventional Therapies:   7/13/13 - right L4 and L5 transforaminal epidural steroid injection - no significant relief noted  04/26/2024 - left L3, L4, L5 radiofrequency ablation - 95% relief  05/10/2024 - right L3, L4, L5 radiofrequency ablation - 95% relief    Relevant Surgeries:  None    Affecting sleep?  Yes    Affecting daily activities? yes    Depressive symptoms? no          SI/HI? No    Work status: Retired    Pain Scores:    Best:       0/10  Worst:     1/10  Usually:   1/10  Today:    1/10    Pain Disability Index  Family/Home Responsibilities:: 0  Recreation:: 0  Social Activity:: 0  Occupation:: 0  Sexual Behavior:: 0  Self Care:: 1  Life-Support Activities:: 0  Pain Disability Index (PDI): 1    Review of Systems   Constitutional:  Negative for activity change, appetite change, chills, fatigue, fever and unexpected weight change.   HENT:  Negative for hearing loss.    Eyes:  Negative for visual disturbance.   Respiratory:  Negative for chest tightness and shortness of breath.    Cardiovascular:  Negative for chest pain.   Gastrointestinal:  Negative for abdominal pain, constipation, diarrhea, nausea and vomiting.   Genitourinary:  Negative for difficulty urinating.   Musculoskeletal:  Positive for arthralgias, back pain, gait problem and myalgias. Negative for neck pain.   Skin:  Negative for rash.   Neurological:  Negative for dizziness, weakness, light-headedness, numbness and headaches.   Psychiatric/Behavioral:  Positive for sleep  disturbance. Negative for hallucinations and suicidal ideas. The patient is not nervous/anxious.        Past Medical History:   Diagnosis Date    Abnormal liver function tests 06/18/2013    Anemia 06/18/2013    Cervical radiculopathy 03/12/2013    CKD stage 3 due to type 2 diabetes mellitus 03/04/2016    Colon polyp     Diabetes mellitus type II, uncontrolled 10/18/2012    Diabetes mellitus with renal manifestations, uncontrolled 09/27/2013    History of colonic polyps 10/05/2015    HTN (hypertension) 10/18/2012    Hyperlipidemia 10/18/2012    Lateral epicondylitis 06/18/2013    Long-term insulin use 03/04/2016    Low back pain 01/29/2013    Microalbuminuria 03/30/2015    Nuclear sclerosis of both eyes 02/14/2020    Open-angle glaucoma     Primary open angle glaucoma (POAG) of both eyes, moderate stage 02/14/2020    Special screening for malignant neoplasms, colon 03/26/2018    Uncontrolled type 2 diabetes mellitus with proteinuria or albuminuria 03/30/2015    Uncontrolled type 2 diabetes mellitus with proteinuria or microalbuminuria 03/30/2015       Past Surgical History:   Procedure Laterality Date    COLONOSCOPY N/A 09/01/2017    Procedure: COLONOSCOPY;  Surgeon: Gopal Ny MD;  Location: [facility] ENDO;  Service: Endoscopy;  Laterality: N/A;    COLONOSCOPY N/A 03/26/2018    Procedure: COLONOSCOPY;  Surgeon: Jamar Batista MD;  Location: [address]);  Service: Endoscopy;  Laterality: N/A;    INJECTION OF ANESTHETIC AGENT AROUND MEDIAL BRANCH NERVES INNERVATING LUMBAR FACET JOINT Bilateral 03/22/2024    Procedure: Bilateral L3, L4, L5 Medial Branch Blocks #1;  Surgeon: Joshua Munoz Jr., MD;  Location: [facility] PAIN MANAGEMENT;  Service: Pain Management;  Laterality: Bilateral;  @1245  No ATC  Check BG prior to RF  MD Sign.    INJECTION OF ANESTHETIC AGENT AROUND MEDIAL BRANCH NERVES INNERVATING LUMBAR FACET JOINT Bilateral 04/12/2024    Procedure: Bilateral L3, L4, L5 Medial Branch Blocks #2;   Surgeon: Joshua Munoz Jr., MD;  Location: [facility] PAIN MANAGEMENT;  Service: Pain Management;  Laterality: Bilateral;  @1145  No ATC  Check BG Prior to RFA  Needs Consent    RADIOFREQUENCY ABLATION OF LUMBAR MEDIAL BRANCH NERVE AT SINGLE LEVEL Left 4/26/2024    Procedure: Left L3, L4, L5 Radiofrequency Thermocoagulation of Medial Branches;  Surgeon: Joshua Munoz Jr., MD;  Location: [facility] PAIN MANAGEMENT;  Service: Pain Management;  Laterality: Left;  @1100 (given)  No ATC  Check BG  Needs Consent    RADIOFREQUENCY ABLATION OF LUMBAR MEDIAL BRANCH NERVE AT SINGLE LEVEL Right 5/10/2024    Procedure: Right L3, L4, L5 Radiofrequency Thermocoagulation of Medial Branches;  Surgeon: Joshua Munoz Jr., MD;  Location: [facility] PAIN MANAGEMENT;  Service: Pain Management;  Laterality: Right;  @1130  No ATC  Check BG  Needs Consent  Last 1&50       Social History     Socioeconomic History    Marital status:    Tobacco Use    Smoking status: Never     Passive exposure: Never    Smokeless tobacco: Never   Substance and Sexual Activity    Alcohol use: Yes     Alcohol/week: 3.0 - 4.0 standard drinks of alcohol     Types: 3 - 4 Cans of beer per week    Drug use: Not Currently    Sexual activity: Yes     Partners: Female     Social Determinants of Health     Financial Resource Strain: Low Risk  (4/19/2024)    Overall Financial Resource Strain (CARDIA)     Difficulty of Paying Living Expenses: Not hard at all   Food Insecurity: No Food Insecurity (4/19/2024)    Hunger Vital Sign     Worried About Running Out of Food in the Last Year: Never true     Ran Out of Food in the Last Year: Never true   Transportation Needs: No Transportation Needs (4/19/2024)    PRAPARE - Transportation     Lack of Transportation (Medical): No     Lack of Transportation (Non-Medical): No   Physical Activity: Unknown (4/19/2024)    Exercise Vital Sign     Days of Exercise per Week: 3 days   Stress: No Stress Concern Present (4/19/2024)     "[facility] of Occupational [facility] - Occupational Stress Questionnaire     Feeling of Stress : Only a little   Housing Stability: Unknown (4/19/2024)    Housing Stability Vital Sign     Unable to Pay for Housing in the Last Year: No       Review of patient's allergies indicates:  No Known Allergies    Current Outpatient Medications on File Prior to Visit   Medication Sig Dispense Refill    atorvastatin (LIPITOR) 20 MG tablet Take 1 tablet (20 mg total) by mouth once daily. 90 tablet 3    blood sugar diagnostic Strp To check blood glucose three times daily, to use with insurance preferred meter 100 each 3    blood-glucose meter,continuous (DEXCOM G7 ) Misc Use with Dexcom G7 sensors 1 each 0    blood-glucose sensor (DEXCOM G7 SENSOR) Ashley Change every 10 days 12 each 3    diltiaZEM (CARDIZEM CD) 240 MG 24 hr capsule TAKE 1 CAPSULE BY MOUTH ONCE DAILY. 90 capsule 1    hydroCHLOROthiazide (HYDRODIURIL) 12.5 MG Tab TAKE 1 TABLET BY MOUTH EVERY DAY 90 tablet 3    insulin glargine, TOUJEO, (TOUJEO SOLOSTAR U-300 INSULIN) 300 unit/mL (1.5 mL) InPn pen Inject 24 units once daily 3 pen 5    lancets Misc To check blood glucose three times daily, to use with insurance preferred meter 100 each 3    lisinopriL (PRINIVIL,ZESTRIL) 40 MG tablet Take 1 tablet (40 mg total) by mouth once daily. 30 tablet 11    metFORMIN (GLUCOPHAGE) 1000 MG tablet Take 1 tablet (1,000 mg total) by mouth 2 (two) times daily. 180 tablet 2    pen needle, diabetic (BD ULTRA-FINE FORREST PEN NEEDLE) 32 gauge x 5/32" Ndle USE 1X/ each 3    semaglutide (OZEMPIC) 2 mg/dose (8 mg/3 mL) PnIj Inject 2 mg into the skin every 7 days. 3 mL 11    blood-glucose meter kit To check blood glucose three times daily, to use with insurance preferred meter 1 each 0    latanoprost 0.005 % ophthalmic solution Place 1 drop into both eyes every evening. 7.5 mL 2    pregabalin (LYRICA) 25 MG capsule TAKE 1 CAPSULE (25 MG TOTAL) BY MOUTH 2 (TWO) TIMES DAILY AS " NEEDED (PAIN). (Patient not taking: Reported on 4/19/2024) 60 capsule 5     Current Facility-Administered Medications on File Prior to Visit   Medication Dose Route Frequency Provider Last Rate Last Admin    LIDOcaine 2%/EPINEPHrine 1:100,000 injection 2 mL  2 mL Intradermal 1 time in Clinic/HOD Andrew Busch MD           Objective:      /68 (BP Location: Left arm, Patient Position: Sitting, BP Method: Medium (Automatic))   Pulse 78   SpO2 97%     Exam:  GEN:  Well developed, well nourished.  No acute distress.  Normal pain behavior.  HEENT:  No trauma.  Mucous membranes moist.  Nares patent bilaterally.  PSYCH: Normal affect. Thought content appropriate.  CHEST:  Breathing symmetric.  No audible wheezing.  ABD: Soft, non-distended.  SKIN:  Warm, pink, dry.  No rash on exposed areas.    EXT:  No cyanosis, clubbing, or edema.  No color change or changes in nail or hair growth.  NEURO/MUSCULOSKELETAL:  Fully alert, oriented, and appropriate. Speech normal chip. No cranial nerve deficits.   Gait:  Normal.  No trendelenburg sign bilaterally.               Imaging:  Narrative & Impression  EXAMINATION:  MRI LUMBAR SPINE WITHOUT CONTRAST     CLINICAL HISTORY:  Low back pain, symptoms persist with > 6wks conservative treatment; Other intervertebral disc degeneration, lumbar region     TECHNIQUE:  Sagittal T1, T2, stir and axial T1-T2 imaging of the lumbar spine without contrast.     COMPARISON:  11/18/2021     FINDINGS:  The lumbar sagittal alignment is similar to prior with trace grade 1 retrolisthesis of L3 on L4.  Continued degenerative disc disease with disc desiccation height loss and endplate degeneration most pronounced at L4/L5 and L5/S1 levels.  Vertebral body heights and contours relatively stable allowing for degenerative change without evidence for acute fracture..     The distal spinal cord and conus is normal in signal and contour tip of the conus approximates the inferior L1 level.     No  aneurysmal dilatation of the visualized abdominal aorta.  There are few scattered small fluid signal foci in the kidneys bilaterally largest along the lower pole on the left measuring 1.3 cm suggestive for probable renal cysts.     T12/L1 through L1/L2: No significant disc bulge, central canal or neural foraminal stenosis.     L2/L3: No significant disc bulge central canal or neural foraminal stenosis.     L3/L4:Continued posterior disc osteophyte with ligamentum flavum hypertrophy and facet arthropathy without significant central canal stenosis with mild moderate bilateral neural foraminal stenosis.     L4/L5:Continued posterior disc osteophyte with ligamentum flavum hypertrophy and facet arthropathy mild central canal narrowing with moderate right greater than left neural foraminal stenosis.     L5/S1: Posterior disc osteophyte with ligamentum flavum hypertrophy and facet arthropathy without significant central canal stenosis and mild moderate bilateral neural foraminal stenosis.     Impression:     Continued degenerative change lumbar spine most pronounced at L4/L5 with posterior disc osteophyte, uncovertebral joint hypertrophy and facet arthropathy with mild central canal stenosis and moderate bilateral neural foraminal stenosis right greater than left     Several small fluid signal foci within the visualized kidneys partially included in the study while nonspecific suggestive for probable bilateral renal cysts.  These could be further evaluated with dedicated renal imaging.     Please see above for additional details.        Electronically signed by: Asaf Alonso DO  Date:                                            03/01/2024  Time:                                           14:24    Narrative & Impression    EXAMINATION:  MRI LUMBAR SPINE WITHOUT CONTRAST     CLINICAL HISTORY:  lumbar DDD; Other intervertebral disc degeneration, lumbar region     TECHNIQUE:  Multiplanar, multisequence MR images were acquired  from the thoracolumbar junction to the sacrum without contrast.     COMPARISON:  09/17/2021     FINDINGS:  Alignment: Normal.     Vertebrae: Degenerative endplate changes are seen in the lower lumbar spine.  No fracture or marrow infiltrative process.     Discs: Partial fusion across the L2-L3 disc space.  Severe disc height loss at L5-S1, mild at L4-L5.  No evidence for discitis.     Cord: Normal.  Conus terminates at L1.     Degenerative findings:     T12-L1: No spinal canal stenosis or neural foraminal narrowing.     L1-L2: No spinal canal stenosis or neural foraminal narrowing.     L2-L3: No spinal canal stenosis or neural foraminal narrowing.     L3-L4: Circumferential disc bulge and mild facet arthropathy result in mild left neural foraminal narrowing.     L4-L5: Circumferential disc bulge and moderate facet arthropathy result in mild effacement of the thecal sac and moderate right, mild left neural foraminal narrowing.     L5-S1: Circumferential disc bulge and mild facet arthropathy result in mild right neural foraminal narrowing.     Paraspinal muscles & soft tissues: Moderate paraspinal muscle atrophy.     Impression:     1. Multilevel degenerative changes of the lumbar spine.  Moderate right neural foraminal narrowing noted at L4-L5.        Electronically signed by: Ezra Vences MD  Date:                                            11/18/2021  Time:                                           10:35           Narrative & Impression    EXAMINATION:  XR HIPS BILATERAL 2 VIEW INCL AP PELVIS     CLINICAL HISTORY:  Lumbago with sciatica, left side     TECHNIQUE:  AP view of the pelvis and frogleg lateral views of both hips were performed.     COMPARISON:  None.     FINDINGS:  Mild bilateral hip joint space narrowing.     No significant osteophyte formation, sclerosis or geodes.     Normal bilateral femoral head contour.     The bilateral sacroiliac joints appear within normal limits.     No fracture, no osseous  lesions.     Atherosclerotic changes of the aorta and its branches.     The soft tissues appear normal.     Impression:     Mild degenerative changes of the bilateral hip joints.        Electronically signed by: Briana Marquez MD  Date:                                            09/17/2021  Time:                                           12:36         Narrative & Impression    EXAMINATION:  XR LUMBAR SPINE COMPLETE 5 VIEW     CLINICAL HISTORY:  Back pain or radiculopathy, > 6 wks;Dorsalgia, unspecified     TECHNIQUE:  AP, lateral, spot and bilateral oblique views of the lumbar spine were performed.     COMPARISON:  09/03/2015     FINDINGS:  Mild dextroscoliosis of the lumbar spine.     The vertebral body heights are well maintained.     Mild disc space narrowing L5-S1.     The oblique views demonstrate no evidence of spondylolysis.     The bilateral sacroiliac joints appear symmetrical on the AP view.     Prominent right lateral bridging osteophyte at L1-L2 and L2-L3.     The facet joints appear within normal limits.     Impression:     Spondylosis of the lumbar spine. No acute process seen.        Electronically signed by: Briana Marquez MD  Date:                                            09/17/2021  Time:                                           12:34         Assessment:       Encounter Diagnoses   Name Primary?    DDD (degenerative disc disease), lumbar Yes    Dorsalgia, unspecified     Lumbar spondylosis          Plan:       Gerson was seen today for follow-up.    Diagnoses and all orders for this visit:    DDD (degenerative disc disease), lumbar    Dorsalgia, unspecified    Lumbar spondylosis          Gerson Phillips III is a 69 y.o. male with chronic bilateral low back pain.  Exact etiology somewhat unclear though does appear to be mostly axial and likely related to lower lumbar facet joints.  Lumbar MRI does show multilevel disc degeneration.  Most notable at L4-5 with significant facet joint  arthropathy and to a lesser degree at L5-S1 and L3-4.  He does also appear to have moderate right-sided foraminal narrowing L4-5.  Patient does report right lateral hip pain in his noted to have degeneration noted bilateral hip x-ray.  No pain reproduced with internal or external rotation of hips.  Significant relief with lumbar RFA.    Prior records reviewed.  Pertinent imaging studies reviewed by me. Imaging results were discussed with patient.  Patient is s/p bilateral left than right L3, L4, L5 radiofrequency ablation with 95% relief.  Patient noting improvement ADLs.  Consider repeating in the future if appropriate.  Stressed the importance of maintaining regular home exercise program and being mindful of how they use their back throughout the day.  Patient expressed understanding and agreement.  Return to clinic as needed.        Prince Jaffe PA-C  [facility]  Interventional Pain Management   05/22/2024    This note was created by combination of typed  and M-Modal dictation.  Transcription and phonetic errors may be present.  If there are any questions, please contact me.

## 2024-05-23 RX ORDER — LISINOPRIL 40 MG/1
40 TABLET ORAL DAILY
Qty: 90 TABLET | Refills: 1 | Status: SHIPPED | OUTPATIENT
Start: 2024-05-23

## 2024-05-23 RX ORDER — PEN NEEDLE, DIABETIC 32GX 5/32"
NEEDLE, DISPOSABLE MISCELLANEOUS
Qty: 100 EACH | Refills: 3 | Status: SHIPPED | OUTPATIENT
Start: 2024-05-23

## 2024-05-23 NOTE — TELEPHONE ENCOUNTER
Refill Decision Note   Gerson Pihllips  is requesting a refill authorization.  Brief Assessment and Rationale for Refill:  Approve     Medication Therapy Plan:  FLOS      Comments:     Note composed:10:38 AM 05/23/2024

## 2024-05-23 NOTE — TELEPHONE ENCOUNTER
Care Due:                  Date            Visit Type   Department     Provider  --------------------------------------------------------------------------------                                [facility]                              PRIMARY      MED/ INTERNAL  Last Visit: 12-      CARE ([facility])   MED/ PEDS      Alisa Chester                              [facility]                              PRIMARY      MED/ INTERNAL  Next Visit: 06-      CARE ([facility])   MED/ PEDS      Alisa Chester                                                            Last  Test          Frequency    Reason                     Performed    Due Date  --------------------------------------------------------------------------------    HBA1C.......  6 months...  semaglutide..............  02- 08-    [facility] Embedded Care Due Messages. Reference number: [id number].   5/22/2024 11:39:40 PM CDT

## 2024-06-28 ENCOUNTER — OFFICE VISIT (OUTPATIENT)
Dept: FAMILY MEDICINE | Facility: CLINIC | Age: 69
End: 2024-06-28
Payer: MEDICARE

## 2024-06-28 VITALS
WEIGHT: 219.44 LBS | SYSTOLIC BLOOD PRESSURE: 132 MMHG | BODY MASS INDEX: 28.18 KG/M2 | HEART RATE: 73 BPM | TEMPERATURE: 98 F | OXYGEN SATURATION: 98 % | DIASTOLIC BLOOD PRESSURE: 60 MMHG

## 2024-06-28 DIAGNOSIS — R80.9 TYPE 2 DIABETES MELLITUS WITH DIABETIC MICROALBUMINURIA, WITH LONG-TERM CURRENT USE OF INSULIN: Primary | ICD-10-CM

## 2024-06-28 DIAGNOSIS — E11.69 DYSLIPIDEMIA ASSOCIATED WITH TYPE 2 DIABETES MELLITUS: ICD-10-CM

## 2024-06-28 DIAGNOSIS — I70.0 AORTIC ATHEROSCLEROSIS: ICD-10-CM

## 2024-06-28 DIAGNOSIS — B35.1 ONYCHOMYCOSIS: ICD-10-CM

## 2024-06-28 DIAGNOSIS — H40.1132 PRIMARY OPEN ANGLE GLAUCOMA (POAG) OF BOTH EYES, MODERATE STAGE: Primary | ICD-10-CM

## 2024-06-28 DIAGNOSIS — Z79.4 TYPE 2 DIABETES MELLITUS WITH DIABETIC MICROALBUMINURIA, WITH LONG-TERM CURRENT USE OF INSULIN: Primary | ICD-10-CM

## 2024-06-28 DIAGNOSIS — I10 ESSENTIAL HYPERTENSION: ICD-10-CM

## 2024-06-28 DIAGNOSIS — F32.0 CURRENT MILD EPISODE OF MAJOR DEPRESSIVE DISORDER WITHOUT PRIOR EPISODE: ICD-10-CM

## 2024-06-28 DIAGNOSIS — E11.29 TYPE 2 DIABETES MELLITUS WITH DIABETIC MICROALBUMINURIA, WITH LONG-TERM CURRENT USE OF INSULIN: Primary | ICD-10-CM

## 2024-06-28 DIAGNOSIS — E78.5 DYSLIPIDEMIA ASSOCIATED WITH TYPE 2 DIABETES MELLITUS: ICD-10-CM

## 2024-06-28 PROCEDURE — 99999 PR PBB SHADOW E&M-EST. PATIENT-LVL V: CPT | Mod: PBBFAC,,, | Performed by: FAMILY MEDICINE

## 2024-06-28 RX ORDER — CITALOPRAM 20 MG/1
20 TABLET, FILM COATED ORAL DAILY
Qty: 30 TABLET | Refills: 11 | Status: SHIPPED | OUTPATIENT
Start: 2024-06-28 | End: 2025-06-28

## 2024-06-28 NOTE — PROGRESS NOTES
Assessment & Plan:    Type 2 diabetes mellitus with diabetic microalbuminuria, with long-term current use of insulin  -     Ambulatory referral/consult to Podiatry; Future; Expected date: 07/05/2024    Fasting labs to be performed.  Discussed limitation of carbs and to keep a diet of lean protein and vegetables.     Onychomycosis  -     Ambulatory referral/consult to Podiatry; Future; Expected date: 07/05/2024    Foot exam revealed severe onychomycosis of the great toes and a callus on the left 2nd digit. Recommended follow up with Podiatry for nail care, as well as diabetic shoes to prevent callus formation.     Dyslipidemia associated with type 2 diabetes mellitus  Aortic atherosclerosis  Labs to be performed.    Essential hypertension  Controlled. Continue current therapy.     Current mild episode of major depressive disorder without prior episode  -     citalopram (CELEXA) 20 MG tablet; Take 1 tablet (20 mg total) by mouth once daily.  Dispense: 30 tablet; Refill: 11    Restart Celexa at 20 mg qd x7 days then increased to 40 mg daily thereafter.       Health maintenance reviewed & addressed above.    Encouraged tetanus and shingles vaccines to be done at his pharmacy.     Follow-up: Follow up in about 6 months (around 12/28/2024).  ______________________________________________________________________    Chief Complaint  Chief Complaint   Patient presents with    Health Maintenance       HPI  Gerson Phillips III is a 69 y.o. male with medical diagnoses as listed in the medical history and problem list that presents to the office to follow up on his chronic conditions. He did not have his blood work done this morning. Reports BG /190s before and after meals, respectively. He is requesting to restart depression medication because of more stress in his life. Review of his record shows that he was taking Celexa 40 mg in 2016 and this was discontinued when he no longer needed it.     Most recent pertinent workup:      Last A1C  Lab Results   Component Value Date    HGBA1C 6.3 (H) 02/16/2024         Health Maintenance         Date Due Completion Date    Shingles Vaccine (1 of 2) Never done ---    COVID-19 Vaccine (6 - 2023-24 season) 09/01/2023 1/20/2023    TETANUS VACCINE 09/27/2023 9/27/2013    Hemoglobin A1c 08/16/2024 2/16/2024    Eye Exam 10/13/2024 10/13/2023    Diabetes Urine Screening 12/22/2024 12/22/2023    Lipid Panel 12/22/2024 12/22/2023    High Dose Statin 06/28/2025 6/28/2024    Foot Exam 06/28/2025 6/28/2024 (Done)    Override on 6/28/2024: Done    Override on 6/9/2023: Done    Override on 12/9/2019: Done    Colorectal Cancer Screening 03/26/2028 3/26/2018              PAST MEDICAL HISTORY:  Past Medical History:   Diagnosis Date    Abnormal liver function tests 06/18/2013    Anemia 06/18/2013    Cervical radiculopathy 03/12/2013    CKD stage 3 due to type 2 diabetes mellitus 03/04/2016    Colon polyp     Diabetes mellitus type II, uncontrolled 10/18/2012    Diabetes mellitus with renal manifestations, uncontrolled 09/27/2013    History of colonic polyps 10/05/2015    HTN (hypertension) 10/18/2012    Hyperlipidemia 10/18/2012    Lateral epicondylitis 06/18/2013    Long-term insulin use 03/04/2016    Low back pain 01/29/2013    Microalbuminuria 03/30/2015    Nuclear sclerosis of both eyes 02/14/2020    Open-angle glaucoma     Primary open angle glaucoma (POAG) of both eyes, moderate stage 02/14/2020    Special screening for malignant neoplasms, colon 03/26/2018    Uncontrolled type 2 diabetes mellitus with proteinuria or albuminuria 03/30/2015    Uncontrolled type 2 diabetes mellitus with proteinuria or microalbuminuria 03/30/2015       PAST SURGICAL HISTORY:  Past Surgical History:   Procedure Laterality Date    COLONOSCOPY N/A 09/01/2017    Procedure: COLONOSCOPY;  Surgeon: Gopal Ny MD;  Location: [facility];  Service: Endoscopy;  Laterality: N/A;    COLONOSCOPY N/A 03/26/2018    Procedure:  COLONOSCOPY;  Surgeon: Jamar Batista MD;  [facility]);  Service: Endoscopy;  Laterality: N/A;    INJECTION OF ANESTHETIC AGENT AROUND MEDIAL BRANCH NERVES INNERVATING LUMBAR FACET JOINT Bilateral 03/22/2024    Procedure: Bilateral L3, L4, L5 Medial Branch Blocks #1;  Surgeon: Joshua Munoz Jr., MD;  Location: [facility];  Service: Pain Management;  Laterality: Bilateral;  @1245  No ATC  Check BG prior to RF  MD Sign.    INJECTION OF ANESTHETIC AGENT AROUND MEDIAL BRANCH NERVES INNERVATING LUMBAR FACET JOINT Bilateral 04/12/2024    Procedure: Bilateral L3, L4, L5 Medial Branch Blocks #2;  Surgeon: Joshua Munoz Jr., MD;  Location: [facility];  Service: Pain Management;  Laterality: Bilateral;  @1145  No ATC  Check BG Prior to RFA  Needs Consent    RADIOFREQUENCY ABLATION OF LUMBAR MEDIAL BRANCH NERVE AT SINGLE LEVEL Left 4/26/2024    Procedure: Left L3, L4, L5 Radiofrequency Thermocoagulation of Medial Branches;  Surgeon: Joshua Munoz Jr., MD;  Location: [facility];  Service: Pain Management;  Laterality: Left;  @1100 (given)  No ATC  Check BG  Needs Consent    RADIOFREQUENCY ABLATION OF LUMBAR MEDIAL BRANCH NERVE AT SINGLE LEVEL Right 5/10/2024    Procedure: Right L3, L4, L5 Radiofrequency Thermocoagulation of Medial Branches;  Surgeon: Joshua Munoz Jr., MD;  Location: [facility];  Service: Pain Management;  Laterality: Right;  @1130  No ATC  Check BG  Needs Consent  Last 1&50       SOCIAL HISTORY:  Social History     Socioeconomic History    Marital status:    Tobacco Use    Smoking status: Never     Passive exposure: Never    Smokeless tobacco: Never   Substance and Sexual Activity    Alcohol use: Yes     Alcohol/week: 3.0 - 4.0 standard drinks of alcohol     Types: 3 - 4 Cans of beer per week    Drug use: Not Currently    Sexual activity: Yes     Partners: Female     Social Determinants of Health     Financial  Resource Strain: Low Risk  (4/19/2024)    Overall Financial Resource Strain (CARDIA)     Difficulty of Paying Living Expenses: Not hard at all   Food Insecurity: No Food Insecurity (4/19/2024)    Hunger Vital Sign     Worried About Running Out of Food in the Last Year: Never true     Ran Out of Food in the Last Year: Never true   Transportation Needs: No Transportation Needs (4/19/2024)    PRAPARE - Transportation     Lack of Transportation (Medical): No     Lack of Transportation (Non-Medical): No   Physical Activity: Unknown (4/19/2024)    Exercise Vital Sign     Days of Exercise per Week: 3 days   Stress: No Stress Concern Present (4/19/2024)    [employer] [place] [employer] - Occupational Stress Questionnaire     Feeling of Stress : Only a little   Housing Stability: Unknown (4/19/2024)    Housing Stability Vital Sign     Unable to Pay for Housing in the Last Year: No       FAMILY HISTORY:  Family History   Problem Relation Name Age of Onset    Diabetes Mother Maid Alan     Cancer Mother Maid Alan     Cancer Father Gerson     Diabetes Father Gerson     Cataracts Sister Joann     Diabetes Sister Joann     Diabetes Sister Nakia     Cancer Brother Jericho     Cataracts Brother Jericho     No Known Problems Brother x3     No Known Problems Maternal Aunt      No Known Problems Maternal Uncle      No Known Problems Paternal Aunt      No Known Problems Paternal Uncle      No Known Problems Maternal Grandmother      No Known Problems Maternal Grandfather      No Known Problems Paternal Grandmother      Diabetes Paternal Grandfather None     Amblyopia Neg Hx      Blindness Neg Hx      Glaucoma Neg Hx      Hypertension Neg Hx      Macular degeneration Neg Hx      Retinal detachment Neg Hx      Strabismus Neg Hx      Stroke Neg Hx      Thyroid disease Neg Hx         ALLERGIES AND MEDICATIONS: updated and reviewed.  Review of patient's allergies indicates:  No Known Allergies  Current Outpatient Medications  "  Medication Sig Dispense Refill    atorvastatin (LIPITOR) 20 MG tablet Take 1 tablet (20 mg total) by mouth once daily. 90 tablet 3    BD FORREST 2ND GEN PEN NEEDLE 32 gauge x 5/32" Ndle USE 1 TIME DAILY 100 each 3    blood-glucose meter,continuous (DEXCOM G7 ) Misc Use with Dexcom G7 sensors 1 each 0    blood-glucose sensor (DEXCOM G7 SENSOR) Ashley Change every 10 days 12 each 3    diltiaZEM (CARDIZEM CD) 240 MG 24 hr capsule TAKE 1 CAPSULE BY MOUTH ONCE DAILY. 90 capsule 1    hydroCHLOROthiazide (HYDRODIURIL) 12.5 MG Tab TAKE 1 TABLET BY MOUTH EVERY DAY 90 tablet 3    insulin glargine, TOUJEO, (TOUJEO SOLOSTAR U-300 INSULIN) 300 unit/mL (1.5 mL) InPn pen Inject 24 units once daily 3 pen 5    latanoprost 0.005 % ophthalmic solution Place 1 drop into both eyes every evening. 7.5 mL 2    lisinopriL (PRINIVIL,ZESTRIL) 40 MG tablet Take 1 tablet (40 mg total) by mouth once daily. 90 tablet 1    metFORMIN (GLUCOPHAGE) 1000 MG tablet Take 1 tablet (1,000 mg total) by mouth 2 (two) times daily. 180 tablet 2    semaglutide (OZEMPIC) 2 mg/dose (8 mg/3 mL) PnIj Inject 2 mg into the skin every 7 days. 3 mL 11    blood sugar diagnostic Strp To check blood glucose three times daily, to use with insurance preferred meter (Patient not taking: Reported on 6/28/2024) 100 each 3    blood-glucose meter kit To check blood glucose three times daily, to use with insurance preferred meter 1 each 0    citalopram (CELEXA) 20 MG tablet Take 1 tablet (20 mg total) by mouth once daily. 30 tablet 11    lancets Misc To check blood glucose three times daily, to use with insurance preferred meter (Patient not taking: Reported on 6/28/2024) 100 each 3    pregabalin (LYRICA) 25 MG capsule TAKE 1 CAPSULE (25 MG TOTAL) BY MOUTH 2 (TWO) TIMES DAILY AS NEEDED (PAIN). (Patient not taking: Reported on 4/19/2024) 60 capsule 5     Current Facility-Administered Medications   Medication Dose Route Frequency Provider Last Rate Last Admin    LIDOcaine " 2%/EPINEPHrine 1:100,000 injection 2 mL  2 mL Intradermal 1 time in Clinic/HOD Andrew Busch MD             ROS  Review of Systems   Skin:  Positive for color change.   Psychiatric/Behavioral:  Positive for dysphoric mood.            Physical Exam  Vitals:    06/28/24 1039   BP: 132/60   Pulse: 73   Temp: 98 °F (36.7 °C)   TempSrc: Oral   SpO2: 98%   Weight: 99.6 kg (219 lb 7.5 oz)    Body mass index is 28.18 kg/m².  Weight: 99.6 kg (219 lb 7.5 oz)       Physical Exam  Constitutional:       General: He is not in acute distress.  HENT:      Head: Normocephalic and atraumatic.   Neck:      Thyroid: No thyromegaly.      Vascular: No carotid bruit.   Cardiovascular:      Rate and Rhythm: Normal rate and regular rhythm.      Pulses: Normal pulses.      Heart sounds: Normal heart sounds.   Pulmonary:      Effort: Pulmonary effort is normal. No respiratory distress.      Breath sounds: Normal breath sounds.   Musculoskeletal:      Cervical back: Neck supple.      Right lower leg: No edema.      Left lower leg: No edema.   Feet:      Comments: Protective Sensation (w/ 10 gram monofilament):  Right: Intact  Left: Intact    Visual Inspection:  Fungal disease of both great toes. Callus formation on the left 2nd toe.     Pedal Pulses:   Right: Present  Left: Present    Posterior Tibialis Pulses:   Right:Present  Left: Present      Lymphadenopathy:      Cervical: No cervical adenopathy.   Skin:     General: Skin is warm and dry.      Findings: No rash.   Neurological:      General: No focal deficit present.      Mental Status: He is alert and oriented to person, place, and time.   Psychiatric:         Mood and Affect: Mood normal.         Behavior: Behavior normal.         Thought Content: Thought content normal.

## 2024-07-12 ENCOUNTER — LAB VISIT (OUTPATIENT)
Dept: LAB | Facility: HOSPITAL | Age: 69
End: 2024-07-12
Attending: FAMILY MEDICINE
Payer: MEDICARE

## 2024-07-12 DIAGNOSIS — E11.65 TYPE 2 DIABETES MELLITUS WITH HYPERGLYCEMIA, WITHOUT LONG-TERM CURRENT USE OF INSULIN: ICD-10-CM

## 2024-07-12 LAB
BASOPHILS # BLD AUTO: 0.04 K/UL (ref 0–0.2)
BASOPHILS NFR BLD: 0.9 % (ref 0–1.9)
DIFFERENTIAL METHOD BLD: ABNORMAL
EOSINOPHIL # BLD AUTO: 0.2 K/UL (ref 0–0.5)
EOSINOPHIL NFR BLD: 3.6 % (ref 0–8)
ERYTHROCYTE [DISTWIDTH] IN BLOOD BY AUTOMATED COUNT: 13.7 % (ref 11.5–14.5)
HCT VFR BLD AUTO: 41.9 % (ref 40–54)
HGB BLD-MCNC: 13.5 G/DL (ref 14–18)
IMM GRANULOCYTES # BLD AUTO: 0.01 K/UL (ref 0–0.04)
IMM GRANULOCYTES NFR BLD AUTO: 0.2 % (ref 0–0.5)
LYMPHOCYTES # BLD AUTO: 1.9 K/UL (ref 1–4.8)
LYMPHOCYTES NFR BLD: 41.5 % (ref 18–48)
MCH RBC QN AUTO: 27.8 PG (ref 27–31)
MCHC RBC AUTO-ENTMCNC: 32.2 G/DL (ref 32–36)
MCV RBC AUTO: 86 FL (ref 82–98)
MONOCYTES # BLD AUTO: 0.5 K/UL (ref 0.3–1)
MONOCYTES NFR BLD: 10.9 % (ref 4–15)
NEUTROPHILS # BLD AUTO: 1.9 K/UL (ref 1.8–7.7)
NEUTROPHILS NFR BLD: 42.9 % (ref 38–73)
NRBC BLD-RTO: 0 /100 WBC
PLATELET # BLD AUTO: 203 K/UL (ref 150–450)
PMV BLD AUTO: 10.2 FL (ref 9.2–12.9)
RBC # BLD AUTO: 4.85 M/UL (ref 4.6–6.2)
WBC # BLD AUTO: 4.48 K/UL (ref 3.9–12.7)

## 2024-07-12 PROCEDURE — 36415 COLL VENOUS BLD VENIPUNCTURE: CPT | Mod: PO | Performed by: FAMILY MEDICINE

## 2024-07-12 PROCEDURE — 83036 HEMOGLOBIN GLYCOSYLATED A1C: CPT | Performed by: FAMILY MEDICINE

## 2024-07-12 PROCEDURE — 80053 COMPREHEN METABOLIC PANEL: CPT | Performed by: FAMILY MEDICINE

## 2024-07-12 PROCEDURE — 85025 COMPLETE CBC W/AUTO DIFF WBC: CPT | Performed by: FAMILY MEDICINE

## 2024-07-12 PROCEDURE — 80061 LIPID PANEL: CPT | Performed by: FAMILY MEDICINE

## 2024-07-13 LAB
ALBUMIN SERPL BCP-MCNC: 3.7 G/DL (ref 3.5–5.2)
ALP SERPL-CCNC: 66 U/L (ref 55–135)
ALT SERPL W/O P-5'-P-CCNC: 15 U/L (ref 10–44)
ANION GAP SERPL CALC-SCNC: 8 MMOL/L (ref 8–16)
AST SERPL-CCNC: 13 U/L (ref 10–40)
BILIRUB SERPL-MCNC: 0.4 MG/DL (ref 0.1–1)
BUN SERPL-MCNC: 13 MG/DL (ref 8–23)
CALCIUM SERPL-MCNC: 9.1 MG/DL (ref 8.7–10.5)
CHLORIDE SERPL-SCNC: 101 MMOL/L (ref 95–110)
CHOLEST SERPL-MCNC: 102 MG/DL (ref 120–199)
CHOLEST/HDLC SERPL: 2 {RATIO} (ref 2–5)
CO2 SERPL-SCNC: 23 MMOL/L (ref 23–29)
CREAT SERPL-MCNC: 0.9 MG/DL (ref 0.5–1.4)
EST. GFR  (NO RACE VARIABLE): >60 ML/MIN/1.73 M^2
ESTIMATED AVG GLUCOSE: 131 MG/DL (ref 68–131)
GLUCOSE SERPL-MCNC: 79 MG/DL (ref 70–110)
HBA1C MFR BLD: 6.2 % (ref 4–5.6)
HDLC SERPL-MCNC: 52 MG/DL (ref 40–75)
HDLC SERPL: 51 % (ref 20–50)
LDLC SERPL CALC-MCNC: 43 MG/DL (ref 63–159)
NONHDLC SERPL-MCNC: 50 MG/DL
POTASSIUM SERPL-SCNC: 3.4 MMOL/L (ref 3.5–5.1)
PROT SERPL-MCNC: 6.6 G/DL (ref 6–8.4)
SODIUM SERPL-SCNC: 132 MMOL/L (ref 136–145)
TRIGL SERPL-MCNC: 35 MG/DL (ref 30–150)

## 2024-07-15 ENCOUNTER — PATIENT MESSAGE (OUTPATIENT)
Dept: FAMILY MEDICINE | Facility: CLINIC | Age: 69
End: 2024-07-15
Payer: MEDICARE

## 2024-07-15 DIAGNOSIS — T50.2X5A DIURETIC-INDUCED HYPOKALEMIA: Primary | ICD-10-CM

## 2024-07-15 DIAGNOSIS — E87.6 DIURETIC-INDUCED HYPOKALEMIA: Primary | ICD-10-CM

## 2024-07-15 DIAGNOSIS — R80.9 TYPE 2 DIABETES MELLITUS WITH DIABETIC MICROALBUMINURIA, WITH LONG-TERM CURRENT USE OF INSULIN: ICD-10-CM

## 2024-07-15 DIAGNOSIS — E11.29 TYPE 2 DIABETES MELLITUS WITH DIABETIC MICROALBUMINURIA, WITH LONG-TERM CURRENT USE OF INSULIN: ICD-10-CM

## 2024-07-15 DIAGNOSIS — Z79.4 TYPE 2 DIABETES MELLITUS WITH DIABETIC MICROALBUMINURIA, WITH LONG-TERM CURRENT USE OF INSULIN: ICD-10-CM

## 2024-07-15 RX ORDER — POTASSIUM CHLORIDE 20 MEQ/1
20 TABLET, EXTENDED RELEASE ORAL DAILY
Qty: 30 TABLET | Refills: 11 | Status: SHIPPED | OUTPATIENT
Start: 2024-07-15 | End: 2025-07-15

## 2024-07-15 NOTE — TELEPHONE ENCOUNTER
Patient was informed of result(s) via [facility]. Please contact them to schedule fasting labs/urine before his f/u in 6 mo.

## 2024-07-19 ENCOUNTER — OFFICE VISIT (OUTPATIENT)
Dept: PODIATRY | Facility: CLINIC | Age: 69
End: 2024-07-19
Payer: MEDICARE

## 2024-07-19 VITALS
HEIGHT: 74 IN | DIASTOLIC BLOOD PRESSURE: 72 MMHG | BODY MASS INDEX: 28.18 KG/M2 | WEIGHT: 219.56 LBS | HEART RATE: 76 BPM | SYSTOLIC BLOOD PRESSURE: 158 MMHG

## 2024-07-19 DIAGNOSIS — E11.29 TYPE 2 DIABETES MELLITUS WITH DIABETIC MICROALBUMINURIA, WITH LONG-TERM CURRENT USE OF INSULIN: ICD-10-CM

## 2024-07-19 DIAGNOSIS — R80.9 TYPE 2 DIABETES MELLITUS WITH DIABETIC MICROALBUMINURIA, WITH LONG-TERM CURRENT USE OF INSULIN: ICD-10-CM

## 2024-07-19 DIAGNOSIS — Z79.4 TYPE 2 DIABETES MELLITUS WITH DIABETIC MICROALBUMINURIA, WITH LONG-TERM CURRENT USE OF INSULIN: ICD-10-CM

## 2024-07-19 DIAGNOSIS — B35.1 ONYCHOMYCOSIS: ICD-10-CM

## 2024-07-19 PROCEDURE — 1126F AMNT PAIN NOTED NONE PRSNT: CPT | Mod: CPTII,S$GLB,, | Performed by: PODIATRIST

## 2024-07-19 PROCEDURE — 3044F HG A1C LEVEL LT 7.0%: CPT | Mod: CPTII,S$GLB,, | Performed by: PODIATRIST

## 2024-07-19 PROCEDURE — 99999 PR PBB SHADOW E&M-EST. PATIENT-LVL IV: CPT | Mod: PBBFAC,,, | Performed by: PODIATRIST

## 2024-07-19 PROCEDURE — 1159F MED LIST DOCD IN RCRD: CPT | Mod: CPTII,S$GLB,, | Performed by: PODIATRIST

## 2024-07-19 PROCEDURE — 3008F BODY MASS INDEX DOCD: CPT | Mod: CPTII,S$GLB,, | Performed by: PODIATRIST

## 2024-07-19 PROCEDURE — 99204 OFFICE O/P NEW MOD 45 MIN: CPT | Mod: S$GLB,,, | Performed by: PODIATRIST

## 2024-07-19 PROCEDURE — 4010F ACE/ARB THERAPY RXD/TAKEN: CPT | Mod: CPTII,S$GLB,, | Performed by: PODIATRIST

## 2024-07-19 PROCEDURE — 1160F RVW MEDS BY RX/DR IN RCRD: CPT | Mod: CPTII,S$GLB,, | Performed by: PODIATRIST

## 2024-07-19 PROCEDURE — 3072F LOW RISK FOR RETINOPATHY: CPT | Mod: CPTII,S$GLB,, | Performed by: PODIATRIST

## 2024-07-19 PROCEDURE — 1101F PT FALLS ASSESS-DOCD LE1/YR: CPT | Mod: CPTII,S$GLB,, | Performed by: PODIATRIST

## 2024-07-19 PROCEDURE — 3078F DIAST BP <80 MM HG: CPT | Mod: CPTII,S$GLB,, | Performed by: PODIATRIST

## 2024-07-19 PROCEDURE — 3288F FALL RISK ASSESSMENT DOCD: CPT | Mod: CPTII,S$GLB,, | Performed by: PODIATRIST

## 2024-07-19 PROCEDURE — 3077F SYST BP >= 140 MM HG: CPT | Mod: CPTII,S$GLB,, | Performed by: PODIATRIST

## 2024-07-19 NOTE — PROGRESS NOTES
Subjective:      Patient ID: Gerson Phillips III is a 69 y.o. male.    Chief Complaint: Diabetic Foot Exam (PCP Alisa Chester, DO 06/28/2024)    Diabetes, increased risk amputation needing evaluation/management/optomization of foot care.    Cc2 thick discolored toenail both big toes.  Chronic condition present for years.  This exacerbation is Gradual onset, worsening over past several weeks, aggravated by increased weight bearing, shoe gear, pressure.  No previous medical treatment.  OTC pain med not helping. Denies trauma ,surgery.    Chief Complaint   Patient presents with    Diabetic Foot Exam     PCP Alisa Chester, DO 06/28/2024       Casual shoes both feet.    Review of Systems   Constitutional: Negative for chills, diaphoresis, fever, malaise/fatigue and night sweats.   Cardiovascular:  Negative for claudication, cyanosis, leg swelling and syncope.   Skin:  Positive for nail changes. Negative for color change, dry skin, rash, suspicious lesions and unusual hair distribution.   Musculoskeletal:  Negative for falls, joint pain, joint swelling, muscle cramps, muscle weakness and stiffness.   Gastrointestinal:  Negative for constipation, diarrhea, nausea and vomiting.   Neurological:  Negative for brief paralysis, disturbances in coordination, focal weakness, numbness, paresthesias, sensory change and tremors.         Objective:      Physical Exam  Constitutional:       Appearance: He is well-developed. He is not diaphoretic.      Comments: Oriented to time, place, and person.   Cardiovascular:      Pulses:           Dorsalis pedis pulses are 2+ on the right side and 2+ on the left side.        Posterior tibial pulses are 1+ on the right side and 1+ on the left side.      Comments: Capillary fill time 3-5 seconds.  All toes warm to touch.      Negative lower extremity edema bilateral.    Negative elevational pallor and dependent rubor bilateral.    Musculoskeletal:      Comments: Normal angle, base, station  of gait. Decreased stride length, early heel off, moderately propulsive toe off bilateral.    All ten toes without clubbing, cyanosis, or signs of ischemia.      Patient has hammertoes of digits   2-5 bilateral                partially reducible without symptom today.      No pain to palpation bilateral lower extremities.      Range of motion, stability, muscle strength, and muscle tone are age and health appropriate normal bilateral feet and legs.       Skin:     General: Skin is warm and dry.      Coloration: Skin is not pale.      Findings: No abrasion, bruising, burn, ecchymosis, erythema, laceration, lesion, petechiae or rash.      Nails: There is no clubbing.      Comments: Skin is normal age and health appropriate color, turgor, texture, and temperature bilateral lower extremities without ulceration, hyperpigmentation, discoloration, masses nodules or cords palpated.  No ecchymosis, erythema, edema, or cardinal signs of infection bilateral lower extremities.       Toenails 1st,   bilateral are hypertrophic thickened 2-3 mm, dystrophic, discolored tanish brown with tan, gray crumbly subungual debris.  Tender to distal nail plate pressure, without periungual skin abnormality of each.     Neurological:      Mental Status: He is alert and oriented to person, place, and time. He is not disoriented.      Sensory: No sensory deficit.      Motor: No tremor, atrophy or abnormal muscle tone.      Comments: Negative tinel sign to percussion sural, superficial peroneal, deep peroneal, saphenous, and posterior tibial nerves right and left ankles and feet.       Psychiatric:         Behavior: Behavior is cooperative.           Assessment:       Encounter Diagnoses   Name Primary?    Type 2 diabetes mellitus with diabetic microalbuminuria, with long-term current use of insulin     Onychomycosis          Plan:       Gerson was seen today for diabetic foot exam.    Diagnoses and all orders for this visit:    Type 2 diabetes  mellitus with diabetic microalbuminuria, with long-term current use of insulin  -     Ambulatory referral/consult to Podiatry    Onychomycosis  -     Ambulatory referral/consult to Podiatry      I counseled the patient on his conditions, their implications and medical management.        The patient has received literature on basic diabetic foot care.  Patient will inspect feet daily, wear protective shoe gear when ambulatory, and apply moisturizer to skin as needed to maintain elasticity and help prevent ulceration.    Inspect feet multiple times daily for signs of occurrence/recurrence ulceration.    Discussed conservative treatment with shoes of adequate dimensions, material, and style to alleviate symptoms and delay or prevent surgical intervention.    [place]              No follow-ups on file.

## 2024-07-22 ENCOUNTER — TELEPHONE (OUTPATIENT)
Dept: PODIATRY | Facility: CLINIC | Age: 69
End: 2024-07-22
Payer: MEDICARE

## 2024-07-22 NOTE — TELEPHONE ENCOUNTER
Staff informed patient he does not qualify for diabetic shoes.     Per DR. Benitez.    Patient verbalized understanding.        ----- Message from Kay Stovall sent at 7/22/2024 11:07 AM CDT -----   Name of Who is Calling:     What is the request in detail:  patient request call back in reference to diabetic shoes / patient want to know if  recommend   diabetic shoes      Please contact to further discuss and advise      Can the clinic reply by [facility]:     What Number to Call Back if not in [facility]:   [phone]

## 2024-08-23 ENCOUNTER — LAB VISIT (OUTPATIENT)
Dept: LAB | Facility: HOSPITAL | Age: 69
End: 2024-08-23
Attending: NURSE PRACTITIONER
Payer: MEDICARE

## 2024-08-23 DIAGNOSIS — R80.9 CONTROLLED TYPE 2 DIABETES MELLITUS WITH MICROALBUMINURIA, WITHOUT LONG-TERM CURRENT USE OF INSULIN: ICD-10-CM

## 2024-08-23 DIAGNOSIS — E11.29 CONTROLLED TYPE 2 DIABETES MELLITUS WITH MICROALBUMINURIA, WITHOUT LONG-TERM CURRENT USE OF INSULIN: ICD-10-CM

## 2024-08-23 LAB
ESTIMATED AVG GLUCOSE: 126 MG/DL (ref 68–131)
HBA1C MFR BLD: 6 % (ref 4–5.6)

## 2024-08-23 PROCEDURE — 83036 HEMOGLOBIN GLYCOSYLATED A1C: CPT | Performed by: NURSE PRACTITIONER

## 2024-08-23 PROCEDURE — 36415 COLL VENOUS BLD VENIPUNCTURE: CPT | Mod: PO | Performed by: NURSE PRACTITIONER

## 2024-08-30 ENCOUNTER — CLINICAL SUPPORT (OUTPATIENT)
Dept: OPHTHALMOLOGY | Facility: CLINIC | Age: 69
End: 2024-08-30
Payer: MEDICARE

## 2024-08-30 ENCOUNTER — OFFICE VISIT (OUTPATIENT)
Dept: OPTOMETRY | Facility: CLINIC | Age: 69
End: 2024-08-30
Payer: MEDICARE

## 2024-08-30 ENCOUNTER — OFFICE VISIT (OUTPATIENT)
Dept: ENDOCRINOLOGY | Facility: CLINIC | Age: 69
End: 2024-08-30
Payer: MEDICARE

## 2024-08-30 VITALS
BODY MASS INDEX: 27.86 KG/M2 | SYSTOLIC BLOOD PRESSURE: 140 MMHG | HEART RATE: 63 BPM | TEMPERATURE: 99 F | WEIGHT: 217 LBS | DIASTOLIC BLOOD PRESSURE: 68 MMHG

## 2024-08-30 DIAGNOSIS — R80.9 CONTROLLED TYPE 2 DIABETES MELLITUS WITH MICROALBUMINURIA, WITHOUT LONG-TERM CURRENT USE OF INSULIN: Primary | ICD-10-CM

## 2024-08-30 DIAGNOSIS — E11.36 TYPE 2 DIABETES MELLITUS WITH DIABETIC CATARACT, WITH LONG-TERM CURRENT USE OF INSULIN: ICD-10-CM

## 2024-08-30 DIAGNOSIS — E78.5 HYPERLIPIDEMIA, UNSPECIFIED HYPERLIPIDEMIA TYPE: ICD-10-CM

## 2024-08-30 DIAGNOSIS — H40.1132 PRIMARY OPEN ANGLE GLAUCOMA (POAG) OF BOTH EYES, MODERATE STAGE: ICD-10-CM

## 2024-08-30 DIAGNOSIS — H40.1132 PRIMARY OPEN ANGLE GLAUCOMA (POAG) OF BOTH EYES, MODERATE STAGE: Primary | ICD-10-CM

## 2024-08-30 DIAGNOSIS — E11.29 CONTROLLED TYPE 2 DIABETES MELLITUS WITH MICROALBUMINURIA, WITHOUT LONG-TERM CURRENT USE OF INSULIN: Primary | ICD-10-CM

## 2024-08-30 DIAGNOSIS — Z79.4 TYPE 2 DIABETES MELLITUS WITH DIABETIC CATARACT, WITH LONG-TERM CURRENT USE OF INSULIN: ICD-10-CM

## 2024-08-30 DIAGNOSIS — H52.7 REFRACTIVE ERROR: ICD-10-CM

## 2024-08-30 DIAGNOSIS — H25.13 NUCLEAR SCLEROSIS OF BOTH EYES: ICD-10-CM

## 2024-08-30 DIAGNOSIS — I10 ESSENTIAL HYPERTENSION: ICD-10-CM

## 2024-08-30 PROCEDURE — 99999 PR PBB SHADOW E&M-EST. PATIENT-LVL III: CPT | Mod: PBBFAC,,, | Performed by: NURSE PRACTITIONER

## 2024-08-30 PROCEDURE — 99999 PR PBB SHADOW E&M-EST. PATIENT-LVL III: CPT | Mod: PBBFAC,,, | Performed by: OPTOMETRIST

## 2024-08-30 RX ORDER — METFORMIN HYDROCHLORIDE 1000 MG/1
1000 TABLET ORAL 2 TIMES DAILY
Qty: 180 TABLET | Refills: 2 | Status: SHIPPED | OUTPATIENT
Start: 2024-08-30

## 2024-08-30 RX ORDER — SEMAGLUTIDE 2.68 MG/ML
2 INJECTION, SOLUTION SUBCUTANEOUS
Qty: 3 ML | Refills: 11 | Status: SHIPPED | OUTPATIENT
Start: 2024-08-30 | End: 2025-08-30

## 2024-08-30 RX ORDER — INSULIN GLARGINE 300 [IU]/ML
INJECTION, SOLUTION SUBCUTANEOUS
Qty: 3 PEN | Refills: 5 | Status: SHIPPED | OUTPATIENT
Start: 2024-08-30

## 2024-08-30 NOTE — PROGRESS NOTES
Subjective:       Patient ID: Gerson Phillips III is a 69 y.o. male      Chief Complaint   Patient presents with    Concerns About Ocular Health    Glaucoma    Diabetic Eye Exam     History of Present Illness  Dls: 10/13/23 Dr. Contreras     69 y/ male presents today for 6 month glaucoma ck.   Pt c/o problems with light at night when driving     No pain  No ha's   No floaters    Eye meds  Latanoprost OU Q HS     Pohx:   Glaucoma     Fohx:   Cat- brother     Hemoglobin A1C       Date                     Value               Ref Range             Status                08/23/2024               6.0 (H)             4.0 - 5.6 %           Final                  07/12/2024               6.2 (H)             4.0 - 5.6 %           Final                  02/16/2024               6.3 (H)             4.0 - 5.6 %           Final              Assessment/Plan:     1. Primary open angle glaucoma (POAG) of both eyes, moderate stage    Moderate Primary Open Angle Glaucoma OU  FHx: none  Tbase (before treatment):  14 // 14  Tmax: 14 // 14  HVF defects (date: 08/30/2024) : OD inferior nasal step // OS superior nasal step  OCT optic nerve (date: 08/30/2024) : abnormal  OD (64): G, ST, T, IT, SN // abnormal OS (55)  CCT:  //   Goal IOP:  low teens  Lasers/surgeries: none  Current treatment: latanoprost QHS OU    Glaucoma since 2016. IOP acceptable on latanoprost QHS OU. OCT/HVF done today. CPM. 6 month IOP check, sooner PRN.      2. Type 2 diabetes mellitus with diabetic cataract, with long-term current use of insulin  No diabetic retinopathy. Discussed with pt the effects of diabetes on vision, importance of good blood sugar control, compliance with meds, and follow up care with PCP. Return in 1 year for dilated eye exam, sooner PRN.      3. Nuclear sclerosis of both eyes  NVS per pt. Educated pt on presence of cataracts and effects on vision. No surgery at this time. Recheck in one year, sooner PRN.    4. Refractive error  Educated patient on  refractive error and discussed lens options. Dispensed updated spectacle Rx. Educated about adaptation period to new specs.    Eyeglass Final Rx       Eyeglass Final Rx         Sphere Cylinder Axis Add    Right -0.50 +0.75 080 +2.50    Left Halls +0.25 010 +2.50      Expiration Date: 8/30/2025                    Today's visit is associated with current and anticipated ongoing medical care related to this patient's single serious/complex condition (glaucoma). Follow up is to be continued indefinitely to monitor the condition.       Follow up in about 6 months (around 2/28/2025) for IOP check.

## 2024-08-30 NOTE — PROGRESS NOTES
CC: This 69 y.o. Black or  male  is here for evaluation of  T2DM along with comorbidities indicated in the Visit Diagnosis section of this encounter.    HPI: Gersno Phillips III was diagnosed with T2DM  > 20 years ago.   Previously followed by Dr. Hoskins with lov in (9/2017).         Prior visit 2/2024  A1c is down form 7.2% in October to now 6.3%.   Ozempic was increased to 2 mg in Dec by his PCP. Tolerating it well. He has lost 5 lb since increasing Ozempic dose and a total of 10 lb since lov in October.   He has started Dexcom CGM as well.   Plan Reduce Toujeo fro 24 to 20 units.   Continue Ozempic 2 mg and metformin.   Maintain healthy eating habits.   Return to clinic in 6 months with A1c prior.   A1c in 3 months.     Interval hx  A1c is down from 6.3% in Feb to now 6%. 90 day CGM average 138  Hasn't taken Ozempic for a few weeks now because he needed a refill. He is in donut hole but ok with continuing it.   Pt continues to take 24 units of Toujeo. He did not reduce insulin.     LAST DIABETES EDUCATION: 11/24/23 for Dexcom training     DIABETES MEDICATIONS:   metformin 1000 mg bid with food  Toujeo 20 units qhs - as above.   Ozempic 2 mg once weekly Sundays      Misses medication doses - ozempic as above.     Rotates injection sites - abdomen   Changes pen needles - yes     DM COMPLICATIONS: nephropathy and impotence    SIGNIFICANT DIABETES MED HISTORY:   Unable to afford Victoza   Vgo stopped d/t ineffectiveness in 7/2018 and MDI (basalgar and novolog)  started   Novolog stopped and Ozempic started 12/2019.       SELF MONITORING BLOOD GLUCOSE: Dexcom G7 kvng     CGM interpretation: BGs overall at goal with postmeal spikes noted especially now off ozempic, as high as 280s after meals. Overnight lows noted in 30 day report, down to 50s.            HYPOGLYCEMIC EPISODES: as above.     CURRENT DIET: drinks Coke Zero and water. Eats 2 meals/day - no lunch but will eat a snack like fruit. Brings lunch  to work or eats out.     CURRENT EXERCISE: none     OCCUPATION:  Long distance       BP (!) 140/68   Pulse 63   Temp 99 °F (37.2 °C)   Wt 98.4 kg (217 lb)   BMI 27.86 kg/m²       ROS:   CONSTITUTIONAL: Appetite low, no fatigue  GI: denies constipation, diarrhea, nausea       PHYSICAL EXAM:  GENERAL: Well developed, well nourished. No acute distress.   PSYCH: AAOx3, appropriate mood and affect, conversant, well-groomed. Judgement and insight good.   NEURO: Cranial nerves grossly intact. Speech clear, no tremor.   CHEST: Respirations even and unlabored.         Hemoglobin A1C   Date Value Ref Range Status   08/23/2024 6.0 (H) 4.0 - 5.6 % Final     Comment:     ADA Screening Guidelines:  5.7-6.4%  Consistent with prediabetes  >or=6.5%  Consistent with diabetes    High levels of fetal hemoglobin interfere with the HbA1C  assay. Heterozygous hemoglobin variants (HbS, HgC, etc)do  not significantly interfere with this assay.   However, presence of multiple variants may affect accuracy.     07/12/2024 6.2 (H) 4.0 - 5.6 % Final     Comment:     ADA Screening Guidelines:  5.7-6.4%  Consistent with prediabetes  >or=6.5%  Consistent with diabetes    High levels of fetal hemoglobin interfere with the HbA1C  assay. Heterozygous hemoglobin variants (HbS, HgC, etc)do  not significantly interfere with this assay.   However, presence of multiple variants may affect accuracy.     02/16/2024 6.3 (H) 4.0 - 5.6 % Final     Comment:     ADA Screening Guidelines:  5.7-6.4%  Consistent with prediabetes  >or=6.5%  Consistent with diabetes    High levels of fetal hemoglobin interfere with the HbA1C  assay. Heterozygous hemoglobin variants (HbS, HgC, etc)do  not significantly interfere with this assay.   However, presence of multiple variants may affect accuracy.             Component Value Date/Time     (L) 07/12/2024 0755    K 3.4 (L) 07/12/2024 0755     07/12/2024 0755    CO2 23 07/12/2024 0755    BUN 13  07/12/2024 0755    CREATININE 0.9 07/12/2024 0755    GLU 79 07/12/2024 0755    CALCIUM 9.1 07/12/2024 0755    ALKPHOS 66 07/12/2024 0755    AST 13 07/12/2024 0755    ALT 15 07/12/2024 0755    BILITOT 0.4 07/12/2024 0755    EGFRNORACEVR >60.0 07/12/2024 0755    ESTGFRAFRICA >60.0 04/22/2022 0722       Lab Results   Component Value Date    CHOL 102 (L) 07/12/2024    CHOL 106 (L) 12/22/2023    CHOL 107 (L) 06/09/2023    CHOL 107 (L) 06/09/2023     Lab Results   Component Value Date    HDL 52 07/12/2024    HDL 47 12/22/2023    HDL 50 06/09/2023    HDL 50 06/09/2023     Lab Results   Component Value Date    LDLCALC 43.0 (L) 07/12/2024    LDLCALC 51.2 (L) 12/22/2023    LDLCALC 48.2 (L) 06/09/2023    LDLCALC 48.2 (L) 06/09/2023     Lab Results   Component Value Date    TRIG 35 07/12/2024    TRIG 39 12/22/2023    TRIG 44 06/09/2023    TRIG 44 06/09/2023       Lab Results   Component Value Date    CHOLHDL 51.0 (H) 07/12/2024    CHOLHDL 44.3 12/22/2023    CHOLHDL 46.7 06/09/2023    CHOLHDL 46.7 06/09/2023         Lab Results   Component Value Date    MICALBCREAT 94.1 (H) 12/22/2023               ASSESSMENT and PLAN:    A1C GOAL: < 7%    1. Controlled type 2 diabetes mellitus with microalbuminuria, without long-term current use of insulin  Reduce Toujeo from 24 to 18 units once daily at night.   Continue Ozempic and metformin.     Return to clinic in 6 months with labs prior.  Pt wishes to remain in Endocrine for DM.     insulin glargine, TOUJEO, (TOUJEO SOLOSTAR U-300 INSULIN) 300 unit/mL (1.5 mL) InPn pen    metFORMIN (GLUCOPHAGE) 1000 MG tablet    semaglutide (OZEMPIC) 2 mg/dose (8 mg/3 mL) PnIj    Hemoglobin A1C    Microalbumin/Creatinine Ratio, Urine      2. Essential hypertension  Pt has not yet taken his BP meds.       3. Hyperlipidemia, unspecified hyperlipidemia type  Continue atorvastatin              Orders Placed This Encounter   Procedures    Hemoglobin A1C     Standing Status:   Future     Standing Expiration  Date:   10/29/2025    Microalbumin/Creatinine Ratio, Urine     Standing Status:   Future     Standing Expiration Date:   10/29/2025     Order Specific Question:   Specimen Source     Answer:   Urine        Follow up in about 6 months (around 2/28/2025).

## 2024-08-30 NOTE — PATIENT INSTRUCTIONS
Reduce Toujeo from 24 to 18 units once daily at night.   Continue Ozempic and metformin.     Return to clinic in 6 months with labs prior.

## 2024-09-01 NOTE — TELEPHONE ENCOUNTER
No care due was identified.  [facility] Embedded Care Due Messages. Reference number: [id number].   9/01/2024 7:35:24 AM CDT

## 2024-09-02 RX ORDER — DILTIAZEM HYDROCHLORIDE 240 MG/1
240 CAPSULE, COATED, EXTENDED RELEASE ORAL
Qty: 90 CAPSULE | Refills: 3 | Status: SHIPPED | OUTPATIENT
Start: 2024-09-02

## 2024-09-02 NOTE — TELEPHONE ENCOUNTER
Refill Routing Note   Medication(s) are not appropriate for processing by [facility] for the following reason(s):        Required vitals abnormal    [facility] action(s):  Defer             Appointments  past 12m or future 3m with PCP    Date Provider   Last Visit   6/28/2024 Alisa Chester, DO   Next Visit   1/10/2025 Alisa Chester, DO   ED visits in past 90 days: 0        Note composed:9:22 PM 09/01/2024

## 2024-09-06 DIAGNOSIS — E78.2 MIXED HYPERLIPIDEMIA: ICD-10-CM

## 2024-09-06 RX ORDER — ATORVASTATIN CALCIUM 20 MG/1
20 TABLET, FILM COATED ORAL
Qty: 90 TABLET | Refills: 3 | Status: SHIPPED | OUTPATIENT
Start: 2024-09-06

## 2024-09-06 NOTE — TELEPHONE ENCOUNTER
No care due was identified.  [facility] Embedded Care Due Messages. Reference number: [id number].   9/06/2024 11:43:03 AM CDT

## 2024-09-06 NOTE — TELEPHONE ENCOUNTER
Refill Decision Note   Gerson Alan  is requesting a refill authorization.  Brief Assessment and Rationale for Refill:  Approve     Medication Therapy Plan:         Comments:     Note composed:6:43 PM 09/06/2024

## 2024-09-16 DIAGNOSIS — I10 ESSENTIAL HYPERTENSION: ICD-10-CM

## 2024-09-16 RX ORDER — HYDROCHLOROTHIAZIDE 12.5 MG/1
TABLET ORAL
Qty: 90 TABLET | Refills: 3 | Status: SHIPPED | OUTPATIENT
Start: 2024-09-16

## 2024-09-16 NOTE — TELEPHONE ENCOUNTER
No care due was identified.  [facility] Embedded Care Due Messages. Reference number: [id number].   9/16/2024 9:03:55 AM CDT

## 2024-09-19 ENCOUNTER — OFFICE VISIT (OUTPATIENT)
Dept: FAMILY MEDICINE | Facility: CLINIC | Age: 69
End: 2024-09-19
Payer: MEDICARE

## 2024-09-19 ENCOUNTER — PATIENT MESSAGE (OUTPATIENT)
Dept: PRIMARY CARE CLINIC | Facility: CLINIC | Age: 69
End: 2024-09-19
Payer: MEDICARE

## 2024-09-19 VITALS
HEART RATE: 81 BPM | SYSTOLIC BLOOD PRESSURE: 142 MMHG | BODY MASS INDEX: 27.26 KG/M2 | WEIGHT: 212.44 LBS | DIASTOLIC BLOOD PRESSURE: 68 MMHG | HEIGHT: 74 IN | OXYGEN SATURATION: 97 % | TEMPERATURE: 98 F

## 2024-09-19 DIAGNOSIS — B02.29 POSTHERPETIC NEURALGIA: Primary | ICD-10-CM

## 2024-09-19 PROCEDURE — 1125F AMNT PAIN NOTED PAIN PRSNT: CPT | Mod: CPTII,S$GLB,, | Performed by: FAMILY MEDICINE

## 2024-09-19 PROCEDURE — 3044F HG A1C LEVEL LT 7.0%: CPT | Mod: CPTII,S$GLB,, | Performed by: FAMILY MEDICINE

## 2024-09-19 PROCEDURE — 3008F BODY MASS INDEX DOCD: CPT | Mod: CPTII,S$GLB,, | Performed by: FAMILY MEDICINE

## 2024-09-19 PROCEDURE — 1159F MED LIST DOCD IN RCRD: CPT | Mod: CPTII,S$GLB,, | Performed by: FAMILY MEDICINE

## 2024-09-19 PROCEDURE — 1101F PT FALLS ASSESS-DOCD LE1/YR: CPT | Mod: CPTII,S$GLB,, | Performed by: FAMILY MEDICINE

## 2024-09-19 PROCEDURE — 99213 OFFICE O/P EST LOW 20 MIN: CPT | Mod: S$GLB,,, | Performed by: FAMILY MEDICINE

## 2024-09-19 PROCEDURE — 1160F RVW MEDS BY RX/DR IN RCRD: CPT | Mod: CPTII,S$GLB,, | Performed by: FAMILY MEDICINE

## 2024-09-19 PROCEDURE — 3077F SYST BP >= 140 MM HG: CPT | Mod: CPTII,S$GLB,, | Performed by: FAMILY MEDICINE

## 2024-09-19 PROCEDURE — 4010F ACE/ARB THERAPY RXD/TAKEN: CPT | Mod: CPTII,S$GLB,, | Performed by: FAMILY MEDICINE

## 2024-09-19 PROCEDURE — 3288F FALL RISK ASSESSMENT DOCD: CPT | Mod: CPTII,S$GLB,, | Performed by: FAMILY MEDICINE

## 2024-09-19 PROCEDURE — 99999 PR PBB SHADOW E&M-EST. PATIENT-LVL V: CPT | Mod: PBBFAC,,, | Performed by: FAMILY MEDICINE

## 2024-09-19 PROCEDURE — 3078F DIAST BP <80 MM HG: CPT | Mod: CPTII,S$GLB,, | Performed by: FAMILY MEDICINE

## 2024-09-19 RX ORDER — GABAPENTIN 400 MG/1
400 CAPSULE ORAL 3 TIMES DAILY PRN
Qty: 90 CAPSULE | Refills: 2 | Status: SHIPPED | OUTPATIENT
Start: 2024-09-19

## 2024-09-19 NOTE — PROGRESS NOTES
Assessment & Plan:    Postherpetic neuralgia  -     gabapentin (NEURONTIN) 400 MG capsule; Take 1 capsule (400 mg total) by mouth 3 (three) times daily as needed (pain).  Dispense: 90 capsule; Refill: 2      Discussed diagnosis in further detail. Increase gabapentin to 400 mg tid prn.  D/C Lyrica.     Advised shingles vaccine when pain resolves.       Follow-up: Follow up if symptoms worsen or fail to improve.  ______________________________________________________________________    Chief Complaint  Chief Complaint   Patient presents with    Hospital Follow Up       HPI  Gerson Phillips III is a 69 y.o. male with medical diagnoses as listed in the medical history and problem list that presents to the office to follow up on a recent ER visit for right-sided thoracic postherpetic neuralgia on 9/14. Patient was prescribed baclofen, gabapentin, and lidoderm. He states that the steroid shot he got in the ER helped his pain. Mild relief with gabapentin 300 mg.       PAST MEDICAL HISTORY:  Past Medical History:   Diagnosis Date    Abnormal liver function tests 06/18/2013    Anemia 06/18/2013    Cervical radiculopathy 03/12/2013    CKD stage 3 due to type 2 diabetes mellitus 03/04/2016    Colon polyp     Diabetes mellitus type II, uncontrolled 10/18/2012    Diabetes mellitus with renal manifestations, uncontrolled 09/27/2013    History of colonic polyps 10/05/2015    HTN (hypertension) 10/18/2012    Hyperlipidemia 10/18/2012    Lateral epicondylitis 06/18/2013    Long-term insulin use 03/04/2016    Low back pain 01/29/2013    Microalbuminuria 03/30/2015    Nuclear sclerosis of both eyes 02/14/2020    Open-angle glaucoma     Primary open angle glaucoma (POAG) of both eyes, moderate stage 02/14/2020    Special screening for malignant neoplasms, colon 03/26/2018    Uncontrolled type 2 diabetes mellitus with proteinuria or albuminuria 03/30/2015    Uncontrolled type 2 diabetes mellitus with proteinuria or microalbuminuria  03/30/2015       PAST SURGICAL HISTORY:  Past Surgical History:   Procedure Laterality Date    COLONOSCOPY N/A 09/01/2017    Procedure: COLONOSCOPY;  Surgeon: Gopal Ny MD;  Location: [facility] ENDO;  Service: Endoscopy;  Laterality: N/A;    COLONOSCOPY N/A 03/26/2018    Procedure: COLONOSCOPY;  Surgeon: Jamar Batista MD;  Location: [facility] ENDO ([facility] FLR);  Service: Endoscopy;  Laterality: N/A;    INJECTION OF ANESTHETIC AGENT AROUND MEDIAL BRANCH NERVES INNERVATING LUMBAR FACET JOINT Bilateral 03/22/2024    Procedure: Bilateral L3, L4, L5 Medial Branch Blocks #1;  Surgeon: Joshua Munoz Jr., MD;  Location: [facility] PAIN MANAGEMENT;  Service: Pain Management;  Laterality: Bilateral;  @1245  No ATC  Check BG prior to RF  MD Sign.    INJECTION OF ANESTHETIC AGENT AROUND MEDIAL BRANCH NERVES INNERVATING LUMBAR FACET JOINT Bilateral 04/12/2024    Procedure: Bilateral L3, L4, L5 Medial Branch Blocks #2;  Surgeon: Joshua Munoz Jr., MD;  Location: [facility] PAIN MANAGEMENT;  Service: Pain Management;  Laterality: Bilateral;  @1145  No ATC  Check BG Prior to RFA  Needs Consent    RADIOFREQUENCY ABLATION OF LUMBAR MEDIAL BRANCH NERVE AT SINGLE LEVEL Left 4/26/2024    Procedure: Left L3, L4, L5 Radiofrequency Thermocoagulation of Medial Branches;  Surgeon: Joshua Munoz Jr., MD;  Location: [facility] PAIN MANAGEMENT;  Service: Pain Management;  Laterality: Left;  @1100 (given)  No ATC  Check BG  Needs Consent    RADIOFREQUENCY ABLATION OF LUMBAR MEDIAL BRANCH NERVE AT SINGLE LEVEL Right 5/10/2024    Procedure: Right L3, L4, L5 Radiofrequency Thermocoagulation of Medial Branches;  Surgeon: Joshua Munoz Jr., MD;  Location: [facility] PAIN MANAGEMENT;  Service: Pain Management;  Laterality: Right;  @1130  No ATC  Check BG  Needs Consent  Last 1&50       SOCIAL HISTORY:  Social History     Socioeconomic History    Marital status:    Tobacco Use    Smoking status: Never     Passive exposure: Never    Smokeless  tobacco: Never   Substance and Sexual Activity    Alcohol use: Yes     Alcohol/week: 3.0 - 4.0 standard drinks of alcohol     Types: 3 - 4 Cans of beer per week    Drug use: Not Currently    Sexual activity: Yes     Partners: Female     Social Determinants of Health     Financial Resource Strain: Low Risk  (9/16/2024)    Overall Financial Resource Strain (CARDIA)     Difficulty of Paying Living Expenses: Not hard at all   Food Insecurity: No Food Insecurity (9/16/2024)    Hunger Vital Sign     Worried About Running Out of Food in the Last Year: Never true     Ran Out of Food in the Last Year: Never true   Transportation Needs: No Transportation Needs (9/16/2024)    PRAPARE - Transportation     Lack of Transportation (Medical): No     Lack of Transportation (Non-Medical): No   Physical Activity: Patient Unable To Answer (9/16/2024)    Exercise Vital Sign     Days of Exercise per Week: Patient unable to answer     Minutes of Exercise per Session: Patient unable to answer   Stress: Patient Unable To Answer (9/16/2024)    [employer] [place] [employer] - Occupational Stress Questionnaire     Feeling of Stress : Patient unable to answer   Housing Stability: Low Risk  (9/16/2024)    Housing Stability Vital Sign     Unable to Pay for Housing in the Last Year: No     Homeless in the Last Year: No       FAMILY HISTORY:  Family History   Problem Relation Name Age of Onset    Diabetes Mother Maid Alan     Cancer Mother Maid Alan     Cancer Father Gerson     Diabetes Father [place]     Cataracts Sister Joann     Diabetes Sister Joann     Diabetes Sister Nakia     Cancer Brother Jericho     Cataracts Brother Jericho     No Known Problems Brother x3     No Known Problems Maternal Aunt      No Known Problems Maternal Uncle      No Known Problems Paternal Aunt      No Known Problems Paternal Uncle      No Known Problems Maternal Grandmother      No Known Problems Maternal Grandfather      No Known Problems Paternal  "Grandmother      Diabetes Paternal Grandfather None     Amblyopia Neg Hx      Blindness Neg Hx      Glaucoma Neg Hx      Hypertension Neg Hx      Macular degeneration Neg Hx      Retinal detachment Neg Hx      Strabismus Neg Hx      Stroke Neg Hx      Thyroid disease Neg Hx         ALLERGIES AND MEDICATIONS: updated and reviewed.  Review of patient's allergies indicates:  No Known Allergies  Current Outpatient Medications   Medication Sig Dispense Refill    atorvastatin (LIPITOR) 20 MG tablet TAKE 1 TABLET BY MOUTH EVERY DAY 90 tablet 3    baclofen (LIORESAL) 10 MG tablet Take 1 tablet (10 mg total) by mouth 3 (three) times daily. for 10 days 30 tablet 0    BD FORREST 2ND GEN PEN NEEDLE 32 gauge x 5/32" Ndle USE 1 TIME DAILY 100 each 3    blood sugar diagnostic Strp To check blood glucose three times daily, to use with insurance preferred meter 100 each 3    blood-glucose meter kit To check blood glucose three times daily, to use with insurance preferred meter 1 each 0    blood-glucose meter,continuous (DEXCOM G7 ) Misc Use with Dexcom G7 sensors 1 each 0    blood-glucose sensor (DEXCOM G7 SENSOR) Ashley Change every 10 days 12 each 3    citalopram (CELEXA) 20 MG tablet Take 1 tablet (20 mg total) by mouth once daily. 30 tablet 11    diltiaZEM (CARDIZEM CD) 240 MG 24 hr capsule TAKE 1 CAPSULE BY MOUTH EVERY DAY 90 capsule 3    hydroCHLOROthiazide (HYDRODIURIL) 12.5 MG Tab TAKE 1 TABLET BY MOUTH EVERY DAY 90 tablet 3    insulin glargine, TOUJEO, (TOUJEO SOLOSTAR U-300 INSULIN) 300 unit/mL (1.5 mL) InPn pen Inject 18 units once daily 3 Pen 5    lancets Misc To check blood glucose three times daily, to use with insurance preferred meter 100 each 3    latanoprost 0.005 % ophthalmic solution Place 1 drop into both eyes every evening. 7.5 mL 2    LIDOcaine (LIDODERM) 5 % Place 1 patch onto the skin once daily. Remove & Discard patch within 12 hours or as directed by MD 5 patch 0    lisinopriL (PRINIVIL,ZESTRIL) 40 MG " "tablet Take 1 tablet (40 mg total) by mouth once daily. 90 tablet 1    metFORMIN (GLUCOPHAGE) 1000 MG tablet Take 1 tablet (1,000 mg total) by mouth 2 (two) times daily. 180 tablet 2    potassium chloride SA (K-DUR,KLOR-CON) 20 MEQ tablet Take 1 tablet (20 mEq total) by mouth once daily. 30 tablet 11    semaglutide (OZEMPIC) 2 mg/dose (8 mg/3 mL) PnIj Inject 2 mg into the skin every 7 days. 3 mL 11    gabapentin (NEURONTIN) 400 MG capsule Take 1 capsule (400 mg total) by mouth 3 (three) times daily as needed (pain). 90 capsule 2     Current Facility-Administered Medications   Medication Dose Route Frequency Provider Last Rate Last Admin    LIDOcaine 2%/EPINEPHrine 1:100,000 injection 2 mL  2 mL Intradermal 1 time in Clinic/HOD Andrew Busch MD             ROS  Review of Systems   Musculoskeletal:  Positive for back pain.           Physical Exam  Vitals:    09/19/24 1004   BP: (!) 142/68   BP Location: Right arm   Patient Position: Sitting   BP Method: Large (Manual)   Pulse: 81   Temp: 98.2 °F (36.8 °C)   TempSrc: Oral   SpO2: 97%   Weight: 96.3 kg (212 lb 6.6 oz)   Height: 6' 2" (1.88 m)    Body mass index is 27.27 kg/m².  Weight: 96.3 kg (212 lb 6.6 oz)   Height: 6' 2" (188 cm)   Physical Exam  Constitutional:       General: He is not in acute distress.  HENT:      Head: Normocephalic and atraumatic.   Skin:     General: Skin is warm and dry.   Neurological:      Mental Status: He is alert. Mental status is at baseline.   Psychiatric:         Mood and Affect: Mood normal.         Behavior: Behavior normal.             "

## 2024-09-19 NOTE — PROGRESS NOTES
Assessment & Plan  There are no diagnoses linked to this encounter.      Health maintenance reviewed & addressed above.    Follow-up: No follow-ups on file.  ______________________________________________________________________    Chief Complaint  Chief Complaint   Patient presents with    Hospital Follow Up       HPI  Gerson Phillips III is a 69 y.o. male with medical diagnoses as listed in the medical history and problem list that presents to the office to follow up on her chronic conditions.     Most recent pertinent workup:     Last CBC Results:   Lab Results   Component Value Date    WBC 4.48 07/12/2024    HGB 13.5 (L) 07/12/2024    HCT 41.9 07/12/2024     07/12/2024       Last CMP Results  Lab Results   Component Value Date     (L) 07/12/2024    K 3.4 (L) 07/12/2024     07/12/2024    CO2 23 07/12/2024    BUN 13 07/12/2024    CREATININE 0.9 07/12/2024    CALCIUM 9.1 07/12/2024    ALBUMIN 3.7 07/12/2024    AST 13 07/12/2024    ALT 15 07/12/2024       Last Lipids  Lab Results   Component Value Date    CHOL 102 (L) 07/12/2024    TRIG 35 07/12/2024    HDL 52 07/12/2024    LDLCALC 43.0 (L) 07/12/2024       Last A1C  Lab Results   Component Value Date    HGBA1C 6.0 (H) 08/23/2024         Health Maintenance         Date Due Completion Date    Shingles Vaccine (1 of 2) Never done ---    TETANUS VACCINE 09/27/2023 9/27/2013    COVID-19 Vaccine (6 - 2023-24 season) 09/01/2024 1/20/2023    Diabetes Urine Screening 12/22/2024 12/22/2023    Hemoglobin A1c 02/23/2025 8/23/2024    Foot Exam 06/28/2025 6/28/2024    Override on 6/28/2024: Done    Override on 6/9/2023: Done    Override on 12/9/2019: Done    Lipid Panel 07/12/2025 7/12/2024    Eye Exam 08/30/2025 8/30/2024    High Dose Statin 09/06/2025 9/6/2024    Colorectal Cancer Screening 03/26/2028 3/26/2018              PAST MEDICAL HISTORY:  Past Medical History:   Diagnosis Date    Abnormal liver function tests 06/18/2013    Anemia 06/18/2013    Cervical  radiculopathy 03/12/2013    CKD stage 3 due to type 2 diabetes mellitus 03/04/2016    Colon polyp     Diabetes mellitus type II, uncontrolled 10/18/2012    Diabetes mellitus with renal manifestations, uncontrolled 09/27/2013    History of colonic polyps 10/05/2015    HTN (hypertension) 10/18/2012    Hyperlipidemia 10/18/2012    Lateral epicondylitis 06/18/2013    Long-term insulin use 03/04/2016    Low back pain 01/29/2013    Microalbuminuria 03/30/2015    Nuclear sclerosis of both eyes 02/14/2020    Open-angle glaucoma     Primary open angle glaucoma (POAG) of both eyes, moderate stage 02/14/2020    Special screening for malignant neoplasms, colon 03/26/2018    Uncontrolled type 2 diabetes mellitus with proteinuria or albuminuria 03/30/2015    Uncontrolled type 2 diabetes mellitus with proteinuria or microalbuminuria 03/30/2015       PAST SURGICAL HISTORY:  Past Surgical History:   Procedure Laterality Date    COLONOSCOPY N/A 09/01/2017    Procedure: COLONOSCOPY;  Surgeon: Gopal Ny MD;  Location: [facility] ENDO;  Service: Endoscopy;  Laterality: N/A;    COLONOSCOPY N/A 03/26/2018    Procedure: COLONOSCOPY;  Surgeon: Jamar Batista MD;  Location: [facility] ENDO ([facility] FLR);  Service: Endoscopy;  Laterality: N/A;    INJECTION OF ANESTHETIC AGENT AROUND MEDIAL BRANCH NERVES INNERVATING LUMBAR FACET JOINT Bilateral 03/22/2024    Procedure: Bilateral L3, L4, L5 Medial Branch Blocks #1;  Surgeon: Joshua Munoz Jr., MD;  Location: [facility] PAIN MANAGEMENT;  Service: Pain Management;  Laterality: Bilateral;  @1245  No ATC  Check BG prior to RF  MD Sign.    INJECTION OF ANESTHETIC AGENT AROUND MEDIAL BRANCH NERVES INNERVATING LUMBAR FACET JOINT Bilateral 04/12/2024    Procedure: Bilateral L3, L4, L5 Medial Branch Blocks #2;  Surgeon: Joshua Munoz Jr., MD;  Location: [facility] PAIN MANAGEMENT;  Service: Pain Management;  Laterality: Bilateral;  @1145  No ATC  Check BG Prior to RFA  Needs Consent    RADIOFREQUENCY  ABLATION OF LUMBAR MEDIAL BRANCH NERVE AT SINGLE LEVEL Left 4/26/2024    Procedure: Left L3, L4, L5 Radiofrequency Thermocoagulation of Medial Branches;  Surgeon: Joshua Munoz Jr., MD;  Location: [facility] PAIN MANAGEMENT;  Service: Pain Management;  Laterality: Left;  @1100 (given)  No ATC  Check BG  Needs Consent    RADIOFREQUENCY ABLATION OF LUMBAR MEDIAL BRANCH NERVE AT SINGLE LEVEL Right 5/10/2024    Procedure: Right L3, L4, L5 Radiofrequency Thermocoagulation of Medial Branches;  Surgeon: Joshua Munoz Jr., MD;  Location: [facility] PAIN MANAGEMENT;  Service: Pain Management;  Laterality: Right;  @1130  No ATC  Check BG  Needs Consent  Last 1&50       SOCIAL HISTORY:  Social History     Socioeconomic History    Marital status:    Tobacco Use    Smoking status: Never     Passive exposure: Never    Smokeless tobacco: Never   Substance and Sexual Activity    Alcohol use: Yes     Alcohol/week: 3.0 - 4.0 standard drinks of alcohol     Types: 3 - 4 Cans of beer per week    Drug use: Not Currently    Sexual activity: Yes     Partners: Female     Social Determinants of Health     Financial Resource Strain: Low Risk  (9/16/2024)    Overall Financial Resource Strain (CARDIA)     Difficulty of Paying Living Expenses: Not hard at all   Food Insecurity: No Food Insecurity (9/16/2024)    Hunger Vital Sign     Worried About Running Out of Food in the Last Year: Never true     Ran Out of Food in the Last Year: Never true   Transportation Needs: No Transportation Needs (9/16/2024)    PRAPARE - Transportation     Lack of Transportation (Medical): No     Lack of Transportation (Non-Medical): No   Physical Activity: Patient Unable To Answer (9/16/2024)    Exercise Vital Sign     Days of Exercise per Week: Patient unable to answer     Minutes of Exercise per Session: Patient unable to answer   Stress: Patient Unable To Answer (9/16/2024)    [employer] - Occupational Stress Questionnaire      "Feeling of Stress : Patient unable to answer   Housing Stability: Low Risk  (9/16/2024)    Housing Stability Vital Sign     Unable to Pay for Housing in the Last Year: No     Homeless in the Last Year: No       FAMILY HISTORY:  Family History   Problem Relation Name Age of Onset    Diabetes Mother Maid Alan     Cancer Mother Maid Alan     Cancer Father Wooster     Diabetes Father Wooster     Cataracts Sister Joann     Diabetes Sister Joann     Diabetes Sister Nakia     Cancer Brother Jericho     Cataracts Brother Jericho     No Known Problems Brother x3     No Known Problems Maternal Aunt      No Known Problems Maternal Uncle      No Known Problems Paternal Aunt      No Known Problems Paternal Uncle      No Known Problems Maternal Grandmother      No Known Problems Maternal Grandfather      No Known Problems Paternal Grandmother      Diabetes Paternal Grandfather None     Amblyopia Neg Hx      Blindness Neg Hx      Glaucoma Neg Hx      Hypertension Neg Hx      Macular degeneration Neg Hx      Retinal detachment Neg Hx      Strabismus Neg Hx      Stroke Neg Hx      Thyroid disease Neg Hx         ALLERGIES AND MEDICATIONS: updated and reviewed.  Review of patient's allergies indicates:  No Known Allergies  Current Outpatient Medications   Medication Sig Dispense Refill    atorvastatin (LIPITOR) 20 MG tablet TAKE 1 TABLET BY MOUTH EVERY DAY 90 tablet 3    baclofen (LIORESAL) 10 MG tablet Take 1 tablet (10 mg total) by mouth 3 (three) times daily. for 10 days 30 tablet 0    BD FORREST 2ND GEN PEN NEEDLE 32 gauge x 5/32" Ndle USE 1 TIME DAILY 100 each 3    blood sugar diagnostic Strp To check blood glucose three times daily, to use with insurance preferred meter 100 each 3    blood-glucose meter kit To check blood glucose three times daily, to use with insurance preferred meter 1 each 0    blood-glucose meter,continuous (DEXCOM G7 ) Misc Use with Dexcom G7 sensors 1 each 0    blood-glucose sensor (DEXCOM G7 SENSOR) " Ashley Change every 10 days 12 each 3    citalopram (CELEXA) 20 MG tablet Take 1 tablet (20 mg total) by mouth once daily. 30 tablet 11    diltiaZEM (CARDIZEM CD) 240 MG 24 hr capsule TAKE 1 CAPSULE BY MOUTH EVERY DAY 90 capsule 3    gabapentin (NEURONTIN) 300 MG capsule Take 1 capsule (300 mg total) by mouth 3 (three) times daily. for 10 days 30 capsule 0    hydroCHLOROthiazide (HYDRODIURIL) 12.5 MG Tab TAKE 1 TABLET BY MOUTH EVERY DAY 90 tablet 3    insulin glargine, TOUJEO, (TOUJEO SOLOSTAR U-300 INSULIN) 300 unit/mL (1.5 mL) InPn pen Inject 18 units once daily 3 Pen 5    lancets Misc To check blood glucose three times daily, to use with insurance preferred meter 100 each 3    latanoprost 0.005 % ophthalmic solution Place 1 drop into both eyes every evening. 7.5 mL 2    LIDOcaine (LIDODERM) 5 % Place 1 patch onto the skin once daily. Remove & Discard patch within 12 hours or as directed by MD 5 patch 0    lisinopriL (PRINIVIL,ZESTRIL) 40 MG tablet Take 1 tablet (40 mg total) by mouth once daily. 90 tablet 1    metFORMIN (GLUCOPHAGE) 1000 MG tablet Take 1 tablet (1,000 mg total) by mouth 2 (two) times daily. 180 tablet 2    potassium chloride SA (K-DUR,KLOR-CON) 20 MEQ tablet Take 1 tablet (20 mEq total) by mouth once daily. 30 tablet 11    pregabalin (LYRICA) 25 MG capsule TAKE 1 CAPSULE (25 MG TOTAL) BY MOUTH 2 (TWO) TIMES DAILY AS NEEDED (PAIN). 60 capsule 5    semaglutide (OZEMPIC) 2 mg/dose (8 mg/3 mL) PnIj Inject 2 mg into the skin every 7 days. 3 mL 11     Current Facility-Administered Medications   Medication Dose Route Frequency Provider Last Rate Last Admin    LIDOcaine 2%/EPINEPHrine 1:100,000 injection 2 mL  2 mL Intradermal 1 time in Clinic/HOD Andrew Busch MD             ROS  Review of Systems        Physical Exam  Vitals:    09/19/24 1004   BP: (!) 142/68   BP Location: Right arm   Patient Position: Sitting   BP Method: Large (Manual)   Pulse: 81   Temp: 98.2 °F (36.8 °C)   TempSrc: Oral  "  SpO2: 97%   Weight: 96.3 kg (212 lb 6.6 oz)   Height: 6' 2" (1.88 m)    Body mass index is 27.27 kg/m².  Weight: 96.3 kg (212 lb 6.6 oz)   Height: 6' 2" (188 cm)   Physical Exam        "

## 2024-11-06 RX ORDER — BLOOD-GLUCOSE SENSOR
EACH MISCELLANEOUS
Qty: 12 EACH | Refills: 3 | Status: SHIPPED | OUTPATIENT
Start: 2024-11-06

## 2024-11-13 ENCOUNTER — PATIENT MESSAGE (OUTPATIENT)
Dept: ADMINISTRATIVE | Facility: HOSPITAL | Age: 69
End: 2024-11-13
Payer: MEDICARE

## 2024-11-14 DIAGNOSIS — I10 ESSENTIAL HYPERTENSION: ICD-10-CM

## 2024-11-14 RX ORDER — LISINOPRIL 40 MG/1
40 TABLET ORAL
Qty: 90 TABLET | Refills: 1 | Status: SHIPPED | OUTPATIENT
Start: 2024-11-14

## 2024-11-14 NOTE — TELEPHONE ENCOUNTER
No care due was identified.  [facility] Embedded Care Due Messages. Reference number: [id number].   11/14/2024 12:15:49 PM CST

## 2024-11-14 NOTE — TELEPHONE ENCOUNTER
Refill Routing Note   Medication(s) are not appropriate for processing by [facility] for the following reason(s):        Required vitals abnormal    [facility] action(s):  Defer               Appointments  past 12m or future 3m with PCP    Date Provider   Last Visit   9/19/2024 Alisa Chester, DO   Next Visit   1/10/2025 Alisa Chester, DO   ED visits in past 90 days: 1        Note composed:2:50 PM 11/14/2024

## 2024-12-13 ENCOUNTER — OFFICE VISIT (OUTPATIENT)
Dept: OPTOMETRY | Facility: CLINIC | Age: 69
End: 2024-12-13
Payer: MEDICARE

## 2024-12-13 DIAGNOSIS — H25.13 NUCLEAR SCLEROSIS OF BOTH EYES: ICD-10-CM

## 2024-12-13 DIAGNOSIS — H40.1132 PRIMARY OPEN ANGLE GLAUCOMA (POAG) OF BOTH EYES, MODERATE STAGE: Primary | ICD-10-CM

## 2024-12-13 PROCEDURE — 99999 PR PBB SHADOW E&M-EST. PATIENT-LVL III: CPT | Mod: PBBFAC,,, | Performed by: OPTOMETRIST

## 2024-12-13 RX ORDER — LATANOPROST 50 UG/ML
1 SOLUTION/ DROPS OPHTHALMIC NIGHTLY
Qty: 7.5 ML | Refills: 2 | Status: SHIPPED | OUTPATIENT
Start: 2024-12-13 | End: 2025-12-13

## 2024-12-13 NOTE — PROGRESS NOTES
Subjective:       Patient ID: Gerson Phillips III is a 69 y.o. male      Chief Complaint   Patient presents with    Concerns About Ocular Health    Glaucoma     History of Present Illness  Dls: 8/30/24 Dr. Contreras     68 y/o male presents today for iop ck   Pt c/o foggy vision ou off/on.     No pain    Eye meds  Latanoprost OU Q HS    Pohx:   Glaucoma     Fohx:   Cat- brother       Last edited by Viky Blum MA on 12/13/2024  1:51 PM.      Assessment/Plan:     1. Primary open angle glaucoma (POAG) of both eyes, moderate stage (Primary)  Moderate Primary Open Angle Glaucoma OU  FHx: none  Tbase (before treatment):  14 // 14  Tmax: 14 // 14  HVF defects (date: 08/30/2024) : OD inferior nasal step // OS superior nasal step  OCT optic nerve (date: 08/30/2024) : abnormal  OD (64): G, ST, T, IT, SN // abnormal OS (55)  CCT:  //   Goal IOP:  low teens  Lasers/surgeries: none  Current treatment: latanoprost QHS OU     Glaucoma since 2016. IOP acceptable on latanoprost QHS OU. CPM. 6 month IOP/DFE/OCT/HVF, sooner PRN.       - latanoprost 0.005 % ophthalmic solution; Place 1 drop into both eyes every evening.  Dispense: 7.5 mL; Refill: 2  - Posterior Segment OCT Optic Nerve- Both eyes; Future  - Wolff Visual Field - OU - Extended - Both Eyes; Future    2. Nuclear sclerosis of both eyes  NVS per pt. Educated pt on presence of cataracts and effects on vision. No surgery at this time. Recheck in one year, sooner PRN.      Follow up in about 6 months (around 6/13/2025) for OCT optic nerve, IOP check, HVF 24-2, DFE.

## 2024-12-31 DIAGNOSIS — F32.0 CURRENT MILD EPISODE OF MAJOR DEPRESSIVE DISORDER WITHOUT PRIOR EPISODE: ICD-10-CM

## 2024-12-31 DIAGNOSIS — E78.2 MIXED HYPERLIPIDEMIA: ICD-10-CM

## 2024-12-31 NOTE — TELEPHONE ENCOUNTER
No care due was identified.  [facility] Embedded Care Due Messages. Reference number: [id number].   12/31/2024 11:15:05 AM CST

## 2025-01-02 ENCOUNTER — PATIENT OUTREACH (OUTPATIENT)
Dept: ADMINISTRATIVE | Facility: HOSPITAL | Age: 70
End: 2025-01-02
Payer: MEDICARE

## 2025-01-02 RX ORDER — ATORVASTATIN CALCIUM 20 MG/1
20 TABLET, FILM COATED ORAL DAILY
Qty: 90 TABLET | Refills: 1 | Status: SHIPPED | OUTPATIENT
Start: 2025-01-02

## 2025-01-02 RX ORDER — CITALOPRAM 20 MG/1
20 TABLET, FILM COATED ORAL DAILY
Qty: 90 TABLET | Refills: 2 | Status: SHIPPED | OUTPATIENT
Start: 2025-01-02

## 2025-01-02 NOTE — TELEPHONE ENCOUNTER
Refill Decision Note   Gerson Alan  is requesting a refill authorization.  Brief Assessment and Rationale for Refill:  Approve     Medication Therapy Plan:        Comments:     Note composed:2:49 PM 01/02/2025

## 2025-01-03 ENCOUNTER — LAB VISIT (OUTPATIENT)
Dept: LAB | Facility: HOSPITAL | Age: 70
End: 2025-01-03
Attending: FAMILY MEDICINE
Payer: MEDICARE

## 2025-01-03 DIAGNOSIS — R80.9 TYPE 2 DIABETES MELLITUS WITH DIABETIC MICROALBUMINURIA, WITH LONG-TERM CURRENT USE OF INSULIN: ICD-10-CM

## 2025-01-03 DIAGNOSIS — E11.29 TYPE 2 DIABETES MELLITUS WITH DIABETIC MICROALBUMINURIA, WITH LONG-TERM CURRENT USE OF INSULIN: ICD-10-CM

## 2025-01-03 DIAGNOSIS — Z79.4 TYPE 2 DIABETES MELLITUS WITH DIABETIC MICROALBUMINURIA, WITH LONG-TERM CURRENT USE OF INSULIN: ICD-10-CM

## 2025-01-03 LAB
ALBUMIN SERPL BCP-MCNC: 3.8 G/DL (ref 3.5–5.2)
ALP SERPL-CCNC: 74 U/L (ref 40–150)
ALT SERPL W/O P-5'-P-CCNC: 12 U/L (ref 10–44)
ANION GAP SERPL CALC-SCNC: 8 MMOL/L (ref 8–16)
AST SERPL-CCNC: 14 U/L (ref 10–40)
BASOPHILS # BLD AUTO: 0.05 K/UL (ref 0–0.2)
BASOPHILS NFR BLD: 1 % (ref 0–1.9)
BILIRUB SERPL-MCNC: 0.5 MG/DL (ref 0.1–1)
BUN SERPL-MCNC: 16 MG/DL (ref 8–23)
CALCIUM SERPL-MCNC: 9.5 MG/DL (ref 8.7–10.5)
CHLORIDE SERPL-SCNC: 98 MMOL/L (ref 95–110)
CHOLEST SERPL-MCNC: 122 MG/DL (ref 120–199)
CHOLEST/HDLC SERPL: 2.1 {RATIO} (ref 2–5)
CO2 SERPL-SCNC: 26 MMOL/L (ref 23–29)
CREAT SERPL-MCNC: 1 MG/DL (ref 0.5–1.4)
DIFFERENTIAL METHOD BLD: ABNORMAL
EOSINOPHIL # BLD AUTO: 0.2 K/UL (ref 0–0.5)
EOSINOPHIL NFR BLD: 3.7 % (ref 0–8)
ERYTHROCYTE [DISTWIDTH] IN BLOOD BY AUTOMATED COUNT: 14 % (ref 11.5–14.5)
EST. GFR  (NO RACE VARIABLE): >60 ML/MIN/1.73 M^2
ESTIMATED AVG GLUCOSE: 131 MG/DL (ref 68–131)
GLUCOSE SERPL-MCNC: 87 MG/DL (ref 70–110)
HBA1C MFR BLD: 6.2 % (ref 4–5.6)
HCT VFR BLD AUTO: 41.8 % (ref 40–54)
HDLC SERPL-MCNC: 59 MG/DL (ref 40–75)
HDLC SERPL: 48.4 % (ref 20–50)
HGB BLD-MCNC: 13.4 G/DL (ref 14–18)
IMM GRANULOCYTES # BLD AUTO: 0.01 K/UL (ref 0–0.04)
IMM GRANULOCYTES NFR BLD AUTO: 0.2 % (ref 0–0.5)
LDLC SERPL CALC-MCNC: 55.6 MG/DL (ref 63–159)
LYMPHOCYTES # BLD AUTO: 2.1 K/UL (ref 1–4.8)
LYMPHOCYTES NFR BLD: 43.9 % (ref 18–48)
MCH RBC QN AUTO: 28 PG (ref 27–31)
MCHC RBC AUTO-ENTMCNC: 32.1 G/DL (ref 32–36)
MCV RBC AUTO: 87 FL (ref 82–98)
MONOCYTES # BLD AUTO: 0.6 K/UL (ref 0.3–1)
MONOCYTES NFR BLD: 11.6 % (ref 4–15)
NEUTROPHILS # BLD AUTO: 1.9 K/UL (ref 1.8–7.7)
NEUTROPHILS NFR BLD: 39.6 % (ref 38–73)
NONHDLC SERPL-MCNC: 63 MG/DL
NRBC BLD-RTO: 0 /100 WBC
PLATELET # BLD AUTO: 216 K/UL (ref 150–450)
PMV BLD AUTO: 9.7 FL (ref 9.2–12.9)
POTASSIUM SERPL-SCNC: 4 MMOL/L (ref 3.5–5.1)
PROT SERPL-MCNC: 7.2 G/DL (ref 6–8.4)
RBC # BLD AUTO: 4.78 M/UL (ref 4.6–6.2)
SODIUM SERPL-SCNC: 132 MMOL/L (ref 136–145)
TRIGL SERPL-MCNC: 37 MG/DL (ref 30–150)
WBC # BLD AUTO: 4.81 K/UL (ref 3.9–12.7)

## 2025-01-03 PROCEDURE — 83036 HEMOGLOBIN GLYCOSYLATED A1C: CPT | Performed by: FAMILY MEDICINE

## 2025-01-03 PROCEDURE — 80061 LIPID PANEL: CPT | Performed by: FAMILY MEDICINE

## 2025-01-03 PROCEDURE — 80053 COMPREHEN METABOLIC PANEL: CPT | Performed by: FAMILY MEDICINE

## 2025-01-03 PROCEDURE — 85025 COMPLETE CBC W/AUTO DIFF WBC: CPT | Performed by: FAMILY MEDICINE

## 2025-01-03 PROCEDURE — 36415 COLL VENOUS BLD VENIPUNCTURE: CPT | Mod: PO | Performed by: FAMILY MEDICINE

## 2025-01-10 ENCOUNTER — OFFICE VISIT (OUTPATIENT)
Dept: FAMILY MEDICINE | Facility: CLINIC | Age: 70
End: 2025-01-10
Payer: MEDICARE

## 2025-01-10 VITALS
BODY MASS INDEX: 28.97 KG/M2 | OXYGEN SATURATION: 98 % | HEIGHT: 74 IN | WEIGHT: 225.75 LBS | HEART RATE: 70 BPM | SYSTOLIC BLOOD PRESSURE: 132 MMHG | DIASTOLIC BLOOD PRESSURE: 78 MMHG | TEMPERATURE: 98 F

## 2025-01-10 DIAGNOSIS — T50.2X5A DIURETIC-INDUCED HYPOKALEMIA: ICD-10-CM

## 2025-01-10 DIAGNOSIS — E11.69 HYPERLIPIDEMIA ASSOCIATED WITH TYPE 2 DIABETES MELLITUS: ICD-10-CM

## 2025-01-10 DIAGNOSIS — E87.6 DIURETIC-INDUCED HYPOKALEMIA: ICD-10-CM

## 2025-01-10 DIAGNOSIS — E78.5 HYPERLIPIDEMIA ASSOCIATED WITH TYPE 2 DIABETES MELLITUS: ICD-10-CM

## 2025-01-10 DIAGNOSIS — I70.0 AORTIC ATHEROSCLEROSIS: ICD-10-CM

## 2025-01-10 DIAGNOSIS — I10 ESSENTIAL HYPERTENSION: ICD-10-CM

## 2025-01-10 DIAGNOSIS — R53.83 FATIGUE, UNSPECIFIED TYPE: Primary | ICD-10-CM

## 2025-01-10 DIAGNOSIS — M51.369 DEGENERATION OF INTERVERTEBRAL DISC OF LUMBAR REGION, UNSPECIFIED WHETHER PAIN PRESENT: ICD-10-CM

## 2025-01-10 DIAGNOSIS — F32.0 CURRENT MILD EPISODE OF MAJOR DEPRESSIVE DISORDER WITHOUT PRIOR EPISODE: ICD-10-CM

## 2025-01-10 DIAGNOSIS — Z79.4 TYPE 2 DIABETES MELLITUS WITH DIABETIC CATARACT, WITH LONG-TERM CURRENT USE OF INSULIN: ICD-10-CM

## 2025-01-10 DIAGNOSIS — E11.36 TYPE 2 DIABETES MELLITUS WITH DIABETIC CATARACT, WITH LONG-TERM CURRENT USE OF INSULIN: ICD-10-CM

## 2025-01-10 DIAGNOSIS — Z12.5 SCREENING FOR PROSTATE CANCER: ICD-10-CM

## 2025-01-10 PROBLEM — E11.22 TYPE 2 DIABETES MELLITUS WITH STAGE 3A CHRONIC KIDNEY DISEASE, WITH LONG-TERM CURRENT USE OF INSULIN: Status: RESOLVED | Noted: 2024-04-19 | Resolved: 2025-01-10

## 2025-01-10 PROBLEM — N18.31 TYPE 2 DIABETES MELLITUS WITH STAGE 3A CHRONIC KIDNEY DISEASE, WITH LONG-TERM CURRENT USE OF INSULIN: Status: RESOLVED | Noted: 2024-04-19 | Resolved: 2025-01-10

## 2025-01-10 PROCEDURE — 99999 PR PBB SHADOW E&M-EST. PATIENT-LVL V: CPT | Mod: PBBFAC,,, | Performed by: FAMILY MEDICINE

## 2025-01-10 RX ORDER — METFORMIN HYDROCHLORIDE 1000 MG/1
1000 TABLET ORAL
Qty: 90 TABLET | Refills: 3 | Status: CANCELLED | OUTPATIENT
Start: 2025-01-10 | End: 2026-01-10

## 2025-01-10 NOTE — PROGRESS NOTES
Assessment & Plan:    Fatigue, unspecified type  Discussed with patient that the ddx for generalized fatigue without any other associated symptoms is broad. It is possible he is not getting enough sleep at night. Recommended against checking for and treated hypogonadism at his age because the risks outweigh the benefits at his age.    Current mild episode of major depressive disorder without prior episode  May consider weaning down Celexa to 1/2 tablet for 2 weeks then D/C, but patient would like to continue this for now.    Type 2 diabetes mellitus with diabetic cataract, with long-term current use of insulin  -     CBC Auto Differential; Future; Expected date: 01/10/2025  -     Comprehensive Metabolic Panel; Future; Expected date: 01/10/2025  -     Hemoglobin A1C; Future; Expected date: 01/10/2025  -     Lipid Panel; Future; Expected date: 01/10/2025  -     Microalbumin/creatinine urine ratio    Controlled. May D/C afternoon dose of metformin with eventual plan to D/C as long as BG remains at goal range.   Repeat labs in 6 mo.    Hyperlipidemia associated with type 2 diabetes mellitus  -     Lipid Panel; Future; Expected date: 01/10/2025    Aortic atherosclerosis  -     Lipid Panel; Future; Expected date: 01/10/2025    Controlled. Continue current therapy.     Essential hypertension  -     Comprehensive Metabolic Panel; Future; Expected date: 01/10/2025    Controlled. Continue current therapy.     Diuretic-induced hypokalemia  -     Comprehensive Metabolic Panel; Future; Expected date: 01/10/2025    Controlled. Continue current therapy.     Degeneration of intervertebral disc of lumbar region, unspecified whether pain present  Controlled. Continue current therapy.     Screening for prostate cancer  -     PSA, Screening; Future; Expected date: 07/10/2025    Recommended tetanus booster.    Follow-up: Follow up in about 6 months (around  7/10/2025).  ______________________________________________________________________    Chief Complaint  Chief Complaint   Patient presents with    Health Maintenance     6 month follow up       HPI  Gerson Phillips III is a 69 y.o. male with medical diagnoses as listed in the medical history and problem list that presents to the office to follow up on his chronic conditions. He has been feeling fatigued lately, possibly due to the Celexa. However, he would like to continue it for now. He asks if his fatigue could be caused by low testosterone. Denies trouble sleeping but admits to waking up early because he is a . Denies snoring, apneic episodes, fever, night sweats, unintentional weight loss. He asks if he should continue all of his medication.     Most recent pertinent workup:     Last CBC Results:   Lab Results   Component Value Date    WBC 4.81 01/03/2025    HGB 13.4 (L) 01/03/2025    HCT 41.8 01/03/2025     01/03/2025       Last CMP Results  Lab Results   Component Value Date     (L) 01/03/2025    K 4.0 01/03/2025    CL 98 01/03/2025    CO2 26 01/03/2025    BUN 16 01/03/2025    CREATININE 1.0 01/03/2025    CALCIUM 9.5 01/03/2025    ALBUMIN 3.8 01/03/2025    AST 14 01/03/2025    ALT 12 01/03/2025       Last Lipids  Lab Results   Component Value Date    CHOL 122 01/03/2025    TRIG 37 01/03/2025    HDL 59 01/03/2025    LDLCALC 55.6 (L) 01/03/2025       Last A1C  Lab Results   Component Value Date    HGBA1C 6.2 (H) 01/03/2025         Health Maintenance         Date Due Completion Date    TETANUS VACCINE 09/27/2023 9/27/2013    Diabetes Urine Screening 12/22/2024 12/22/2023    Shingles Vaccine (2 of 2) 02/07/2025 12/13/2024    Foot Exam 06/28/2025 6/28/2024    Override on 6/28/2024: Done    Override on 6/9/2023: Done    Override on 12/9/2019: Done    Hemoglobin A1c 07/03/2025 1/3/2025    Eye Exam 08/30/2025 8/30/2024    Lipid Panel 01/03/2026 1/3/2025    High Dose Statin 01/10/2026 1/10/2025     Colorectal Cancer Screening 03/26/2028 3/26/2018              PAST MEDICAL HISTORY:  Past Medical History:   Diagnosis Date    Abnormal liver function tests 06/18/2013    Anemia 06/18/2013    Cervical radiculopathy 03/12/2013    CKD stage 3 due to type 2 diabetes mellitus 03/04/2016    Colon polyp     Diabetes mellitus type II, uncontrolled 10/18/2012    Diabetes mellitus with renal manifestations, uncontrolled 09/27/2013    History of colonic polyps 10/05/2015    HTN (hypertension) 10/18/2012    Hyperlipidemia 10/18/2012    Lateral epicondylitis 06/18/2013    Long-term insulin use 03/04/2016    Low back pain 01/29/2013    Microalbuminuria 03/30/2015    Nuclear sclerosis of both eyes 02/14/2020    Open-angle glaucoma     Primary open angle glaucoma (POAG) of both eyes, moderate stage 02/14/2020    Special screening for malignant neoplasms, colon 03/26/2018    Uncontrolled type 2 diabetes mellitus with proteinuria or albuminuria 03/30/2015    Uncontrolled type 2 diabetes mellitus with proteinuria or microalbuminuria 03/30/2015       PAST SURGICAL HISTORY:  Past Surgical History:   Procedure Laterality Date    COLONOSCOPY N/A 09/01/2017    Procedure: COLONOSCOPY;  Surgeon: Gopal Ny MD;  Location: [facility] ENDO;  Service: Endoscopy;  Laterality: N/A;    COLONOSCOPY N/A 03/26/2018    Procedure: COLONOSCOPY;  Surgeon: Jamar Batista MD;  Location: [address]);  Service: Endoscopy;  Laterality: N/A;    INJECTION OF ANESTHETIC AGENT AROUND MEDIAL BRANCH NERVES INNERVATING LUMBAR FACET JOINT Bilateral 03/22/2024    Procedure: Bilateral L3, L4, L5 Medial Branch Blocks #1;  Surgeon: Joshua Munoz Jr., MD;  Location: [facility] PAIN MANAGEMENT;  Service: Pain Management;  Laterality: Bilateral;  @1245  No ATC  Check BG prior to RF  MD Sign.    INJECTION OF ANESTHETIC AGENT AROUND MEDIAL BRANCH NERVES INNERVATING LUMBAR FACET JOINT Bilateral 04/12/2024    Procedure: Bilateral L3, L4, L5 Medial Branch Blocks  #2;  Surgeon: Joshau Munoz Jr., MD;  Location: [facility] PAIN MANAGEMENT;  Service: Pain Management;  Laterality: Bilateral;  @1145  No ATC  Check BG Prior to RFA  Needs Consent    RADIOFREQUENCY ABLATION OF LUMBAR MEDIAL BRANCH NERVE AT SINGLE LEVEL Left 4/26/2024    Procedure: Left L3, L4, L5 Radiofrequency Thermocoagulation of Medial Branches;  Surgeon: Joshua Munoz Jr., MD;  Location: [facility] PAIN MANAGEMENT;  Service: Pain Management;  Laterality: Left;  @1100 (given)  No ATC  Check BG  Needs Consent    RADIOFREQUENCY ABLATION OF LUMBAR MEDIAL BRANCH NERVE AT SINGLE LEVEL Right 5/10/2024    Procedure: Right L3, L4, L5 Radiofrequency Thermocoagulation of Medial Branches;  Surgeon: Joshua Munoz Jr., MD;  Location: [facility] PAIN MANAGEMENT;  Service: Pain Management;  Laterality: Right;  @1130  No ATC  Check BG  Needs Consent  Last 1&50       SOCIAL HISTORY:  Social History     Socioeconomic History    Marital status:    Tobacco Use    Smoking status: Never     Passive exposure: Never    Smokeless tobacco: Never   Substance and Sexual Activity    Alcohol use: Yes     Alcohol/week: 3.0 - 4.0 standard drinks of alcohol     Types: 3 - 4 Cans of beer per week    Drug use: Not Currently    Sexual activity: Yes     Partners: Female     Social Drivers of Health     Financial Resource Strain: Low Risk  (9/16/2024)    Overall Financial Resource Strain (CARDIA)     Difficulty of Paying Living Expenses: Not hard at all   Food Insecurity: No Food Insecurity (9/16/2024)    Hunger Vital Sign     Worried About Running Out of Food in the Last Year: Never true     Ran Out of Food in the Last Year: Never true   Transportation Needs: No Transportation Needs (9/16/2024)    PRAPARE - Transportation     Lack of Transportation (Medical): No     Lack of Transportation (Non-Medical): No   Physical Activity: Patient Unable To Answer (9/16/2024)    Exercise Vital Sign     Days of Exercise per Week: Patient unable to  "answer     Minutes of Exercise per Session: Patient unable to answer   Stress: Patient Unable To Answer (9/16/2024)    [employer] [place] [employer] - Occupational Stress Questionnaire     Feeling of Stress : Patient unable to answer   Housing Stability: Low Risk  (9/16/2024)    Housing Stability Vital Sign     Unable to Pay for Housing in the Last Year: No     Homeless in the Last Year: No       FAMILY HISTORY:  Family History   Problem Relation Name Age of Onset    Diabetes Mother Maid Alna     Cancer Mother Maid Alan     Cancer Father Gerson     Diabetes Father Gerson     Cataracts Sister Joann     Diabetes Sister Joann     Diabetes Sister Nakia     Cancer Brother Jericho     Cataracts Brother Jericho     No Known Problems Brother x3     No Known Problems Maternal Aunt      No Known Problems Maternal Uncle      No Known Problems Paternal Aunt      No Known Problems Paternal Uncle      No Known Problems Maternal Grandmother      No Known Problems Maternal Grandfather      No Known Problems Paternal Grandmother      Diabetes Paternal Grandfather None     Amblyopia Neg Hx      Blindness Neg Hx      Glaucoma Neg Hx      Hypertension Neg Hx      Macular degeneration Neg Hx      Retinal detachment Neg Hx      Strabismus Neg Hx      Stroke Neg Hx      Thyroid disease Neg Hx         ALLERGIES AND MEDICATIONS: updated and reviewed.  Review of patient's allergies indicates:  No Known Allergies  Current Outpatient Medications   Medication Sig Dispense Refill    atorvastatin (LIPITOR) 20 MG tablet Take 1 tablet (20 mg total) by mouth once daily. 90 tablet 1    BD FORREST 2ND GEN PEN NEEDLE 32 gauge x 5/32" Ndle USE 1 TIME DAILY 100 each 3    blood sugar diagnostic Strp To check blood glucose three times daily, to use with insurance preferred meter 100 each 3    blood-glucose meter,continuous (DEXCOM G7 ) Misc Use with Dexcom G7 sensors 1 each 0    blood-glucose sensor (DEXCOM G7 SENSOR) Ashley Change every 10 " "days 12 each 3    citalopram (CELEXA) 20 MG tablet Take 1 tablet (20 mg total) by mouth once daily. 90 tablet 2    diltiaZEM (CARDIZEM CD) 240 MG 24 hr capsule TAKE 1 CAPSULE BY MOUTH EVERY DAY 90 capsule 3    gabapentin (NEURONTIN) 400 MG capsule Take 1 capsule (400 mg total) by mouth 3 (three) times daily as needed (pain). 90 capsule 2    hydroCHLOROthiazide (HYDRODIURIL) 12.5 MG Tab TAKE 1 TABLET BY MOUTH EVERY DAY 90 tablet 3    insulin glargine, TOUJEO, (TOUJEO SOLOSTAR U-300 INSULIN) 300 unit/mL (1.5 mL) InPn pen Inject 18 units once daily 3 Pen 5    lancets Misc To check blood glucose three times daily, to use with insurance preferred meter 100 each 3    latanoprost 0.005 % ophthalmic solution Place 1 drop into both eyes every evening. 7.5 mL 2    lisinopriL (PRINIVIL,ZESTRIL) 40 MG tablet TAKE 1 TABLET BY MOUTH EVERY DAY 90 tablet 1    metFORMIN (GLUCOPHAGE) 1000 MG tablet Take 1 tablet (1,000 mg total) by mouth 2 (two) times daily. 180 tablet 2    potassium chloride SA (K-DUR,KLOR-CON) 20 MEQ tablet Take 1 tablet (20 mEq total) by mouth once daily. 30 tablet 11    semaglutide (OZEMPIC) 2 mg/dose (8 mg/3 mL) PnIj Inject 2 mg into the skin every 7 days. 3 mL 11    blood-glucose meter kit To check blood glucose three times daily, to use with insurance preferred meter 1 each 0     Current Facility-Administered Medications   Medication Dose Route Frequency Provider Last Rate Last Admin    LIDOcaine 2%/EPINEPHrine 1:100,000 injection 2 mL  2 mL Intradermal 1 time in Clinic/HOD Andrew Busch MD             ROS  Review of Systems   Constitutional:  Positive for fatigue. Negative for fever and unexpected weight change.   Psychiatric/Behavioral:  Negative for sleep disturbance.            Physical Exam  Vitals:    01/10/25 1403   BP: 132/78   Pulse: 70   Temp: 97.9 °F (36.6 °C)   TempSrc: Oral   SpO2: 98%   Weight: 102.4 kg (225 lb 12 oz)   Height: 6' 2" (1.88 m)    Body mass index is 28.98 kg/m².  Weight: " "102.4 kg (225 lb 12 oz)   Height: 6' 2" (188 cm)   Physical Exam  Constitutional:       General: He is not in acute distress.     Appearance: Normal appearance.   HENT:      Head: Normocephalic and atraumatic.   Neck:      Thyroid: No thyromegaly.      Vascular: No carotid bruit.   Cardiovascular:      Rate and Rhythm: Normal rate and regular rhythm.      Pulses: Normal pulses.      Heart sounds: Normal heart sounds.   Pulmonary:      Effort: Pulmonary effort is normal. No respiratory distress.      Breath sounds: Normal breath sounds.   Musculoskeletal:      Cervical back: Neck supple.      Right lower leg: No edema.      Left lower leg: No edema.   Lymphadenopathy:      Cervical: No cervical adenopathy.   Skin:     General: Skin is warm and dry.      Findings: No rash.   Neurological:      General: No focal deficit present.      Mental Status: He is alert and oriented to person, place, and time.   Psychiatric:         Mood and Affect: Mood normal.         Behavior: Behavior normal.         Thought Content: Thought content normal.             "

## 2025-01-20 DIAGNOSIS — I10 ESSENTIAL HYPERTENSION: ICD-10-CM

## 2025-01-20 DIAGNOSIS — E11.29 CONTROLLED TYPE 2 DIABETES MELLITUS WITH MICROALBUMINURIA, WITHOUT LONG-TERM CURRENT USE OF INSULIN: ICD-10-CM

## 2025-01-20 DIAGNOSIS — E87.6 DIURETIC-INDUCED HYPOKALEMIA: ICD-10-CM

## 2025-01-20 DIAGNOSIS — T50.2X5A DIURETIC-INDUCED HYPOKALEMIA: ICD-10-CM

## 2025-01-20 DIAGNOSIS — B02.29 POSTHERPETIC NEURALGIA: ICD-10-CM

## 2025-01-20 DIAGNOSIS — E78.2 MIXED HYPERLIPIDEMIA: ICD-10-CM

## 2025-01-20 DIAGNOSIS — R80.9 CONTROLLED TYPE 2 DIABETES MELLITUS WITH MICROALBUMINURIA, WITHOUT LONG-TERM CURRENT USE OF INSULIN: ICD-10-CM

## 2025-01-21 RX ORDER — LISINOPRIL 40 MG/1
TABLET ORAL
Refills: 0 | OUTPATIENT
Start: 2025-01-21

## 2025-01-21 RX ORDER — HYDROCHLOROTHIAZIDE 12.5 MG/1
TABLET ORAL
Refills: 0 | OUTPATIENT
Start: 2025-01-21

## 2025-01-21 RX ORDER — POTASSIUM CHLORIDE 20 MEQ/1
TABLET, EXTENDED RELEASE ORAL
Refills: 0 | OUTPATIENT
Start: 2025-01-21

## 2025-01-21 RX ORDER — ATORVASTATIN CALCIUM 20 MG/1
TABLET, FILM COATED ORAL
Refills: 0 | OUTPATIENT
Start: 2025-01-21

## 2025-01-21 RX ORDER — INSULIN GLARGINE 300 [IU]/ML
INJECTION, SOLUTION SUBCUTANEOUS
Refills: 0 | OUTPATIENT
Start: 2025-01-21

## 2025-01-21 RX ORDER — GABAPENTIN 400 MG/1
CAPSULE ORAL
Refills: 0 | OUTPATIENT
Start: 2025-01-21

## 2025-01-21 RX ORDER — DILTIAZEM HYDROCHLORIDE 240 MG/1
CAPSULE, COATED, EXTENDED RELEASE ORAL
Refills: 0 | OUTPATIENT
Start: 2025-01-21

## 2025-01-21 RX ORDER — METFORMIN HYDROCHLORIDE 1000 MG/1
TABLET ORAL
Refills: 0 | OUTPATIENT
Start: 2025-01-21

## 2025-01-21 NOTE — TELEPHONE ENCOUNTER
Refill Decision Note   Gerson Phillips  is requesting a refill authorization.  Brief Assessment and Rationale for Refill:  Quick Discontinue     Medication Therapy Plan:   . Pharmacy is requesting new scripts for the following medications without required information, (sig/ frequency/qty/etc)          Comments: Pharmacies have been requesting medications for patients without required information, (sig, frequency, qty, etc.). In addition, requests are sent for medication(s) pt. are currently not taking, and medications patients have never taken.    We have spoken to the pharmacies about these request types and advised their teams previously that we are unable to assess these New Script requests and require all details for these requests. This is a known issue and has been reported.     Note composed:5:39 PM 01/21/2025

## 2025-01-21 NOTE — TELEPHONE ENCOUNTER
No care due was identified.  [facility] Embedded Care Due Messages. Reference number: [id number].   1/21/2025 5:12:40 PM CST

## 2025-02-15 DIAGNOSIS — B02.29 POSTHERPETIC NEURALGIA: ICD-10-CM

## 2025-02-15 NOTE — TELEPHONE ENCOUNTER
No care due was identified.  [facility] Embedded Care Due Messages. Reference number: [id number].   2/15/2025 4:58:46 PM CST

## 2025-02-17 RX ORDER — GABAPENTIN 400 MG/1
400 CAPSULE ORAL 3 TIMES DAILY PRN
Qty: 90 CAPSULE | Refills: 3 | Status: SHIPPED | OUTPATIENT
Start: 2025-02-17

## 2025-03-14 DIAGNOSIS — E11.29 CONTROLLED TYPE 2 DIABETES MELLITUS WITH MICROALBUMINURIA, WITHOUT LONG-TERM CURRENT USE OF INSULIN: ICD-10-CM

## 2025-03-14 DIAGNOSIS — R80.9 CONTROLLED TYPE 2 DIABETES MELLITUS WITH MICROALBUMINURIA, WITHOUT LONG-TERM CURRENT USE OF INSULIN: ICD-10-CM

## 2025-03-14 RX ORDER — METFORMIN HYDROCHLORIDE 1000 MG/1
1000 TABLET ORAL 2 TIMES DAILY
Qty: 180 TABLET | Refills: 2 | Status: SHIPPED | OUTPATIENT
Start: 2025-03-14

## 2025-03-24 DIAGNOSIS — Z00.00 ENCOUNTER FOR MEDICARE ANNUAL WELLNESS EXAM: ICD-10-CM

## 2025-03-29 DIAGNOSIS — F32.0 CURRENT MILD EPISODE OF MAJOR DEPRESSIVE DISORDER WITHOUT PRIOR EPISODE: ICD-10-CM

## 2025-03-29 DIAGNOSIS — E78.2 MIXED HYPERLIPIDEMIA: ICD-10-CM

## 2025-03-30 NOTE — TELEPHONE ENCOUNTER
No care due was identified.  [facility] Embedded Care Due Messages. Reference number: [id number].   3/29/2025 9:52:40 PM CDT

## 2025-03-30 NOTE — TELEPHONE ENCOUNTER
No care due was identified.  [facility] Embedded Care Due Messages. Reference number: [id number].   3/29/2025 9:53:42 PM CDT

## 2025-03-31 RX ORDER — ATORVASTATIN CALCIUM 20 MG/1
20 TABLET, FILM COATED ORAL DAILY
Qty: 90 TABLET | Refills: 3 | Status: SHIPPED | OUTPATIENT
Start: 2025-03-31

## 2025-03-31 RX ORDER — CITALOPRAM 20 MG/1
20 TABLET, FILM COATED ORAL DAILY
Qty: 90 TABLET | Refills: 3 | Status: SHIPPED | OUTPATIENT
Start: 2025-03-31

## 2025-03-31 NOTE — TELEPHONE ENCOUNTER
Refill Decision Note   Gerson Alan  is requesting a refill authorization.  Brief Assessment and Rationale for Refill:  Approve     Medication Therapy Plan:        Comments:     Note composed:5:39 PM 03/31/2025

## 2025-04-12 DIAGNOSIS — E11.29 CONTROLLED TYPE 2 DIABETES MELLITUS WITH MICROALBUMINURIA, WITHOUT LONG-TERM CURRENT USE OF INSULIN: ICD-10-CM

## 2025-04-12 DIAGNOSIS — R80.9 CONTROLLED TYPE 2 DIABETES MELLITUS WITH MICROALBUMINURIA, WITHOUT LONG-TERM CURRENT USE OF INSULIN: ICD-10-CM

## 2025-04-14 RX ORDER — INSULIN GLARGINE 300 [IU]/ML
INJECTION, SOLUTION SUBCUTANEOUS
Qty: 6 PEN | Refills: 1 | Status: SHIPPED | OUTPATIENT
Start: 2025-04-14

## 2025-05-02 ENCOUNTER — OFFICE VISIT (OUTPATIENT)
Dept: FAMILY MEDICINE | Facility: CLINIC | Age: 70
End: 2025-05-02
Payer: MEDICARE

## 2025-05-02 VITALS
TEMPERATURE: 98 F | WEIGHT: 224 LBS | OXYGEN SATURATION: 97 % | SYSTOLIC BLOOD PRESSURE: 130 MMHG | BODY MASS INDEX: 28.75 KG/M2 | DIASTOLIC BLOOD PRESSURE: 64 MMHG | HEART RATE: 71 BPM | HEIGHT: 74 IN

## 2025-05-02 DIAGNOSIS — I70.0 AORTIC ATHEROSCLEROSIS: ICD-10-CM

## 2025-05-02 DIAGNOSIS — F32.89 OTHER DEPRESSION: ICD-10-CM

## 2025-05-02 DIAGNOSIS — H40.1132 PRIMARY OPEN ANGLE GLAUCOMA (POAG) OF BOTH EYES, MODERATE STAGE: ICD-10-CM

## 2025-05-02 DIAGNOSIS — M47.816 LUMBAR SPONDYLOSIS: ICD-10-CM

## 2025-05-02 DIAGNOSIS — E11.36 TYPE 2 DIABETES MELLITUS WITH DIABETIC CATARACT, WITH LONG-TERM CURRENT USE OF INSULIN: ICD-10-CM

## 2025-05-02 DIAGNOSIS — H52.7 REFRACTIVE ERROR: ICD-10-CM

## 2025-05-02 DIAGNOSIS — H25.13 NUCLEAR SCLEROSIS OF BOTH EYES: ICD-10-CM

## 2025-05-02 DIAGNOSIS — R80.9 DIABETES MELLITUS WITH PROTEINURIA: ICD-10-CM

## 2025-05-02 DIAGNOSIS — E11.29 DIABETES MELLITUS WITH PROTEINURIA: ICD-10-CM

## 2025-05-02 DIAGNOSIS — F51.01 PRIMARY INSOMNIA: ICD-10-CM

## 2025-05-02 DIAGNOSIS — N52.1 DIABETES WITH CIRCULATORY DISORDER CAUSING ERECTILE DYSFUNCTION: ICD-10-CM

## 2025-05-02 DIAGNOSIS — Z86.0100 HISTORY OF COLONIC POLYPS: ICD-10-CM

## 2025-05-02 DIAGNOSIS — E78.2 MIXED HYPERLIPIDEMIA: ICD-10-CM

## 2025-05-02 DIAGNOSIS — M48.062 SPINAL STENOSIS OF LUMBAR REGION WITH NEUROGENIC CLAUDICATION: ICD-10-CM

## 2025-05-02 DIAGNOSIS — M51.360 DEGENERATION OF INTERVERTEBRAL DISC OF LUMBAR REGION WITH DISCOGENIC BACK PAIN: ICD-10-CM

## 2025-05-02 DIAGNOSIS — Z79.4 TYPE 2 DIABETES MELLITUS WITH DIABETIC CATARACT, WITH LONG-TERM CURRENT USE OF INSULIN: ICD-10-CM

## 2025-05-02 DIAGNOSIS — I10 ESSENTIAL HYPERTENSION: ICD-10-CM

## 2025-05-02 DIAGNOSIS — M54.16 LUMBAR RADICULOPATHY: ICD-10-CM

## 2025-05-02 DIAGNOSIS — Z79.4 LONG-TERM INSULIN USE: ICD-10-CM

## 2025-05-02 DIAGNOSIS — Z00.00 ENCOUNTER FOR MEDICARE ANNUAL WELLNESS EXAM: Primary | ICD-10-CM

## 2025-05-02 DIAGNOSIS — E11.59 DIABETES WITH CIRCULATORY DISORDER CAUSING ERECTILE DYSFUNCTION: ICD-10-CM

## 2025-05-02 PROBLEM — E11.21 DIABETES MELLITUS WITH PROTEINURIC DIABETIC NEPHROPATHY: Status: RESOLVED | Noted: 2017-02-06 | Resolved: 2025-05-02

## 2025-05-02 PROCEDURE — 1126F AMNT PAIN NOTED NONE PRSNT: CPT | Mod: CPTII,S$GLB,, | Performed by: NURSE PRACTITIONER

## 2025-05-02 PROCEDURE — G0439 PPPS, SUBSEQ VISIT: HCPCS | Mod: S$GLB,,, | Performed by: NURSE PRACTITIONER

## 2025-05-02 PROCEDURE — 3060F POS MICROALBUMINURIA REV: CPT | Mod: CPTII,S$GLB,, | Performed by: NURSE PRACTITIONER

## 2025-05-02 PROCEDURE — 3288F FALL RISK ASSESSMENT DOCD: CPT | Mod: CPTII,S$GLB,, | Performed by: NURSE PRACTITIONER

## 2025-05-02 PROCEDURE — 99999 PR PBB SHADOW E&M-EST. PATIENT-LVL V: CPT | Mod: PBBFAC,,, | Performed by: NURSE PRACTITIONER

## 2025-05-02 PROCEDURE — 3066F NEPHROPATHY DOC TX: CPT | Mod: CPTII,S$GLB,, | Performed by: NURSE PRACTITIONER

## 2025-05-02 PROCEDURE — 4010F ACE/ARB THERAPY RXD/TAKEN: CPT | Mod: CPTII,S$GLB,, | Performed by: NURSE PRACTITIONER

## 2025-05-02 PROCEDURE — 3075F SYST BP GE 130 - 139MM HG: CPT | Mod: CPTII,S$GLB,, | Performed by: NURSE PRACTITIONER

## 2025-05-02 PROCEDURE — 3044F HG A1C LEVEL LT 7.0%: CPT | Mod: CPTII,S$GLB,, | Performed by: NURSE PRACTITIONER

## 2025-05-02 PROCEDURE — 1158F ADVNC CARE PLAN TLK DOCD: CPT | Mod: CPTII,S$GLB,, | Performed by: NURSE PRACTITIONER

## 2025-05-02 PROCEDURE — 1160F RVW MEDS BY RX/DR IN RCRD: CPT | Mod: CPTII,S$GLB,, | Performed by: NURSE PRACTITIONER

## 2025-05-02 PROCEDURE — 1159F MED LIST DOCD IN RCRD: CPT | Mod: CPTII,S$GLB,, | Performed by: NURSE PRACTITIONER

## 2025-05-02 PROCEDURE — 1101F PT FALLS ASSESS-DOCD LE1/YR: CPT | Mod: CPTII,S$GLB,, | Performed by: NURSE PRACTITIONER

## 2025-05-02 PROCEDURE — 3072F LOW RISK FOR RETINOPATHY: CPT | Mod: CPTII,S$GLB,, | Performed by: NURSE PRACTITIONER

## 2025-05-02 PROCEDURE — 3078F DIAST BP <80 MM HG: CPT | Mod: CPTII,S$GLB,, | Performed by: NURSE PRACTITIONER

## 2025-05-02 NOTE — PROGRESS NOTES
"  Gerson Phillips presented for a  Medicare AWV and comprehensive Health Risk Assessment today. The following components were reviewed and updated:    Medical history  Family History  Social history  Allergies and Current Medications  Health Risk Assessment  Health Maintenance  Care Team        See Completed Assessments for Annual Wellness Visit within the encounter summary.       The following assessments were completed:  Living Situation  CAGE  Depression Screening  Timed Get Up and Go  Whisper Test  Cognitive Function Screening  Nutrition Screening  ADL Screening  PAQ Screening      Opioid documentation:      Patient does not have a current opioid prescription.        Vitals:    05/02/25 1001   BP: 130/64   Pulse: 71   Temp: 98.3 °F (36.8 °C)   TempSrc: Oral   SpO2: 97%   Weight: 101.6 kg (223 lb 15.8 oz)   Height: 6' 2" (1.88 m)     Body mass index is 28.76 kg/m².  Physical Exam  Vitals and nursing note reviewed.   Constitutional:       General: He is not in acute distress.     Appearance: Normal appearance.   HENT:      Head: Normocephalic and atraumatic.   Eyes:      Conjunctiva/sclera: Conjunctivae normal.      Pupils: Pupils are equal, round, and reactive to light.   Cardiovascular:      Heart sounds: Normal heart sounds. No murmur heard.  Pulmonary:      Effort: Pulmonary effort is normal. No respiratory distress.   Musculoskeletal:      Cervical back: Normal range of motion.   Skin:     General: Skin is warm and dry.   Neurological:      Mental Status: He is alert and oriented to person, place, and time.   Psychiatric:         Mood and Affect: Mood normal.         Behavior: Behavior normal.               Diagnoses and health risks identified today and associated recommendations/orders:    1. Encounter for Medicare annual wellness exam  Screenings performed as noted above.  Personal preventative testing needs reviewed.'    2. Essential hypertension  BP well controlled.   Continue lisinopril 40 mg p.o. daily, " diltiazem 240 mg p.o. daily, hydrochlorothiazide 12.5 mg p.o. daily.    3. Type 2 diabetes mellitus with diabetic cataract, with long-term current use of insulin  Monitor A1c as scheduled.    Continue insulin glargine, metformin, and Ozempic as prescribed.    4. Spinal stenosis of lumbar region with neurogenic claudication  Stable, The current medical regimen is effective;  continue present plan and medications.  Followed by PCP.     5. Lumbar spondylosis  Stable, The current medical regimen is effective;  continue present plan and medications.  Pain well controlled.   Followed by PCP.     6. Lumbar radiculopathy  Plan as listed above.     7. Degeneration of intervertebral disc of lumbar region with discogenic back pain  Plan as listed above.     8. Other depression  Continue Celexa.     9. Refractive error  Continue follow up with ophthalmology.     10. Aortic atherosclerosis  Continue Lipitor.     11. Primary open angle glaucoma (POAG) of both eyes, moderate stage  Continue follow up with ophthalmology.     12. Nuclear sclerosis of both eyes  Continue follow up with ophthalmology.     13. Mixed hyperlipidemia  Continue lipitor.     14. 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     15. Long-term insulin use  Continue A1c.     16. Diabetes mellitus with proteinuria  Continue glucose control with current regimen.     17. History of colonic polyps  Stable. Colorectal screening as scheduled.     18. Primary insomnia  Encouraged sleep routine.      Provided Gerson with a 5-10 year written screening schedule and personal prevention plan. Recommendations were developed using the USPSTF age appropriate recommendations. Education, counseling, and referrals were provided as needed. After Visit Summary printed and given to patient which includes a list of additional screenings\tests needed.      I offered to discuss advanced care planning, including how to pick a person who  would make decisions for you if you were unable to make them for yourself, called a health care power of , and what kind of decisions you might make such as use of life sustaining treatments such as ventilators and tube feeding when faced with a life limiting illness recorded on a living will that they will need to know. (How you want to be cared for as you near the end of your natural life)     X Patient is interested in learning more about how to make advanced directives.  I provided them paperwork and offered to discuss this with them.    Follow up in about 1 year (around 5/2/2026) for for health prevention/screening.    Kristine Ovalle, DNP

## 2025-05-02 NOTE — PATIENT INSTRUCTIONS
Counseling and Referral of Other Preventative  (Italic type indicates deductible and co-insurance are waived)    Patient Name: Gerson Phillips  Today's Date: 5/2/2025    Health Maintenance       Date Due Completion Date    TETANUS VACCINE 09/27/2023 9/27/2013    Foot Exam 06/28/2025 6/28/2024    Override on 6/28/2024: Done    Override on 6/9/2023: Done    Override on 12/9/2019: Done    Hemoglobin A1c 08/21/2025 2/21/2025    Diabetic Eye Exam 08/30/2025 8/30/2024    Lipid Panel 01/03/2026 1/3/2025    Diabetes Urine Screening 02/21/2026 2/21/2025    High Dose Statin 03/31/2026 3/31/2025    Colorectal Cancer Screening 03/26/2028 3/26/2018        No orders of the defined types were placed in this encounter.      The following information is provided to all patients.  This information is to help you find resources for any of the problems found today that may be affecting your health:                  Living healthy guide: www.[facility].[place].gov      Understanding Diabetes: www.diabetes.org      Eating healthy: www.cdc.gov/healthyweight      CDC home safety checklist: www.cdc.gov/steadi/patient.html      Agency on Aging: www.goea.[place].gov      Alcoholics anonymous (AA): www.aa.org      Physical Activity: www.neisha.nih.gov/ix6huzr      Tobacco use: www.quitwithusla.org

## 2025-06-26 ENCOUNTER — PATIENT OUTREACH (OUTPATIENT)
Dept: ADMINISTRATIVE | Facility: HOSPITAL | Age: 70
End: 2025-06-26
Payer: MEDICARE

## 2025-06-27 ENCOUNTER — LAB VISIT (OUTPATIENT)
Dept: LAB | Facility: HOSPITAL | Age: 70
End: 2025-06-27
Attending: FAMILY MEDICINE
Payer: MEDICARE

## 2025-06-27 DIAGNOSIS — E11.69 HYPERLIPIDEMIA ASSOCIATED WITH TYPE 2 DIABETES MELLITUS: ICD-10-CM

## 2025-06-27 DIAGNOSIS — I70.0 AORTIC ATHEROSCLEROSIS: ICD-10-CM

## 2025-06-27 DIAGNOSIS — E11.36 TYPE 2 DIABETES MELLITUS WITH DIABETIC CATARACT, WITH LONG-TERM CURRENT USE OF INSULIN: ICD-10-CM

## 2025-06-27 DIAGNOSIS — E87.6 DIURETIC-INDUCED HYPOKALEMIA: ICD-10-CM

## 2025-06-27 DIAGNOSIS — Z79.4 TYPE 2 DIABETES MELLITUS WITH DIABETIC CATARACT, WITH LONG-TERM CURRENT USE OF INSULIN: ICD-10-CM

## 2025-06-27 DIAGNOSIS — Z12.5 SCREENING FOR PROSTATE CANCER: ICD-10-CM

## 2025-06-27 DIAGNOSIS — E78.5 HYPERLIPIDEMIA ASSOCIATED WITH TYPE 2 DIABETES MELLITUS: ICD-10-CM

## 2025-06-27 DIAGNOSIS — I10 ESSENTIAL HYPERTENSION: ICD-10-CM

## 2025-06-27 DIAGNOSIS — T50.2X5A DIURETIC-INDUCED HYPOKALEMIA: ICD-10-CM

## 2025-06-27 LAB
ABSOLUTE EOSINOPHIL (OHS): 0.21 K/UL
ABSOLUTE MONOCYTE (OHS): 0.62 K/UL (ref 0.3–1)
ABSOLUTE NEUTROPHIL COUNT (OHS): 2.14 K/UL (ref 1.8–7.7)
ALBUMIN SERPL BCP-MCNC: 4 G/DL (ref 3.5–5.2)
ALP SERPL-CCNC: 72 UNIT/L (ref 40–150)
ALT SERPL W/O P-5'-P-CCNC: 18 UNIT/L (ref 10–44)
ANION GAP (OHS): 8 MMOL/L (ref 8–16)
AST SERPL-CCNC: 15 UNIT/L (ref 11–45)
BASOPHILS # BLD AUTO: 0.05 K/UL
BASOPHILS NFR BLD AUTO: 1 %
BILIRUB SERPL-MCNC: 0.4 MG/DL (ref 0.1–1)
BUN SERPL-MCNC: 16 MG/DL (ref 8–23)
CALCIUM SERPL-MCNC: 9 MG/DL (ref 8.7–10.5)
CHLORIDE SERPL-SCNC: 99 MMOL/L (ref 95–110)
CHOLEST SERPL-MCNC: 105 MG/DL (ref 120–199)
CHOLEST/HDLC SERPL: 2 {RATIO} (ref 2–5)
CO2 SERPL-SCNC: 23 MMOL/L (ref 23–29)
CREAT SERPL-MCNC: 1.1 MG/DL (ref 0.5–1.4)
EAG (OHS): 134 MG/DL (ref 68–131)
ERYTHROCYTE [DISTWIDTH] IN BLOOD BY AUTOMATED COUNT: 13.6 % (ref 11.5–14.5)
GFR SERPLBLD CREATININE-BSD FMLA CKD-EPI: >60 ML/MIN/1.73/M2
GLUCOSE SERPL-MCNC: 89 MG/DL (ref 70–110)
HBA1C MFR BLD: 6.3 % (ref 4–5.6)
HCT VFR BLD AUTO: 40.7 % (ref 40–54)
HDLC SERPL-MCNC: 53 MG/DL (ref 40–75)
HDLC SERPL: 50.5 % (ref 20–50)
HGB BLD-MCNC: 13.1 GM/DL (ref 14–18)
IMM GRANULOCYTES # BLD AUTO: 0.02 K/UL (ref 0–0.04)
IMM GRANULOCYTES NFR BLD AUTO: 0.4 % (ref 0–0.5)
LDLC SERPL CALC-MCNC: 45.8 MG/DL (ref 63–159)
LYMPHOCYTES # BLD AUTO: 2.11 K/UL (ref 1–4.8)
MCH RBC QN AUTO: 27.5 PG (ref 27–31)
MCHC RBC AUTO-ENTMCNC: 32.2 G/DL (ref 32–36)
MCV RBC AUTO: 85 FL (ref 82–98)
NONHDLC SERPL-MCNC: 52 MG/DL
NUCLEATED RBC (/100WBC) (OHS): 0 /100 WBC
PLATELET # BLD AUTO: 225 K/UL (ref 150–450)
PMV BLD AUTO: 9.7 FL (ref 9.2–12.9)
POTASSIUM SERPL-SCNC: 4.1 MMOL/L (ref 3.5–5.1)
PROT SERPL-MCNC: 7.1 GM/DL (ref 6–8.4)
PSA SERPL-MCNC: 1.8 NG/ML
RBC # BLD AUTO: 4.77 M/UL (ref 4.6–6.2)
RELATIVE EOSINOPHIL (OHS): 4.1 %
RELATIVE LYMPHOCYTE (OHS): 41 % (ref 18–48)
RELATIVE MONOCYTE (OHS): 12 % (ref 4–15)
RELATIVE NEUTROPHIL (OHS): 41.5 % (ref 38–73)
SODIUM SERPL-SCNC: 130 MMOL/L (ref 136–145)
TRIGL SERPL-MCNC: 31 MG/DL (ref 30–150)
WBC # BLD AUTO: 5.15 K/UL (ref 3.9–12.7)

## 2025-06-27 PROCEDURE — 36415 COLL VENOUS BLD VENIPUNCTURE: CPT | Mod: PO

## 2025-06-27 PROCEDURE — 84153 ASSAY OF PSA TOTAL: CPT

## 2025-06-27 PROCEDURE — 82435 ASSAY OF BLOOD CHLORIDE: CPT

## 2025-06-27 PROCEDURE — 83036 HEMOGLOBIN GLYCOSYLATED A1C: CPT

## 2025-06-27 PROCEDURE — 85025 COMPLETE CBC W/AUTO DIFF WBC: CPT

## 2025-06-27 PROCEDURE — 82465 ASSAY BLD/SERUM CHOLESTEROL: CPT

## 2025-06-30 ENCOUNTER — RESULTS FOLLOW-UP (OUTPATIENT)
Dept: FAMILY MEDICINE | Facility: CLINIC | Age: 70
End: 2025-06-30

## 2025-06-30 ENCOUNTER — TELEPHONE (OUTPATIENT)
Dept: FAMILY MEDICINE | Facility: CLINIC | Age: 70
End: 2025-06-30
Payer: MEDICARE

## 2025-06-30 DIAGNOSIS — I10 ESSENTIAL HYPERTENSION: Primary | ICD-10-CM

## 2025-06-30 NOTE — TELEPHONE ENCOUNTER
Please inform patient that his labs are showing low sodium. This is most likely caused by the HCTZ. Advise to stop this medication and may also stop the potassium as well since he will no longer need it. Before I recommend an alternative: is he still taking diltiazem?

## 2025-06-30 NOTE — TELEPHONE ENCOUNTER
Spoke with patient. Patient verbalized understanding to stop HCTZ  and potassium. Patient has no other questions or concerns at this time. Patient stated he is still taking diltiazem.

## 2025-06-30 NOTE — TELEPHONE ENCOUNTER
We will try to hold off on adding any new medications. We can monitor the BP for now; does he use the [facility] portal? If so, we can get him set up with the digital program. His insurance will cover a new BP cuff that would be mailed to him.     We also need to repeat his sodium and potassium level in about a week to make sure they normalize.

## 2025-07-01 ENCOUNTER — TELEPHONE (OUTPATIENT)
Dept: FAMILY MEDICINE | Facility: CLINIC | Age: 70
End: 2025-07-01
Payer: MEDICARE

## 2025-07-01 NOTE — TELEPHONE ENCOUNTER
Spoke with patient.Patient verbalized understanding we will monitor his bp for now . Patient stated that he does use the [facility] portal in that we can get him set up with the digital program an his insurance will cover a new BP cuff that would be mailed.

## 2025-07-01 NOTE — TELEPHONE ENCOUNTER
Copied from CRM #[id number]. Topic: General Inquiry - Return Call  >> Jul 1, 2025 10:26 AM Med Assistant Bucky wrote:  Type: Patient Call Back    Who called:Self    What is the request in detail: pt states he will come in to finish the on boarding for digital medicine when he's back in town..     Can the clinic reply by [facility]?NO    Would the patient rather a call back or a response via [facility]? Yes, call     Best call back number: [phone] (home)

## 2025-07-11 ENCOUNTER — LAB VISIT (OUTPATIENT)
Dept: LAB | Facility: HOSPITAL | Age: 70
End: 2025-07-11
Attending: FAMILY MEDICINE
Payer: MEDICARE

## 2025-07-11 DIAGNOSIS — E11.29 TYPE 2 DIABETES MELLITUS WITH DIABETIC MICROALBUMINURIA, WITH LONG-TERM CURRENT USE OF INSULIN: ICD-10-CM

## 2025-07-11 DIAGNOSIS — Z79.4 TYPE 2 DIABETES MELLITUS WITH DIABETIC MICROALBUMINURIA, WITH LONG-TERM CURRENT USE OF INSULIN: ICD-10-CM

## 2025-07-11 DIAGNOSIS — R80.9 TYPE 2 DIABETES MELLITUS WITH DIABETIC MICROALBUMINURIA, WITH LONG-TERM CURRENT USE OF INSULIN: ICD-10-CM

## 2025-07-11 DIAGNOSIS — I10 ESSENTIAL HYPERTENSION: ICD-10-CM

## 2025-07-11 LAB
ALBUMIN/CREAT UR: 199.2 UG/MG
ANION GAP (OHS): 8 MMOL/L (ref 8–16)
BUN SERPL-MCNC: 13 MG/DL (ref 8–23)
CALCIUM SERPL-MCNC: 8.6 MG/DL (ref 8.7–10.5)
CHLORIDE SERPL-SCNC: 104 MMOL/L (ref 95–110)
CO2 SERPL-SCNC: 23 MMOL/L (ref 23–29)
CREAT SERPL-MCNC: 1.1 MG/DL (ref 0.5–1.4)
CREAT UR-MCNC: 123 MG/DL (ref 23–375)
GFR SERPLBLD CREATININE-BSD FMLA CKD-EPI: >60 ML/MIN/1.73/M2
GLUCOSE SERPL-MCNC: 193 MG/DL (ref 70–110)
MICROALBUMIN UR-MCNC: 245 UG/ML (ref ?–5000)
POTASSIUM SERPL-SCNC: 3.7 MMOL/L (ref 3.5–5.1)
SODIUM SERPL-SCNC: 135 MMOL/L (ref 136–145)

## 2025-07-11 PROCEDURE — 36415 COLL VENOUS BLD VENIPUNCTURE: CPT | Mod: PO

## 2025-07-11 PROCEDURE — 82043 UR ALBUMIN QUANTITATIVE: CPT

## 2025-07-11 PROCEDURE — 80048 BASIC METABOLIC PNL TOTAL CA: CPT

## 2025-07-27 DIAGNOSIS — H40.1132 PRIMARY OPEN ANGLE GLAUCOMA (POAG) OF BOTH EYES, MODERATE STAGE: ICD-10-CM

## 2025-07-29 RX ORDER — LATANOPROST 50 UG/ML
1 SOLUTION/ DROPS OPHTHALMIC
Qty: 7.5 ML | Refills: 0 | Status: SHIPPED | OUTPATIENT
Start: 2025-07-29

## 2025-08-10 DIAGNOSIS — T50.2X5A DIURETIC-INDUCED HYPOKALEMIA: ICD-10-CM

## 2025-08-10 DIAGNOSIS — E87.6 DIURETIC-INDUCED HYPOKALEMIA: ICD-10-CM

## 2025-08-10 RX ORDER — POTASSIUM CHLORIDE 20 MEQ/1
20 TABLET, EXTENDED RELEASE ORAL
Qty: 90 TABLET | Refills: 3 | OUTPATIENT
Start: 2025-08-10

## 2025-08-12 ENCOUNTER — TELEPHONE (OUTPATIENT)
Dept: FAMILY MEDICINE | Facility: CLINIC | Age: 70
End: 2025-08-12
Payer: MEDICARE

## 2025-09-05 ENCOUNTER — OFFICE VISIT (OUTPATIENT)
Dept: FAMILY MEDICINE | Facility: CLINIC | Age: 70
End: 2025-09-05
Payer: MEDICARE

## 2025-09-05 ENCOUNTER — HOSPITAL ENCOUNTER (OUTPATIENT)
Dept: RADIOLOGY | Facility: HOSPITAL | Age: 70
Discharge: HOME | End: 2025-09-05
Attending: FAMILY MEDICINE
Payer: MEDICARE

## 2025-09-05 VITALS
WEIGHT: 219.13 LBS | BODY MASS INDEX: 28.12 KG/M2 | OXYGEN SATURATION: 96 % | HEIGHT: 74 IN | RESPIRATION RATE: 18 BRPM | TEMPERATURE: 98 F | SYSTOLIC BLOOD PRESSURE: 130 MMHG | DIASTOLIC BLOOD PRESSURE: 64 MMHG | HEART RATE: 71 BPM

## 2025-09-05 DIAGNOSIS — T50.2X5A DIURETIC-INDUCED HYPOKALEMIA: ICD-10-CM

## 2025-09-05 DIAGNOSIS — I10 ESSENTIAL HYPERTENSION: ICD-10-CM

## 2025-09-05 DIAGNOSIS — E87.6 DIURETIC-INDUCED HYPOKALEMIA: ICD-10-CM

## 2025-09-05 DIAGNOSIS — E78.5 HYPERLIPIDEMIA ASSOCIATED WITH TYPE 2 DIABETES MELLITUS: ICD-10-CM

## 2025-09-05 DIAGNOSIS — M16.12 PRIMARY OSTEOARTHRITIS OF LEFT HIP: Primary | ICD-10-CM

## 2025-09-05 DIAGNOSIS — E11.36 TYPE 2 DIABETES MELLITUS WITH DIABETIC CATARACT, WITH LONG-TERM CURRENT USE OF INSULIN: ICD-10-CM

## 2025-09-05 DIAGNOSIS — E11.69 HYPERLIPIDEMIA ASSOCIATED WITH TYPE 2 DIABETES MELLITUS: ICD-10-CM

## 2025-09-05 DIAGNOSIS — F32.89 OTHER DEPRESSION: ICD-10-CM

## 2025-09-05 DIAGNOSIS — B35.1 ONYCHOMYCOSIS: ICD-10-CM

## 2025-09-05 DIAGNOSIS — Z79.4 TYPE 2 DIABETES MELLITUS WITH DIABETIC CATARACT, WITH LONG-TERM CURRENT USE OF INSULIN: ICD-10-CM

## 2025-09-05 DIAGNOSIS — M16.12 PRIMARY OSTEOARTHRITIS OF LEFT HIP: ICD-10-CM

## 2025-09-05 PROCEDURE — 73502 X-RAY EXAM HIP UNI 2-3 VIEWS: CPT | Mod: TC,PO,LT

## 2025-09-05 PROCEDURE — 99999 PR PBB SHADOW E&M-EST. PATIENT-LVL V: CPT | Mod: PBBFAC,,, | Performed by: FAMILY MEDICINE

## 2025-09-05 PROCEDURE — 73502 X-RAY EXAM HIP UNI 2-3 VIEWS: CPT | Mod: 26,LT,, | Performed by: RADIOLOGY

## 2025-09-05 RX ORDER — MELOXICAM 15 MG/1
15 TABLET ORAL DAILY PRN
Qty: 30 TABLET | Refills: 1 | Status: SHIPPED | OUTPATIENT
Start: 2025-09-05